# Patient Record
Sex: FEMALE | Race: WHITE | NOT HISPANIC OR LATINO
[De-identification: names, ages, dates, MRNs, and addresses within clinical notes are randomized per-mention and may not be internally consistent; named-entity substitution may affect disease eponyms.]

---

## 2017-01-05 ENCOUNTER — APPOINTMENT (OUTPATIENT)
Dept: WOUND CARE | Facility: HOSPITAL | Age: 82
End: 2017-01-05

## 2017-01-05 ENCOUNTER — OUTPATIENT (OUTPATIENT)
Dept: OUTPATIENT SERVICES | Facility: HOSPITAL | Age: 82
LOS: 1 days | Discharge: HOME | End: 2017-01-05

## 2017-01-05 DIAGNOSIS — N17.9 ACUTE KIDNEY FAILURE, UNSPECIFIED: ICD-10-CM

## 2017-01-05 DIAGNOSIS — Z95.5 PRESENCE OF CORONARY ANGIOPLASTY IMPLANT AND GRAFT: Chronic | ICD-10-CM

## 2017-01-05 DIAGNOSIS — H26.9 UNSPECIFIED CATARACT: Chronic | ICD-10-CM

## 2017-01-05 DIAGNOSIS — L89.899 PRESSURE ULCER OF OTHER SITE, UNSPECIFIED STAGE: ICD-10-CM

## 2017-01-05 DIAGNOSIS — R79.89 OTHER SPECIFIED ABNORMAL FINDINGS OF BLOOD CHEMISTRY: ICD-10-CM

## 2017-01-05 DIAGNOSIS — R78.9 FINDING OF UNSPECIFIED SUBSTANCE, NOT NORMALLY FOUND IN BLOOD: ICD-10-CM

## 2017-01-05 DIAGNOSIS — Z90.89 ACQUIRED ABSENCE OF OTHER ORGANS: Chronic | ICD-10-CM

## 2017-01-05 DIAGNOSIS — E79.0 HYPERURICEMIA WITHOUT SIGNS OF INFLAMMATORY ARTHRITIS AND TOPHACEOUS DISEASE: ICD-10-CM

## 2017-01-05 DIAGNOSIS — Z90.49 ACQUIRED ABSENCE OF OTHER SPECIFIED PARTS OF DIGESTIVE TRACT: Chronic | ICD-10-CM

## 2017-01-06 DIAGNOSIS — E78.00 PURE HYPERCHOLESTEROLEMIA, UNSPECIFIED: ICD-10-CM

## 2017-01-06 DIAGNOSIS — I25.2 OLD MYOCARDIAL INFARCTION: ICD-10-CM

## 2017-01-06 DIAGNOSIS — S31.819A UNSPECIFIED OPEN WOUND OF RIGHT BUTTOCK, INITIAL ENCOUNTER: ICD-10-CM

## 2017-01-06 DIAGNOSIS — L97.512 NON-PRESSURE CHRONIC ULCER OF OTHER PART OF RIGHT FOOT WITH FAT LAYER EXPOSED: ICD-10-CM
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
## 2017-01-06 DIAGNOSIS — I25.10 ATHEROSCLEROTIC HEART DISEASE OF NATIVE CORONARY ARTERY WITHOUT ANGINA PECTORIS: ICD-10-CM

## 2017-01-06 DIAGNOSIS — Z79.899 OTHER LONG TERM (CURRENT) DRUG THERAPY: ICD-10-CM
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
## 2017-01-06 DIAGNOSIS — L97.812 NON-PRESSURE CHRONIC ULCER OF OTHER PART OF RIGHT LOWER LEG WITH FAT LAYER EXPOSED: ICD-10-CM

## 2017-01-06 DIAGNOSIS — N17.9 ACUTE KIDNEY FAILURE, UNSPECIFIED: ICD-10-CM
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
## 2017-01-06 DIAGNOSIS — L88 PYODERMA GANGRENOSUM: ICD-10-CM

## 2017-01-06 DIAGNOSIS — M19.90 UNSPECIFIED OSTEOARTHRITIS, UNSPECIFIED SITE: ICD-10-CM

## 2017-01-06 DIAGNOSIS — Z79.01 LONG TERM (CURRENT) USE OF ANTICOAGULANTS: ICD-10-CM

## 2017-01-06 DIAGNOSIS — L97.822 NON-PRESSURE CHRONIC ULCER OF OTHER PART OF LEFT LOWER LEG WITH FAT LAYER EXPOSED: ICD-10-CM
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
## 2017-01-06 DIAGNOSIS — Z95.5 PRESENCE OF CORONARY ANGIOPLASTY IMPLANT AND GRAFT: ICD-10-CM

## 2017-01-06 DIAGNOSIS — Z88.8 ALLERGY STATUS TO OTHER DRUGS, MEDICAMENTS AND BIOLOGICAL SUBSTANCES STATUS: ICD-10-CM

## 2017-01-12 ENCOUNTER — APPOINTMENT (OUTPATIENT)
Dept: WOUND CARE | Facility: HOSPITAL | Age: 82
End: 2017-01-12

## 2017-01-12 ENCOUNTER — OUTPATIENT (OUTPATIENT)
Dept: OUTPATIENT SERVICES | Facility: HOSPITAL | Age: 82
LOS: 1 days | Discharge: HOME | End: 2017-01-12

## 2017-01-12 DIAGNOSIS — L89.90 PRESSURE ULCER OF UNSPECIFIED SITE, UNSPECIFIED STAGE: ICD-10-CM

## 2017-01-12 DIAGNOSIS — H26.9 UNSPECIFIED CATARACT: Chronic | ICD-10-CM

## 2017-01-12 DIAGNOSIS — Z90.89 ACQUIRED ABSENCE OF OTHER ORGANS: Chronic | ICD-10-CM

## 2017-01-12 DIAGNOSIS — Z95.5 PRESENCE OF CORONARY ANGIOPLASTY IMPLANT AND GRAFT: Chronic | ICD-10-CM

## 2017-01-12 DIAGNOSIS — Z90.49 ACQUIRED ABSENCE OF OTHER SPECIFIED PARTS OF DIGESTIVE TRACT: Chronic | ICD-10-CM

## 2017-01-13 DIAGNOSIS — E78.00 PURE HYPERCHOLESTEROLEMIA, UNSPECIFIED: ICD-10-CM
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
## 2017-01-13 DIAGNOSIS — M19.90 UNSPECIFIED OSTEOARTHRITIS, UNSPECIFIED SITE: ICD-10-CM

## 2017-01-13 DIAGNOSIS — L89.90 PRESSURE ULCER OF UNSPECIFIED SITE, UNSPECIFIED STAGE: ICD-10-CM

## 2017-01-13 DIAGNOSIS — Z79.01 LONG TERM (CURRENT) USE OF ANTICOAGULANTS: ICD-10-CM

## 2017-01-13 DIAGNOSIS — L97.822 NON-PRESSURE CHRONIC ULCER OF OTHER PART OF LEFT LOWER LEG WITH FAT LAYER EXPOSED: ICD-10-CM
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
## 2017-01-13 DIAGNOSIS — Z95.5 PRESENCE OF CORONARY ANGIOPLASTY IMPLANT AND GRAFT: ICD-10-CM

## 2017-01-13 DIAGNOSIS — L88 PYODERMA GANGRENOSUM: ICD-10-CM

## 2017-01-13 DIAGNOSIS — S31.819A UNSPECIFIED OPEN WOUND OF RIGHT BUTTOCK, INITIAL ENCOUNTER: ICD-10-CM

## 2017-01-13 DIAGNOSIS — I10 ESSENTIAL (PRIMARY) HYPERTENSION: ICD-10-CM

## 2017-01-13 DIAGNOSIS — L97.512 NON-PRESSURE CHRONIC ULCER OF OTHER PART OF RIGHT FOOT WITH FAT LAYER EXPOSED: ICD-10-CM

## 2017-01-13 DIAGNOSIS — L97.812 NON-PRESSURE CHRONIC ULCER OF OTHER PART OF RIGHT LOWER LEG WITH FAT LAYER EXPOSED: ICD-10-CM

## 2017-01-13 DIAGNOSIS — I25.2 OLD MYOCARDIAL INFARCTION: ICD-10-CM

## 2017-01-13 DIAGNOSIS — Z79.899 OTHER LONG TERM (CURRENT) DRUG THERAPY: ICD-10-CM

## 2017-01-13 DIAGNOSIS — Z88.8 ALLERGY STATUS TO OTHER DRUGS, MEDICAMENTS AND BIOLOGICAL SUBSTANCES STATUS: ICD-10-CM

## 2017-01-13 DIAGNOSIS — I25.10 ATHEROSCLEROTIC HEART DISEASE OF NATIVE CORONARY ARTERY WITHOUT ANGINA PECTORIS: ICD-10-CM
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
## 2017-01-17 ENCOUNTER — APPOINTMENT (OUTPATIENT)
Dept: ULTRASOUND IMAGING | Facility: HOSPITAL | Age: 82
End: 2017-01-17

## 2017-01-17 ENCOUNTER — OUTPATIENT (OUTPATIENT)
Dept: OUTPATIENT SERVICES | Facility: HOSPITAL | Age: 82
LOS: 1 days | Discharge: HOME | End: 2017-01-17
Payer: MEDICARE

## 2017-01-17 DIAGNOSIS — Z95.5 PRESENCE OF CORONARY ANGIOPLASTY IMPLANT AND GRAFT: Chronic | ICD-10-CM

## 2017-01-17 DIAGNOSIS — Z90.89 ACQUIRED ABSENCE OF OTHER ORGANS: Chronic | ICD-10-CM

## 2017-01-17 DIAGNOSIS — Z90.49 ACQUIRED ABSENCE OF OTHER SPECIFIED PARTS OF DIGESTIVE TRACT: Chronic | ICD-10-CM

## 2017-01-17 DIAGNOSIS — N17.9 ACUTE KIDNEY FAILURE, UNSPECIFIED: ICD-10-CM

## 2017-01-17 DIAGNOSIS — H26.9 UNSPECIFIED CATARACT: Chronic | ICD-10-CM

## 2017-01-17 PROCEDURE — 76705 ECHO EXAM OF ABDOMEN: CPT | Mod: 26

## 2017-01-23 ENCOUNTER — OUTPATIENT (OUTPATIENT)
Dept: OUTPATIENT SERVICES | Facility: HOSPITAL | Age: 82
LOS: 1 days | Discharge: HOME | End: 2017-01-23

## 2017-01-23 ENCOUNTER — APPOINTMENT (OUTPATIENT)
Dept: WOUND CARE | Facility: HOSPITAL | Age: 82
End: 2017-01-23

## 2017-01-23 DIAGNOSIS — L89.90 PRESSURE ULCER OF UNSPECIFIED SITE, UNSPECIFIED STAGE: ICD-10-CM

## 2017-01-23 DIAGNOSIS — Z90.89 ACQUIRED ABSENCE OF OTHER ORGANS: Chronic | ICD-10-CM

## 2017-01-23 DIAGNOSIS — Z90.49 ACQUIRED ABSENCE OF OTHER SPECIFIED PARTS OF DIGESTIVE TRACT: Chronic | ICD-10-CM

## 2017-01-23 DIAGNOSIS — H26.9 UNSPECIFIED CATARACT: Chronic | ICD-10-CM

## 2017-01-23 DIAGNOSIS — Z95.5 PRESENCE OF CORONARY ANGIOPLASTY IMPLANT AND GRAFT: Chronic | ICD-10-CM
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
## 2017-01-25 DIAGNOSIS — L97.822 NON-PRESSURE CHRONIC ULCER OF OTHER PART OF LEFT LOWER LEG WITH FAT LAYER EXPOSED: ICD-10-CM
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
## 2017-01-25 DIAGNOSIS — I25.10 ATHEROSCLEROTIC HEART DISEASE OF NATIVE CORONARY ARTERY WITHOUT ANGINA PECTORIS: ICD-10-CM

## 2017-01-25 DIAGNOSIS — Z88.8 ALLERGY STATUS TO OTHER DRUGS, MEDICAMENTS AND BIOLOGICAL SUBSTANCES STATUS: ICD-10-CM

## 2017-01-25 DIAGNOSIS — I10 ESSENTIAL (PRIMARY) HYPERTENSION: ICD-10-CM

## 2017-01-25 DIAGNOSIS — L97.812 NON-PRESSURE CHRONIC ULCER OF OTHER PART OF RIGHT LOWER LEG WITH FAT LAYER EXPOSED: ICD-10-CM

## 2017-01-25 DIAGNOSIS — L89.90 PRESSURE ULCER OF UNSPECIFIED SITE, UNSPECIFIED STAGE: ICD-10-CM

## 2017-01-25 DIAGNOSIS — Z95.5 PRESENCE OF CORONARY ANGIOPLASTY IMPLANT AND GRAFT: ICD-10-CM

## 2017-01-25 DIAGNOSIS — M19.90 UNSPECIFIED OSTEOARTHRITIS, UNSPECIFIED SITE: ICD-10-CM

## 2017-01-25 DIAGNOSIS — L97.512 NON-PRESSURE CHRONIC ULCER OF OTHER PART OF RIGHT FOOT WITH FAT LAYER EXPOSED: ICD-10-CM

## 2017-01-25 DIAGNOSIS — Z79.01 LONG TERM (CURRENT) USE OF ANTICOAGULANTS: ICD-10-CM

## 2017-01-25 DIAGNOSIS — I25.2 OLD MYOCARDIAL INFARCTION: ICD-10-CM
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
## 2017-01-25 DIAGNOSIS — E78.00 PURE HYPERCHOLESTEROLEMIA, UNSPECIFIED: ICD-10-CM

## 2017-01-25 DIAGNOSIS — Z79.899 OTHER LONG TERM (CURRENT) DRUG THERAPY: ICD-10-CM

## 2017-01-25 DIAGNOSIS — L88 PYODERMA GANGRENOSUM: ICD-10-CM

## 2017-01-25 DIAGNOSIS — S31.819A UNSPECIFIED OPEN WOUND OF RIGHT BUTTOCK, INITIAL ENCOUNTER: ICD-10-CM

## 2017-01-31 ENCOUNTER — APPOINTMENT (OUTPATIENT)
Dept: WOUND CARE | Facility: HOSPITAL | Age: 82
End: 2017-01-31

## 2017-02-02 ENCOUNTER — OUTPATIENT (OUTPATIENT)
Dept: OUTPATIENT SERVICES | Facility: HOSPITAL | Age: 82
LOS: 1 days | Discharge: HOME | End: 2017-02-02

## 2017-02-02 ENCOUNTER — APPOINTMENT (OUTPATIENT)
Dept: WOUND CARE | Facility: HOSPITAL | Age: 82
End: 2017-02-02

## 2017-02-02 DIAGNOSIS — Z90.49 ACQUIRED ABSENCE OF OTHER SPECIFIED PARTS OF DIGESTIVE TRACT: Chronic | ICD-10-CM

## 2017-02-02 DIAGNOSIS — L89.90 PRESSURE ULCER OF UNSPECIFIED SITE, UNSPECIFIED STAGE: ICD-10-CM

## 2017-02-02 DIAGNOSIS — Z90.89 ACQUIRED ABSENCE OF OTHER ORGANS: Chronic | ICD-10-CM

## 2017-02-02 DIAGNOSIS — Z95.5 PRESENCE OF CORONARY ANGIOPLASTY IMPLANT AND GRAFT: Chronic | ICD-10-CM

## 2017-02-02 DIAGNOSIS — H26.9 UNSPECIFIED CATARACT: Chronic | ICD-10-CM

## 2017-02-03 DIAGNOSIS — Z79.01 LONG TERM (CURRENT) USE OF ANTICOAGULANTS: ICD-10-CM

## 2017-02-03 DIAGNOSIS — I25.10 ATHEROSCLEROTIC HEART DISEASE OF NATIVE CORONARY ARTERY WITHOUT ANGINA PECTORIS: ICD-10-CM

## 2017-02-03 DIAGNOSIS — E78.00 PURE HYPERCHOLESTEROLEMIA, UNSPECIFIED: ICD-10-CM

## 2017-02-03 DIAGNOSIS — M19.90 UNSPECIFIED OSTEOARTHRITIS, UNSPECIFIED SITE: ICD-10-CM

## 2017-02-03 DIAGNOSIS — Z95.5 PRESENCE OF CORONARY ANGIOPLASTY IMPLANT AND GRAFT: ICD-10-CM

## 2017-02-03 DIAGNOSIS — I25.2 OLD MYOCARDIAL INFARCTION: ICD-10-CM

## 2017-02-03 DIAGNOSIS — L88 PYODERMA GANGRENOSUM: ICD-10-CM

## 2017-02-03 DIAGNOSIS — Z79.899 OTHER LONG TERM (CURRENT) DRUG THERAPY: ICD-10-CM

## 2017-02-03 DIAGNOSIS — L97.812 NON-PRESSURE CHRONIC ULCER OF OTHER PART OF RIGHT LOWER LEG WITH FAT LAYER EXPOSED: ICD-10-CM

## 2017-02-03 DIAGNOSIS — I10 ESSENTIAL (PRIMARY) HYPERTENSION: ICD-10-CM

## 2017-02-03 DIAGNOSIS — Z88.8 ALLERGY STATUS TO OTHER DRUGS, MEDICAMENTS AND BIOLOGICAL SUBSTANCES STATUS: ICD-10-CM

## 2017-02-03 DIAGNOSIS — L97.512 NON-PRESSURE CHRONIC ULCER OF OTHER PART OF RIGHT FOOT WITH FAT LAYER EXPOSED: ICD-10-CM

## 2017-02-03 DIAGNOSIS — L89.90 PRESSURE ULCER OF UNSPECIFIED SITE, UNSPECIFIED STAGE: ICD-10-CM

## 2017-02-03 DIAGNOSIS — L97.822 NON-PRESSURE CHRONIC ULCER OF OTHER PART OF LEFT LOWER LEG WITH FAT LAYER EXPOSED: ICD-10-CM

## 2017-02-07 ENCOUNTER — OUTPATIENT (OUTPATIENT)
Dept: OUTPATIENT SERVICES | Facility: HOSPITAL | Age: 82
LOS: 1 days | Discharge: HOME | End: 2017-02-07

## 2017-02-07 ENCOUNTER — APPOINTMENT (OUTPATIENT)
Dept: WOUND CARE | Facility: HOSPITAL | Age: 82
End: 2017-02-07

## 2017-02-07 DIAGNOSIS — L89.90 PRESSURE ULCER OF UNSPECIFIED SITE, UNSPECIFIED STAGE: ICD-10-CM

## 2017-02-07 DIAGNOSIS — Z90.49 ACQUIRED ABSENCE OF OTHER SPECIFIED PARTS OF DIGESTIVE TRACT: Chronic | ICD-10-CM

## 2017-02-07 DIAGNOSIS — Z95.5 PRESENCE OF CORONARY ANGIOPLASTY IMPLANT AND GRAFT: Chronic | ICD-10-CM

## 2017-02-07 DIAGNOSIS — H26.9 UNSPECIFIED CATARACT: Chronic | ICD-10-CM

## 2017-02-07 DIAGNOSIS — Z90.89 ACQUIRED ABSENCE OF OTHER ORGANS: Chronic | ICD-10-CM

## 2017-02-13 DIAGNOSIS — L89.90 PRESSURE ULCER OF UNSPECIFIED SITE, UNSPECIFIED STAGE: ICD-10-CM

## 2017-02-13 DIAGNOSIS — L97.822 NON-PRESSURE CHRONIC ULCER OF OTHER PART OF LEFT LOWER LEG WITH FAT LAYER EXPOSED: ICD-10-CM

## 2017-02-13 DIAGNOSIS — L88 PYODERMA GANGRENOSUM: ICD-10-CM

## 2017-02-13 DIAGNOSIS — I25.2 OLD MYOCARDIAL INFARCTION: ICD-10-CM

## 2017-02-13 DIAGNOSIS — L97.812 NON-PRESSURE CHRONIC ULCER OF OTHER PART OF RIGHT LOWER LEG WITH FAT LAYER EXPOSED: ICD-10-CM

## 2017-02-13 DIAGNOSIS — Z88.8 ALLERGY STATUS TO OTHER DRUGS, MEDICAMENTS AND BIOLOGICAL SUBSTANCES STATUS: ICD-10-CM

## 2017-02-13 DIAGNOSIS — Z79.899 OTHER LONG TERM (CURRENT) DRUG THERAPY: ICD-10-CM

## 2017-02-13 DIAGNOSIS — Z95.5 PRESENCE OF CORONARY ANGIOPLASTY IMPLANT AND GRAFT: ICD-10-CM

## 2017-02-13 DIAGNOSIS — M19.90 UNSPECIFIED OSTEOARTHRITIS, UNSPECIFIED SITE: ICD-10-CM

## 2017-02-13 DIAGNOSIS — I25.10 ATHEROSCLEROTIC HEART DISEASE OF NATIVE CORONARY ARTERY WITHOUT ANGINA PECTORIS: ICD-10-CM

## 2017-02-13 DIAGNOSIS — E78.00 PURE HYPERCHOLESTEROLEMIA, UNSPECIFIED: ICD-10-CM

## 2017-02-13 DIAGNOSIS — Z79.01 LONG TERM (CURRENT) USE OF ANTICOAGULANTS: ICD-10-CM

## 2017-02-13 DIAGNOSIS — I10 ESSENTIAL (PRIMARY) HYPERTENSION: ICD-10-CM

## 2017-02-14 ENCOUNTER — OUTPATIENT (OUTPATIENT)
Dept: OUTPATIENT SERVICES | Facility: HOSPITAL | Age: 82
LOS: 1 days | Discharge: HOME | End: 2017-02-14

## 2017-02-14 ENCOUNTER — APPOINTMENT (OUTPATIENT)
Dept: WOUND CARE | Facility: HOSPITAL | Age: 82
End: 2017-02-14

## 2017-02-14 DIAGNOSIS — Z90.89 ACQUIRED ABSENCE OF OTHER ORGANS: Chronic | ICD-10-CM

## 2017-02-14 DIAGNOSIS — L89.90 PRESSURE ULCER OF UNSPECIFIED SITE, UNSPECIFIED STAGE: ICD-10-CM

## 2017-02-14 DIAGNOSIS — Z95.5 PRESENCE OF CORONARY ANGIOPLASTY IMPLANT AND GRAFT: Chronic | ICD-10-CM

## 2017-02-14 DIAGNOSIS — Z90.49 ACQUIRED ABSENCE OF OTHER SPECIFIED PARTS OF DIGESTIVE TRACT: Chronic | ICD-10-CM

## 2017-02-14 DIAGNOSIS — H26.9 UNSPECIFIED CATARACT: Chronic | ICD-10-CM

## 2017-02-21 ENCOUNTER — OUTPATIENT (OUTPATIENT)
Dept: OUTPATIENT SERVICES | Facility: HOSPITAL | Age: 82
LOS: 1 days | Discharge: HOME | End: 2017-02-21

## 2017-02-21 ENCOUNTER — APPOINTMENT (OUTPATIENT)
Dept: WOUND CARE | Facility: HOSPITAL | Age: 82
End: 2017-02-21

## 2017-02-21 DIAGNOSIS — H26.9 UNSPECIFIED CATARACT: Chronic | ICD-10-CM

## 2017-02-21 DIAGNOSIS — Z90.49 ACQUIRED ABSENCE OF OTHER SPECIFIED PARTS OF DIGESTIVE TRACT: Chronic | ICD-10-CM

## 2017-02-21 DIAGNOSIS — Z95.5 PRESENCE OF CORONARY ANGIOPLASTY IMPLANT AND GRAFT: Chronic | ICD-10-CM

## 2017-02-21 DIAGNOSIS — Z90.89 ACQUIRED ABSENCE OF OTHER ORGANS: Chronic | ICD-10-CM

## 2017-02-21 DIAGNOSIS — L89.90 PRESSURE ULCER OF UNSPECIFIED SITE, UNSPECIFIED STAGE: ICD-10-CM

## 2017-02-24 DIAGNOSIS — I25.2 OLD MYOCARDIAL INFARCTION: ICD-10-CM

## 2017-02-24 DIAGNOSIS — I25.10 ATHEROSCLEROTIC HEART DISEASE OF NATIVE CORONARY ARTERY WITHOUT ANGINA PECTORIS: ICD-10-CM

## 2017-02-24 DIAGNOSIS — M19.90 UNSPECIFIED OSTEOARTHRITIS, UNSPECIFIED SITE: ICD-10-CM

## 2017-02-24 DIAGNOSIS — I10 ESSENTIAL (PRIMARY) HYPERTENSION: ICD-10-CM

## 2017-02-24 DIAGNOSIS — Z79.01 LONG TERM (CURRENT) USE OF ANTICOAGULANTS: ICD-10-CM

## 2017-02-24 DIAGNOSIS — Z88.8 ALLERGY STATUS TO OTHER DRUGS, MEDICAMENTS AND BIOLOGICAL SUBSTANCES STATUS: ICD-10-CM

## 2017-02-24 DIAGNOSIS — Z95.5 PRESENCE OF CORONARY ANGIOPLASTY IMPLANT AND GRAFT: ICD-10-CM

## 2017-02-24 DIAGNOSIS — L89.90 PRESSURE ULCER OF UNSPECIFIED SITE, UNSPECIFIED STAGE: ICD-10-CM

## 2017-02-24 DIAGNOSIS — L97.822 NON-PRESSURE CHRONIC ULCER OF OTHER PART OF LEFT LOWER LEG WITH FAT LAYER EXPOSED: ICD-10-CM

## 2017-02-24 DIAGNOSIS — L88 PYODERMA GANGRENOSUM: ICD-10-CM

## 2017-02-24 DIAGNOSIS — E78.00 PURE HYPERCHOLESTEROLEMIA, UNSPECIFIED: ICD-10-CM

## 2017-02-24 DIAGNOSIS — L97.812 NON-PRESSURE CHRONIC ULCER OF OTHER PART OF RIGHT LOWER LEG WITH FAT LAYER EXPOSED: ICD-10-CM

## 2017-02-24 DIAGNOSIS — Z79.899 OTHER LONG TERM (CURRENT) DRUG THERAPY: ICD-10-CM

## 2017-02-27 DIAGNOSIS — R60.9 EDEMA, UNSPECIFIED: ICD-10-CM

## 2017-02-27 DIAGNOSIS — I73.9 PERIPHERAL VASCULAR DISEASE, UNSPECIFIED: ICD-10-CM

## 2017-02-27 DIAGNOSIS — L89.90 PRESSURE ULCER OF UNSPECIFIED SITE, UNSPECIFIED STAGE: ICD-10-CM

## 2017-02-27 DIAGNOSIS — Z22.322 CARRIER OR SUSPECTED CARRIER OF METHICILLIN RESISTANT STAPHYLOCOCCUS AUREUS: ICD-10-CM

## 2017-02-27 DIAGNOSIS — I87.2 VENOUS INSUFFICIENCY (CHRONIC) (PERIPHERAL): ICD-10-CM

## 2017-02-27 DIAGNOSIS — Z79.899 OTHER LONG TERM (CURRENT) DRUG THERAPY: ICD-10-CM

## 2017-02-27 DIAGNOSIS — M79.605 PAIN IN LEFT LEG: ICD-10-CM

## 2017-02-27 DIAGNOSIS — E11.622 TYPE 2 DIABETES MELLITUS WITH OTHER SKIN ULCER: ICD-10-CM

## 2017-02-27 DIAGNOSIS — I10 ESSENTIAL (PRIMARY) HYPERTENSION: ICD-10-CM

## 2017-02-27 DIAGNOSIS — L95.9 VASCULITIS LIMITED TO THE SKIN, UNSPECIFIED: ICD-10-CM

## 2017-02-27 DIAGNOSIS — L97.822 NON-PRESSURE CHRONIC ULCER OF OTHER PART OF LEFT LOWER LEG WITH FAT LAYER EXPOSED: ICD-10-CM

## 2017-02-27 DIAGNOSIS — E11.9 TYPE 2 DIABETES MELLITUS WITHOUT COMPLICATIONS: ICD-10-CM

## 2017-02-27 DIAGNOSIS — Z79.82 LONG TERM (CURRENT) USE OF ASPIRIN: ICD-10-CM

## 2017-02-28 ENCOUNTER — APPOINTMENT (OUTPATIENT)
Dept: WOUND CARE | Facility: HOSPITAL | Age: 82
End: 2017-02-28

## 2017-03-03 ENCOUNTER — APPOINTMENT (OUTPATIENT)
Dept: WOUND CARE | Facility: HOSPITAL | Age: 82
End: 2017-03-03

## 2017-03-03 ENCOUNTER — OUTPATIENT (OUTPATIENT)
Dept: OUTPATIENT SERVICES | Facility: HOSPITAL | Age: 82
LOS: 1 days | Discharge: HOME | End: 2017-03-03

## 2017-03-03 DIAGNOSIS — L89.90 PRESSURE ULCER OF UNSPECIFIED SITE, UNSPECIFIED STAGE: ICD-10-CM

## 2017-03-03 DIAGNOSIS — H26.9 UNSPECIFIED CATARACT: Chronic | ICD-10-CM

## 2017-03-03 DIAGNOSIS — Z90.49 ACQUIRED ABSENCE OF OTHER SPECIFIED PARTS OF DIGESTIVE TRACT: Chronic | ICD-10-CM

## 2017-03-03 DIAGNOSIS — Z95.5 PRESENCE OF CORONARY ANGIOPLASTY IMPLANT AND GRAFT: Chronic | ICD-10-CM

## 2017-03-03 DIAGNOSIS — Z90.89 ACQUIRED ABSENCE OF OTHER ORGANS: Chronic | ICD-10-CM

## 2017-03-07 ENCOUNTER — OUTPATIENT (OUTPATIENT)
Dept: OUTPATIENT SERVICES | Facility: HOSPITAL | Age: 82
LOS: 1 days | Discharge: HOME | End: 2017-03-07

## 2017-03-07 ENCOUNTER — APPOINTMENT (OUTPATIENT)
Dept: WOUND CARE | Facility: HOSPITAL | Age: 82
End: 2017-03-07

## 2017-03-07 DIAGNOSIS — E78.00 PURE HYPERCHOLESTEROLEMIA, UNSPECIFIED: ICD-10-CM

## 2017-03-07 DIAGNOSIS — Z79.899 OTHER LONG TERM (CURRENT) DRUG THERAPY: ICD-10-CM

## 2017-03-07 DIAGNOSIS — M19.90 UNSPECIFIED OSTEOARTHRITIS, UNSPECIFIED SITE: ICD-10-CM

## 2017-03-07 DIAGNOSIS — Z95.5 PRESENCE OF CORONARY ANGIOPLASTY IMPLANT AND GRAFT: Chronic | ICD-10-CM

## 2017-03-07 DIAGNOSIS — H26.9 UNSPECIFIED CATARACT: Chronic | ICD-10-CM

## 2017-03-07 DIAGNOSIS — I25.10 ATHEROSCLEROTIC HEART DISEASE OF NATIVE CORONARY ARTERY WITHOUT ANGINA PECTORIS: ICD-10-CM

## 2017-03-07 DIAGNOSIS — Z95.5 PRESENCE OF CORONARY ANGIOPLASTY IMPLANT AND GRAFT: ICD-10-CM

## 2017-03-07 DIAGNOSIS — Z90.49 ACQUIRED ABSENCE OF OTHER SPECIFIED PARTS OF DIGESTIVE TRACT: Chronic | ICD-10-CM

## 2017-03-07 DIAGNOSIS — L89.90 PRESSURE ULCER OF UNSPECIFIED SITE, UNSPECIFIED STAGE: ICD-10-CM

## 2017-03-07 DIAGNOSIS — L88 PYODERMA GANGRENOSUM: ICD-10-CM

## 2017-03-07 DIAGNOSIS — I25.2 OLD MYOCARDIAL INFARCTION: ICD-10-CM

## 2017-03-07 DIAGNOSIS — Z79.01 LONG TERM (CURRENT) USE OF ANTICOAGULANTS: ICD-10-CM

## 2017-03-07 DIAGNOSIS — Z88.8 ALLERGY STATUS TO OTHER DRUGS, MEDICAMENTS AND BIOLOGICAL SUBSTANCES STATUS: ICD-10-CM

## 2017-03-07 DIAGNOSIS — Z90.89 ACQUIRED ABSENCE OF OTHER ORGANS: Chronic | ICD-10-CM

## 2017-03-14 ENCOUNTER — APPOINTMENT (OUTPATIENT)
Dept: WOUND CARE | Facility: HOSPITAL | Age: 82
End: 2017-03-14

## 2017-03-16 DIAGNOSIS — Z79.01 LONG TERM (CURRENT) USE OF ANTICOAGULANTS: ICD-10-CM

## 2017-03-16 DIAGNOSIS — L89.90 PRESSURE ULCER OF UNSPECIFIED SITE, UNSPECIFIED STAGE: ICD-10-CM

## 2017-03-16 DIAGNOSIS — Z95.5 PRESENCE OF CORONARY ANGIOPLASTY IMPLANT AND GRAFT: ICD-10-CM

## 2017-03-16 DIAGNOSIS — E78.00 PURE HYPERCHOLESTEROLEMIA, UNSPECIFIED: ICD-10-CM

## 2017-03-16 DIAGNOSIS — L88 PYODERMA GANGRENOSUM: ICD-10-CM

## 2017-03-16 DIAGNOSIS — I25.10 ATHEROSCLEROTIC HEART DISEASE OF NATIVE CORONARY ARTERY WITHOUT ANGINA PECTORIS: ICD-10-CM

## 2017-03-16 DIAGNOSIS — Z79.899 OTHER LONG TERM (CURRENT) DRUG THERAPY: ICD-10-CM

## 2017-03-16 DIAGNOSIS — Z88.8 ALLERGY STATUS TO OTHER DRUGS, MEDICAMENTS AND BIOLOGICAL SUBSTANCES STATUS: ICD-10-CM

## 2017-03-16 DIAGNOSIS — M19.90 UNSPECIFIED OSTEOARTHRITIS, UNSPECIFIED SITE: ICD-10-CM

## 2017-03-16 DIAGNOSIS — I25.2 OLD MYOCARDIAL INFARCTION: ICD-10-CM

## 2017-03-16 DIAGNOSIS — I10 ESSENTIAL (PRIMARY) HYPERTENSION: ICD-10-CM

## 2017-03-17 ENCOUNTER — OUTPATIENT (OUTPATIENT)
Dept: OUTPATIENT SERVICES | Facility: HOSPITAL | Age: 82
LOS: 1 days | Discharge: HOME | End: 2017-03-17

## 2017-03-17 ENCOUNTER — APPOINTMENT (OUTPATIENT)
Dept: WOUND CARE | Facility: HOSPITAL | Age: 82
End: 2017-03-17

## 2017-03-17 DIAGNOSIS — I25.2 OLD MYOCARDIAL INFARCTION: ICD-10-CM

## 2017-03-17 DIAGNOSIS — Z90.49 ACQUIRED ABSENCE OF OTHER SPECIFIED PARTS OF DIGESTIVE TRACT: Chronic | ICD-10-CM

## 2017-03-17 DIAGNOSIS — E78.00 PURE HYPERCHOLESTEROLEMIA, UNSPECIFIED: ICD-10-CM

## 2017-03-17 DIAGNOSIS — I10 ESSENTIAL (PRIMARY) HYPERTENSION: ICD-10-CM

## 2017-03-17 DIAGNOSIS — L89.90 PRESSURE ULCER OF UNSPECIFIED SITE, UNSPECIFIED STAGE: ICD-10-CM

## 2017-03-17 DIAGNOSIS — M19.90 UNSPECIFIED OSTEOARTHRITIS, UNSPECIFIED SITE: ICD-10-CM

## 2017-03-17 DIAGNOSIS — Z95.5 PRESENCE OF CORONARY ANGIOPLASTY IMPLANT AND GRAFT: Chronic | ICD-10-CM

## 2017-03-17 DIAGNOSIS — Z79.899 OTHER LONG TERM (CURRENT) DRUG THERAPY: ICD-10-CM

## 2017-03-17 DIAGNOSIS — Z90.89 ACQUIRED ABSENCE OF OTHER ORGANS: Chronic | ICD-10-CM

## 2017-03-17 DIAGNOSIS — Z95.5 PRESENCE OF CORONARY ANGIOPLASTY IMPLANT AND GRAFT: ICD-10-CM

## 2017-03-17 DIAGNOSIS — Z88.8 ALLERGY STATUS TO OTHER DRUGS, MEDICAMENTS AND BIOLOGICAL SUBSTANCES STATUS: ICD-10-CM

## 2017-03-17 DIAGNOSIS — I25.10 ATHEROSCLEROTIC HEART DISEASE OF NATIVE CORONARY ARTERY WITHOUT ANGINA PECTORIS: ICD-10-CM

## 2017-03-17 DIAGNOSIS — L88 PYODERMA GANGRENOSUM: ICD-10-CM

## 2017-03-17 DIAGNOSIS — Z79.01 LONG TERM (CURRENT) USE OF ANTICOAGULANTS: ICD-10-CM

## 2017-03-17 DIAGNOSIS — H26.9 UNSPECIFIED CATARACT: Chronic | ICD-10-CM

## 2017-03-21 ENCOUNTER — APPOINTMENT (OUTPATIENT)
Dept: WOUND CARE | Facility: HOSPITAL | Age: 82
End: 2017-03-21

## 2017-03-27 ENCOUNTER — OUTPATIENT (OUTPATIENT)
Dept: OUTPATIENT SERVICES | Facility: HOSPITAL | Age: 82
LOS: 1 days | Discharge: HOME | End: 2017-03-27
Payer: MEDICARE

## 2017-03-27 ENCOUNTER — APPOINTMENT (OUTPATIENT)
Dept: WOUND CARE | Facility: HOSPITAL | Age: 82
End: 2017-03-27

## 2017-03-27 DIAGNOSIS — H26.9 UNSPECIFIED CATARACT: Chronic | ICD-10-CM

## 2017-03-27 DIAGNOSIS — Z95.5 PRESENCE OF CORONARY ANGIOPLASTY IMPLANT AND GRAFT: Chronic | ICD-10-CM

## 2017-03-27 DIAGNOSIS — Z90.49 ACQUIRED ABSENCE OF OTHER SPECIFIED PARTS OF DIGESTIVE TRACT: Chronic | ICD-10-CM

## 2017-03-27 DIAGNOSIS — Z90.89 ACQUIRED ABSENCE OF OTHER ORGANS: Chronic | ICD-10-CM

## 2017-03-27 PROCEDURE — 93971 EXTREMITY STUDY: CPT | Mod: 26

## 2017-03-28 ENCOUNTER — APPOINTMENT (OUTPATIENT)
Dept: WOUND CARE | Facility: HOSPITAL | Age: 82
End: 2017-03-28

## 2017-03-30 DIAGNOSIS — Z79.52 LONG TERM (CURRENT) USE OF SYSTEMIC STEROIDS: ICD-10-CM

## 2017-03-30 DIAGNOSIS — M19.90 UNSPECIFIED OSTEOARTHRITIS, UNSPECIFIED SITE: ICD-10-CM

## 2017-03-30 DIAGNOSIS — Z95.5 PRESENCE OF CORONARY ANGIOPLASTY IMPLANT AND GRAFT: ICD-10-CM

## 2017-03-30 DIAGNOSIS — I25.2 OLD MYOCARDIAL INFARCTION: ICD-10-CM

## 2017-03-30 DIAGNOSIS — L88 PYODERMA GANGRENOSUM: ICD-10-CM

## 2017-03-30 DIAGNOSIS — Z79.899 OTHER LONG TERM (CURRENT) DRUG THERAPY: ICD-10-CM

## 2017-03-30 DIAGNOSIS — I25.10 ATHEROSCLEROTIC HEART DISEASE OF NATIVE CORONARY ARTERY WITHOUT ANGINA PECTORIS: ICD-10-CM

## 2017-03-30 DIAGNOSIS — I10 ESSENTIAL (PRIMARY) HYPERTENSION: ICD-10-CM

## 2017-03-30 DIAGNOSIS — Z79.01 LONG TERM (CURRENT) USE OF ANTICOAGULANTS: ICD-10-CM

## 2017-03-30 DIAGNOSIS — E78.00 PURE HYPERCHOLESTEROLEMIA, UNSPECIFIED: ICD-10-CM

## 2017-03-30 DIAGNOSIS — Z88.8 ALLERGY STATUS TO OTHER DRUGS, MEDICAMENTS AND BIOLOGICAL SUBSTANCES STATUS: ICD-10-CM

## 2017-04-06 ENCOUNTER — APPOINTMENT (OUTPATIENT)
Dept: WOUND CARE | Facility: HOSPITAL | Age: 82
End: 2017-04-06

## 2017-04-11 ENCOUNTER — APPOINTMENT (OUTPATIENT)
Dept: WOUND CARE | Facility: HOSPITAL | Age: 82
End: 2017-04-11

## 2017-04-11 ENCOUNTER — OUTPATIENT (OUTPATIENT)
Dept: OUTPATIENT SERVICES | Facility: HOSPITAL | Age: 82
LOS: 1 days | Discharge: HOME | End: 2017-04-11

## 2017-04-11 DIAGNOSIS — Z90.49 ACQUIRED ABSENCE OF OTHER SPECIFIED PARTS OF DIGESTIVE TRACT: Chronic | ICD-10-CM

## 2017-04-11 DIAGNOSIS — Z90.89 ACQUIRED ABSENCE OF OTHER ORGANS: Chronic | ICD-10-CM

## 2017-04-11 DIAGNOSIS — H26.9 UNSPECIFIED CATARACT: Chronic | ICD-10-CM

## 2017-04-11 DIAGNOSIS — L89.90 PRESSURE ULCER OF UNSPECIFIED SITE, UNSPECIFIED STAGE: ICD-10-CM

## 2017-04-11 DIAGNOSIS — Z95.5 PRESENCE OF CORONARY ANGIOPLASTY IMPLANT AND GRAFT: Chronic | ICD-10-CM

## 2017-04-13 ENCOUNTER — INPATIENT (INPATIENT)
Facility: HOSPITAL | Age: 82
LOS: 6 days | Discharge: HOME HEALTH | End: 2017-04-20
Attending: INTERNAL MEDICINE | Admitting: INTERNAL MEDICINE
Payer: MEDICARE

## 2017-04-13 VITALS
HEART RATE: 75 BPM | SYSTOLIC BLOOD PRESSURE: 123 MMHG | RESPIRATION RATE: 18 BRPM | TEMPERATURE: 98 F | HEIGHT: 63 IN | DIASTOLIC BLOOD PRESSURE: 78 MMHG | WEIGHT: 169.98 LBS | OXYGEN SATURATION: 94 %

## 2017-04-13 DIAGNOSIS — Z90.49 ACQUIRED ABSENCE OF OTHER SPECIFIED PARTS OF DIGESTIVE TRACT: Chronic | ICD-10-CM

## 2017-04-13 DIAGNOSIS — Z95.5 PRESENCE OF CORONARY ANGIOPLASTY IMPLANT AND GRAFT: Chronic | ICD-10-CM

## 2017-04-13 DIAGNOSIS — H26.9 UNSPECIFIED CATARACT: Chronic | ICD-10-CM

## 2017-04-13 DIAGNOSIS — Z90.89 ACQUIRED ABSENCE OF OTHER ORGANS: Chronic | ICD-10-CM

## 2017-04-13 LAB
ALBUMIN SERPL ELPH-MCNC: 2.6 G/DL — LOW (ref 3.3–5)
ALP SERPL-CCNC: 82 U/L — SIGNIFICANT CHANGE UP (ref 40–120)
ALT FLD-CCNC: 60 U/L — SIGNIFICANT CHANGE UP (ref 12–78)
ANION GAP SERPL CALC-SCNC: 11 MMOL/L — SIGNIFICANT CHANGE UP (ref 5–17)
APPEARANCE UR: ABNORMAL
APTT BLD: 31.1 SEC — SIGNIFICANT CHANGE UP (ref 27.5–37.4)
AST SERPL-CCNC: 65 U/L — HIGH (ref 15–37)
BASOPHILS # BLD AUTO: 0.1 K/UL — SIGNIFICANT CHANGE UP (ref 0–0.2)
BASOPHILS NFR BLD AUTO: 1 % — SIGNIFICANT CHANGE UP (ref 0–2)
BILIRUB SERPL-MCNC: 0.3 MG/DL — SIGNIFICANT CHANGE UP (ref 0.2–1.2)
BILIRUB UR-MCNC: NEGATIVE — SIGNIFICANT CHANGE UP
BUN SERPL-MCNC: 36 MG/DL — HIGH (ref 7–23)
CALCIUM SERPL-MCNC: 8.3 MG/DL — LOW (ref 8.5–10.1)
CHLORIDE SERPL-SCNC: 109 MMOL/L — HIGH (ref 96–108)
CO2 SERPL-SCNC: 24 MMOL/L — SIGNIFICANT CHANGE UP (ref 22–31)
COLOR SPEC: YELLOW — SIGNIFICANT CHANGE UP
CREAT SERPL-MCNC: 2.68 MG/DL — HIGH (ref 0.5–1.3)
DIFF PNL FLD: ABNORMAL
EOSINOPHIL # BLD AUTO: 0 K/UL — SIGNIFICANT CHANGE UP (ref 0–0.5)
EOSINOPHIL NFR BLD AUTO: 0.9 % — SIGNIFICANT CHANGE UP (ref 0–6)
FLUAV SPEC QL CULT: NEGATIVE — SIGNIFICANT CHANGE UP
FLUBV AG SPEC QL IA: POSITIVE
GLUCOSE SERPL-MCNC: 95 MG/DL — SIGNIFICANT CHANGE UP (ref 70–99)
GLUCOSE UR QL: NEGATIVE MG/DL — SIGNIFICANT CHANGE UP
HCT VFR BLD CALC: 36.5 % — SIGNIFICANT CHANGE UP (ref 34.5–45)
HGB BLD-MCNC: 12.2 G/DL — SIGNIFICANT CHANGE UP (ref 11.5–15.5)
INR BLD: 1.04 RATIO — SIGNIFICANT CHANGE UP (ref 0.88–1.16)
KETONES UR-MCNC: ABNORMAL
LACTATE SERPL-SCNC: 1.4 MMOL/L — SIGNIFICANT CHANGE UP (ref 0.7–2)
LEUKOCYTE ESTERASE UR-ACNC: ABNORMAL
LYMPHOCYTES # BLD AUTO: 0.9 K/UL — LOW (ref 1–3.3)
LYMPHOCYTES # BLD AUTO: 16.5 % — SIGNIFICANT CHANGE UP (ref 13–44)
MCHC RBC-ENTMCNC: 28.8 PG — SIGNIFICANT CHANGE UP (ref 27–34)
MCHC RBC-ENTMCNC: 33.6 GM/DL — SIGNIFICANT CHANGE UP (ref 32–36)
MCV RBC AUTO: 85.9 FL — SIGNIFICANT CHANGE UP (ref 80–100)
MONOCYTES # BLD AUTO: 0.6 K/UL — SIGNIFICANT CHANGE UP (ref 0–0.9)
MONOCYTES NFR BLD AUTO: 11.7 % — SIGNIFICANT CHANGE UP (ref 2–14)
NEUTROPHILS # BLD AUTO: 3.7 K/UL — SIGNIFICANT CHANGE UP (ref 1.8–7.4)
NEUTROPHILS NFR BLD AUTO: 69.8 % — SIGNIFICANT CHANGE UP (ref 43–77)
NITRITE UR-MCNC: POSITIVE
PH UR: 6 — SIGNIFICANT CHANGE UP (ref 4.8–8)
PLATELET # BLD AUTO: 168 K/UL — SIGNIFICANT CHANGE UP (ref 150–400)
POTASSIUM SERPL-MCNC: 4.3 MMOL/L — SIGNIFICANT CHANGE UP (ref 3.5–5.3)
POTASSIUM SERPL-SCNC: 4.3 MMOL/L — SIGNIFICANT CHANGE UP (ref 3.5–5.3)
PROT SERPL-MCNC: 6.3 GM/DL — SIGNIFICANT CHANGE UP (ref 6–8.3)
PROT UR-MCNC: 100 MG/DL
PROTHROM AB SERPL-ACNC: 11.4 SEC — SIGNIFICANT CHANGE UP (ref 9.8–12.7)
RBC # BLD: 4.25 M/UL — SIGNIFICANT CHANGE UP (ref 3.8–5.2)
RBC # FLD: 17.3 % — HIGH (ref 11–15)
SODIUM SERPL-SCNC: 144 MMOL/L — SIGNIFICANT CHANGE UP (ref 135–145)
SP GR SPEC: 1.01 — SIGNIFICANT CHANGE UP (ref 1.01–1.02)
UROBILINOGEN FLD QL: NEGATIVE MG/DL — SIGNIFICANT CHANGE UP
WBC # BLD: 5.3 K/UL — SIGNIFICANT CHANGE UP (ref 3.8–10.5)
WBC # FLD AUTO: 5.3 K/UL — SIGNIFICANT CHANGE UP (ref 3.8–10.5)

## 2017-04-13 PROCEDURE — 71010: CPT | Mod: 26

## 2017-04-13 PROCEDURE — 70450 CT HEAD/BRAIN W/O DYE: CPT | Mod: 26

## 2017-04-13 PROCEDURE — 99285 EMERGENCY DEPT VISIT HI MDM: CPT

## 2017-04-13 PROCEDURE — 72131 CT LUMBAR SPINE W/O DYE: CPT | Mod: 26

## 2017-04-13 PROCEDURE — 76377 3D RENDER W/INTRP POSTPROCES: CPT | Mod: 26

## 2017-04-13 RX ORDER — ACETAMINOPHEN 500 MG
650 TABLET ORAL ONCE
Qty: 0 | Refills: 0 | Status: COMPLETED | OUTPATIENT
Start: 2017-04-13 | End: 2017-04-13

## 2017-04-13 RX ORDER — SODIUM CHLORIDE 9 MG/ML
500 INJECTION INTRAMUSCULAR; INTRAVENOUS; SUBCUTANEOUS
Qty: 0 | Refills: 0 | Status: COMPLETED | OUTPATIENT
Start: 2017-04-13 | End: 2017-04-13

## 2017-04-13 RX ORDER — SODIUM CHLORIDE 9 MG/ML
3 INJECTION INTRAMUSCULAR; INTRAVENOUS; SUBCUTANEOUS ONCE
Qty: 0 | Refills: 0 | Status: COMPLETED | OUTPATIENT
Start: 2017-04-13 | End: 2017-04-13

## 2017-04-13 RX ORDER — IPRATROPIUM/ALBUTEROL SULFATE 18-103MCG
3 AEROSOL WITH ADAPTER (GRAM) INHALATION
Qty: 0 | Refills: 0 | Status: COMPLETED | OUTPATIENT
Start: 2017-04-13 | End: 2017-04-13

## 2017-04-13 RX ADMIN — Medication 3 MILLILITER(S): at 22:00

## 2017-04-13 RX ADMIN — Medication 650 MILLIGRAM(S): at 23:48

## 2017-04-13 RX ADMIN — SODIUM CHLORIDE 3 MILLILITER(S): 9 INJECTION INTRAMUSCULAR; INTRAVENOUS; SUBCUTANEOUS at 21:15

## 2017-04-13 RX ADMIN — Medication 3 MILLILITER(S): at 21:34

## 2017-04-13 RX ADMIN — SODIUM CHLORIDE 2000 MILLILITER(S): 9 INJECTION INTRAMUSCULAR; INTRAVENOUS; SUBCUTANEOUS at 21:34

## 2017-04-13 RX ADMIN — SODIUM CHLORIDE 2000 MILLILITER(S): 9 INJECTION INTRAMUSCULAR; INTRAVENOUS; SUBCUTANEOUS at 22:21

## 2017-04-13 RX ADMIN — SODIUM CHLORIDE 2000 MILLILITER(S): 9 INJECTION INTRAMUSCULAR; INTRAVENOUS; SUBCUTANEOUS at 22:00

## 2017-04-13 RX ADMIN — Medication 650 MILLIGRAM(S): at 21:06

## 2017-04-13 RX ADMIN — Medication 3 MILLILITER(S): at 21:11

## 2017-04-13 RX ADMIN — SODIUM CHLORIDE 2000 MILLILITER(S): 9 INJECTION INTRAMUSCULAR; INTRAVENOUS; SUBCUTANEOUS at 21:11

## 2017-04-13 NOTE — ED PROVIDER NOTE - CARE PLAN
Principal Discharge DX:	UTI (urinary tract infection)  Secondary Diagnosis:	ARF (acute renal failure)  Secondary Diagnosis:	Influenza

## 2017-04-13 NOTE — ED ADULT TRIAGE NOTE - CHIEF COMPLAINT QUOTE
c/o  lower back pain s/p fell out bed last night, denies loc or head injury, takes plavix, sent from urgent care, xrays done with compression fx noted l5, son states being evaluated by neurologist for increased memory lapses over past 3 months, no new mental status changes following fall last night, also c/o cough x few weeks, treated in march per pmd with avalox x 7 days, c/o generalized weakness increasing over past several months

## 2017-04-13 NOTE — ED PROVIDER NOTE - MEDICAL DECISION MAKING DETAILS
Patient with acute on chronic renal failure. Labs and imaging reviewed. Patient received abx, tamiflu, tylenol, tylenol, abuterol and ivfs. Patient now admitted to medicine for further management.

## 2017-04-13 NOTE — ED PROVIDER NOTE - OBJECTIVE STATEMENT
Pertinent PMH/PSH/FHx/SHx and Review of Systems contained within:  85 yo f with PMH of CAD on plavix, HTN, HLD and mild dementia BIB son for lower back pain s/p fall from bed. Unknown if head trauma. Son also reports patient c/o generalized weakness associated cough for the last month. Patient finished abx regimen but symptoms continue. No aggravating or relieving factors, No fever/chills, No photophobia/eye pain/changes in vision, No ear pain/sore throat/dysphagia, No chest pain/palpitations, no SOB/wheeze/stridor, No abdominal pain, No N/V/D, no dysuria/frequency/discharge, No neck pain, no rash, no changes in neurological status/function.

## 2017-04-13 NOTE — ED ADULT NURSE NOTE - PMH
High cholesterol    HTN (hypertension)    MI (myocardial infarction)    Ventricular bigeminy  history of ablation

## 2017-04-13 NOTE — ED ADULT NURSE NOTE - OBJECTIVE STATEMENT
Pt son states she has fallen frequently in the last 7 months.  She c/o weakness.  pt has cough and is wheezing.  Does not c/o SOB or difficulty breathing.

## 2017-04-13 NOTE — ED ADULT NURSE NOTE - PSH
Cataract, bilateral    History of appendectomy    History of tonsillectomy    S/P coronary artery stent placement

## 2017-04-14 DIAGNOSIS — E78.00 PURE HYPERCHOLESTEROLEMIA, UNSPECIFIED: ICD-10-CM

## 2017-04-14 DIAGNOSIS — I10 ESSENTIAL (PRIMARY) HYPERTENSION: ICD-10-CM

## 2017-04-14 DIAGNOSIS — N17.0 ACUTE KIDNEY FAILURE WITH TUBULAR NECROSIS: ICD-10-CM

## 2017-04-14 DIAGNOSIS — Z29.9 ENCOUNTER FOR PROPHYLACTIC MEASURES, UNSPECIFIED: ICD-10-CM

## 2017-04-14 DIAGNOSIS — E66.09 OTHER OBESITY DUE TO EXCESS CALORIES: ICD-10-CM

## 2017-04-14 DIAGNOSIS — N30.00 ACUTE CYSTITIS WITHOUT HEMATURIA: ICD-10-CM

## 2017-04-14 LAB
ALBUMIN SERPL ELPH-MCNC: 2.7 G/DL — LOW (ref 3.3–5)
ALP SERPL-CCNC: 88 U/L — SIGNIFICANT CHANGE UP (ref 40–120)
ALT FLD-CCNC: 60 U/L — SIGNIFICANT CHANGE UP (ref 12–78)
ANION GAP SERPL CALC-SCNC: 10 MMOL/L — SIGNIFICANT CHANGE UP (ref 5–17)
AST SERPL-CCNC: 63 U/L — HIGH (ref 15–37)
BACTERIA # UR AUTO: ABNORMAL
BILIRUB SERPL-MCNC: 0.3 MG/DL — SIGNIFICANT CHANGE UP (ref 0.2–1.2)
BUN SERPL-MCNC: 32 MG/DL — HIGH (ref 7–23)
CALCIUM SERPL-MCNC: 7.8 MG/DL — LOW (ref 8.5–10.1)
CHLORIDE SERPL-SCNC: 111 MMOL/L — HIGH (ref 96–108)
CK MB BLD-MCNC: 6.2 % — HIGH (ref 0–3.5)
CK MB CFR SERPL CALC: 3.2 NG/ML — SIGNIFICANT CHANGE UP (ref 0.5–3.6)
CK SERPL-CCNC: 52 U/L — SIGNIFICANT CHANGE UP (ref 26–192)
CO2 SERPL-SCNC: 23 MMOL/L — SIGNIFICANT CHANGE UP (ref 22–31)
CREAT SERPL-MCNC: 2.46 MG/DL — HIGH (ref 0.5–1.3)
EPI CELLS # UR: SIGNIFICANT CHANGE UP
FLUBV RNA SPEC QL NAA+PROBE: DETECTED
GLUCOSE SERPL-MCNC: 167 MG/DL — HIGH (ref 70–99)
HCT VFR BLD CALC: 38.1 % — SIGNIFICANT CHANGE UP (ref 34.5–45)
HGB BLD-MCNC: 12.1 G/DL — SIGNIFICANT CHANGE UP (ref 11.5–15.5)
LACTATE SERPL-SCNC: 1.6 MMOL/L — SIGNIFICANT CHANGE UP (ref 0.7–2)
MAGNESIUM SERPL-MCNC: 1.9 MG/DL — SIGNIFICANT CHANGE UP (ref 1.8–2.4)
MCHC RBC-ENTMCNC: 27.9 PG — SIGNIFICANT CHANGE UP (ref 27–34)
MCHC RBC-ENTMCNC: 31.7 GM/DL — LOW (ref 32–36)
MCV RBC AUTO: 88.1 FL — SIGNIFICANT CHANGE UP (ref 80–100)
NT-PROBNP SERPL-SCNC: 979 PG/ML — HIGH (ref 0–450)
PHOSPHATE SERPL-MCNC: 3.9 MG/DL — SIGNIFICANT CHANGE UP (ref 2.5–4.5)
PLATELET # BLD AUTO: 129 K/UL — LOW (ref 150–400)
POTASSIUM SERPL-MCNC: 4.4 MMOL/L — SIGNIFICANT CHANGE UP (ref 3.5–5.3)
POTASSIUM SERPL-SCNC: 4.4 MMOL/L — SIGNIFICANT CHANGE UP (ref 3.5–5.3)
PROT SERPL-MCNC: 6.2 GM/DL — SIGNIFICANT CHANGE UP (ref 6–8.3)
RAPID RVP RESULT: DETECTED
RBC # BLD: 4.33 M/UL — SIGNIFICANT CHANGE UP (ref 3.8–5.2)
RBC # FLD: 17.7 % — HIGH (ref 11–15)
RBC CASTS # UR COMP ASSIST: ABNORMAL /HPF (ref 0–4)
SODIUM SERPL-SCNC: 144 MMOL/L — SIGNIFICANT CHANGE UP (ref 135–145)
TROPONIN I SERPL-MCNC: 0.51 NG/ML — HIGH (ref 0.01–0.04)
WBC # BLD: 5.1 K/UL — SIGNIFICANT CHANGE UP (ref 3.8–10.5)
WBC # FLD AUTO: 5.1 K/UL — SIGNIFICANT CHANGE UP (ref 3.8–10.5)
WBC UR QL: >50

## 2017-04-14 PROCEDURE — 71010: CPT | Mod: 26

## 2017-04-14 PROCEDURE — 99291 CRITICAL CARE FIRST HOUR: CPT

## 2017-04-14 PROCEDURE — 99223 1ST HOSP IP/OBS HIGH 75: CPT

## 2017-04-14 RX ORDER — CEFTRIAXONE 500 MG/1
1 INJECTION, POWDER, FOR SOLUTION INTRAMUSCULAR; INTRAVENOUS EVERY 24 HOURS
Qty: 0 | Refills: 0 | Status: DISCONTINUED | OUTPATIENT
Start: 2017-04-14 | End: 2017-04-20

## 2017-04-14 RX ORDER — AMIODARONE HYDROCHLORIDE 400 MG/1
200 TABLET ORAL DAILY
Qty: 0 | Refills: 0 | Status: DISCONTINUED | OUTPATIENT
Start: 2017-04-14 | End: 2017-04-20

## 2017-04-14 RX ORDER — METOPROLOL TARTRATE 50 MG
25 TABLET ORAL DAILY
Qty: 0 | Refills: 0 | Status: DISCONTINUED | OUTPATIENT
Start: 2017-04-14 | End: 2017-04-16

## 2017-04-14 RX ORDER — ALPRAZOLAM 0.25 MG
0.25 TABLET ORAL DAILY
Qty: 0 | Refills: 0 | Status: DISCONTINUED | OUTPATIENT
Start: 2017-04-14 | End: 2017-04-20

## 2017-04-14 RX ORDER — INFLUENZA VIRUS VACCINE 15; 15; 15; 15 UG/.5ML; UG/.5ML; UG/.5ML; UG/.5ML
0.5 SUSPENSION INTRAMUSCULAR ONCE
Qty: 0 | Refills: 0 | Status: DISCONTINUED | OUTPATIENT
Start: 2017-04-14 | End: 2017-04-20

## 2017-04-14 RX ORDER — ACETAMINOPHEN 500 MG
650 TABLET ORAL EVERY 6 HOURS
Qty: 0 | Refills: 0 | Status: DISCONTINUED | OUTPATIENT
Start: 2017-04-14 | End: 2017-04-20

## 2017-04-14 RX ORDER — ZOLPIDEM TARTRATE 10 MG/1
5 TABLET ORAL AT BEDTIME
Qty: 0 | Refills: 0 | Status: DISCONTINUED | OUTPATIENT
Start: 2017-04-14 | End: 2017-04-20

## 2017-04-14 RX ORDER — HEPARIN SODIUM 5000 [USP'U]/ML
5000 INJECTION INTRAVENOUS; SUBCUTANEOUS EVERY 8 HOURS
Qty: 0 | Refills: 0 | Status: DISCONTINUED | OUTPATIENT
Start: 2017-04-14 | End: 2017-04-20

## 2017-04-14 RX ORDER — SODIUM CHLORIDE 9 MG/ML
1000 INJECTION, SOLUTION INTRAVENOUS
Qty: 0 | Refills: 0 | Status: DISCONTINUED | OUTPATIENT
Start: 2017-04-14 | End: 2017-04-15

## 2017-04-14 RX ORDER — IPRATROPIUM/ALBUTEROL SULFATE 18-103MCG
3 AEROSOL WITH ADAPTER (GRAM) INHALATION EVERY 6 HOURS
Qty: 0 | Refills: 0 | Status: DISCONTINUED | OUTPATIENT
Start: 2017-04-14 | End: 2017-04-20

## 2017-04-14 RX ORDER — FUROSEMIDE 40 MG
40 TABLET ORAL ONCE
Qty: 0 | Refills: 0 | Status: COMPLETED | OUTPATIENT
Start: 2017-04-14 | End: 2017-04-14

## 2017-04-14 RX ORDER — CEFTRIAXONE 500 MG/1
1 INJECTION, POWDER, FOR SOLUTION INTRAMUSCULAR; INTRAVENOUS ONCE
Qty: 0 | Refills: 0 | Status: COMPLETED | OUTPATIENT
Start: 2017-04-14 | End: 2017-04-14

## 2017-04-14 RX ORDER — CLOPIDOGREL BISULFATE 75 MG/1
75 TABLET, FILM COATED ORAL DAILY
Qty: 0 | Refills: 0 | Status: DISCONTINUED | OUTPATIENT
Start: 2017-04-14 | End: 2017-04-20

## 2017-04-14 RX ORDER — IPRATROPIUM/ALBUTEROL SULFATE 18-103MCG
3 AEROSOL WITH ADAPTER (GRAM) INHALATION ONCE
Qty: 0 | Refills: 0 | Status: COMPLETED | OUTPATIENT
Start: 2017-04-14 | End: 2017-04-14

## 2017-04-14 RX ORDER — ATORVASTATIN CALCIUM 80 MG/1
40 TABLET, FILM COATED ORAL AT BEDTIME
Qty: 0 | Refills: 0 | Status: DISCONTINUED | OUTPATIENT
Start: 2017-04-14 | End: 2017-04-17

## 2017-04-14 RX ADMIN — ATORVASTATIN CALCIUM 40 MILLIGRAM(S): 80 TABLET, FILM COATED ORAL at 22:08

## 2017-04-14 RX ADMIN — HEPARIN SODIUM 5000 UNIT(S): 5000 INJECTION INTRAVENOUS; SUBCUTANEOUS at 22:08

## 2017-04-14 RX ADMIN — Medication 25 MILLIGRAM(S): at 06:02

## 2017-04-14 RX ADMIN — SODIUM CHLORIDE 80 MILLILITER(S): 9 INJECTION, SOLUTION INTRAVENOUS at 05:25

## 2017-04-14 RX ADMIN — Medication 40 MILLIGRAM(S): at 06:33

## 2017-04-14 RX ADMIN — Medication 0.25 MILLIGRAM(S): at 05:54

## 2017-04-14 RX ADMIN — CLOPIDOGREL BISULFATE 75 MILLIGRAM(S): 75 TABLET, FILM COATED ORAL at 11:33

## 2017-04-14 RX ADMIN — HEPARIN SODIUM 5000 UNIT(S): 5000 INJECTION INTRAVENOUS; SUBCUTANEOUS at 15:34

## 2017-04-14 RX ADMIN — Medication 3 MILLILITER(S): at 11:54

## 2017-04-14 RX ADMIN — HEPARIN SODIUM 5000 UNIT(S): 5000 INJECTION INTRAVENOUS; SUBCUTANEOUS at 06:02

## 2017-04-14 RX ADMIN — Medication 3 MILLILITER(S): at 17:04

## 2017-04-14 RX ADMIN — CEFTRIAXONE 100 GRAM(S): 500 INJECTION, POWDER, FOR SOLUTION INTRAMUSCULAR; INTRAVENOUS at 01:55

## 2017-04-14 RX ADMIN — Medication 30 MILLIGRAM(S): at 00:10

## 2017-04-14 RX ADMIN — Medication 3 MILLILITER(S): at 06:11

## 2017-04-14 RX ADMIN — AMIODARONE HYDROCHLORIDE 200 MILLIGRAM(S): 400 TABLET ORAL at 06:02

## 2017-04-14 RX ADMIN — Medication 125 MILLIGRAM(S): at 06:27

## 2017-04-14 NOTE — H&P ADULT. - ASSESSMENT
86 year old woman with PMH of CAD on plavix, HTN, HLD and dementia brought in by son after she fell out of bed and was not acting herself.  Workup revealed UTII with decreased renal function.  Pt will need admission for at least 2 midnights for further evaluation and mgmt of metabolic encephalopathy secondary to UTI as detailed below:

## 2017-04-14 NOTE — H&P ADULT. - HISTORY OF PRESENT ILLNESS
86 year old woman with PMH of CAD on plavix, HTN, HLD and dementia brought in by son after she fell out of bed and was not acting herself.  Patient is not a reliable historian and information was obtained mainly from the chart.  At bedside, patient complains only of lower abdominal pressure.  As per chart, patient also has been complaining of cough for the last few weeks.    In the ED, CT LS done for low back pain showed:  1. Age-indeterminate superior endplate compression fracture deformities   of the L1 and L2 vertebral bodies which are new compared with a CT of the   abdomen and pelvis from 10/8/2016.   2. Mild perivesicular inflammatory change suspicious for a cystitis.   Correlate with urinalysis.  3. Mild bilateral hydroureteronephrosis to the level of the urinary   bladder without evidence of an obstructing calculus and likely related to   the urinary bladder pathology.    Pt received one dose of Rocephin IVPB, flu +, Tamiflu given.

## 2017-04-14 NOTE — ED ADULT NURSE REASSESSMENT NOTE - NS ED NURSE REASSESS COMMENT FT1
Spoke to lubna ARTEAGA.   Pt was having difficulty breathing, audible wheezes heard.  Duoneb given.  Pt to remain on O2 NC.  Will reassess.
floor called Neda to call back in 2 minutes
report given pt to go up with bipap Sobia aware to make sure pt clean before transport
son at bedside spoke with Dr Sam about moms c also aware troponin elevated
0815pt took self off bipap tolerating breakfast ,placed l O2 monitor tech aware to kep eye on vs 0900 torerating off monitor 1000 o2 off pt aware will need bipap if doesn't  keep o2 on. pt expressing verbal understanding and cooperation.
pt received bed 4 bipap removed by pt. states was too tight mask readjusted and reapplied color good pt slightly anxious no acute distress + distal pulses extremities distally equally cool.

## 2017-04-14 NOTE — H&P ADULT. - PROBLEM SELECTOR PLAN 1
Continue Rocephin 1gm IVPB Q Daily.  Send Urine Cx.  Send Blood Cx 2 sets.  Monitor CBC & BMP Qdaily.  Start IV Fluids 1/2 NS@80ml/hr for now  Tylenol 650mg po q6hrs PRN for fever.

## 2017-04-14 NOTE — H&P ADULT. - SKIN COMMENTS
1 venous stasis ulcer LLE, stage 2, no erythema, discharge, drainage.  2 venous stasis ulcers, both stage 2 RLE, no erythema, discharge, or drainage.

## 2017-04-14 NOTE — CHART NOTE - NSCHARTNOTEFT_GEN_A_CORE
Paged by RN, patient in resp distress.  Unclear etiology as patient has no history of CHF or COPD.  Lung sounds remarkable for b/l crackles.  Duoneb treatment given with little improvement. O2 sat 93%.  BP elevated to 195/90. Patient started on venti and given Solumedrol 125 mg IVP still with little improvement.  Patient placed on BiPAP with notable improvement.  BP improved to 130/76, O2 sat stable at 98%.  STAT CXR showed no change since admission.  1 dose lasix 40 mg given IVP.  EKG showed sinus tachycardia.  Patient currently breathing comfortably on BiPAP satting at 98-99%; STAT labs and ABG ordered, pending.  Possibly new onset HF given cardiac history and improvement after BiPAP and lasix.

## 2017-04-14 NOTE — CONSULT NOTE ADULT - SUBJECTIVE AND OBJECTIVE BOX
MEDICAL RECORD REVIEWED; HISTORY AND  REVIEW OF SYSTEMS OBTAINED; PATIENT EXAMINED:    86 YEAR OLD FEMALE KNOWN TO ME PRESENTING WITH A FALL AND DEVELOPING ACUTE DYSPNEA: CHEST XRAY TO MY EYE SUGGESTS SOME REDISTRIBUTION CHANGES; ABG:PENDING, ECGS: NSR.....RATE RELATED IVCD/LBBB. PRESENTLY WITH TACHYPNEA AND SATURATION OF 96% ON NASAL CANULA, TELEMETRY: NSR; STABLE BP; WHEEZING, SOFT S1, NO DISTENSION, NO CCE; ALL LABS/RADIOLOGY/MEDICATIONS REVIEWED; : INFLUENZA B POSITIVE, RENAL INSUFFICIENCY, UTI; HISTORY OF ASHD AND CARDIAC DYSRHYHMIA WITH ABLATION PROCEDURES. DEMENTIA    IMPRESSION:    ACUTE DYSPNEA: CHF COMPONENT; ? INFLUENZA COMPONENT? COPD/AIRFLOW OBSTRUCTION COMPONENT  AGREE WITH PRESENT MANAGEMENT : DIURESIS AS NEEDED; ON AMIODARONE AND  BETA BLOCKER; OXYGEN THERAPY  WILL FOLLOW AND REVIEW OFFICE RECORDS    RUEL LACY MD, FACC

## 2017-04-15 LAB
ANION GAP SERPL CALC-SCNC: 10 MMOL/L — SIGNIFICANT CHANGE UP (ref 5–17)
BUN SERPL-MCNC: 34 MG/DL — HIGH (ref 7–23)
CALCIUM SERPL-MCNC: 8.2 MG/DL — LOW (ref 8.5–10.1)
CHLORIDE SERPL-SCNC: 111 MMOL/L — HIGH (ref 96–108)
CHOLEST SERPL-MCNC: 114 MG/DL — SIGNIFICANT CHANGE UP (ref 10–199)
CO2 SERPL-SCNC: 25 MMOL/L — SIGNIFICANT CHANGE UP (ref 22–31)
CREAT SERPL-MCNC: 2.49 MG/DL — HIGH (ref 0.5–1.3)
CULTURE RESULTS: SIGNIFICANT CHANGE UP
GLUCOSE SERPL-MCNC: 175 MG/DL — HIGH (ref 70–99)
HCT VFR BLD CALC: 34.3 % — LOW (ref 34.5–45)
HDLC SERPL-MCNC: 38 MG/DL — LOW (ref 40–125)
HGB BLD-MCNC: 11.3 G/DL — LOW (ref 11.5–15.5)
LIPID PNL WITH DIRECT LDL SERPL: 32 MG/DL — SIGNIFICANT CHANGE UP
MCHC RBC-ENTMCNC: 28.8 PG — SIGNIFICANT CHANGE UP (ref 27–34)
MCHC RBC-ENTMCNC: 32.8 GM/DL — SIGNIFICANT CHANGE UP (ref 32–36)
MCV RBC AUTO: 87.8 FL — SIGNIFICANT CHANGE UP (ref 80–100)
PLATELET # BLD AUTO: 171 K/UL — SIGNIFICANT CHANGE UP (ref 150–400)
POTASSIUM SERPL-MCNC: 4.8 MMOL/L — SIGNIFICANT CHANGE UP (ref 3.5–5.3)
POTASSIUM SERPL-SCNC: 4.8 MMOL/L — SIGNIFICANT CHANGE UP (ref 3.5–5.3)
RBC # BLD: 3.91 M/UL — SIGNIFICANT CHANGE UP (ref 3.8–5.2)
RBC # FLD: 17.1 % — HIGH (ref 11–15)
SODIUM SERPL-SCNC: 146 MMOL/L — HIGH (ref 135–145)
SPECIMEN SOURCE: SIGNIFICANT CHANGE UP
TOTAL CHOLESTEROL/HDL RATIO MEASUREMENT: 3 RATIO — LOW (ref 3.3–7.1)
TRIGL SERPL-MCNC: 218 MG/DL — HIGH (ref 10–149)
TROPONIN I SERPL-MCNC: 0.14 NG/ML — HIGH (ref 0.01–0.04)
WBC # BLD: 11.3 K/UL — HIGH (ref 3.8–10.5)
WBC # FLD AUTO: 11.3 K/UL — HIGH (ref 3.8–10.5)

## 2017-04-15 PROCEDURE — 99233 SBSQ HOSP IP/OBS HIGH 50: CPT

## 2017-04-15 RX ORDER — AZITHROMYCIN 500 MG/1
500 TABLET, FILM COATED ORAL DAILY
Qty: 0 | Refills: 0 | Status: DISCONTINUED | OUTPATIENT
Start: 2017-04-15 | End: 2017-04-20

## 2017-04-15 RX ADMIN — Medication 3 MILLILITER(S): at 17:07

## 2017-04-15 RX ADMIN — Medication 0.25 MILLIGRAM(S): at 21:24

## 2017-04-15 RX ADMIN — Medication 30 MILLIGRAM(S): at 11:04

## 2017-04-15 RX ADMIN — Medication 3 MILLILITER(S): at 03:10

## 2017-04-15 RX ADMIN — HEPARIN SODIUM 5000 UNIT(S): 5000 INJECTION INTRAVENOUS; SUBCUTANEOUS at 14:17

## 2017-04-15 RX ADMIN — Medication 3 MILLILITER(S): at 12:01

## 2017-04-15 RX ADMIN — ATORVASTATIN CALCIUM 40 MILLIGRAM(S): 80 TABLET, FILM COATED ORAL at 21:25

## 2017-04-15 RX ADMIN — Medication 3 MILLILITER(S): at 23:33

## 2017-04-15 RX ADMIN — AMIODARONE HYDROCHLORIDE 200 MILLIGRAM(S): 400 TABLET ORAL at 05:27

## 2017-04-15 RX ADMIN — CEFTRIAXONE 100 GRAM(S): 500 INJECTION, POWDER, FOR SOLUTION INTRAMUSCULAR; INTRAVENOUS at 14:17

## 2017-04-15 RX ADMIN — Medication 3 MILLILITER(S): at 07:30

## 2017-04-15 RX ADMIN — HEPARIN SODIUM 5000 UNIT(S): 5000 INJECTION INTRAVENOUS; SUBCUTANEOUS at 21:25

## 2017-04-15 RX ADMIN — HEPARIN SODIUM 5000 UNIT(S): 5000 INJECTION INTRAVENOUS; SUBCUTANEOUS at 05:27

## 2017-04-15 RX ADMIN — CLOPIDOGREL BISULFATE 75 MILLIGRAM(S): 75 TABLET, FILM COATED ORAL at 11:04

## 2017-04-15 NOTE — PROGRESS NOTE ADULT - PROBLEM SELECTOR PLAN 1
Continue Rocephin 1gm IVPB Q Daily.  Send Urine Cx likely contaminant specimen  Send Blood Cx 2 sets.  Monitor CBC & BMP Qdaily.  Start IV Fluids 1/2 NS@80ml/hr for now will hold   Tylenol 650mg po q6hrs PRN for fever.

## 2017-04-15 NOTE — PROGRESS NOTE ADULT - SUBJECTIVE AND OBJECTIVE BOX
Patient is a 86y old  Female who presents with a chief complaint of     INTERVAL HPI/OVERNIGHT EVENTS: feeling better less hypoxic seen by cardiology     MEDICATIONS  (STANDING):  amiodarone    Tablet 200milliGRAM(s) Oral daily  atorvastatin 40milliGRAM(s) Oral at bedtime  clopidogrel Tablet 75milliGRAM(s) Oral daily  metoprolol 25milliGRAM(s) Oral daily  cefTRIAXone   IVPB 1Gram(s) IV Intermittent every 24 hours  heparin  Injectable 5000Unit(s) SubCutaneous every 8 hours  ALBUTerol/ipratropium for Nebulization 3milliLiter(s) Nebulizer every 6 hours  oseltamivir 30milliGRAM(s) Oral daily  influenza   Vaccine 0.5milliLiter(s) IntraMuscular once  azithromycin   Tablet 500milliGRAM(s) Oral daily  predniSONE   Tablet 50milliGRAM(s) Oral daily    MEDICATIONS  (PRN):  acetaminophen   Tablet. 650milliGRAM(s) Oral every 6 hours PRN Mild Pain (1 - 3 or fever >100.4 F  zolpidem 5milliGRAM(s) Oral at bedtime PRN Insomnia  ALPRAZolam 0.25milliGRAM(s) Oral daily PRN anxiety  HYDROcodone/homatropine Syrup 5milliLiter(s) Oral every 4 hours PRN Cough  benzonatate 100milliGRAM(s) Oral every 6 hours PRN Cough      Allergies    Benadryl Allergy (Other)    Intolerances        REVIEW OF SYSTEMS:  CONSTITUTIONAL: No fever, weight loss, or fatigue  EYES: No eye pain, visual disturbances, or discharge  ENMT:  No difficulty hearing, tinnitus, vertigo; No sinus or throat pain  NECK: No pain or stiffness  BREASTS: No pain, masses, or nipple discharge  RESPIRATORY: cough   CARDIOVASCULAR: No chest pain, palpitations, dizziness, or leg swelling  GASTROINTESTINAL: No abdominal or epigastric pain. No nausea, vomiting, or hematemesis; No diarrhea or constipation. No melena or hematochezia.  GENITOURINARY: No dysuria, frequency, hematuria, or incontinence  NEUROLOGICAL: No headaches, memory loss, loss of strength, numbness, or tremors  SKIN: No itching, burning, rashes, or lesions   LYMPH NODES: No enlarged glands  ENDOCRINE: No heat or cold intolerance; No hair loss  MUSCULOSKELETAL: No joint pain or swelling; No muscle, back, or extremity pain  PSYCHIATRIC: No depression, anxiety, mood swings, or difficulty sleeping  HEME/LYMPH: No easy bruising, or bleeding gums  ALLERGY AND IMMUNOLOGIC: No hives or eczema    Vital Signs Last 24 Hrs  T(C): 37, Max: 37 (-15 @ 16:16)  T(F): 98.6, Max: 98.6 (-15 @ 16:16)  HR: 103 (70 - 103)  BP: 157/84 (124/62 - 157/84)  BP(mean): --  RR: 18 (16 - 18)  SpO2: 98% (95% - 99%)    PHYSICAL EXAM:  GENERAL: NAD, well-groomed, well-developed  HEAD:  Atraumatic, Normocephalic  EYES: EOMI, PERRLA, conjunctiva and sclera clear  ENMT: No tonsillar erythema, exudates, or enlargement; Moist mucous membranes, Good dentition, No lesions  NECK: Supple, No JVD, Normal thyroid  NERVOUS SYSTEM:  Alert & Oriented X3, Good concentration; Motor Strength 5/5 B/L upper and lower extremities; DTRs 2+ intact and symmetric  CHEST/LUNG: Clear to percussion bilaterally; No rales, rhonchi, wheezing, or rubs  HEART: Regular rate and rhythm; No murmurs, rubs, or gallops  ABDOMEN: Soft, Nontender, Nondistended; Bowel sounds present  EXTREMITIES:  2+ Peripheral Pulses, No clubbing, cyanosis, or edema  LYMPH: No lymphadenopathy noted  SKIN: skin lesion wound   LABS:                        11.3   11.3  )-----------( 171      ( 15 Apr 2017 08:47 )             34.3     -15    146<H>  |  111<H>  |  34<H>  ----------------------------<  175<H>  4.8   |  25  |  2.49<H>    Ca    8.2<L>      15 Apr 2017 08:47  Phos  3.9     -14  Mg     1.9     -14    TPro  6.2  /  Alb  2.7<L>  /  TBili  0.3  /  DBili  x   /  AST  63<H>  /  ALT  60  /  AlkPhos  88  -14    PT/INR - ( 2017 21:17 )   PT: 11.4 sec;   INR: 1.04 ratio         PTT - ( 2017 21:17 )  PTT:31.1 sec  Urinalysis Basic - ( 2017 23:35 )    Color: Yellow / Appearance: Slightly Turbid / S.015 / pH: x  Gluc: x / Ketone: Trace  / Bili: Negative / Urobili: Negative mg/dL   Blood: x / Protein: 100 mg/dL / Nitrite: Positive   Leuk Esterase: Moderate / RBC: 11-25 /HPF / WBC >50   Sq Epi: x / Non Sq Epi: Few / Bacteria: TNTC      CAPILLARY BLOOD GLUCOSE      RADIOLOGY & ADDITIONAL TESTS:    Imaging Personally Reviewed:  [ x] YES  [ ] NO    Consultant(s) Notes Reviewed:  [ x] YES  [ ] NO    Care Discussed with Consultants/Other Providers [x ] YES  [ ] NO

## 2017-04-15 NOTE — PROGRESS NOTE ADULT - SUBJECTIVE AND OBJECTIVE BOX
LOOKS & FEELS MUCH BETTER; LESS LABORED BREATHING  APPEARS CUSHINGOID....HAS BEEN ON CHRONIC STEROIDS  TELEMETRY: NSR  GOOD AIR ENTRY  SOFT S1  NON TENDER ABDOMEN  MILD EDEMA; LEG: DRESSED  TROPONIN EQUIVOCAL    IMPRESSION:    INFLUENA B  DEMAND ISCHEMIA  PROBABLE ELEMENT OF ACUTE HEART FAILURE ,NOW RESOLVED CLINICALLY  ONGOING TREATMENT OF PNEUMONITIS AND ASHD;   MAY NEED TO CONSIDER STEROID REPLACEMENT THERAPY

## 2017-04-15 NOTE — PROGRESS NOTE ADULT - PROBLEM SELECTOR PLAN 2
hold ivf for now  may restart in am   If no improvement after hydration consider further workup  Avoid nephrotoxic medications

## 2017-04-15 NOTE — PROGRESS NOTE ADULT - ASSESSMENT
86 year old woman with PMH of CAD on plavix, HTN, HLD and dementia brought in by son after she fell out of bed and was not acting herself.  Workup revealed UTII with decreased renal function.  Pt will need admission for at least 2 midnights for further evaluation and mgmt of metabolic encephalopathy secondary to UTI as detailed below:    Positive flue metabolic encephalopathy and acute respiratory distress resolved   will continue steroids for now concern raised by cardiology for cushingoid syndrome

## 2017-04-16 LAB
ALBUMIN SERPL ELPH-MCNC: 2.6 G/DL — LOW (ref 3.3–5)
ALP SERPL-CCNC: 83 U/L — SIGNIFICANT CHANGE UP (ref 40–120)
ALT FLD-CCNC: 46 U/L — SIGNIFICANT CHANGE UP (ref 12–78)
ANION GAP SERPL CALC-SCNC: 9 MMOL/L — SIGNIFICANT CHANGE UP (ref 5–17)
AST SERPL-CCNC: 39 U/L — HIGH (ref 15–37)
BILIRUB SERPL-MCNC: 0.3 MG/DL — SIGNIFICANT CHANGE UP (ref 0.2–1.2)
BUN SERPL-MCNC: 40 MG/DL — HIGH (ref 7–23)
CALCIUM SERPL-MCNC: 8.6 MG/DL — SIGNIFICANT CHANGE UP (ref 8.5–10.1)
CHLORIDE SERPL-SCNC: 112 MMOL/L — HIGH (ref 96–108)
CO2 SERPL-SCNC: 26 MMOL/L — SIGNIFICANT CHANGE UP (ref 22–31)
CREAT SERPL-MCNC: 2.65 MG/DL — HIGH (ref 0.5–1.3)
GLUCOSE SERPL-MCNC: 98 MG/DL — SIGNIFICANT CHANGE UP (ref 70–99)
HCT VFR BLD CALC: 33.1 % — LOW (ref 34.5–45)
HGB BLD-MCNC: 11.4 G/DL — LOW (ref 11.5–15.5)
MCHC RBC-ENTMCNC: 30.3 PG — SIGNIFICANT CHANGE UP (ref 27–34)
MCHC RBC-ENTMCNC: 34.4 GM/DL — SIGNIFICANT CHANGE UP (ref 32–36)
MCV RBC AUTO: 88.1 FL — SIGNIFICANT CHANGE UP (ref 80–100)
PLATELET # BLD AUTO: 158 K/UL — SIGNIFICANT CHANGE UP (ref 150–400)
POTASSIUM SERPL-MCNC: 5.1 MMOL/L — SIGNIFICANT CHANGE UP (ref 3.5–5.3)
POTASSIUM SERPL-SCNC: 5.1 MMOL/L — SIGNIFICANT CHANGE UP (ref 3.5–5.3)
PROT SERPL-MCNC: 5.9 GM/DL — LOW (ref 6–8.3)
RBC # BLD: 3.76 M/UL — LOW (ref 3.8–5.2)
RBC # FLD: 18 % — HIGH (ref 11–15)
SODIUM SERPL-SCNC: 147 MMOL/L — HIGH (ref 135–145)
WBC # BLD: 9.7 K/UL — SIGNIFICANT CHANGE UP (ref 3.8–10.5)
WBC # FLD AUTO: 9.7 K/UL — SIGNIFICANT CHANGE UP (ref 3.8–10.5)

## 2017-04-16 PROCEDURE — 99233 SBSQ HOSP IP/OBS HIGH 50: CPT

## 2017-04-16 RX ORDER — AMLODIPINE BESYLATE 2.5 MG/1
10 TABLET ORAL DAILY
Qty: 0 | Refills: 0 | Status: DISCONTINUED | OUTPATIENT
Start: 2017-04-16 | End: 2017-04-20

## 2017-04-16 RX ORDER — METOPROLOL TARTRATE 50 MG
25 TABLET ORAL
Qty: 0 | Refills: 0 | Status: DISCONTINUED | OUTPATIENT
Start: 2017-04-16 | End: 2017-04-16

## 2017-04-16 RX ORDER — SODIUM CHLORIDE 9 MG/ML
1000 INJECTION, SOLUTION INTRAVENOUS
Qty: 0 | Refills: 0 | Status: DISCONTINUED | OUTPATIENT
Start: 2017-04-16 | End: 2017-04-17

## 2017-04-16 RX ADMIN — Medication 3 MILLILITER(S): at 05:40

## 2017-04-16 RX ADMIN — CEFTRIAXONE 100 GRAM(S): 500 INJECTION, POWDER, FOR SOLUTION INTRAMUSCULAR; INTRAVENOUS at 13:17

## 2017-04-16 RX ADMIN — Medication 30 MILLIGRAM(S): at 11:11

## 2017-04-16 RX ADMIN — AMLODIPINE BESYLATE 10 MILLIGRAM(S): 2.5 TABLET ORAL at 18:09

## 2017-04-16 RX ADMIN — AMIODARONE HYDROCHLORIDE 200 MILLIGRAM(S): 400 TABLET ORAL at 06:14

## 2017-04-16 RX ADMIN — CLOPIDOGREL BISULFATE 75 MILLIGRAM(S): 75 TABLET, FILM COATED ORAL at 11:11

## 2017-04-16 RX ADMIN — Medication 3 MILLILITER(S): at 17:16

## 2017-04-16 RX ADMIN — SODIUM CHLORIDE 100 MILLILITER(S): 9 INJECTION, SOLUTION INTRAVENOUS at 16:00

## 2017-04-16 RX ADMIN — Medication 3 MILLILITER(S): at 11:43

## 2017-04-16 RX ADMIN — HEPARIN SODIUM 5000 UNIT(S): 5000 INJECTION INTRAVENOUS; SUBCUTANEOUS at 06:14

## 2017-04-16 RX ADMIN — HEPARIN SODIUM 5000 UNIT(S): 5000 INJECTION INTRAVENOUS; SUBCUTANEOUS at 13:17

## 2017-04-16 RX ADMIN — AZITHROMYCIN 500 MILLIGRAM(S): 500 TABLET, FILM COATED ORAL at 11:12

## 2017-04-16 RX ADMIN — HEPARIN SODIUM 5000 UNIT(S): 5000 INJECTION INTRAVENOUS; SUBCUTANEOUS at 21:43

## 2017-04-16 RX ADMIN — Medication 50 MILLIGRAM(S): at 06:14

## 2017-04-16 NOTE — PHYSICAL THERAPY INITIAL EVALUATION ADULT - GENERAL OBSERVATIONS, REHAB EVAL
Patient seen seated in bedside chair with nasal canula O2 at 2L/minute with telemetry monitor, with no apparent distress, and multiple wound dressings clean, dry, and intact.

## 2017-04-16 NOTE — PROGRESS NOTE ADULT - PROBLEM SELECTOR PLAN 2
rivf started at 100  in am   If no improvement after hydration consider further workup  Avoid nephrotoxic medications  previous creatnine has leilani as high as 2.7

## 2017-04-16 NOTE — PHYSICAL THERAPY INITIAL EVALUATION ADULT - PLANNED THERAPY INTERVENTIONS, PT EVAL
strengthening/balance training/bed mobility training/postural re-education/transfer training/gait training

## 2017-04-16 NOTE — DIETITIAN INITIAL EVALUATION ADULT. - PERTINENT LABORATORY DATA
04-16 Na147 mmol/L<H> Glu 98 mg/dL K+ 5.1 mmol/L Cr  2.65 mg/dL<H> BUN 40 mg/dL<H> Phos n/a   Alb 2.6 g/dL<L> PAB n/a, GFR 17L, TG 218H, HDL Chol 38L

## 2017-04-16 NOTE — PROGRESS NOTE ADULT - SUBJECTIVE AND OBJECTIVE BOX
Patient is a 86y old  Female who presents with a chief complaint of     INTERVAL HPI/OVERNIGHT EVENTS: reportred elevation of blood pressure feels better however     MEDICATIONS  (STANDING):  amiodarone    Tablet 200milliGRAM(s) Oral daily  atorvastatin 40milliGRAM(s) Oral at bedtime  clopidogrel Tablet 75milliGRAM(s) Oral daily  cefTRIAXone   IVPB 1Gram(s) IV Intermittent every 24 hours  heparin  Injectable 5000Unit(s) SubCutaneous every 8 hours  ALBUTerol/ipratropium for Nebulization 3milliLiter(s) Nebulizer every 6 hours  oseltamivir 30milliGRAM(s) Oral daily  influenza   Vaccine 0.5milliLiter(s) IntraMuscular once  azithromycin   Tablet 500milliGRAM(s) Oral daily  predniSONE   Tablet 50milliGRAM(s) Oral daily  sodium chloride 0.45%. 1000milliLiter(s) IV Continuous <Continuous>  metoprolol 25milliGRAM(s) Oral two times a day    MEDICATIONS  (PRN):  acetaminophen   Tablet. 650milliGRAM(s) Oral every 6 hours PRN Mild Pain (1 - 3 or fever >100.4 F  zolpidem 5milliGRAM(s) Oral at bedtime PRN Insomnia  ALPRAZolam 0.25milliGRAM(s) Oral daily PRN anxiety  HYDROcodone/homatropine Syrup 5milliLiter(s) Oral every 4 hours PRN Cough  benzonatate 100milliGRAM(s) Oral every 6 hours PRN Cough      Allergies    Benadryl Allergy (Other)    Intolerances        REVIEW OF SYSTEMS:  CONSTITUTIONAL: No fever, weight loss, or fatigue  EYES: No eye pain, visual disturbances, or discharge  ENMT:  No difficulty hearing, tinnitus, vertigo; No sinus or throat pain  NECK: No pain or stiffness  BREASTS: No pain, masses, or nipple discharge  RESPIRATORY: No cough, wheezing, chills or hemoptysis; No shortness of breath  CARDIOVASCULAR: No chest pain, palpitations, dizziness, or leg swelling  GASTROINTESTINAL: No abdominal or epigastric pain. No nausea, vomiting, or hematemesis; No diarrhea or constipation. No melena or hematochezia.  GENITOURINARY: No dysuria, frequency, hematuria, or incontinence  NEUROLOGICAL: No headaches, memory loss, loss of strength, numbness, or tremors  SKIN: No itching, burning, rashes, or lesions   LYMPH NODES: No enlarged glands  ENDOCRINE: No heat or cold intolerance; No hair loss  MUSCULOSKELETAL: No joint pain or swelling; No muscle, back, or extremity pain  PSYCHIATRIC: No depression, anxiety, mood swings, or difficulty sleeping  HEME/LYMPH: No easy bruising, or bleeding gums  ALLERGY AND IMMUNOLOGIC: No hives or eczema    Vital Signs Last 24 Hrs  T(C): 36.6, Max: 36.7 (04-16 @ 10:48)  T(F): 97.8, Max: 98.1 (04-16 @ 10:48)  HR: 93 (80 - 98)  BP: 177/95 (111/64 - 177/95)  BP(mean): --  RR: 18 (17 - 18)  SpO2: 100% (93% - 100%)    PHYSICAL EXAM:  GENERAL: NAD, well-groomed, well-developed  HEAD:  Atraumatic, Normocephalic  EYES: EOMI, PERRLA, conjunctiva and sclera clear  ENMT: No tonsillar erythema, exudates, or enlargement; Moist mucous membranes, Good dentition, No lesions  NECK: Supple, No JVD, Normal thyroid  NERVOUS SYSTEM:  Alert & Oriented X3, Good concentration; Motor Strength 5/5 B/L upper and lower extremities; DTRs 2+ intact and symmetric  CHEST/LUNG: Clear to percussion bilaterally; No rales, rhonchi, wheezing, or rubs  HEART: Regular rate and rhythm; No murmurs, rubs, or gallops  ABDOMEN: Soft, Nontender, Nondistended; Bowel sounds present  EXTREMITIES:right lowere extremity wrapped with ace bandage left lowerr extremity also wrapped some edema   LYMPH: No lymphadenopathy noted  SKIN: No rashes or lesions    LABS:                        11.4   9.7   )-----------( 158      ( 16 Apr 2017 08:30 )             33.1     04-16    147<H>  |  112<H>  |  40<H>  ----------------------------<  98  5.1   |  26  |  2.65<H>    Ca    8.6      16 Apr 2017 08:30    TPro  5.9<L>  /  Alb  2.6<L>  /  TBili  0.3  /  DBili  x   /  AST  39<H>  /  ALT  46  /  AlkPhos  83  04-16        CAPILLARY BLOOD GLUCOSE      RADIOLOGY & ADDITIONAL TESTS:    Imaging Personally Reviewed:  [ ] YES  [ ] NO    Consultant(s) Notes Reviewed:  [ ] YES  [ ] NO    Care Discussed with Consultants/Other Providers [ ] YES  [ ] NO

## 2017-04-16 NOTE — PHYSICAL THERAPY INITIAL EVALUATION ADULT - MODIFIED CLINICAL TEST OF SENSORY INTEGRATION IN BALANCE TEST
Barthel Index: Feeding Score _10__, Bathing Score _0__, Grooming Score __5_, Dressing Score __5_, Bowels Score _10__, Bladder Score _10__, Toilet Score _10__, Transfers Score _10__, Mobility Score _0__, Stairs Score _0__,     Total Score _60__

## 2017-04-16 NOTE — DIETITIAN INITIAL EVALUATION ADULT. - ENERGY NEEDS
Height (cm): 162.6 (04-14)  Weight (kg): 78.5 (04-14)  BMI (kg/m2): 29.7 (04-14)  IBW:   54.5 kg +/- 10%       % IBW:   144%          UBW:   stable           %UBW: 100%

## 2017-04-16 NOTE — PHYSICAL THERAPY INITIAL EVALUATION ADULT - LIVES WITH, PROFILE
children/Private house with 1/2 step to enter to first level with no stairs within the house. Patient receives assistance from an aide 7 days per week 9 hours per day.

## 2017-04-16 NOTE — PHYSICAL THERAPY INITIAL EVALUATION ADULT - CRITERIA FOR SKILLED THERAPEUTIC INTERVENTIONS
Home with Home P.T./risk reduction/prevention/rehab potential/impairments found/functional limitations in following categories/predicted duration of therapy intervention/therapy frequency/anticipated discharge recommendation

## 2017-04-16 NOTE — DIETITIAN INITIAL EVALUATION ADULT. - OTHER INFO
Pt seen for consult - education. Pt somewhat confused; limited information obtained. Pt lives c son in a 2-family home; has HHA 10 hrs/day x 7 days/wk to assist c ADL. Denies any N/V/C/D or chew/swallowing issues; BM regular; last x 1 (4/16). RD observed pt eating breakfast; approx 75% consumed.

## 2017-04-16 NOTE — DIETITIAN INITIAL EVALUATION ADULT. - PERTINENT MEDS FT
Plavix, Heparin, Zithromax, Tamiflu, Rocephin, Tessalon Perle, Hycodan, Prednisone, Xanax, Pacerone, Lopressor, Ambien

## 2017-04-16 NOTE — DIETITIAN INITIAL EVALUATION ADULT. - PROBLEM SELECTOR PLAN 2
IVF with 1/2 NS as above  If no improvement after hydration consider further workup  Avoid nephrotoxic medications

## 2017-04-16 NOTE — PROGRESS NOTE ADULT - PROBLEM SELECTOR PLAN 1
Continue Rocephin 1gm IVPB Q Daily.  Send Urine Cx likely contaminant specimen  Send Blood Cx 2 setsnegative to date   Monitor CBC & BMP Qdaily.  Start IV Fluids 1/2 NS@80ml/hr for now will hold   Tylenol 650mg po q6hrs PRN for fever.

## 2017-04-17 LAB
ALBUMIN SERPL ELPH-MCNC: 2.6 G/DL — LOW (ref 3.3–5)
ALP SERPL-CCNC: 81 U/L — SIGNIFICANT CHANGE UP (ref 40–120)
ALT FLD-CCNC: 37 U/L — SIGNIFICANT CHANGE UP (ref 12–78)
ANION GAP SERPL CALC-SCNC: 8 MMOL/L — SIGNIFICANT CHANGE UP (ref 5–17)
AST SERPL-CCNC: 28 U/L — SIGNIFICANT CHANGE UP (ref 15–37)
BILIRUB SERPL-MCNC: 0.4 MG/DL — SIGNIFICANT CHANGE UP (ref 0.2–1.2)
BUN SERPL-MCNC: 35 MG/DL — HIGH (ref 7–23)
CALCIUM SERPL-MCNC: 8.6 MG/DL — SIGNIFICANT CHANGE UP (ref 8.5–10.1)
CHLORIDE SERPL-SCNC: 109 MMOL/L — HIGH (ref 96–108)
CO2 SERPL-SCNC: 25 MMOL/L — SIGNIFICANT CHANGE UP (ref 22–31)
CREAT SERPL-MCNC: 2.36 MG/DL — HIGH (ref 0.5–1.3)
GLUCOSE SERPL-MCNC: 117 MG/DL — HIGH (ref 70–99)
HCT VFR BLD CALC: 33 % — LOW (ref 34.5–45)
HGB BLD-MCNC: 10.9 G/DL — LOW (ref 11.5–15.5)
MAGNESIUM SERPL-MCNC: 2 MG/DL — SIGNIFICANT CHANGE UP (ref 1.8–2.4)
MCHC RBC-ENTMCNC: 28.8 PG — SIGNIFICANT CHANGE UP (ref 27–34)
MCHC RBC-ENTMCNC: 33.1 GM/DL — SIGNIFICANT CHANGE UP (ref 32–36)
MCV RBC AUTO: 87.2 FL — SIGNIFICANT CHANGE UP (ref 80–100)
PLATELET # BLD AUTO: 175 K/UL — SIGNIFICANT CHANGE UP (ref 150–400)
POTASSIUM SERPL-MCNC: 5 MMOL/L — SIGNIFICANT CHANGE UP (ref 3.5–5.3)
POTASSIUM SERPL-SCNC: 5 MMOL/L — SIGNIFICANT CHANGE UP (ref 3.5–5.3)
PROT SERPL-MCNC: 5.9 GM/DL — LOW (ref 6–8.3)
RBC # BLD: 3.79 M/UL — LOW (ref 3.8–5.2)
RBC # FLD: 17.2 % — HIGH (ref 11–15)
SODIUM SERPL-SCNC: 142 MMOL/L — SIGNIFICANT CHANGE UP (ref 135–145)
WBC # BLD: 11.3 K/UL — HIGH (ref 3.8–10.5)
WBC # FLD AUTO: 11.3 K/UL — HIGH (ref 3.8–10.5)

## 2017-04-17 PROCEDURE — 99233 SBSQ HOSP IP/OBS HIGH 50: CPT

## 2017-04-17 RX ORDER — SODIUM CHLORIDE 9 MG/ML
1000 INJECTION, SOLUTION INTRAVENOUS
Qty: 0 | Refills: 0 | Status: DISCONTINUED | OUTPATIENT
Start: 2017-04-17 | End: 2017-04-19

## 2017-04-17 RX ORDER — ROSUVASTATIN CALCIUM 5 MG/1
10 TABLET ORAL AT BEDTIME
Qty: 0 | Refills: 0 | Status: DISCONTINUED | OUTPATIENT
Start: 2017-04-17 | End: 2017-04-17

## 2017-04-17 RX ORDER — NITROGLYCERIN 6.5 MG
0.5 CAPSULE, EXTENDED RELEASE ORAL ONCE
Qty: 0 | Refills: 0 | Status: COMPLETED | OUTPATIENT
Start: 2017-04-17 | End: 2017-04-17

## 2017-04-17 RX ORDER — IPRATROPIUM/ALBUTEROL SULFATE 18-103MCG
3 AEROSOL WITH ADAPTER (GRAM) INHALATION ONCE
Qty: 0 | Refills: 0 | Status: COMPLETED | OUTPATIENT
Start: 2017-04-17 | End: 2017-04-17

## 2017-04-17 RX ORDER — BISOPROLOL FUMARATE 10 MG/1
5 TABLET, FILM COATED ORAL DAILY
Qty: 0 | Refills: 0 | Status: DISCONTINUED | OUTPATIENT
Start: 2017-04-17 | End: 2017-04-20

## 2017-04-17 RX ADMIN — Medication 3 MILLILITER(S): at 00:19

## 2017-04-17 RX ADMIN — SODIUM CHLORIDE 100 MILLILITER(S): 9 INJECTION, SOLUTION INTRAVENOUS at 17:33

## 2017-04-17 RX ADMIN — BISOPROLOL FUMARATE 5 MILLIGRAM(S): 10 TABLET, FILM COATED ORAL at 17:32

## 2017-04-17 RX ADMIN — Medication 3 MILLILITER(S): at 12:16

## 2017-04-17 RX ADMIN — CEFTRIAXONE 100 GRAM(S): 500 INJECTION, POWDER, FOR SOLUTION INTRAMUSCULAR; INTRAVENOUS at 13:42

## 2017-04-17 RX ADMIN — HEPARIN SODIUM 5000 UNIT(S): 5000 INJECTION INTRAVENOUS; SUBCUTANEOUS at 21:42

## 2017-04-17 RX ADMIN — ZOLPIDEM TARTRATE 5 MILLIGRAM(S): 10 TABLET ORAL at 22:18

## 2017-04-17 RX ADMIN — CLOPIDOGREL BISULFATE 75 MILLIGRAM(S): 75 TABLET, FILM COATED ORAL at 11:28

## 2017-04-17 RX ADMIN — AMIODARONE HYDROCHLORIDE 200 MILLIGRAM(S): 400 TABLET ORAL at 05:26

## 2017-04-17 RX ADMIN — Medication 30 MILLIGRAM(S): at 11:28

## 2017-04-17 RX ADMIN — Medication 0.5 INCH(S): at 22:14

## 2017-04-17 RX ADMIN — Medication 3 MILLILITER(S): at 20:58

## 2017-04-17 RX ADMIN — Medication 0.25 MILLIGRAM(S): at 01:06

## 2017-04-17 RX ADMIN — Medication 3 MILLILITER(S): at 06:19

## 2017-04-17 RX ADMIN — HEPARIN SODIUM 5000 UNIT(S): 5000 INJECTION INTRAVENOUS; SUBCUTANEOUS at 05:26

## 2017-04-17 RX ADMIN — AMLODIPINE BESYLATE 10 MILLIGRAM(S): 2.5 TABLET ORAL at 05:26

## 2017-04-17 RX ADMIN — Medication 3 MILLILITER(S): at 17:23

## 2017-04-17 RX ADMIN — HEPARIN SODIUM 5000 UNIT(S): 5000 INJECTION INTRAVENOUS; SUBCUTANEOUS at 13:43

## 2017-04-17 RX ADMIN — Medication 100 MILLIGRAM(S): at 11:27

## 2017-04-17 RX ADMIN — Medication 3 MILLILITER(S): at 23:12

## 2017-04-17 RX ADMIN — SODIUM CHLORIDE 100 MILLILITER(S): 9 INJECTION, SOLUTION INTRAVENOUS at 05:39

## 2017-04-17 RX ADMIN — Medication 50 MILLIGRAM(S): at 13:43

## 2017-04-17 RX ADMIN — AZITHROMYCIN 500 MILLIGRAM(S): 500 TABLET, FILM COATED ORAL at 13:42

## 2017-04-17 NOTE — CHART NOTE - NSCHARTNOTEFT_GEN_A_CORE
Hospitalist Medicine PA    Called by RN to evaluate patient for SOB when pt walked to bathroom. Saw patient and examined at bedside. Pt c/o SOB when she went to the bathroom and cough. Pt denies chest pain, palpitations.     VS  Heart: Normal S1/S2. RRR  Pulmonary: B/L basilar crackles.  Abd: Soft, NT/ND.  Extremities: Slight B/L leg edema  Neuro: AAOx3    A/P:  Pt is an 87 y/o F w/ PNA/flu will give STAT Duoneb treatment. Will continue to follow. Hospitalist Medicine PA    Called by RN to evaluate patient for SOB when pt walked to bathroom. Saw patient and examined at bedside. Pt c/o SOB when she went to the bathroom and cough. Pt denies chest pain, palpitations.       Vital Signs Last 24 Hrs  T(C): 36.9, Max: 36.9 (04-17 @ 11:29)  T(F): 98.4, Max: 98.4 (04-17 @ 11:29)  HR: 93 (87 - 103)  BP: 144/74 (137/69 - 145/73)  BP(mean): --  RR: 18 (16 - 18)  SpO2: 96% (95% - 98%)    PE:  Heart: Normal S1/S2. RRR  Pulmonary: B/L basilar crackles.  Abd: Soft, NT/ND.  Extremities: Slight B/L leg edema  Neuro: AAOx3    A/P:  Pt is an 87 y/o F w/ PNA/flu will give STAT Duoneb treatment. Will continue to follow. Hospitalist Medicine PA    Called by RN to evaluate patient for SOB when pt walked to bathroom. Saw patient and examined at bedside. Pt c/o SOB when she went to the bathroom and cough. Pt denies chest pain, palpitations.       Vital Signs Last 24 Hrs  T(C): 36.9, Max: 36.9 (04-17 @ 11:29)  T(F): 98.4, Max: 98.4 (04-17 @ 11:29)  HR: 93 (87 - 103)  BP: 144/74 (137/69 - 145/73)  BP(mean): --  RR: 18 (16 - 18)  SpO2: 96% (95% - 98%)    PE:  Heart: Normal S1/S2. RRR  Pulmonary: B/L basilar crackles and wheezing.  Abd: Soft, NT/ND.  Extremities: +1 B/L leg edema  Neuro: AAOx3    A/P:  Pt is an 87 y/o F w/ PNA/flu will give STAT Duoneb treatment. Will continue to follow.        Addendum 4/17/17 @ 9073    Follow up after patient finished nebulizer treatment. Saw patient and examined at bedside. Lessening crackles and wheezing. Will give some nitropaste 1/2" on chest for one dose. Will continue to follow.

## 2017-04-17 NOTE — PROGRESS NOTE ADULT - PROBLEM SELECTOR PLAN 2
ivf started at 100  in am   If no improvement after hydration consider further workup  Avoid nephrotoxic medications  previus admission creatnine range ws low 2 to normal consider renal consult   previous creatnine has leilani as high as 2.7

## 2017-04-17 NOTE — PROGRESS NOTE ADULT - SUBJECTIVE AND OBJECTIVE BOX
STILL WHEEZING  PULMONARY ISSUES PREDOMINATE  STABLE CARDIOVASCULAR STATUS; CONTINUING WITH PRESENT MANAGEMENT.

## 2017-04-17 NOTE — PHARMACY COMMUNICATION NOTE - COMMENTS
4/17/17 spoke w Dr Sam He verified that he wants patient to take 5mg(1 tab)  daily. He is aware that patient takes this med at home as 1/2 tab. daily

## 2017-04-17 NOTE — PROGRESS NOTE ADULT - PROBLEM SELECTOR PLAN 1
Continue Rocephin 1gm IVPB Q Daily.   Urine Cx likely contaminant specimen   Blood Cx 2 sets negative   negative to date   Monitor CBC & BMP Qdaily.  Start IV Fluids 1/2 NS@80ml/hr for now will hold   Tylenol 650mg po q6hrs PRN for fever.

## 2017-04-17 NOTE — PROGRESS NOTE ADULT - ASSESSMENT
86 year old woman with PMH of CAD on plavix, HTN, HLD and dementia brought in by son after she fell out of bed and was not acting herself.  Workup revealed UTII with decreased renal function.  Pt will need admission for at least 2 midnights for further evaluation and mgmt of metabolic encephalopathy secondary to UTI as detailed below:    Positive flue metabolic encephalopathy and acute respiratory distress resolved   will continue steroids for now concern raised by cardiology for cushingoid syndrome      Patient With history of MDR e.coli last urine  culture was contaminated may need repeat if patient has fever

## 2017-04-17 NOTE — PROGRESS NOTE ADULT - SUBJECTIVE AND OBJECTIVE BOX
Patient is a 86y old  Female who presents with a chief complaint of     INTERVAL HPI/OVERNIGHT EVENTS:    MEDICATIONS  (STANDING):  amiodarone    Tablet 200milliGRAM(s) Oral daily  clopidogrel Tablet 75milliGRAM(s) Oral daily  cefTRIAXone   IVPB 1Gram(s) IV Intermittent every 24 hours  heparin  Injectable 5000Unit(s) SubCutaneous every 8 hours  ALBUTerol/ipratropium for Nebulization 3milliLiter(s) Nebulizer every 6 hours  oseltamivir 30milliGRAM(s) Oral daily  influenza   Vaccine 0.5milliLiter(s) IntraMuscular once  azithromycin   Tablet 500milliGRAM(s) Oral daily  predniSONE   Tablet 50milliGRAM(s) Oral daily  sodium chloride 0.45%. 1000milliLiter(s) IV Continuous <Continuous>  amLODIPine   Tablet 10milliGRAM(s) Oral daily  bisoprolol   Tablet 5milliGRAM(s) Oral daily  freetext medication  - 10milliGRAM(s) Oral at bedtime    MEDICATIONS  (PRN):  acetaminophen   Tablet. 650milliGRAM(s) Oral every 6 hours PRN Mild Pain (1 - 3 or fever >100.4 F  zolpidem 5milliGRAM(s) Oral at bedtime PRN Insomnia  ALPRAZolam 0.25milliGRAM(s) Oral daily PRN anxiety  HYDROcodone/homatropine Syrup 5milliLiter(s) Oral every 4 hours PRN Cough  benzonatate 100milliGRAM(s) Oral every 6 hours PRN Cough      Allergies    Benadryl Allergy (Other)    Intolerances    metoprolol (Other)      REVIEW OF SYSTEMS:  CONSTITUTIONAL: No fever, weight loss, or fatigue  EYES: No eye pain, visual disturbances, or discharge  ENMT:  No difficulty hearing, tinnitus, vertigo; No sinus or throat pain  NECK: No pain or stiffness  BREASTS: No pain, masses, or nipple discharge  RESPIRATORY: No cough, wheezing, chills or hemoptysis; No shortness of breath  CARDIOVASCULAR: No chest pain, palpitations, dizziness, or leg swelling  GASTROINTESTINAL: No abdominal or epigastric pain. No nausea, vomiting, or hematemesis; No diarrhea or constipation. No melena or hematochezia.  GENITOURINARY: No dysuria, frequency, hematuria, or incontinence  NEUROLOGICAL: No headaches, memory loss, loss of strength, numbness, or tremors  SKIN: No itching, burning, rashes, or lesions   LYMPH NODES: No enlarged glands  ENDOCRINE: No heat or cold intolerance; No hair loss  MUSCULOSKELETAL: No joint pain or swelling; No muscle, back, or extremity pain  PSYCHIATRIC: No depression, anxiety, mood swings, or difficulty sleeping  HEME/LYMPH: No easy bruising, or bleeding gums  ALLERGY AND IMMUNOLOGIC: No hives or eczema    Vital Signs Last 24 Hrs  T(C): 36.9, Max: 36.9 (04-17 @ 11:29)  T(F): 98.4, Max: 98.4 (04-17 @ 11:29)  HR: 90 (87 - 103)  BP: 140/68 (137/69 - 177/95)  BP(mean): --  RR: 16 (16 - 18)  SpO2: 96% (95% - 100%)    PHYSICAL EXAM:  GENERAL: NAD, well-groomed, well-developed  HEAD:  Atraumatic, Normocephalic  EYES: EOMI, PERRLA, conjunctiva and sclera clear  ENMT: No tonsillar erythema, exudates, or enlargement; Moist mucous membranes, Good dentition, No lesions  NECK: Supple, No JVD, Normal thyroid  NERVOUS SYSTEM:  Alert & Oriented X3, Good concentration; Motor Strength 5/5 B/L upper and lower extremities; DTRs 2+ intact and symmetric  CHEST/LUNG: Clear to percussion bilaterally; No rales, rhonchi, wheezing, or rubs  HEART: Regular rate and rhythm; No murmurs, rubs, or gallops  ABDOMEN: Soft, Nontender, Nondistended; Bowel sounds present  EXTREMITIES:  2+ Peripheral Pulses, No clubbing, cyanosis, or edema  LYMPH: No lymphadenopathy noted  SKIN: No rashes or lesions    LABS:                        10.9   11.3  )-----------( 175      ( 17 Apr 2017 10:56 )             33.0     04-17    142  |  109<H>  |  35<H>  ----------------------------<  117<H>  5.0   |  25  |  2.36<H>    Ca    8.6      17 Apr 2017 10:56  Mg     2.0     04-17    TPro  5.9<L>  /  Alb  2.6<L>  /  TBili  0.4  /  DBili  x   /  AST  28  /  ALT  37  /  AlkPhos  81  04-17        CAPILLARY BLOOD GLUCOSE      RADIOLOGY & ADDITIONAL TESTS:    Imaging Personally Reviewed:  [ ] YES  [ ] NO    Consultant(s) Notes Reviewed:  [ ] YES  [ ] NO    Care Discussed with Consultants/Other Providers [ ] YES  [ ] NO

## 2017-04-18 ENCOUNTER — APPOINTMENT (OUTPATIENT)
Dept: WOUND CARE | Facility: HOSPITAL | Age: 82
End: 2017-04-18

## 2017-04-18 LAB
ANION GAP SERPL CALC-SCNC: 11 MMOL/L — SIGNIFICANT CHANGE UP (ref 5–17)
BUN SERPL-MCNC: 31 MG/DL — HIGH (ref 7–23)
CALCIUM SERPL-MCNC: 8.8 MG/DL — SIGNIFICANT CHANGE UP (ref 8.5–10.1)
CHLORIDE SERPL-SCNC: 106 MMOL/L — SIGNIFICANT CHANGE UP (ref 96–108)
CO2 SERPL-SCNC: 25 MMOL/L — SIGNIFICANT CHANGE UP (ref 22–31)
CREAT SERPL-MCNC: 2.07 MG/DL — HIGH (ref 0.5–1.3)
GLUCOSE SERPL-MCNC: 137 MG/DL — HIGH (ref 70–99)
HCT VFR BLD CALC: 33 % — LOW (ref 34.5–45)
HGB BLD-MCNC: 11.4 G/DL — LOW (ref 11.5–15.5)
MAGNESIUM SERPL-MCNC: 2.1 MG/DL — SIGNIFICANT CHANGE UP (ref 1.8–2.4)
MCHC RBC-ENTMCNC: 29.9 PG — SIGNIFICANT CHANGE UP (ref 27–34)
MCHC RBC-ENTMCNC: 34.5 GM/DL — SIGNIFICANT CHANGE UP (ref 32–36)
MCV RBC AUTO: 86.6 FL — SIGNIFICANT CHANGE UP (ref 80–100)
PLATELET # BLD AUTO: 148 K/UL — LOW (ref 150–400)
POTASSIUM SERPL-MCNC: 5.1 MMOL/L — SIGNIFICANT CHANGE UP (ref 3.5–5.3)
POTASSIUM SERPL-SCNC: 5.1 MMOL/L — SIGNIFICANT CHANGE UP (ref 3.5–5.3)
RBC # BLD: 3.81 M/UL — SIGNIFICANT CHANGE UP (ref 3.8–5.2)
RBC # FLD: 16.9 % — HIGH (ref 11–15)
SODIUM SERPL-SCNC: 142 MMOL/L — SIGNIFICANT CHANGE UP (ref 135–145)
WBC # BLD: 10.2 K/UL — SIGNIFICANT CHANGE UP (ref 3.8–10.5)
WBC # FLD AUTO: 10.2 K/UL — SIGNIFICANT CHANGE UP (ref 3.8–10.5)

## 2017-04-18 PROCEDURE — 99233 SBSQ HOSP IP/OBS HIGH 50: CPT

## 2017-04-18 RX ADMIN — Medication 3 MILLILITER(S): at 05:45

## 2017-04-18 RX ADMIN — Medication 3 MILLILITER(S): at 11:27

## 2017-04-18 RX ADMIN — AMLODIPINE BESYLATE 10 MILLIGRAM(S): 2.5 TABLET ORAL at 05:23

## 2017-04-18 RX ADMIN — Medication 30 MILLIGRAM(S): at 11:24

## 2017-04-18 RX ADMIN — Medication 3 MILLILITER(S): at 17:06

## 2017-04-18 RX ADMIN — Medication 0.25 MILLIGRAM(S): at 20:30

## 2017-04-18 RX ADMIN — Medication 0.5 INCH(S): at 10:39

## 2017-04-18 RX ADMIN — CLOPIDOGREL BISULFATE 75 MILLIGRAM(S): 75 TABLET, FILM COATED ORAL at 11:24

## 2017-04-18 RX ADMIN — Medication 50 MILLIGRAM(S): at 05:23

## 2017-04-18 RX ADMIN — Medication 3 MILLILITER(S): at 23:48

## 2017-04-18 RX ADMIN — HEPARIN SODIUM 5000 UNIT(S): 5000 INJECTION INTRAVENOUS; SUBCUTANEOUS at 05:23

## 2017-04-18 RX ADMIN — HEPARIN SODIUM 5000 UNIT(S): 5000 INJECTION INTRAVENOUS; SUBCUTANEOUS at 14:07

## 2017-04-18 RX ADMIN — AZITHROMYCIN 500 MILLIGRAM(S): 500 TABLET, FILM COATED ORAL at 11:24

## 2017-04-18 RX ADMIN — AMIODARONE HYDROCHLORIDE 200 MILLIGRAM(S): 400 TABLET ORAL at 05:23

## 2017-04-18 RX ADMIN — CEFTRIAXONE 100 GRAM(S): 500 INJECTION, POWDER, FOR SOLUTION INTRAMUSCULAR; INTRAVENOUS at 14:07

## 2017-04-18 RX ADMIN — Medication 100 MILLIGRAM(S): at 17:15

## 2017-04-18 RX ADMIN — BISOPROLOL FUMARATE 5 MILLIGRAM(S): 10 TABLET, FILM COATED ORAL at 05:27

## 2017-04-18 RX ADMIN — HEPARIN SODIUM 5000 UNIT(S): 5000 INJECTION INTRAVENOUS; SUBCUTANEOUS at 21:19

## 2017-04-18 NOTE — PROGRESS NOTE ADULT - SUBJECTIVE AND OBJECTIVE BOX
BREATHING A BIT BETTER THOUGH STILL TACHYPNEIC  CUSHINGOID  GOOD AIR ENTRY WITH WHEEZES  SOFT HEART SOUNDS  NO APPRECIABLE EDEMA    PULMONARY ISSUES PREDOMINATE  CV STATUS MORE OR LESS STABLE  CONTINUING PRESENT CV CARE FOR NOW.

## 2017-04-19 ENCOUNTER — APPOINTMENT (OUTPATIENT)
Dept: WOUND CARE | Facility: HOSPITAL | Age: 82
End: 2017-04-19

## 2017-04-19 LAB
ANION GAP SERPL CALC-SCNC: 9 MMOL/L — SIGNIFICANT CHANGE UP (ref 5–17)
BASOPHILS # BLD AUTO: 0.1 K/UL — SIGNIFICANT CHANGE UP (ref 0–0.2)
BASOPHILS NFR BLD AUTO: 1 % — SIGNIFICANT CHANGE UP (ref 0–2)
BUN SERPL-MCNC: 30 MG/DL — HIGH (ref 7–23)
CALCIUM SERPL-MCNC: 8.6 MG/DL — SIGNIFICANT CHANGE UP (ref 8.5–10.1)
CHLORIDE SERPL-SCNC: 108 MMOL/L — SIGNIFICANT CHANGE UP (ref 96–108)
CO2 SERPL-SCNC: 26 MMOL/L — SIGNIFICANT CHANGE UP (ref 22–31)
CREAT SERPL-MCNC: 1.81 MG/DL — HIGH (ref 0.5–1.3)
CULTURE RESULTS: SIGNIFICANT CHANGE UP
CULTURE RESULTS: SIGNIFICANT CHANGE UP
EOSINOPHIL # BLD AUTO: 0 K/UL — SIGNIFICANT CHANGE UP (ref 0–0.5)
EOSINOPHIL NFR BLD AUTO: 0.1 % — SIGNIFICANT CHANGE UP (ref 0–6)
GLUCOSE SERPL-MCNC: 88 MG/DL — SIGNIFICANT CHANGE UP (ref 70–99)
HCT VFR BLD CALC: 31.7 % — LOW (ref 34.5–45)
HGB BLD-MCNC: 10.6 G/DL — LOW (ref 11.5–15.5)
LYMPHOCYTES # BLD AUTO: 1.6 K/UL — SIGNIFICANT CHANGE UP (ref 1–3.3)
LYMPHOCYTES # BLD AUTO: 14.2 % — SIGNIFICANT CHANGE UP (ref 13–44)
MCHC RBC-ENTMCNC: 28.7 PG — SIGNIFICANT CHANGE UP (ref 27–34)
MCHC RBC-ENTMCNC: 33.3 GM/DL — SIGNIFICANT CHANGE UP (ref 32–36)
MCV RBC AUTO: 86.2 FL — SIGNIFICANT CHANGE UP (ref 80–100)
MONOCYTES # BLD AUTO: 1.2 K/UL — HIGH (ref 0–0.9)
MONOCYTES NFR BLD AUTO: 10.6 % — SIGNIFICANT CHANGE UP (ref 2–14)
NEUTROPHILS # BLD AUTO: 8.5 K/UL — HIGH (ref 1.8–7.4)
NEUTROPHILS NFR BLD AUTO: 74.1 % — SIGNIFICANT CHANGE UP (ref 43–77)
PLATELET # BLD AUTO: 183 K/UL — SIGNIFICANT CHANGE UP (ref 150–400)
POTASSIUM SERPL-MCNC: 4.7 MMOL/L — SIGNIFICANT CHANGE UP (ref 3.5–5.3)
POTASSIUM SERPL-SCNC: 4.7 MMOL/L — SIGNIFICANT CHANGE UP (ref 3.5–5.3)
RBC # BLD: 3.68 M/UL — LOW (ref 3.8–5.2)
RBC # FLD: 16.9 % — HIGH (ref 11–15)
SODIUM SERPL-SCNC: 143 MMOL/L — SIGNIFICANT CHANGE UP (ref 135–145)
SPECIMEN SOURCE: SIGNIFICANT CHANGE UP
SPECIMEN SOURCE: SIGNIFICANT CHANGE UP
WBC # BLD: 11.5 K/UL — HIGH (ref 3.8–10.5)
WBC # FLD AUTO: 11.5 K/UL — HIGH (ref 3.8–10.5)

## 2017-04-19 PROCEDURE — 71010: CPT | Mod: 26

## 2017-04-19 PROCEDURE — 99233 SBSQ HOSP IP/OBS HIGH 50: CPT

## 2017-04-19 RX ORDER — ROSUVASTATIN CALCIUM 5 MG/1
1 TABLET ORAL
Qty: 0 | Refills: 0 | COMMUNITY

## 2017-04-19 RX ORDER — ALBUTEROL 90 UG/1
2 AEROSOL, METERED ORAL
Qty: 1 | Refills: 0 | OUTPATIENT
Start: 2017-04-19 | End: 2017-05-19

## 2017-04-19 RX ADMIN — Medication 3 MILLILITER(S): at 11:34

## 2017-04-19 RX ADMIN — ZOLPIDEM TARTRATE 5 MILLIGRAM(S): 10 TABLET ORAL at 21:08

## 2017-04-19 RX ADMIN — CEFTRIAXONE 100 GRAM(S): 500 INJECTION, POWDER, FOR SOLUTION INTRAMUSCULAR; INTRAVENOUS at 14:07

## 2017-04-19 RX ADMIN — Medication 30 MILLIGRAM(S): at 11:01

## 2017-04-19 RX ADMIN — AZITHROMYCIN 500 MILLIGRAM(S): 500 TABLET, FILM COATED ORAL at 11:01

## 2017-04-19 RX ADMIN — CLOPIDOGREL BISULFATE 75 MILLIGRAM(S): 75 TABLET, FILM COATED ORAL at 11:01

## 2017-04-19 RX ADMIN — Medication 3 MILLILITER(S): at 17:13

## 2017-04-19 RX ADMIN — AMIODARONE HYDROCHLORIDE 200 MILLIGRAM(S): 400 TABLET ORAL at 05:16

## 2017-04-19 RX ADMIN — BISOPROLOL FUMARATE 5 MILLIGRAM(S): 10 TABLET, FILM COATED ORAL at 05:17

## 2017-04-19 RX ADMIN — Medication 3 MILLILITER(S): at 23:19

## 2017-04-19 RX ADMIN — HEPARIN SODIUM 5000 UNIT(S): 5000 INJECTION INTRAVENOUS; SUBCUTANEOUS at 21:07

## 2017-04-19 RX ADMIN — HEPARIN SODIUM 5000 UNIT(S): 5000 INJECTION INTRAVENOUS; SUBCUTANEOUS at 05:15

## 2017-04-19 RX ADMIN — AMLODIPINE BESYLATE 10 MILLIGRAM(S): 2.5 TABLET ORAL at 05:16

## 2017-04-19 RX ADMIN — HEPARIN SODIUM 5000 UNIT(S): 5000 INJECTION INTRAVENOUS; SUBCUTANEOUS at 14:08

## 2017-04-19 RX ADMIN — Medication 3 MILLILITER(S): at 05:25

## 2017-04-19 RX ADMIN — Medication 20 MILLIGRAM(S): at 05:16

## 2017-04-19 RX ADMIN — Medication 0.25 MILLIGRAM(S): at 17:10

## 2017-04-19 NOTE — DISCHARGE NOTE ADULT - SECONDARY DIAGNOSIS.
Influenza Acute cystitis without hematuria Ventricular bigeminy Acute renal failure with tubular necrosis Leg ulcer

## 2017-04-19 NOTE — PROGRESS NOTE ADULT - PROBLEM SELECTOR PLAN 2
ivf started at 100  in am   If no improvement after hydration consider further workup  Avoid nephrotoxic medications  previus admission creatnine range ws low 2 to normal consider renal consult   previous creatnine has been as high as 2.7  at baseline ckd 3 now

## 2017-04-19 NOTE — DISCHARGE NOTE ADULT - CARE PROVIDER_API CALL
Scott Medley), Medicine  [address] [place] [id number]  Phone: [phone]  Fax: [phone]    Miguelito Bautista), Urology  [address] [place] [id number]  Phone: [phone]  Fax: [phone]    Jeovanny Riggs), Surgery  [address] [place] [address]  Phone: [phone]  Fax: [phone]

## 2017-04-19 NOTE — DISCHARGE NOTE ADULT - PLAN OF CARE
optimize breathing continue with cough medicines and inhaler as needed you have completed treatment with tamiflu and zithromax for acute bronchitis you have completed treatment with ceftriaxone  follow-up with urologist Dr. Bautista for recurrent urinary retention and infections continue wit amiodarone with chronic kidney disease, kidney function returning to baseline, monitor with kidney function closely with your primary care provider.  avoid motrin/advil/aleve follow up with wound care

## 2017-04-19 NOTE — PROGRESS NOTE ADULT - PROBLEM SELECTOR PLAN 3
Pt given Tamiflu will continue for positive rapid flu, complete tamiflu  on azithromycin for bronchitis

## 2017-04-19 NOTE — PROGRESS NOTE ADULT - ASSESSMENT
86 year old woman with PMH of CAD on plavix, HTN, HLD and dementia brought in by son after she fell out of bed and was not acting herself.  Workup revealed UTII with decreased renal function.  Pt will need admission for at least 2 midnights for further evaluation and mgmt of metabolic encephalopathy secondary to UTI as detailed below:    Positive flu metabolic encephalopathy and acute respiratory distress resolved   will continue steroids for now concern raised by cardiology for cushingoid syndrome      Patient With history of MDR e.coli last urine  culture was contaminated may need repeat if patient has fever

## 2017-04-19 NOTE — DISCHARGE NOTE ADULT - PATIENT PORTAL LINK FT
“You can access the [facility] Patient Portal, offered by [facility], by registering with the following website: [email or web address]”

## 2017-04-19 NOTE — DISCHARGE NOTE ADULT - MEDICATION SUMMARY - MEDICATIONS TO STOP TAKING
I will STOP taking the medications listed below when I get home from the hospital:    acetaminophen 325 mg oral tablet  -- 2 tab(s) by mouth every 6 hours, As needed, For Temp greater than 38 C (100.4 F)

## 2017-04-19 NOTE — PROGRESS NOTE ADULT - PROBLEM SELECTOR PLAN 1
completely abb   Urine Cx likely contaminant specimen   Blood Cx 2 sets negative   negative to date   Monitor CBC & BMP Qdaily.  Start IV Fluids 1/2 NS@80ml/hr for now will hold   Tylenol 650mg po q6hrs PRN for fever.

## 2017-04-19 NOTE — PROGRESS NOTE ADULT - PROBLEM/PLAN-2
DISPLAY PLAN FREE TEXT normal (ped)... In no apparent distress, appears well developed and well nourished.

## 2017-04-19 NOTE — DISCHARGE NOTE ADULT - CARE PLAN
Principal Discharge DX:	Acute respiratory failure with hypoxia  Goal:	optimize breathing  Instructions for follow-up, activity and diet:	continue with cough medicines and inhaler as needed  Secondary Diagnosis:	Influenza  Instructions for follow-up, activity and diet:	you have completed treatment with tamiflu and zithromax for acute bronchitis  Secondary Diagnosis:	Acute cystitis without hematuria  Instructions for follow-up, activity and diet:	you have completed treatment with ceftriaxone  follow-up with urologist Dr. Bautista for recurrent urinary retention and infections  Secondary Diagnosis:	Ventricular bigeminy  Instructions for follow-up, activity and diet:	continue wit amiodarone  Secondary Diagnosis:	Acute renal failure with tubular necrosis  Instructions for follow-up, activity and diet:	with chronic kidney disease, kidney function returning to baseline, monitor with kidney function closely with your primary care provider.  avoid motrin/advil/aleve  Secondary Diagnosis:	Leg ulcer  Instructions for follow-up, activity and diet:	follow up with wound care

## 2017-04-19 NOTE — DISCHARGE NOTE ADULT - HOSPITAL COURSE
86 year old woman with PMH of CAD on plavix, HTN, HLD and , urinary retentio, mdr e.coli uti, leg wounds brought in by son after she fell out of bed and was not acting herself.  Patient is not a reliable historian and information was obtained mainly from the chart.  At bedside, patient complains only of lower abdominal pressure.  As per chart, patient also has been complaining of cough for the last few weeks.    In the ED, CT LS done for low back pain showed:  1. Age-indeterminate superior endplate compression fracture deformities   of the L1 and L2 vertebral bodies which are new compared with a CT of the   abdomen and pelvis from 10/8/2016.   2. Mild perivesicular inflammatory change suspicious for a cystitis.   Correlate with urinalysis.  3. Mild bilateral hydroureteronephrosis to the level of the urinary   bladder without evidence of an obstructing calculus and likely related to   the urinary bladder pathology.    Pt received one dose of Rocephin IVPB, flu +, Tamiflu given. (14 Apr 2017 03:42)    Admitted with acute hypoxic resp failure 2/2 influenza and acute bronchitis, treated withh tamiflu and zithromax. uti treated withh ceftriaxone, urine culture had >3 organisms so unclear if recurrent MDR e.coli but asymptomatic. hypoxia resolved and pt no longer requiring oxygen. Se will have [facility] care and home PT and aide reinstated

## 2017-04-20 VITALS
HEART RATE: 84 BPM | DIASTOLIC BLOOD PRESSURE: 68 MMHG | SYSTOLIC BLOOD PRESSURE: 112 MMHG | OXYGEN SATURATION: 96 % | RESPIRATION RATE: 18 BRPM | TEMPERATURE: 98 F

## 2017-04-20 PROCEDURE — 99239 HOSP IP/OBS DSCHRG MGMT >30: CPT

## 2017-04-20 RX ADMIN — Medication 20 MILLIGRAM(S): at 05:21

## 2017-04-20 RX ADMIN — AMLODIPINE BESYLATE 10 MILLIGRAM(S): 2.5 TABLET ORAL at 05:21

## 2017-04-20 RX ADMIN — HEPARIN SODIUM 5000 UNIT(S): 5000 INJECTION INTRAVENOUS; SUBCUTANEOUS at 05:21

## 2017-04-20 RX ADMIN — BISOPROLOL FUMARATE 5 MILLIGRAM(S): 10 TABLET, FILM COATED ORAL at 05:21

## 2017-04-20 RX ADMIN — Medication 3 MILLILITER(S): at 05:39

## 2017-04-20 RX ADMIN — AMIODARONE HYDROCHLORIDE 200 MILLIGRAM(S): 400 TABLET ORAL at 05:21

## 2017-04-24 DIAGNOSIS — I25.10 ATHEROSCLEROTIC HEART DISEASE OF NATIVE CORONARY ARTERY WITHOUT ANGINA PECTORIS: ICD-10-CM

## 2017-04-24 DIAGNOSIS — N30.00 ACUTE CYSTITIS WITHOUT HEMATURIA: ICD-10-CM

## 2017-04-24 DIAGNOSIS — R00.0 TACHYCARDIA, UNSPECIFIED: ICD-10-CM

## 2017-04-24 DIAGNOSIS — Z95.5 PRESENCE OF CORONARY ANGIOPLASTY IMPLANT AND GRAFT: ICD-10-CM

## 2017-04-24 DIAGNOSIS — N13.30 UNSPECIFIED HYDRONEPHROSIS: ICD-10-CM

## 2017-04-24 DIAGNOSIS — Z87.891 PERSONAL HISTORY OF NICOTINE DEPENDENCE: ICD-10-CM

## 2017-04-24 DIAGNOSIS — J20.9 ACUTE BRONCHITIS, UNSPECIFIED: ICD-10-CM

## 2017-04-24 DIAGNOSIS — Z90.49 ACQUIRED ABSENCE OF OTHER SPECIFIED PARTS OF DIGESTIVE TRACT: ICD-10-CM

## 2017-04-24 DIAGNOSIS — J10.1 INFLUENZA DUE TO OTHER IDENTIFIED INFLUENZA VIRUS WITH OTHER RESPIRATORY MANIFESTATIONS: ICD-10-CM

## 2017-04-24 DIAGNOSIS — I12.9 HYPERTENSIVE CHRONIC KIDNEY DISEASE WITH STAGE 1 THROUGH STAGE 4 CHRONIC KIDNEY DISEASE, OR UNSPECIFIED CHRONIC KIDNEY DISEASE: ICD-10-CM

## 2017-04-24 DIAGNOSIS — I24.8 OTHER FORMS OF ACUTE ISCHEMIC HEART DISEASE: ICD-10-CM
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
## 2017-04-24 DIAGNOSIS — M48.56XA COLLAPSED VERTEBRA, NOT ELSEWHERE CLASSIFIED, LUMBAR REGION, INITIAL ENCOUNTER FOR FRACTURE: ICD-10-CM

## 2017-04-24 DIAGNOSIS — T38.7X5A ADVERSE EFFECT OF ANDROGENS AND ANABOLIC CONGENERS, INITIAL ENCOUNTER: ICD-10-CM

## 2017-04-24 DIAGNOSIS — N18.9 CHRONIC KIDNEY DISEASE, UNSPECIFIED: ICD-10-CM

## 2017-04-24 DIAGNOSIS — I44.7 LEFT BUNDLE-BRANCH BLOCK, UNSPECIFIED: ICD-10-CM

## 2017-04-24 DIAGNOSIS — J96.01 ACUTE RESPIRATORY FAILURE WITH HYPOXIA: ICD-10-CM

## 2017-04-24 DIAGNOSIS — N17.0 ACUTE KIDNEY FAILURE WITH TUBULAR NECROSIS: ICD-10-CM

## 2017-04-24 DIAGNOSIS — E24.2 DRUG-INDUCED CUSHING'S SYNDROME: ICD-10-CM

## 2017-04-24 DIAGNOSIS — I25.2 OLD MYOCARDIAL INFARCTION: ICD-10-CM

## 2017-04-24 DIAGNOSIS — F41.9 ANXIETY DISORDER, UNSPECIFIED: ICD-10-CM

## 2017-04-24 DIAGNOSIS — G93.41 METABOLIC ENCEPHALOPATHY: ICD-10-CM

## 2017-04-24 DIAGNOSIS — E66.9 OBESITY, UNSPECIFIED: ICD-10-CM

## 2017-04-24 DIAGNOSIS — R33.9 RETENTION OF URINE, UNSPECIFIED: ICD-10-CM

## 2017-04-24 DIAGNOSIS — L97.909 NON-PRESSURE CHRONIC ULCER OF UNSPECIFIED PART OF UNSPECIFIED LOWER LEG WITH UNSPECIFIED SEVERITY: ICD-10-CM

## 2017-04-24 DIAGNOSIS — Z91.81 HISTORY OF FALLING: ICD-10-CM
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
## 2017-04-24 DIAGNOSIS — R00.8 OTHER ABNORMALITIES OF HEART BEAT: ICD-10-CM

## 2017-04-24 DIAGNOSIS — Z79.01 LONG TERM (CURRENT) USE OF ANTICOAGULANTS: ICD-10-CM

## 2017-04-24 DIAGNOSIS — E78.5 HYPERLIPIDEMIA, UNSPECIFIED: ICD-10-CM

## 2017-04-25 ENCOUNTER — APPOINTMENT (OUTPATIENT)
Dept: WOUND CARE | Facility: HOSPITAL | Age: 82
End: 2017-04-25

## 2017-04-25 ENCOUNTER — INPATIENT (INPATIENT)
Facility: HOSPITAL | Age: 82
LOS: 10 days | Discharge: NURSING, REHAB OR LONG-TERM CARE | End: 2017-05-06
Attending: INTERNAL MEDICINE | Admitting: INTERNAL MEDICINE
Payer: MEDICARE

## 2017-04-25 VITALS
TEMPERATURE: 100 F | SYSTOLIC BLOOD PRESSURE: 136 MMHG | HEIGHT: 63 IN | WEIGHT: 175.05 LBS | HEART RATE: 100 BPM | RESPIRATION RATE: 22 BRPM | DIASTOLIC BLOOD PRESSURE: 93 MMHG | OXYGEN SATURATION: 94 %

## 2017-04-25 DIAGNOSIS — Z95.5 PRESENCE OF CORONARY ANGIOPLASTY IMPLANT AND GRAFT: Chronic | ICD-10-CM

## 2017-04-25 DIAGNOSIS — Z90.89 ACQUIRED ABSENCE OF OTHER ORGANS: Chronic | ICD-10-CM

## 2017-04-25 DIAGNOSIS — H26.9 UNSPECIFIED CATARACT: Chronic | ICD-10-CM

## 2017-04-25 DIAGNOSIS — Z90.49 ACQUIRED ABSENCE OF OTHER SPECIFIED PARTS OF DIGESTIVE TRACT: Chronic | ICD-10-CM

## 2017-04-25 LAB
ALBUMIN SERPL ELPH-MCNC: 2.9 G/DL — LOW (ref 3.3–5)
ALP SERPL-CCNC: 113 U/L — SIGNIFICANT CHANGE UP (ref 40–120)
ALT FLD-CCNC: 32 U/L — SIGNIFICANT CHANGE UP (ref 12–78)
ANION GAP SERPL CALC-SCNC: 10 MMOL/L — SIGNIFICANT CHANGE UP (ref 5–17)
ANISOCYTOSIS BLD QL: SLIGHT — SIGNIFICANT CHANGE UP
APTT BLD: 26.6 SEC — LOW (ref 27.5–37.4)
AST SERPL-CCNC: 39 U/L — HIGH (ref 15–37)
BILIRUB SERPL-MCNC: 0.6 MG/DL — SIGNIFICANT CHANGE UP (ref 0.2–1.2)
BUN SERPL-MCNC: 34 MG/DL — HIGH (ref 7–23)
CALCIUM SERPL-MCNC: 8.9 MG/DL — SIGNIFICANT CHANGE UP (ref 8.5–10.1)
CHLORIDE SERPL-SCNC: 104 MMOL/L — SIGNIFICANT CHANGE UP (ref 96–108)
CK MB BLD-MCNC: <0.6 % — SIGNIFICANT CHANGE UP (ref 0–3.5)
CK MB CFR SERPL CALC: <0.5 NG/ML — SIGNIFICANT CHANGE UP (ref 0.5–3.6)
CK SERPL-CCNC: 88 U/L — SIGNIFICANT CHANGE UP (ref 26–192)
CO2 SERPL-SCNC: 26 MMOL/L — SIGNIFICANT CHANGE UP (ref 22–31)
CREAT SERPL-MCNC: 2.19 MG/DL — HIGH (ref 0.5–1.3)
GLUCOSE SERPL-MCNC: 108 MG/DL — HIGH (ref 70–99)
HCT VFR BLD CALC: 38 % — SIGNIFICANT CHANGE UP (ref 34.5–45)
HGB BLD-MCNC: 13.3 G/DL — SIGNIFICANT CHANGE UP (ref 11.5–15.5)
INR BLD: 1.09 RATIO — SIGNIFICANT CHANGE UP (ref 0.88–1.16)
LACTATE SERPL-SCNC: 0.9 MMOL/L — SIGNIFICANT CHANGE UP (ref 0.7–2)
LYMPHOCYTES # BLD AUTO: 4 % — LOW (ref 13–44)
MCHC RBC-ENTMCNC: 30.2 PG — SIGNIFICANT CHANGE UP (ref 27–34)
MCHC RBC-ENTMCNC: 34.9 GM/DL — SIGNIFICANT CHANGE UP (ref 32–36)
MCV RBC AUTO: 86.3 FL — SIGNIFICANT CHANGE UP (ref 80–100)
MONOCYTES NFR BLD AUTO: 5 % — SIGNIFICANT CHANGE UP (ref 2–14)
NEUTROPHILS NFR BLD AUTO: 89 % — HIGH (ref 43–77)
NEUTS BAND # BLD: 2 % — SIGNIFICANT CHANGE UP (ref 0–8)
NT-PROBNP SERPL-SCNC: 1650 PG/ML — HIGH (ref 0–450)
OVALOCYTES BLD QL SMEAR: SLIGHT — SIGNIFICANT CHANGE UP
PLAT MORPH BLD: NORMAL — SIGNIFICANT CHANGE UP
PLATELET # BLD AUTO: 241 K/UL — SIGNIFICANT CHANGE UP (ref 150–400)
POTASSIUM SERPL-MCNC: 4.8 MMOL/L — SIGNIFICANT CHANGE UP (ref 3.5–5.3)
POTASSIUM SERPL-SCNC: 4.8 MMOL/L — SIGNIFICANT CHANGE UP (ref 3.5–5.3)
PROT SERPL-MCNC: 6.9 GM/DL — SIGNIFICANT CHANGE UP (ref 6–8.3)
PROTHROM AB SERPL-ACNC: 11.9 SEC — SIGNIFICANT CHANGE UP (ref 9.8–12.7)
RBC # BLD: 4.4 M/UL — SIGNIFICANT CHANGE UP (ref 3.8–5.2)
RBC # FLD: 16.4 % — HIGH (ref 11–15)
RBC BLD AUTO: ABNORMAL
SODIUM SERPL-SCNC: 140 MMOL/L — SIGNIFICANT CHANGE UP (ref 135–145)
TROPONIN I SERPL-MCNC: 0.05 NG/ML — HIGH (ref 0.01–0.04)
WBC # BLD: 20.2 K/UL — HIGH (ref 3.8–10.5)
WBC # FLD AUTO: 20.2 K/UL — HIGH (ref 3.8–10.5)

## 2017-04-25 PROCEDURE — 71010: CPT | Mod: 26

## 2017-04-25 PROCEDURE — 99285 EMERGENCY DEPT VISIT HI MDM: CPT

## 2017-04-25 RX ORDER — ACETAMINOPHEN 500 MG
650 TABLET ORAL ONCE
Qty: 0 | Refills: 0 | Status: COMPLETED | OUTPATIENT
Start: 2017-04-25 | End: 2017-04-25

## 2017-04-25 RX ORDER — PIPERACILLIN AND TAZOBACTAM 4; .5 G/20ML; G/20ML
3.38 INJECTION, POWDER, LYOPHILIZED, FOR SOLUTION INTRAVENOUS ONCE
Qty: 0 | Refills: 0 | Status: COMPLETED | OUTPATIENT
Start: 2017-04-25 | End: 2017-04-25

## 2017-04-25 RX ORDER — SODIUM CHLORIDE 9 MG/ML
1000 INJECTION INTRAMUSCULAR; INTRAVENOUS; SUBCUTANEOUS ONCE
Qty: 0 | Refills: 0 | Status: COMPLETED | OUTPATIENT
Start: 2017-04-25 | End: 2017-04-25

## 2017-04-25 RX ORDER — VANCOMYCIN HCL 1 G
1000 VIAL (EA) INTRAVENOUS ONCE
Qty: 0 | Refills: 0 | Status: COMPLETED | OUTPATIENT
Start: 2017-04-25 | End: 2017-04-25

## 2017-04-25 RX ADMIN — SODIUM CHLORIDE 1000 MILLILITER(S): 9 INJECTION INTRAMUSCULAR; INTRAVENOUS; SUBCUTANEOUS at 22:39

## 2017-04-25 RX ADMIN — Medication 250 MILLIGRAM(S): at 23:41

## 2017-04-25 RX ADMIN — PIPERACILLIN AND TAZOBACTAM 200 GRAM(S): 4; .5 INJECTION, POWDER, LYOPHILIZED, FOR SOLUTION INTRAVENOUS at 22:39

## 2017-04-25 RX ADMIN — Medication 650 MILLIGRAM(S): at 22:38

## 2017-04-25 NOTE — ED PROVIDER NOTE - PROGRESS NOTE DETAILS
ICU consult requested. ICU consult requested per admitting physician. Patient more alert and oriented this morning, asking for family.

## 2017-04-25 NOTE — ED PROVIDER NOTE - OBJECTIVE STATEMENT
Pertinent PMH/PSH/FHx/SHx and Review of Systems contained within:  Patient with history of HTN, HLD, CAD/MI s/p stents, presents to the ED for fever and altered mental status.  Patient diagnosed with UTI and influenza B on April 14.  Lives with son who noted that patient was confused and weak today and warm to touch.  Patient is awake, disoriented to time and place.  Appears comfortable, denies any pain.  Other than mildly worsening cough, weakness, and poor appetite, son denies noting any symptoms.

## 2017-04-25 NOTE — ED PROVIDER NOTE - MEDICAL DECISION MAKING DETAILS
Patient presents with fever and altered mental status.  Sepsis work up and broad spectrum abx given.  Patient source seems to be urine.  Does not appear meningeal.  ICU consult in chart, see note.  Patient is to be admitted to the hospital and the case was discussed with the admitting physician.  Any changes in plan, additional imaging/labs, and further work up will be at the discretion of the admitting physician.  Patient remained stable in my care. Patient presents with fever and altered mental status.  Sepsis work up and broad spectrum abx given.  Fever source seems to be UTI.  No signs of meningitis.  ICU consult in chart, see note.  Patient is to be admitted to the hospital and the case was discussed with the admitting physician.  Any changes in plan, additional imaging/labs, and further work up will be at the discretion of the admitting physician.  Patient remained stable in my care.

## 2017-04-25 NOTE — ED PROVIDER NOTE - CARE PLAN
Principal Discharge DX:	Fever, unspecified fever cause  Secondary Diagnosis:	Urinary tract infection without hematuria, site unspecified  Secondary Diagnosis:	Acute encephalopathy

## 2017-04-26 DIAGNOSIS — Z79.52 LONG TERM (CURRENT) USE OF SYSTEMIC STEROIDS: ICD-10-CM

## 2017-04-26 DIAGNOSIS — Z79.899 OTHER LONG TERM (CURRENT) DRUG THERAPY: ICD-10-CM

## 2017-04-26 DIAGNOSIS — N17.9 ACUTE KIDNEY FAILURE, UNSPECIFIED: ICD-10-CM

## 2017-04-26 DIAGNOSIS — I25.2 OLD MYOCARDIAL INFARCTION: ICD-10-CM

## 2017-04-26 DIAGNOSIS — Z79.01 LONG TERM (CURRENT) USE OF ANTICOAGULANTS: ICD-10-CM

## 2017-04-26 DIAGNOSIS — Z95.5 PRESENCE OF CORONARY ANGIOPLASTY IMPLANT AND GRAFT: ICD-10-CM

## 2017-04-26 DIAGNOSIS — R33.9 RETENTION OF URINE, UNSPECIFIED: ICD-10-CM

## 2017-04-26 DIAGNOSIS — I15.9 SECONDARY HYPERTENSION, UNSPECIFIED: ICD-10-CM

## 2017-04-26 DIAGNOSIS — I10 ESSENTIAL (PRIMARY) HYPERTENSION: ICD-10-CM

## 2017-04-26 DIAGNOSIS — I25.10 ATHEROSCLEROTIC HEART DISEASE OF NATIVE CORONARY ARTERY WITHOUT ANGINA PECTORIS: ICD-10-CM

## 2017-04-26 DIAGNOSIS — E78.00 PURE HYPERCHOLESTEROLEMIA, UNSPECIFIED: ICD-10-CM

## 2017-04-26 DIAGNOSIS — Z88.8 ALLERGY STATUS TO OTHER DRUGS, MEDICAMENTS AND BIOLOGICAL SUBSTANCES STATUS: ICD-10-CM

## 2017-04-26 DIAGNOSIS — N39.0 URINARY TRACT INFECTION, SITE NOT SPECIFIED: ICD-10-CM

## 2017-04-26 DIAGNOSIS — M19.90 UNSPECIFIED OSTEOARTHRITIS, UNSPECIFIED SITE: ICD-10-CM

## 2017-04-26 DIAGNOSIS — L88 PYODERMA GANGRENOSUM: ICD-10-CM

## 2017-04-26 DIAGNOSIS — L89.90 PRESSURE ULCER OF UNSPECIFIED SITE, UNSPECIFIED STAGE: ICD-10-CM

## 2017-04-26 DIAGNOSIS — N20.0 CALCULUS OF KIDNEY: ICD-10-CM

## 2017-04-26 LAB
APPEARANCE UR: ABNORMAL
BACTERIA # UR AUTO: ABNORMAL
BILIRUB UR-MCNC: NEGATIVE — SIGNIFICANT CHANGE UP
COLOR SPEC: YELLOW — SIGNIFICANT CHANGE UP
DIFF PNL FLD: ABNORMAL
EPI CELLS # UR: SIGNIFICANT CHANGE UP
GLUCOSE UR QL: NEGATIVE MG/DL — SIGNIFICANT CHANGE UP
KETONES UR-MCNC: NEGATIVE — SIGNIFICANT CHANGE UP
LEUKOCYTE ESTERASE UR-ACNC: ABNORMAL
NITRITE UR-MCNC: POSITIVE
PH UR: 6 — SIGNIFICANT CHANGE UP (ref 5–8)
PROT UR-MCNC: 100 MG/DL
RBC CASTS # UR COMP ASSIST: ABNORMAL /HPF (ref 0–4)
SP GR SPEC: 1.01 — SIGNIFICANT CHANGE UP (ref 1.01–1.02)
UROBILINOGEN FLD QL: NEGATIVE MG/DL — SIGNIFICANT CHANGE UP
WBC UR QL: >50

## 2017-04-26 PROCEDURE — 74176 CT ABD & PELVIS W/O CONTRAST: CPT | Mod: 26

## 2017-04-26 PROCEDURE — 76770 US EXAM ABDO BACK WALL COMP: CPT | Mod: 26

## 2017-04-26 RX ORDER — ASPIRIN/CALCIUM CARB/MAGNESIUM 324 MG
325 TABLET ORAL ONCE
Qty: 0 | Refills: 0 | Status: COMPLETED | OUTPATIENT
Start: 2017-04-26 | End: 2017-04-26

## 2017-04-26 RX ORDER — SODIUM CHLORIDE 9 MG/ML
1000 INJECTION, SOLUTION INTRAVENOUS
Qty: 0 | Refills: 0 | Status: DISCONTINUED | OUTPATIENT
Start: 2017-04-26 | End: 2017-05-06

## 2017-04-26 RX ORDER — ALBUTEROL 90 UG/1
2 AEROSOL, METERED ORAL EVERY 6 HOURS
Qty: 0 | Refills: 0 | Status: DISCONTINUED | OUTPATIENT
Start: 2017-04-26 | End: 2017-05-06

## 2017-04-26 RX ORDER — CLOPIDOGREL BISULFATE 75 MG/1
75 TABLET, FILM COATED ORAL DAILY
Qty: 0 | Refills: 0 | Status: DISCONTINUED | OUTPATIENT
Start: 2017-04-26 | End: 2017-05-06

## 2017-04-26 RX ORDER — ENOXAPARIN SODIUM 100 MG/ML
30 INJECTION SUBCUTANEOUS EVERY 24 HOURS
Qty: 0 | Refills: 0 | Status: DISCONTINUED | OUTPATIENT
Start: 2017-04-26 | End: 2017-05-06

## 2017-04-26 RX ORDER — PIPERACILLIN AND TAZOBACTAM 4; .5 G/20ML; G/20ML
3.38 INJECTION, POWDER, LYOPHILIZED, FOR SOLUTION INTRAVENOUS EVERY 8 HOURS
Qty: 0 | Refills: 0 | Status: DISCONTINUED | OUTPATIENT
Start: 2017-04-26 | End: 2017-04-28

## 2017-04-26 RX ORDER — SENNA PLUS 8.6 MG/1
2 TABLET ORAL AT BEDTIME
Qty: 0 | Refills: 0 | Status: DISCONTINUED | OUTPATIENT
Start: 2017-04-26 | End: 2017-05-06

## 2017-04-26 RX ORDER — ROSUVASTATIN CALCIUM 5 MG/1
10 TABLET ORAL AT BEDTIME
Qty: 0 | Refills: 0 | Status: DISCONTINUED | OUTPATIENT
Start: 2017-04-26 | End: 2017-04-26

## 2017-04-26 RX ORDER — FUROSEMIDE 40 MG
40 TABLET ORAL DAILY
Qty: 0 | Refills: 0 | Status: DISCONTINUED | OUTPATIENT
Start: 2017-04-26 | End: 2017-04-26

## 2017-04-26 RX ORDER — BISOPROLOL FUMARATE 10 MG/1
2.5 TABLET, FILM COATED ORAL DAILY
Qty: 0 | Refills: 0 | Status: DISCONTINUED | OUTPATIENT
Start: 2017-04-26 | End: 2017-05-06

## 2017-04-26 RX ORDER — ONDANSETRON 8 MG/1
4 TABLET, FILM COATED ORAL EVERY 6 HOURS
Qty: 0 | Refills: 0 | Status: DISCONTINUED | OUTPATIENT
Start: 2017-04-26 | End: 2017-05-06

## 2017-04-26 RX ORDER — AMIODARONE HYDROCHLORIDE 400 MG/1
200 TABLET ORAL DAILY
Qty: 0 | Refills: 0 | Status: DISCONTINUED | OUTPATIENT
Start: 2017-04-26 | End: 2017-05-06

## 2017-04-26 RX ORDER — ACETAMINOPHEN 500 MG
650 TABLET ORAL EVERY 6 HOURS
Qty: 0 | Refills: 0 | Status: DISCONTINUED | OUTPATIENT
Start: 2017-04-26 | End: 2017-05-06

## 2017-04-26 RX ADMIN — ENOXAPARIN SODIUM 30 MILLIGRAM(S): 100 INJECTION SUBCUTANEOUS at 05:43

## 2017-04-26 RX ADMIN — PIPERACILLIN AND TAZOBACTAM 25 GRAM(S): 4; .5 INJECTION, POWDER, LYOPHILIZED, FOR SOLUTION INTRAVENOUS at 05:42

## 2017-04-26 RX ADMIN — CLOPIDOGREL BISULFATE 75 MILLIGRAM(S): 75 TABLET, FILM COATED ORAL at 12:25

## 2017-04-26 RX ADMIN — PIPERACILLIN AND TAZOBACTAM 25 GRAM(S): 4; .5 INJECTION, POWDER, LYOPHILIZED, FOR SOLUTION INTRAVENOUS at 13:14

## 2017-04-26 RX ADMIN — AMIODARONE HYDROCHLORIDE 200 MILLIGRAM(S): 400 TABLET ORAL at 05:44

## 2017-04-26 RX ADMIN — BISOPROLOL FUMARATE 2.5 MILLIGRAM(S): 10 TABLET, FILM COATED ORAL at 22:41

## 2017-04-26 RX ADMIN — Medication 325 MILLIGRAM(S): at 01:09

## 2017-04-26 RX ADMIN — SODIUM CHLORIDE 75 MILLILITER(S): 9 INJECTION, SOLUTION INTRAVENOUS at 04:50

## 2017-04-26 RX ADMIN — Medication 650 MILLIGRAM(S): at 08:21

## 2017-04-26 RX ADMIN — PIPERACILLIN AND TAZOBACTAM 25 GRAM(S): 4; .5 INJECTION, POWDER, LYOPHILIZED, FOR SOLUTION INTRAVENOUS at 22:40

## 2017-04-26 RX ADMIN — Medication 40 MILLIGRAM(S): at 05:42

## 2017-04-26 NOTE — CONSULT NOTE ADULT - SUBJECTIVE AND OBJECTIVE BOX
Patient is a 86y old  Female who presents with a chief complaint of Mental status change (2017 13:34)    Full records in shadow chart.      HPI:       Noted pyuria; bilateral hydronephrosis with renal stones on imaging.    PAST MEDICAL & SURGICAL HISTORY:  Ventricular bigeminy: history of ablation  MI (myocardial infarction)  High cholesterol  HTN (hypertension)  S/P coronary artery stent placement  History of tonsillectomy  Cataract, bilateral  History of appendectomy    FAMILY HISTORY:  No pertinent family history in first degree relatives    Benadryl Allergy (Other)  metoprolol (Other)    MEDICATIONS  (STANDING):  enoxaparin Injectable 30milliGRAM(s) SubCutaneous every 24 hours  dextrose 5% + sodium chloride 0.9%. 1000milliLiter(s) IV Continuous <Continuous>  predniSONE   Tablet 10milliGRAM(s) Oral daily  amiodarone    Tablet 200milliGRAM(s) Oral daily  clopidogrel Tablet 75milliGRAM(s) Oral daily  piperacillin/tazobactam IVPB. 3.375Gram(s) IV Intermittent every 8 hours    MEDICATIONS  (PRN):  acetaminophen   Tablet 650milliGRAM(s) Oral every 6 hours PRN For Temp greater than 38 C (100.4 F)  ondansetron Injectable 4milliGRAM(s) IV Push every 6 hours PRN Nausea  aluminum hydroxide/magnesium hydroxide/simethicone Suspension 30milliLiter(s) Oral every 4 hours PRN Dyspepsia  senna 2Tablet(s) Oral at bedtime PRN Constipation  ALBUTerol    90 MICROgram(s) HFA Inhaler 2Puff(s) Inhalation every 6 hours PRN Shortness of Breath    Vital Signs Last 24 Hrs  T(C): 37, Max: 38.5 (- @ 08:12)  T(F): 98.6, Max: 101.3 (- @ 08:12)  HR: 70 (70 - 100)  BP: 115/64 (115/64 - 136/93)  BP(mean): --  RR: 18 (16 - 23)  SpO2: 98% (94% - 98%)    CAPILLARY BLOOD GLUCOSE    PHYSICAL EXAM:    alert, confused, easily reoriented      T(C): 37, Max: 38.5 (- @ 08:12)  HR: 70 (70 - 100)  BP: 115/64 (115/64 - 136/93)  RR: 18 (16 - 23)  SpO2: 98% (94% - 98%)  Wt(kg): --  Respiratory: clear anteriorly, decreased BS at bases  Cardiovascular: S1 S2  Gastrointestinal: soft NT ND +BS  Extremities:    1 +   edema                  140  |  104  |  34<H>  ----------------------------<  108<H>  4.8   |  26  |  2.19<H>    Ca    8.9      2017 22:49    TPro  6.9  /  Alb  2.9<L>  /  TBili  0.6  /  DBili  x   /  AST  39<H>  /  ALT  32  /  AlkPhos  113                            13.3   20.2  )-----------( 241      ( 2017 22:49 )             38.0     Urinalysis Basic - ( 2017 00:42 )    Color: Yellow / Appearance: Slightly Turbid / S.010 / pH: x  Gluc: x / Ketone: Negative  / Bili: Negative / Urobili: Negative mg/dL   Blood: x / Protein: 100 mg/dL / Nitrite: Positive   Leuk Esterase: Moderate / RBC: 3-5 /HPF / WBC >50   Sq Epi: x / Non Sq Epi: Few / Bacteria: Many        Assessment and Plan    ASHD  Stable CV status  Agree with plans as outlined.

## 2017-04-26 NOTE — CONSULT NOTE ADULT - ATTENDING COMMENTS
86 year old female with  HLD, HTN, CAD s/p PTCA, s/ p MI, brought in by EMS after son activated for mental status change and fever.  noted to have recurrent UTI and ? tabatha on ckd  hemodynamics stale at time of consult    Plan  admit to medical floor  IVF  IV abx  renal eval  US abd  reconsult ICu as needed.

## 2017-04-26 NOTE — CONSULT NOTE ADULT - SUBJECTIVE AND OBJECTIVE BOX
Patient is a 86y old  Female with PMhf of HLD, HTN, CAD s/p PTCA, s/ p MI, brought in by EMS after son activated for mental status change and fever. As per chart, patient was admitted to Telemetry from - for Influ B and UTI. Pt lives with son who noted that patient was confused and weak today and warm to touch.  Patient is awake, and alert, knows who  called EMS, and how got to the hospital, " I am having problem remembering the year", otherwise pleasantly confused.  Pt vital signs were: 116/70 HR 70's O2 100% on room air.  ICU consulted for AMS.      PAST MEDICAL & SURGICAL HISTORY:  Ventricular bigeminy: history of ablation  MI (myocardial infarction)  High cholesterol  HTN (hypertension)  S/P coronary artery stent placement  History of tonsillectomy  Cataract, bilateral  History of appendectomy    FAMILY HISTORY:  No pertinent family history in first degree relatives    Social History: Allergies    Benadryl Allergy (Other)    Intolerances    metoprolol (Other)      REVIEW OF SYSTEMS:    see HPI    PHYSICAL EXAM:    T(C): 37.1, Max: 37.9 ( @ 21:31)  T(F): 98.7, Max: 100.3 ( @ 21:31)  HR: 91 (91 - 100)  BP: 132/87 (132/87 - 136/93)  RR: 20 (20 - 22)  SpO2: 95% (94% - 95%)    GENERAL: awake and alert, knows self in hospital, cannot remember the year  HEAD:  Atraumatic, Normocephalic  NECK: Supple, No JVD, Normal thyroid  CHEST/LUNG: CTA bilaterally; No rales, rhonchi, wheezing, or rubs  HEART: Regular rate and rhythm; No murmurs, rubs, or gallops  ABDOMEN: Soft, Nontender, Nondistended; Bowel sounds present  EXTREMITIES:  +4 edema to right lower leg, 1+ to left leg          LABS:                        13.3   20.2  )-----------( 241      ( 2017 22:49 )             38.0         140  |  104  |  34<H>  ----------------------------<  108<H>  4.8   |  26  |  2.19<H>    Ca    8.9      2017 22:49    TPro  6.9  /  Alb  2.9<L>  /  TBili  0.6  /  DBili  x   /  AST  39<H>  /  ALT  32  /  AlkPhos  113  04-25    PT/INR - ( 2017 22:49 )   PT: 11.9 sec;   INR: 1.09 ratio         PTT - ( 2017 22:49 )  PTT:26.6 sec  Urinalysis Basic - ( 2017 00:42 )    Color: Yellow / Appearance: Slightly Turbid / S.010 / pH: x  Gluc: x / Ketone: Negative  / Bili: Negative / Urobili: Negative mg/dL   Blood: x / Protein: 100 mg/dL / Nitrite: Positive   Leuk Esterase: Moderate / RBC: 3-5 /HPF / WBC >50   Sq Epi: x / Non Sq Epi: Few / Bacteria: Many         A/P   Patient is a 86y old  Female with PMhf of HLD, HTN, CAD s/p PTCA, s/ p MI, here for Mental status change, with history of Dementia.    -patient not an ICU candidate this time  -all labs reviewed, normal lactate, with high white count and abnormal UA, possible another UTI  -would continue abx, urine culture  -please re-consult, when patient condition changes.  -case d/w EICU attending    CRITICAL CARE TIME SPENT:40minutes.

## 2017-04-26 NOTE — CONSULT NOTE ADULT - SUBJECTIVE AND OBJECTIVE BOX
HPI:  87 yo lady with acute MS changes and fever. Admitted with acute pylonephritis. She notes frequency and nocturia. She denies any dysuria, hematuria or flank pain.(2017 13:34)      PAST MEDICAL & SURGICAL HISTORY:  Ventricular bigeminy: history of ablation  MI (myocardial infarction)  High cholesterol  HTN (hypertension)  S/P coronary artery stent placement  History of tonsillectomy  Cataract, bilateral  History of appendectomy      REVIEW OF SYSTEMS:    General: Fever	    Respiratory and Thorax: no sob  	  Cardiovascular:	no cp    Gastrointestinal:	 diarrhea    Genitourinary: no flank pain	    Musculoskeletal: no bone pain	      MEDICATIONS  (STANDING):  enoxaparin Injectable 30milliGRAM(s) SubCutaneous every 24 hours  dextrose 5% + sodium chloride 0.9%. 1000milliLiter(s) IV Continuous <Continuous>  predniSONE   Tablet 10milliGRAM(s) Oral daily  amiodarone    Tablet 200milliGRAM(s) Oral daily  clopidogrel Tablet 75milliGRAM(s) Oral daily  piperacillin/tazobactam IVPB. 3.375Gram(s) IV Intermittent every 8 hours    MEDICATIONS  (PRN):  acetaminophen   Tablet 650milliGRAM(s) Oral every 6 hours PRN For Temp greater than 38 C (100.4 F)  ondansetron Injectable 4milliGRAM(s) IV Push every 6 hours PRN Nausea  aluminum hydroxide/magnesium hydroxide/simethicone Suspension 30milliLiter(s) Oral every 4 hours PRN Dyspepsia  senna 2Tablet(s) Oral at bedtime PRN Constipation  ALBUTerol    90 MICROgram(s) HFA Inhaler 2Puff(s) Inhalation every 6 hours PRN Shortness of Breath      Allergies    Benadryl Allergy (Other)    Intolerances    metoprolol (Other)      SOCIAL HISTORY:    FAMILY HISTORY:  No pertinent family history in first degree relatives      Vital Signs Last 24 Hrs  T(C): 37.2, Max: 38.5 ( @ 08:12)  T(F): 99, Max: 101.3 (- @ 08:12)  HR: 77 (70 - 100)  BP: 133/71 (115/64 - 136/93)  BP(mean): --  RR: 20 (16 - 23)  SpO2: 97% (94% - 98%)      PHYSICAL EXAM:    Constitutional: WDWN female    Gastrointestinal: soft, ND, NT    Genitourinary: No cva    Extremities: no edema      LABS:                        13.3   20.2  )-----------( 241      ( 2017 22:49 )             38.0         140  |  104  |  34<H>  ----------------------------<  108<H>  4.8   |  26  |  2.19<H>    Ca    8.9      2017 22:49    TPro  6.9  /  Alb  2.9<L>  /  TBili  0.6  /  DBili  x   /  AST  39<H>  /  ALT  32  /  AlkPhos  113      PT/INR - ( 2017 22:49 )   PT: 11.9 sec;   INR: 1.09 ratio         PTT - ( 2017 22:49 )  PTT:26.6 sec  Urinalysis Basic - ( 2017 00:42 )    Color: Yellow / Appearance: Slightly Turbid / S.010 / pH: x  Gluc: x / Ketone: Negative  / Bili: Negative / Urobili: Negative mg/dL   Blood: x / Protein: 100 mg/dL / Nitrite: Positive   Leuk Esterase: Moderate / RBC: 3-5 /HPF / WBC >50   Sq Epi: x / Non Sq Epi: Few / Bacteria: Many        RADIOLOGY & ADDITIONAL STUDIES:

## 2017-04-26 NOTE — CONSULT NOTE ADULT - ASSESSMENT
86 yrs old white female as fever , leucocytosis , infected ulcer on lateral aspect of right lower leg.  CT scan : is negative for obstruction. Source of fever may be from infected leg wound or from urine.  No surgical intervention necessary at present. Continue Higuera catheter.  await for cultures

## 2017-04-26 NOTE — CONSULT NOTE ADULT - SUBJECTIVE AND OBJECTIVE BOX
HPI:  85 yo lady with acute MS changes and fever. Admitted with acute pylonephritis. (2017 13:34)    pt has fever, renal sonogram: Bilateral hydronephrosis, leucocytosis with left shift.    She hs been my old pt, took care of her in May and 2016 for similar problem. No upper tract obstruction was found and was treated with Higuera catheter and improvement. However in view of fever and possible stones will get CT scan for renal stone hunt to r/o stone, requiring urgent intervention. I have ordered the CT and personally spoke to the CT tech for CT ASAP.        PAST MEDICAL & SURGICAL HISTORY:  Ventricular bigeminy: history of ablation  MI (myocardial infarction)  High cholesterol  HTN (hypertension)  S/P coronary artery stent placement  History of tonsillectomy  Cataract, bilateral  History of appendectomy  bilateral hydronephrosis        Allergies    Benadryl Allergy (Other)    Intolerances    metoprolol (Other)    FAMILY HISTORY:  No pertinent family history in first degree relatives      MEDICATIONS  (STANDING):  enoxaparin Injectable 30milliGRAM(s) SubCutaneous every 24 hours  dextrose 5% + sodium chloride 0.9%. 1000milliLiter(s) IV Continuous <Continuous>  predniSONE   Tablet 10milliGRAM(s) Oral daily  amiodarone    Tablet 200milliGRAM(s) Oral daily  clopidogrel Tablet 75milliGRAM(s) Oral daily  piperacillin/tazobactam IVPB. 3.375Gram(s) IV Intermittent every 8 hours    MEDICATIONS  (PRN):  acetaminophen   Tablet 650milliGRAM(s) Oral every 6 hours PRN For Temp greater than 38 C (100.4 F)  ondansetron Injectable 4milliGRAM(s) IV Push every 6 hours PRN Nausea  aluminum hydroxide/magnesium hydroxide/simethicone Suspension 30milliLiter(s) Oral every 4 hours PRN Dyspepsia  senna 2Tablet(s) Oral at bedtime PRN Constipation  ALBUTerol    90 MICROgram(s) HFA Inhaler 2Puff(s) Inhalation every 6 hours PRN Shortness of Breath      ROS:  difficult to evaluate because of AMS    General: +fevers, chills, night sweats  Eyes:  Good vision, no reported pain  ENT:  No sore throat, pain, runny nose, dysphagia  CV:  No pain, palpitatioins, hypo/hypertension  Resp:  No dyspnea, cough, tachypnea, wheezing  GI:  No pain, nausea, vomiting, diarrhea, constipatiion  :  + nocturia,+ frequency  Muscle:  No pain, weakness  Neuro:  No weakness, tingling, memory problems  Psych:  AMS  Endocrine:  No polyuria, polydypsia, cold/heat intolerance  Heme:  No petechiae, ecchymosis, easy bruisability  Skin:  No rash, tattoos, scars, edema      Physical Exam:    Vital Signs:  Vital Signs Last 24 Hrs  T(C): 37.7, Max: 38.5 ( @ 08:12)  T(F): 99.8, Max: 101.3 ( @ 08:12)  HR: 86 (70 - 100)  BP: 145/95 (115/64 - 145/95)  BP(mean): --  RR: 18 (16 - 23)  SpO2: 96% (94% - 98%)  Daily Height in cm: 160.02 (2017 21:31)        General:  Appears stated age,  well-nourished, no distress  HEENT:  NC/AT, patent nares w/ pink mucosa, OP moist and pink,  PERRL, EOMI, conjunctivae clear, no thyromegaly, nodules, adenopathy, no JVD  Chest:  Full & symmetric excursion, no increased effort.   Cardiovascular:  Regular rhythm, S1, S2,   Abdomen:  Soft, non-tender, non-distended, normoactive bowel sounds.  Pelvic: deferred  Rectal Examination: Deferred.  Extremities:  infected ulcer lateral aspect of right lower leg  Skin:  infected ulcer lateral aspect of right lower leg  Musculoskeletal:  no pain  Neuro/Psych: confused      LABS:                        13.3   20.2  )-----------( 241      ( 2017 22:49 )             38.0         140  |  104  |  34<H>  ----------------------------<  108<H>  4.8   |  26  |  2.19<H>    Ca    8.9      2017 22:49    TPro  6.9  /  Alb  2.9<L>  /  TBili  0.6  /  DBili  x   /  AST  39<H>  /  ALT  32  /  AlkPhos  113      PT/INR - ( 2017 22:49 )   PT: 11.9 sec;   INR: 1.09 ratio         PTT - ( 2017 22:49 )  PTT:26.6 sec  Urinalysis Basic - ( 2017 00:42 )    Color: Yellow / Appearance: Slightly Turbid / S.010 / pH: x  Gluc: x / Ketone: Negative  / Bili: Negative / Urobili: Negative mg/dL   Blood: x / Protein: 100 mg/dL / Nitrite: Positive   Leuk Esterase: Moderate / RBC: 3-5 /HPF / WBC >50   Sq Epi: x / Non Sq Epi: Few / Bacteria: Many        RADIOLOGY & ADDITIONAL STUDIES:  EXAM:  CT RENAL STONE HUNT                            PROCEDURE DATE:  2017        INTERPRETATION:  CLINICAL INFORMATION: Pyelonephritis. Fever and UTI.    COMPARISON: CT abdomen and pelvis 10/8/2016    PROCEDURE:   CT of the Abdomen and Pelvis was performed without intravenous contrast.   Intravenous contrast: None.  Oral contrast: None.  Sagittal and coronal reformats were performed.    FINDINGS:    LOWER CHEST: Within normal limits.    LIVER: Stable indeterminate 1.3 cm hypodense lesion in segment 7.  BILE DUCTS: Normal caliber.  GALLBLADDER: Within normal limits.  SPLEEN: Stable indeterminate 1.3 cm hypodense splenic lesion .  PANCREAS: Within normal limits.  ADRENALS: Within normal limits.  KIDNEYS/URETERS: No hydroureteronephrosis or nephroureterolithiasis..    BLADDER: Collapsed around a Higuera catheter balloon.  REPRODUCTIVE ORGANS: The uterus and adnexa are within normal limits.     BOWEL: No bowel obstruction. Sigmoid diverticulosis.  PERITONEUM: No ascites.  VESSELS:  Within normal limits.  RETROPERITONEUM: No lymphadenopathy.    ABDOMINAL WALL: Subcutaneous gas in the anterior abdominal wall likely   due to recent injection. Calcified injection granulomas in bilateral   gluteal regions  BONES: Degenerative changes of the spine. Stable grade 1 retrolisthesis   of L5 on S1. New minimal grade 1 anterolisthesis of L4 on L5. L1, age   indeterminant, but new since prior study. There is minimal bony   retropulsion at this level.      IMPRESSION: No evidence for obstructive uropathy.    Age-indeterminate mild compression deformity at L1, new since prior study   with minimal bony retropulsion. Correlate for the presence of point   tenderness.              MILAN WHITE M.D., ATTENDING RADIOLOGIST  This document has been electronically signed. 2017  5:27PM      EXAM:  US KIDNEYS AND BLADDER                            PROCEDURE DATE:  2017        INTERPRETATION:    Date of study: 4/26/17.    Prior: Had renal sonogram done 17.    Clinical history: 86-yo-female - fever; altered mental status. Assess   renal failure.    Ultrasound of the kidneys and bladder:  Right kidney: The right kidney is 9.6cm in greatest longitudinal   diameter. There is moderate right hydroureteronephrosis. No right renal   calculi seen. There is slightly increased rightrenal cortical   echogenicity.    Left kidney: The left kidney is 9.4cm in greatest longitudinal diameter.   There is mild to moderate left-sided hydroureteronephrosis. Two   nonobstructing left renal calculi seen - the larger is 1.2cm. there is   slightly increased left renal cortical echogenicity.    Urinary bladder:   Prevoid volume is 315.5mls. The patient did not feel like voiding.  There is moveable debris at the bladder base.  Unable to visualize the bilateral ureteral jets.      IMPRESSION:   Moderate right hydroureteronephrosis, similar in appearance to the   17 study.  Interval development of mild to moderate left-sided hydronephrosis since   17 exam. Nonobstructing left renal calculi now seen.  Mildly increased bilateral renal cortical echogenicity compatible with   medical renal disease.  Bladder volume as above. Moveable bladder debris noted.               TAINA AMAYA M.D., ATTENDING RADIOLOGIST  This document has been electronically signed. 2017 12:19PM     EXAM:  CHEST SINGLE VIEW                            PROCEDURE DATE:  2017        INTERPRETATION:  PROCEDURE: AP view of the chest.    CLINICAL INFORMATION: Fever    COMPARISON: 2017    FINDINGS:        There are no focal air space opacities. The cardiac and mediastinal   contours are prominent, which may be due to magnification from AP   technique and shallow inspiration. The osseous structures are intact.    IMPRESSION:    No focal air space opacities.                EDGAR JAMES M.D., ATTENDING RADIOLOGIST  This document has been electronically signed. 2017  8:52AM

## 2017-04-26 NOTE — CONSULT NOTE ADULT - SUBJECTIVE AND OBJECTIVE BOX
Patient is a 86y old  Female who presents with a chief complaint of Mental status change (2017 13:34)    HPI:  87 yo lady with acute MS changes and fever. Admitted with acute pylonephritis. (2017 13:34)    Noted pyuria; bilateral hydronephrosis with renal stones on imaging.    PAST MEDICAL & SURGICAL HISTORY:  Ventricular bigeminy: history of ablation  MI (myocardial infarction)  High cholesterol  HTN (hypertension)  S/P coronary artery stent placement  History of tonsillectomy  Cataract, bilateral  History of appendectomy    FAMILY HISTORY:  No pertinent family history in first degree relatives    Benadryl Allergy (Other)  metoprolol (Other)    MEDICATIONS  (STANDING):  enoxaparin Injectable 30milliGRAM(s) SubCutaneous every 24 hours  dextrose 5% + sodium chloride 0.9%. 1000milliLiter(s) IV Continuous <Continuous>  predniSONE   Tablet 10milliGRAM(s) Oral daily  amiodarone    Tablet 200milliGRAM(s) Oral daily  clopidogrel Tablet 75milliGRAM(s) Oral daily  piperacillin/tazobactam IVPB. 3.375Gram(s) IV Intermittent every 8 hours    MEDICATIONS  (PRN):  acetaminophen   Tablet 650milliGRAM(s) Oral every 6 hours PRN For Temp greater than 38 C (100.4 F)  ondansetron Injectable 4milliGRAM(s) IV Push every 6 hours PRN Nausea  aluminum hydroxide/magnesium hydroxide/simethicone Suspension 30milliLiter(s) Oral every 4 hours PRN Dyspepsia  senna 2Tablet(s) Oral at bedtime PRN Constipation  ALBUTerol    90 MICROgram(s) HFA Inhaler 2Puff(s) Inhalation every 6 hours PRN Shortness of Breath    Vital Signs Last 24 Hrs  T(C): 37, Max: 38.5 (04- @ 08:12)  T(F): 98.6, Max: 101.3 (04- @ 08:12)  HR: 70 (70 - 100)  BP: 115/64 (115/64 - 136/93)  BP(mean): --  RR: 18 (16 - 23)  SpO2: 98% (94% - 98%)    CAPILLARY BLOOD GLUCOSE    PHYSICAL EXAM:      T(C): 37, Max: 38.5 (- @ 08:12)  HR: 70 (70 - 100)  BP: 115/64 (115/64 - 136/93)  RR: 18 (16 - 23)  SpO2: 98% (94% - 98%)  Wt(kg): --  Respiratory: clear anteriorly, decreased BS at bases  Cardiovascular: S1 S2  Gastrointestinal: soft NT ND +BS  Extremities:    1 + dressed edema                  140  |  104  |  34<H>  ----------------------------<  108<H>  4.8   |  26  |  2.19<H>    Ca    8.9      2017 22:49    TPro  6.9  /  Alb  2.9<L>  /  TBili  0.6  /  DBili  x   /  AST  39<H>  /  ALT  32  /  AlkPhos  113                            13.3   20.2  )-----------( 241      ( 2017 22:49 )             38.0     Urinalysis Basic - ( 2017 00:42 )    Color: Yellow / Appearance: Slightly Turbid / S.010 / pH: x  Gluc: x / Ketone: Negative  / Bili: Negative / Urobili: Negative mg/dL   Blood: x / Protein: 100 mg/dL / Nitrite: Positive   Leuk Esterase: Moderate / RBC: 3-5 /HPF / WBC >50   Sq Epi: x / Non Sq Epi: Few / Bacteria: Many        Assessment and Plan    FÉLIX pre/post renal azotemia, AIN infectious w/ pyelonephritis.  IVF; IV abx; urology opinion.  Stress dose steroids pending MAP trend.

## 2017-04-27 LAB
ALBUMIN SERPL ELPH-MCNC: 2.3 G/DL — LOW (ref 3.3–5)
ALP SERPL-CCNC: 89 U/L — SIGNIFICANT CHANGE UP (ref 40–120)
ALT FLD-CCNC: 24 U/L — SIGNIFICANT CHANGE UP (ref 12–78)
ANION GAP SERPL CALC-SCNC: 8 MMOL/L — SIGNIFICANT CHANGE UP (ref 5–17)
AST SERPL-CCNC: 27 U/L — SIGNIFICANT CHANGE UP (ref 15–37)
BILIRUB SERPL-MCNC: 0.4 MG/DL — SIGNIFICANT CHANGE UP (ref 0.2–1.2)
BUN SERPL-MCNC: 25 MG/DL — HIGH (ref 7–23)
CALCIUM SERPL-MCNC: 8.1 MG/DL — LOW (ref 8.5–10.1)
CHLORIDE SERPL-SCNC: 104 MMOL/L — SIGNIFICANT CHANGE UP (ref 96–108)
CO2 SERPL-SCNC: 30 MMOL/L — SIGNIFICANT CHANGE UP (ref 22–31)
CREAT SERPL-MCNC: 2.02 MG/DL — HIGH (ref 0.5–1.3)
GLUCOSE SERPL-MCNC: 129 MG/DL — HIGH (ref 70–99)
HCT VFR BLD CALC: 33.7 % — LOW (ref 34.5–45)
HGB BLD-MCNC: 11.7 G/DL — SIGNIFICANT CHANGE UP (ref 11.5–15.5)
MCHC RBC-ENTMCNC: 30 PG — SIGNIFICANT CHANGE UP (ref 27–34)
MCHC RBC-ENTMCNC: 34.7 GM/DL — SIGNIFICANT CHANGE UP (ref 32–36)
MCV RBC AUTO: 86.5 FL — SIGNIFICANT CHANGE UP (ref 80–100)
PLATELET # BLD AUTO: 191 K/UL — SIGNIFICANT CHANGE UP (ref 150–400)
POTASSIUM SERPL-MCNC: 3.1 MMOL/L — LOW (ref 3.5–5.3)
POTASSIUM SERPL-SCNC: 3.1 MMOL/L — LOW (ref 3.5–5.3)
PROT SERPL-MCNC: 5.7 GM/DL — LOW (ref 6–8.3)
RBC # BLD: 3.9 M/UL — SIGNIFICANT CHANGE UP (ref 3.8–5.2)
RBC # FLD: 16.5 % — HIGH (ref 11–15)
SODIUM SERPL-SCNC: 142 MMOL/L — SIGNIFICANT CHANGE UP (ref 135–145)
WBC # BLD: 14.7 K/UL — HIGH (ref 3.8–10.5)
WBC # FLD AUTO: 14.7 K/UL — HIGH (ref 3.8–10.5)

## 2017-04-27 RX ORDER — ALPRAZOLAM 0.25 MG
0.25 TABLET ORAL EVERY 8 HOURS
Qty: 0 | Refills: 0 | Status: DISCONTINUED | OUTPATIENT
Start: 2017-04-27 | End: 2017-04-30

## 2017-04-27 RX ORDER — POTASSIUM CHLORIDE 20 MEQ
40 PACKET (EA) ORAL EVERY 4 HOURS
Qty: 0 | Refills: 0 | Status: COMPLETED | OUTPATIENT
Start: 2017-04-27 | End: 2017-04-27

## 2017-04-27 RX ORDER — POTASSIUM CHLORIDE 20 MEQ
40 PACKET (EA) ORAL EVERY 4 HOURS
Qty: 0 | Refills: 0 | Status: DISCONTINUED | OUTPATIENT
Start: 2017-04-27 | End: 2017-04-27

## 2017-04-27 RX ORDER — POTASSIUM CHLORIDE 20 MEQ
10 PACKET (EA) ORAL
Qty: 0 | Refills: 0 | Status: COMPLETED | OUTPATIENT
Start: 2017-04-27 | End: 2017-04-27

## 2017-04-27 RX ADMIN — BISOPROLOL FUMARATE 2.5 MILLIGRAM(S): 10 TABLET, FILM COATED ORAL at 06:14

## 2017-04-27 RX ADMIN — Medication 40 MILLIEQUIVALENT(S): at 17:55

## 2017-04-27 RX ADMIN — Medication 100 MILLIEQUIVALENT(S): at 11:53

## 2017-04-27 RX ADMIN — AMIODARONE HYDROCHLORIDE 200 MILLIGRAM(S): 400 TABLET ORAL at 06:08

## 2017-04-27 RX ADMIN — PIPERACILLIN AND TAZOBACTAM 25 GRAM(S): 4; .5 INJECTION, POWDER, LYOPHILIZED, FOR SOLUTION INTRAVENOUS at 22:12

## 2017-04-27 RX ADMIN — Medication 10 MILLIGRAM(S): at 06:07

## 2017-04-27 RX ADMIN — ENOXAPARIN SODIUM 30 MILLIGRAM(S): 100 INJECTION SUBCUTANEOUS at 06:07

## 2017-04-27 RX ADMIN — SODIUM CHLORIDE 75 MILLILITER(S): 9 INJECTION, SOLUTION INTRAVENOUS at 17:50

## 2017-04-27 RX ADMIN — CLOPIDOGREL BISULFATE 75 MILLIGRAM(S): 75 TABLET, FILM COATED ORAL at 11:56

## 2017-04-27 RX ADMIN — Medication 100 MILLIEQUIVALENT(S): at 13:50

## 2017-04-27 RX ADMIN — Medication 100 MILLIEQUIVALENT(S): at 10:36

## 2017-04-27 RX ADMIN — Medication 40 MILLIEQUIVALENT(S): at 10:35

## 2017-04-27 RX ADMIN — PIPERACILLIN AND TAZOBACTAM 25 GRAM(S): 4; .5 INJECTION, POWDER, LYOPHILIZED, FOR SOLUTION INTRAVENOUS at 06:15

## 2017-04-27 RX ADMIN — PIPERACILLIN AND TAZOBACTAM 25 GRAM(S): 4; .5 INJECTION, POWDER, LYOPHILIZED, FOR SOLUTION INTRAVENOUS at 13:51

## 2017-04-27 RX ADMIN — Medication 0.25 MILLIGRAM(S): at 22:12

## 2017-04-27 NOTE — PROGRESS NOTE ADULT - SUBJECTIVE AND OBJECTIVE BOX
Subjective: comfortable. Higuera maintained.       MEDICATIONS  (STANDING):  enoxaparin Injectable 30milliGRAM(s) SubCutaneous every 24 hours  dextrose 5% + sodium chloride 0.9%. 1000milliLiter(s) IV Continuous <Continuous>  predniSONE   Tablet 10milliGRAM(s) Oral daily  amiodarone    Tablet 200milliGRAM(s) Oral daily  clopidogrel Tablet 75milliGRAM(s) Oral daily  piperacillin/tazobactam IVPB. 3.375Gram(s) IV Intermittent every 8 hours  bisoprolol   Tablet 2.5milliGRAM(s) Oral daily  freetext medication  - 1Tablet(s) Oral daily    MEDICATIONS  (PRN):  acetaminophen   Tablet 650milliGRAM(s) Oral every 6 hours PRN For Temp greater than 38 C (100.4 F)  ondansetron Injectable 4milliGRAM(s) IV Push every 6 hours PRN Nausea  aluminum hydroxide/magnesium hydroxide/simethicone Suspension 30milliLiter(s) Oral every 4 hours PRN Dyspepsia  senna 2Tablet(s) Oral at bedtime PRN Constipation  ALBUTerol    90 MICROgram(s) HFA Inhaler 2Puff(s) Inhalation every 6 hours PRN Shortness of Breath          T(C): 38.2, Max: 38.2 (04-27 @ 05:34)  HR: 84 (70 - 86)  BP: 149/76 (115/64 - 149/76)  RR: 18 (18 - 20)  SpO2: 96% (96% - 98%)  Wt(kg): --        I&O's; 1100/900    I & Os for current day (as of 2017 09:26)  =============================================  IN:    dextrose 5% + sodium chloride 0.9%.: 900 ml    IV PiggyBack: 200 ml    Total IN: 1100 ml  ---------------------------------------------  OUT:    Indwelling Catheter - Urethral: 900 ml    Total OUT: 900 ml  ---------------------------------------------  Total NET: 200 ml           PHYSICAL EXAM:    GENERAL: comfortable  EYES: EOMI, PERRLA, conjunctiva and sclera clear  NECK: Supple, no inc in JVP  CHEST/LUNG: Clear  HEART: S1S2  ABDOMEN: Soft, Nontender, Nondistended; Bowel sounds present  EXTREMITIES:  trace edema  NEURO: no asterixis      LABS:  CBC Full  -  ( 2017 07:27 )  WBC Count : 14.7 K/uL  Hemoglobin : 11.7 g/dL  Hematocrit : 33.7 %  Platelet Count - Automated : 191 K/uL  Mean Cell Volume : 86.5 fl  Mean Cell Hemoglobin : 30.0 pg  Mean Cell Hemoglobin Concentration : 34.7 gm/dL  Auto Neutrophil # : x  Auto Lymphocyte # : x  Auto Monocyte # : x  Auto Eosinophil # : x  Auto Basophil # : x  Auto Neutrophil % : x  Auto Lymphocyte % : x  Auto Monocyte % : x  Auto Eosinophil % : x  Auto Basophil % : x        142  |  104  |  25<H>  ----------------------------<  129<H>  3.1<L>   |  30  |  2.02<H>    Ca    8.1<L>      2017 07:27    TPro  5.7<L>  /  Alb  2.3<L>  /  TBili  0.4  /  DBili  x   /  AST  27  /  ALT  24  /  AlkPhos  89      PT/INR - ( 2017 22:49 )   PT: 11.9 sec;   INR: 1.09 ratio         PTT - ( 2017 22:49 )  PTT:26.6 sec  Urinalysis Basic - ( 2017 00:42 )    Color: Yellow / Appearance: Slightly Turbid / S.010 / pH: x  Gluc: x / Ketone: Negative  / Bili: Negative / Urobili: Negative mg/dL   Blood: x / Protein: 100 mg/dL / Nitrite: Positive   Leuk Esterase: Moderate / RBC: 3-5 /HPF / WBC >50   Sq Epi: x / Non Sq Epi: Few / Bacteria: Many      Culture Results:   No growth to date. ( @ 08:28)  Culture Results:   No growth to date. ( @ 08:27)        ASSESSMENT and PLAN:    * FÉLIX -- urinary retention, +/- intravasc volume depletion. Urology eval appreciated. CT A/P without hydro or nephro-ureterolithiasis.   Cr is stable over 24h, non-oliguric. Maintain Higuera, cont volume expansion with crystalloid. Repeat Cr/BMP in am

## 2017-04-27 NOTE — PROGRESS NOTE ADULT - SUBJECTIVE AND OBJECTIVE BOX
Patient seen and examined bedside resting comfortably.  No complaints offered.   Denies chest pain, dyspnea, cough.    T(F): 97.5, Max: 100.8 (04-27 @ 05:34)  HR: 72 (67 - 86)  BP: 105/69 (104/59 - 149/76)  RR: 18 (18 - 19)  SpO2: 99% (96% - 99%)      General: Alert, oriented, NAD  CV: +S1S2 regular rate and rhythm  Lung: Respirations nonlabored  Abdomen: soft, NTND. Normactive BS  Extremities: 1+ pitting edema b/l, no calf tenderness  : no suprapubic tenderness. childs catheter in situ draining clear, yellow urine, output: 900cc/24hrs    LABS:                        11.7   14.7  )-----------( 191      ( 27 Apr 2017 07:27 )             33.7     04-27    142  |  104  |  25<H>  ----------------------------<  129<H>  3.1<L>   |  30  |  2.02<H>    Ca    8.1<L>      27 Apr 2017 07:27    TPro  5.7<L>  /  Alb  2.3<L>  /  TBili  0.4  /  DBili  x   /  AST  27  /  ALT  24  /  AlkPhos  89  04-27    PT/INR - ( 25 Apr 2017 22:49 )   PT: 11.9 sec;   INR: 1.09 ratio         PTT - ( 25 Apr 2017 22:49 )  PTT:26.6 sec    Impression: 86 year old female PMH MI, HLD, HTN a/w AMS, UTI, infected right leg wound, and FÉLIX. Leukocytosis improving.   Plan:  - continue childs catheter, monitor output  - continue antibiotics  - continue nephrology follow up  - follow labs, monitor WBC and renal function  - follow up urine cultures  - continue all medical management  - will discuss with surgical attendings

## 2017-04-27 NOTE — PROGRESS NOTE ADULT - SUBJECTIVE AND OBJECTIVE BOX
INTERVAL HISTORY:    improved    PAST MEDICAL & SURGICAL HISTORY:  Ventricular bigeminy: history of ablation  MI (myocardial infarction)  High cholesterol  HTN (hypertension)  S/P coronary artery stent placement  History of tonsillectomy  Cataract, bilateral  History of appendectomy        MEDICATIONS:  MEDICATIONS  (STANDING):  enoxaparin Injectable 30milliGRAM(s) SubCutaneous every 24 hours  dextrose 5% + sodium chloride 0.9%. 1000milliLiter(s) IV Continuous <Continuous>  predniSONE   Tablet 10milliGRAM(s) Oral daily  amiodarone    Tablet 200milliGRAM(s) Oral daily  clopidogrel Tablet 75milliGRAM(s) Oral daily  piperacillin/tazobactam IVPB. 3.375Gram(s) IV Intermittent every 8 hours  bisoprolol   Tablet 2.5milliGRAM(s) Oral daily  freetext medication  - 1Tablet(s) Oral daily  potassium chloride    Tablet ER 40milliEquivalent(s) Oral every 4 hours    MEDICATIONS  (PRN):  acetaminophen   Tablet 650milliGRAM(s) Oral every 6 hours PRN For Temp greater than 38 C (100.4 F)  ondansetron Injectable 4milliGRAM(s) IV Push every 6 hours PRN Nausea  aluminum hydroxide/magnesium hydroxide/simethicone Suspension 30milliLiter(s) Oral every 4 hours PRN Dyspepsia  senna 2Tablet(s) Oral at bedtime PRN Constipation  ALBUTerol    90 MICROgram(s) HFA Inhaler 2Puff(s) Inhalation every 6 hours PRN Shortness of Breath      PHYSICAL EXAM:  T(F): 97.5, Max: 100.8 (04-27 @ 05:34)  HR: 67 (67 - 86)  BP: 104/59 (104/59 - 149/76)  RR: 18 (18 - 20)  SpO2: 96% (96% - 98%)  Wt(kg): --  I&O's Summary  I & Os for 24h ending 27 Apr 2017 07:00  =============================================  IN: 1100 ml / OUT: 900 ml / NET: 200 ml    I & Os for current day (as of 27 Apr 2017 15:33)  =============================================  IN: 240 ml / OUT: 0 ml / NET: 240 ml        PHYSICAL EXAM:    HEENT: sclera anicteric, conjunctiva normal  JVP normal  Carotids: normal upstroke  Lungs:  diminished bs  Cor: normal S1S2, no S3, rub  Abdomen:  normal bs, nontender, nondistended, no organomegaly  Ext: + edema  Neuro:  no focality       CARDIAC MARKERS:  Troponin I, Serum: .052 ng/mL (04-25 @ 22:49)                                  11.7   14.7  )-----------( 191      ( 27 Apr 2017 07:27 )             33.7     04-27    142  |  104  |  25<H>  ----------------------------<  129<H>  3.1<L>   |  30  |  2.02<H>    Ca    8.1<L>      27 Apr 2017 07:27    TPro  5.7<L>  /  Alb  2.3<L>  /  TBili  0.4  /  DBili  x   /  AST  27  /  ALT  24  /  AlkPhos  89  04-27    proBNP: Serum Pro-Brain Natriuretic Peptide: 1650 pg/mL (04-25 @ 22:49)    Lipid Profile:   HgA1c:   TSH:   Test                            Date  WBC         14.7                04-27 @ 07:27  RBC         3.90                04-27 @ 07:27  Hemoglobin  11.7                04-27 @ 07:27  Hematocrit  33.7                04-27 @ 07:27  Platelets   191                 04-27 @ 07:27  Creatinine  2.02                04-27 @ 07:27  Test                            Date  WBC         20.2                04-25 @ 22:49  RBC         4.40                04-25 @ 22:49  Hemoglobin  13.3                04-25 @ 22:49  Hematocrit  38.0                04-25 @ 22:49  Platelets   241                 04-25 @ 22:49  Creatinine  2.19                04-25 @ 22:49

## 2017-04-27 NOTE — PHYSICAL THERAPY INITIAL EVALUATION ADULT - GAIT DEVIATIONS NOTED, PT EVAL
increased stride width/decreased stride length/decreased fred/decreased step length/decreased velocity of limb motion/increased time in double stance

## 2017-04-28 LAB
-  AMIKACIN: SIGNIFICANT CHANGE UP
-  AMPICILLIN/SULBACTAM: SIGNIFICANT CHANGE UP
-  AMPICILLIN: SIGNIFICANT CHANGE UP
-  AZTREONAM: SIGNIFICANT CHANGE UP
-  CEFAZOLIN: SIGNIFICANT CHANGE UP
-  CEFEPIME: SIGNIFICANT CHANGE UP
-  CEFOXITIN: SIGNIFICANT CHANGE UP
-  CEFTAZIDIME: SIGNIFICANT CHANGE UP
-  CEFTRIAXONE: SIGNIFICANT CHANGE UP
-  CIPROFLOXACIN: SIGNIFICANT CHANGE UP
-  ERTAPENEM: SIGNIFICANT CHANGE UP
-  GENTAMICIN: SIGNIFICANT CHANGE UP
-  IMIPENEM: SIGNIFICANT CHANGE UP
-  LEVOFLOXACIN: SIGNIFICANT CHANGE UP
-  MEROPENEM: SIGNIFICANT CHANGE UP
-  NITROFURANTOIN: SIGNIFICANT CHANGE UP
-  PIPERACILLIN/TAZOBACTAM: SIGNIFICANT CHANGE UP
-  TOBRAMYCIN: SIGNIFICANT CHANGE UP
-  TRIMETHOPRIM/SULFAMETHOXAZOLE: SIGNIFICANT CHANGE UP
ANION GAP SERPL CALC-SCNC: 7 MMOL/L — SIGNIFICANT CHANGE UP (ref 5–17)
BUN SERPL-MCNC: 24 MG/DL — HIGH (ref 7–23)
CALCIUM SERPL-MCNC: 8.1 MG/DL — LOW (ref 8.5–10.1)
CHLORIDE SERPL-SCNC: 111 MMOL/L — HIGH (ref 96–108)
CO2 SERPL-SCNC: 26 MMOL/L — SIGNIFICANT CHANGE UP (ref 22–31)
CREAT SERPL-MCNC: 1.83 MG/DL — HIGH (ref 0.5–1.3)
CULTURE RESULTS: SIGNIFICANT CHANGE UP
EOSINOPHIL NFR URNS MANUAL: NEGATIVE — SIGNIFICANT CHANGE UP
GLUCOSE SERPL-MCNC: 85 MG/DL — SIGNIFICANT CHANGE UP (ref 70–99)
METHOD TYPE: SIGNIFICANT CHANGE UP
ORGANISM # SPEC MICROSCOPIC CNT: SIGNIFICANT CHANGE UP
ORGANISM # SPEC MICROSCOPIC CNT: SIGNIFICANT CHANGE UP
POTASSIUM SERPL-MCNC: 4.5 MMOL/L — SIGNIFICANT CHANGE UP (ref 3.5–5.3)
POTASSIUM SERPL-SCNC: 4.5 MMOL/L — SIGNIFICANT CHANGE UP (ref 3.5–5.3)
SODIUM SERPL-SCNC: 144 MMOL/L — SIGNIFICANT CHANGE UP (ref 135–145)
SPECIMEN SOURCE: SIGNIFICANT CHANGE UP

## 2017-04-28 RX ORDER — MEROPENEM 1 G/30ML
1000 INJECTION INTRAVENOUS EVERY 12 HOURS
Qty: 0 | Refills: 0 | Status: DISCONTINUED | OUTPATIENT
Start: 2017-04-29 | End: 2017-05-06

## 2017-04-28 RX ORDER — MEROPENEM 1 G/30ML
INJECTION INTRAVENOUS
Qty: 0 | Refills: 0 | Status: DISCONTINUED | OUTPATIENT
Start: 2017-04-28 | End: 2017-05-06

## 2017-04-28 RX ORDER — MEROPENEM 1 G/30ML
1000 INJECTION INTRAVENOUS ONCE
Qty: 0 | Refills: 0 | Status: COMPLETED | OUTPATIENT
Start: 2017-04-28 | End: 2017-04-28

## 2017-04-28 RX ORDER — GENTAMICIN SULFATE 40 MG/ML
80 VIAL (ML) INJECTION ONCE
Qty: 0 | Refills: 0 | Status: COMPLETED | OUTPATIENT
Start: 2017-04-28 | End: 2017-04-28

## 2017-04-28 RX ORDER — LACTOBACILLUS ACIDOPHILUS 100MM CELL
1 CAPSULE ORAL EVERY 12 HOURS
Qty: 0 | Refills: 0 | Status: DISCONTINUED | OUTPATIENT
Start: 2017-04-28 | End: 2017-05-06

## 2017-04-28 RX ADMIN — AMIODARONE HYDROCHLORIDE 200 MILLIGRAM(S): 400 TABLET ORAL at 05:48

## 2017-04-28 RX ADMIN — CLOPIDOGREL BISULFATE 75 MILLIGRAM(S): 75 TABLET, FILM COATED ORAL at 14:01

## 2017-04-28 RX ADMIN — MEROPENEM 200 MILLIGRAM(S): 1 INJECTION INTRAVENOUS at 20:34

## 2017-04-28 RX ADMIN — PIPERACILLIN AND TAZOBACTAM 25 GRAM(S): 4; .5 INJECTION, POWDER, LYOPHILIZED, FOR SOLUTION INTRAVENOUS at 15:23

## 2017-04-28 RX ADMIN — Medication 100 MILLIGRAM(S): at 19:42

## 2017-04-28 RX ADMIN — BISOPROLOL FUMARATE 2.5 MILLIGRAM(S): 10 TABLET, FILM COATED ORAL at 05:49

## 2017-04-28 RX ADMIN — SODIUM CHLORIDE 75 MILLILITER(S): 9 INJECTION, SOLUTION INTRAVENOUS at 05:48

## 2017-04-28 RX ADMIN — Medication 1 TABLET(S): at 19:41

## 2017-04-28 RX ADMIN — ENOXAPARIN SODIUM 30 MILLIGRAM(S): 100 INJECTION SUBCUTANEOUS at 05:49

## 2017-04-28 RX ADMIN — PIPERACILLIN AND TAZOBACTAM 25 GRAM(S): 4; .5 INJECTION, POWDER, LYOPHILIZED, FOR SOLUTION INTRAVENOUS at 05:48

## 2017-04-28 RX ADMIN — Medication 0.25 MILLIGRAM(S): at 22:36

## 2017-04-28 RX ADMIN — Medication 10 MILLIGRAM(S): at 05:48

## 2017-04-28 NOTE — PROGRESS NOTE ADULT - SUBJECTIVE AND OBJECTIVE BOX
Patient seen and examined bedside resting comfortably.  No complaints offered. Denies f/c, cough,     T(F): 98.8, Max: 98.8 (04-28 @ 12:06)  HR: 65 (62 - 72)  BP: 142/70 (102/50 - 143/69)  RR: 16 (16 - 18)  SpO2: 96% (96% - 99%)    PHYSICAL EXAM:  General: NAD, alert and awake  HEENT: NCAT, EOMI, conjunctiva clear  Chest: nonlabored respirations, good inspiratory effort  Abdomen: soft, NTND.   Extremities: no pedal edema or calf tenderness noted   : no suprapubic tenderness. Childs catheter indwelling, draining clear yellow urine    LABS:                        11.7   14.7  )-----------( 191      ( 27 Apr 2017 07:27 )             33.7   04-28    144  |  111<H>  |  24<H>  ----------------------------<  85  4.5   |  26  |  1.83<H>    Ca    8.1<L>      28 Apr 2017 06:22  TPro  5.7<L>  /  Alb  2.3<L>  /  TBili  0.4  /  DBili  x   /  AST  27  /  ALT  24  /  AlkPhos  89  04-27    I&O 580/3300cc    Urine cx 4/26: >100,000 E coli ESBL    CT 4/26:  IMPRESSION: No evidence for obstructive uropathy.    Urinary bladder:   Prevoid volume is 315.5mls. The patient did not feel like voiding.  There is moveable debris at the bladder base.  Unable to visualize the bilateral ureteral jets.      Renal US 4/26:  IMPRESSION:   Moderate right hydroureteronephrosis, similar in appearance to the   1/17/17 study.  Interval development of mild to moderate left-sided hydronephrosis since   1/17/17 exam. Nonobstructing left renal calculi now seen.  Mildly increased bilateral renal cortical echogenicity compatible with   medical renal disease.  Bladder volume as above. Moveable bladder debris noted.     Impression: 86 year old female PMH urinary retention, MI, HLD, HTN a/w AMS, e coli UTI, b/l hydronephrosis with urinary retention, infected right leg wound, and Acute on chronic kidney failure, improving.     Plan:  - continue childs catheter for retention; renal function improving. Will discuss with Dr Bautista for further recommendations.  - continue present medical management, renal and cardio Follow up   - e coli UTI resistant to zosyn. Await ID evaluation for abx modification Patient seen and examined bedside resting comfortably.  No complaints offered. Denies f/c, cough, abdominal pain. Ambulates with PT/walker.    T(F): 98.8, Max: 98.8 (04-28 @ 12:06)  HR: 65 (62 - 72)  BP: 142/70 (102/50 - 143/69)  RR: 16 (16 - 18)  SpO2: 96% (96% - 99%)    PHYSICAL EXAM:  General: NAD, alert and awake  HEENT: NCAT, EOMI, conjunctiva clear  Chest: nonlabored respirations, good inspiratory effort  Abdomen: soft, NTND.   Extremities: no pedal edema or calf tenderness noted   : no suprapubic tenderness. Childs catheter indwelling, draining clear yellow urine    LABS:                        11.7   14.7  )-----------( 191      ( 27 Apr 2017 07:27 )             33.7   04-28    144  |  111<H>  |  24<H>  ----------------------------<  85  4.5   |  26  |  1.83<H>    Ca    8.1<L>      28 Apr 2017 06:22  TPro  5.7<L>  /  Alb  2.3<L>  /  TBili  0.4  /  DBili  x   /  AST  27  /  ALT  24  /  AlkPhos  89  04-27    I&O 580/3300cc    Urine cx 4/26: >100,000 E coli ESBL    CT 4/26:  IMPRESSION: No evidence for obstructive uropathy.    Urinary bladder:   Prevoid volume is 315.5mls. The patient did not feel like voiding.  There is moveable debris at the bladder base.  Unable to visualize the bilateral ureteral jets.      Renal US 4/26:  IMPRESSION:   Moderate right hydroureteronephrosis, similar in appearance to the   1/17/17 study.  Interval development of mild to moderate left-sided hydronephrosis since   1/17/17 exam. Nonobstructing left renal calculi now seen.  Mildly increased bilateral renal cortical echogenicity compatible with   medical renal disease.  Bladder volume as above. Moveable bladder debris noted.     Impression: 86 year old female PMH urinary retention, MI, HLD, HTN a/w AMS, e coli UTI, b/l hydronephrosis with urinary retention, infected right leg wound, and Acute on chronic kidney failure, improving.     Plan:  - continue childs catheter for retention; renal function improving. Will discuss with Dr Bautista for further recommendations.  - continue present medical management, renal and cardio Follow up   - e coli UTI resistant to zosyn. Await ID evaluation for abx modification

## 2017-04-28 NOTE — PROGRESS NOTE ADULT - SUBJECTIVE AND OBJECTIVE BOX
INTERVAL HISTORY:  comfortable    PAST MEDICAL & SURGICAL HISTORY:  Ventricular bigeminy: history of ablation  MI (myocardial infarction)  High cholesterol  HTN (hypertension)  S/P coronary artery stent placement  History of tonsillectomy  Cataract, bilateral  History of appendectomy        MEDICATIONS:  MEDICATIONS  (STANDING):  enoxaparin Injectable 30milliGRAM(s) SubCutaneous every 24 hours  dextrose 5% + sodium chloride 0.9%. 1000milliLiter(s) IV Continuous <Continuous>  predniSONE   Tablet 10milliGRAM(s) Oral daily  amiodarone    Tablet 200milliGRAM(s) Oral daily  clopidogrel Tablet 75milliGRAM(s) Oral daily  piperacillin/tazobactam IVPB. 3.375Gram(s) IV Intermittent every 8 hours  bisoprolol   Tablet 2.5milliGRAM(s) Oral daily  freetext medication  - 1Tablet(s) Oral daily    MEDICATIONS  (PRN):  acetaminophen   Tablet 650milliGRAM(s) Oral every 6 hours PRN For Temp greater than 38 C (100.4 F)  ondansetron Injectable 4milliGRAM(s) IV Push every 6 hours PRN Nausea  aluminum hydroxide/magnesium hydroxide/simethicone Suspension 30milliLiter(s) Oral every 4 hours PRN Dyspepsia  senna 2Tablet(s) Oral at bedtime PRN Constipation  ALBUTerol    90 MICROgram(s) HFA Inhaler 2Puff(s) Inhalation every 6 hours PRN Shortness of Breath  ALPRAZolam 0.25milliGRAM(s) Oral every 8 hours PRN anxiety      PHYSICAL EXAM:  T(F): 98.8, Max: 98.8 (04-28 @ 12:06)  HR: 65 (62 - 70)  BP: 142/70 (102/50 - 143/69)  RR: 16 (16 - 18)  SpO2: 96% (96% - 98%)  Wt(kg): --  I&O's Summary  I & Os for 24h ending 28 Apr 2017 07:00  =============================================  IN: 580 ml / OUT: 3300 ml / NET: -2720 ml    I & Os for current day (as of 28 Apr 2017 15:53)  =============================================  IN: 1000 ml / OUT: 0 ml / NET: 1000 ml        PHYSICAL EXAM:    HEENT: sclera anicteric, conjunctiva normal  JVP normal  Carotids: normal upstroke  Lungs:  clear  Cor: normal S1S2, no S3, rub  Abdomen:  normal bs, nontender, nondistended, no organomegaly  Ext:  ++ edema        OTHER: 	    LABS:	     CARDIAC MARKERS:  Troponin I, Serum: .052 ng/mL (04-25 @ 22:49)                                  11.7   14.7  )-----------( 191      ( 27 Apr 2017 07:27 )             33.7     04-28    144  |  111<H>  |  24<H>  ----------------------------<  85  4.5   |  26  |  1.83<H>    Ca    8.1<L>      28 Apr 2017 06:22    TPro  5.7<L>  /  Alb  2.3<L>  /  TBili  0.4  /  DBili  x   /  AST  27  /  ALT  24  /  AlkPhos  89  04-27    proBNP: Serum Pro-Brain Natriuretic Peptide: 1650 pg/mL (04-25 @ 22:49)    Lipid Profile:   HgA1c:   TSH:   Test                            Date  WBC         --                  04-28 @ 06:22  RBC         --                  04-28 @ 06:22  Hemoglobin  --                  04-28 @ 06:22  Hematocrit  --                  04-28 @ 06:22  Platelets   --                  04-28 @ 06:22  Creatinine  1.83                04-28 @ 06:22  Test                            Date  WBC         14.7                04-27 @ 07:27  RBC         3.90                04-27 @ 07:27  Hemoglobin  11.7                04-27 @ 07:27  Hematocrit  33.7                04-27 @ 07:27  Platelets   191                 04-27 @ 07:27  Creatinine  2.02                04-27 @ 07:27

## 2017-04-28 NOTE — CONSULT NOTE ADULT - SUBJECTIVE AND OBJECTIVE BOX
HPI:  85 y/o white female    Allergies :  Benadryl Allergy (Other)    Intolerances:  metoprolol (Other)      Social History:  Tobacco: neg  Alcohol: neg  Recent Travel: none  Immunizations:        MEDICATIONS  (STANDING):  enoxaparin Injectable 30milliGRAM(s) SubCutaneous every 24 hours  dextrose 5% + sodium chloride 0.9%. 1000milliLiter(s) IV Continuous <Continuous>  predniSONE   Tablet 10milliGRAM(s) Oral daily  amiodarone    Tablet 200milliGRAM(s) Oral daily  clopidogrel Tablet 75milliGRAM(s) Oral daily  bisoprolol   Tablet 2.5milliGRAM(s) Oral daily  freetext medication  - 1Tablet(s) Oral daily    MEDICATIONS  (PRN):  acetaminophen   Tablet 650milliGRAM(s) Oral every 6 hours PRN For Temp greater than 38 C (100.4 F)  ondansetron Injectable 4milliGRAM(s) IV Push every 6 hours PRN Nausea  aluminum hydroxide/magnesium hydroxide/simethicone Suspension 30milliLiter(s) Oral every 4 hours PRN Dyspepsia  senna 2Tablet(s) Oral at bedtime PRN Constipation  ALBUTerol    90 MICROgram(s) HFA Inhaler 2Puff(s) Inhalation every 6 hours PRN Shortness of Breath  ALPRAZolam 0.25milliGRAM(s) Oral every 8 hours PRN anxiety      LABS:  CBC Full  -  ( 27 Apr 2017 07:27 )  WBC Count : 14.7 K/uL  Hemoglobin : 11.7 g/dL  Hematocrit : 33.7 %  Platelet Count - Automated : 191 K/uL  Mean Cell Volume : 86.5 fl  Mean Cell Hemoglobin : 30.0 pg  Mean Cell Hemoglobin Concentration : 34.7 gm/dL  Auto Neutrophil # : x  Auto Lymphocyte # : x  Auto Monocyte # : x  Auto Eosinophil # : x  Auto Basophil # : x  Auto Neutrophil % : x  Auto Lymphocyte % : x  Auto Monocyte % : x  Auto Eosinophil % : x  Auto Basophil % : x    04-28    144  |  111<H>  |  24<H>  ----------------------------<  85  4.5   |  26  |  1.83<H>    Ca    8.1<L>      28 Apr 2017 06:22    TPro  5.7<L>  /  Alb  2.3<L>  /  TBili  0.4  /  DBili  x   /  AST  27  /  ALT  24  /  AlkPhos  89  04-27    LIVER FUNCTIONS - ( 27 Apr 2017 07:27 )  Alb: 2.3 g/dL / Pro: 5.7 gm/dL / ALK PHOS: 89 U/L / ALT: 24 U/L / AST: 27 U/L / GGT: x               CULTURES:  Specimen Source: .Urine Clean Catch (Midstream) (04-26 @ 09:22)  Culture Results:   >100,000 CFU/ml Escherichia coli ESBL (04-26 @ 09:22) - only  sensitive to Carbapenems and Aminoglycosides    Specimen Source: .Blood Blood-Peripheral (04-26 @ 08:28)  Culture Results:   No growth to date. (04-26 @ 08:28)    Specimen Source: .Blood Blood-Peripheral (04-26 @ 08:27)  Culture Results:   No growth to date. (04-26 @ 08:27)          Radiology:   CXR - 4/25 -    IMPRESSION:    No focal air space opacities.                                               Renal Sono - 4/26 -  Urinary bladder:   Prevoid volume is 315.5mls. The patient did not feel like voiding.  There is moveable debris at the bladder base.  Unable to visualize the bilateral ureteral jets.      IMPRESSION:   Moderate right hydroureteronephrosis, similar in appearance to the   1/17/17 study.  Interval development of mild to moderate left-sided hydronephrosis since   1/17/17 exam. Nonobstructing left renal calculi now seen.  Mildly increased bilateral renal cortical echogenicity compatible with   medical renal disease.  Bladder volume as above. Moveable bladder debris noted.                         CT Renal Stone [facility] - 4/26 -     IMPRESSION: No evidence for obstructive uropathy.    Age-indeterminate mild compression deformity at L1, new since prior study   with minimal bony retropulsion. Correlate for the presence of point   tenderness.        Vital Signs Last 24 Hrs  T(C): 37.1, Max: 37.1 (04-28 @ 12:06)  T(F): 98.8, Max: 98.8 (04-28 @ 12:06)  HR: 65 (62 - 70)  BP: 142/70 (102/50 - 143/69)  BP(mean): --  RR: 16 (16 - 18)  SpO2: 96% (96% - 98%)  Supplemental O2:    PHYSICAL EXAM    General:  HEENT:   Neck:  Heart:  Lungs:  Abdomen:  Extremities:  Skin:          I&O's Summary  I & Os for 24h ending 28 Apr 2017 07:00  =============================================  IN: 580 ml / OUT: 3300 ml / NET: -2720 ml    I & Os for current day (as of 28 Apr 2017 16:44)  =============================================  IN: 1000 ml / OUT: 0 ml / NET: 1000 ml      Impression:    Suggestions: HPI:  85 y/o white female born in [facility], in the [place] x many yrs., with hx HTN, HLD, CAD, s/p MI, s/p Coronary Stent placement, CKD,  s/p Bilateral Cataract Surgeries, s/p AP, s/p Tonsillectomy, recurrent UTI's (several of which have been ESBL EColi  in the last 2 yrs.), and with recurrent problems with hydronephrosis as well, s/p recent [facility] admission from 4/13- 4/19 for Influenza B, now re-admitted on 4/26 with fever, weakness, and confusion.  Found to have fever up to 100.8, leukocytosis, and pyuria on u/a, and further w/u with Renal Sono and then CT Scan revealed Bilateral Hydroureteronephrosis and a non-obstructing Lt Renal calculus.  Blood and Urine cx's were obtained, childs catheter was placed,  and patient was rx'ed empirically with Zosyn + Vanco x1.  Now Urine cx is + again for ESBL EColi.  Patient denies any pain now and was unaware of any dysuria or difficulty voiding at home.      Allergies :  Benadryl Allergy  - shaking reported    Intolerances:  metoprolol  - palpitations      Social History:  Tobacco: neg  Alcohol: neg  Recent Travel: none  Immunizations: none - claims she never receives Influenza Vaccination and has never had Pneumovax  Patient lives at home with her son and his wife and 3 children  No pets  Claims she has been ambulating at home with walker,        MEDICATIONS  (STANDING):  enoxaparin Injectable 30milliGRAM(s) SubCutaneous every 24 hours  dextrose 5% + sodium chloride 0.9%. 1000milliLiter(s) IV Continuous <Continuous>  predniSONE   Tablet 10milliGRAM(s) Oral daily  amiodarone    Tablet 200milliGRAM(s) Oral daily  clopidogrel Tablet 75milliGRAM(s) Oral daily  bisoprolol   Tablet 2.5milliGRAM(s) Oral daily  freetext medication  - 1Tablet(s) Oral daily    MEDICATIONS  (PRN):  acetaminophen   Tablet 650milliGRAM(s) Oral every 6 hours PRN For Temp greater than 38 C (100.4 F)  ondansetron Injectable 4milliGRAM(s) IV Push every 6 hours PRN Nausea  aluminum hydroxide/magnesium hydroxide/simethicone Suspension 30milliLiter(s) Oral every 4 hours PRN Dyspepsia  senna 2Tablet(s) Oral at bedtime PRN Constipation  ALBUTerol    90 MICROgram(s) HFA Inhaler 2Puff(s) Inhalation every 6 hours PRN Shortness of Breath  ALPRAZolam 0.25milliGRAM(s) Oral every 8 hours PRN anxiety      LABS:  CBC Full  -  ( 27 Apr 2017 07:27 )  WBC Count : 14.7 K/uL  Hemoglobin : 11.7 g/dL  Hematocrit : 33.7 %  Platelet Count - Automated : 191 K/uL  Mean Cell Volume : 86.5 fl  Mean Cell Hemoglobin : 30.0 pg  Mean Cell Hemoglobin Concentration : 34.7 gm/dL  Auto Neutrophil # : x  Auto Lymphocyte # : x  Auto Monocyte # : x  Auto Eosinophil # : x  Auto Basophil # : x  Auto Neutrophil % : x  Auto Lymphocyte % : x  Auto Monocyte % : x  Auto Eosinophil % : x  Auto Basophil % : x    04-28    144  |  111<H>  |  24<H>  ----------------------------<  85  4.5   |  26  |  1.83<H>    Ca    8.1<L>      28 Apr 2017 06:22    TPro  5.7<L>  /  Alb  2.3<L>  /  TBili  0.4  /  DBili  x   /  AST  27  /  ALT  24  /  AlkPhos  89  04-27    LIVER FUNCTIONS - ( 27 Apr 2017 07:27 )  Alb: 2.3 g/dL / Pro: 5.7 gm/dL / ALK PHOS: 89 U/L / ALT: 24 U/L / AST: 27 U/L / GGT: x               CULTURES:  Specimen Source: .Urine Clean Catch (Midstream) (04-26 @ 09:22)  Culture Results:   >100,000 CFU/ml Escherichia coli ESBL (04-26 @ 09:22) - only  sensitive to Carbapenems, Gent and Tobra ( resistant to Amikacin)    Specimen Source: .Blood Blood-Peripheral (04-26 @ 08:28)  Culture Results:   No growth to date. (04-26 @ 08:28)    Specimen Source: .Blood Blood-Peripheral (04-26 @ 08:27)  Culture Results:   No growth to date. (04-26 @ 08:27)          Radiology:   CXR - 4/25 -    IMPRESSION:    No focal air space opacities.                                               Renal Sono - 4/26 -  Urinary bladder:   Prevoid volume is 315.5mls. The patient did not feel like voiding.  There is moveable debris at the bladder base.  Unable to visualize the bilateral ureteral jets.      IMPRESSION:   Moderate right hydroureteronephrosis, similar in appearance to the   1/17/17 study.  Interval development of mild to moderate left-sided hydronephrosis since   1/17/17 exam. Nonobstructing left renal calculi now seen.  Mildly increased bilateral renal cortical echogenicity compatible with   medical renal disease.  Bladder volume as above. Moveable bladder debris noted.                         CT Renal [facility] - 4/26 -     IMPRESSION: No evidence for obstructive uropathy.    Age-indeterminate mild compression deformity at L1, new since prior study   with minimal bony retropulsion. Correlate for the presence of point   tenderness.        Vital Signs Last 24 Hrs  T(C): 37.1, Max: 37.1 (04-28 @ 12:06)  T(F): 98.8, Max: 98.8 (04-28 @ 12:06)  HR: 65 (62 - 70)  BP: 142/70 (102/50 - 143/69)  BP(mean): --  RR: 16 (16 - 18)  SpO2: 96% (96% - 98%)  Supplemental O2: on RA       PHYSICAL EXAM    General: 85 y/o white female, awake, alert, lying semi-upright in bed, cooperative, but wants to go home, noted with intermittent cough, but in NAD  HEENT: conj pink, sclerae anicteric, PERRLA, no oral lesions noted  Neck: supple, no nodes noted  Heart: RR  Lungs: rhonchi bilat at bases   Abdomen: soft, BS+, nontender, no masses, no HS-megaly noted, no CVA or spinal tenderness elicited  Extremities: trace edema LE's                    dressing in place RLE covering an ulceration  Skin: warm, dry, no rash noted  Childs in place and draining cloudy-yellow urine now      I&O's Summary :  I & Os for 24h ending 28 Apr 2017 07:00  =============================================  IN: 580 ml / OUT: 3300 ml / NET: -2720 ml    I & Os for current day (as of 28 Apr 2017 16:44)  =============================================  IN: 1000 ml / OUT: 0 ml / NET: 1000 ml      Impression:  85 y/o white female with hx HTN, HLD, CAD, s/p MI, s/p Coronary stent placement,  CKD, hx recurrent UTI's and  intermittent Hydronephrosis, recent Influenza B, now with Sepsis secondary to Pyelonephritis with ESBL EColi now in urine cx.    Suggestions: Will therefore change ab rx to Meropenem and rx with Gent x1,  and follow-up temps and labs closely.  Contact Isolation for ESBL EColi.  Urology follow-up re: bilateral Hydro and underlying renal calculi with recurrent infections.  Discussed in detail with patient at bedside.

## 2017-04-28 NOTE — PROGRESS NOTE ADULT - SUBJECTIVE AND OBJECTIVE BOX
Patient seen in follow up for FÉLIX; feels well.    MEDICATIONS  (STANDING):  enoxaparin Injectable 30milliGRAM(s) SubCutaneous every 24 hours  dextrose 5% + sodium chloride 0.9%. 1000milliLiter(s) IV Continuous <Continuous>  predniSONE   Tablet 10milliGRAM(s) Oral daily  amiodarone    Tablet 200milliGRAM(s) Oral daily  clopidogrel Tablet 75milliGRAM(s) Oral daily  piperacillin/tazobactam IVPB. 3.375Gram(s) IV Intermittent every 8 hours  bisoprolol   Tablet 2.5milliGRAM(s) Oral daily  freetext medication  - 1Tablet(s) Oral daily    MEDICATIONS  (PRN):  acetaminophen   Tablet 650milliGRAM(s) Oral every 6 hours PRN For Temp greater than 38 C (100.4 F)  ondansetron Injectable 4milliGRAM(s) IV Push every 6 hours PRN Nausea  aluminum hydroxide/magnesium hydroxide/simethicone Suspension 30milliLiter(s) Oral every 4 hours PRN Dyspepsia  senna 2Tablet(s) Oral at bedtime PRN Constipation  ALBUTerol    90 MICROgram(s) HFA Inhaler 2Puff(s) Inhalation every 6 hours PRN Shortness of Breath  ALPRAZolam 0.25milliGRAM(s) Oral every 8 hours PRN anxiety    PHYSICAL EXAM:      T(C): 37.1, Max: 37.1 (04-28 @ 12:06)  HR: 65 (62 - 72)  BP: 142/70 (102/50 - 143/69)  RR: 16 (16 - 18)  SpO2: 96% (96% - 99%)  Wt(kg): --  Respiratory: clear anteriorly, decreased BS at bases  Cardiovascular: S1 S2  Gastrointestinal: soft NT ND +BS  Extremities:   1-2  edema                                    11.7   14.7  )-----------( 191      ( 27 Apr 2017 07:27 )             33.7     04-28    144  |  111<H>  |  24<H>  ----------------------------<  85  4.5   |  26  |  1.83<H>    Ca    8.1<L>      28 Apr 2017 06:22    TPro  5.7<L>  /  Alb  2.3<L>  /  TBili  0.4  /  DBili  x   /  AST  27  /  ALT  24  /  AlkPhos  89  04-27      LIVER FUNCTIONS - ( 27 Apr 2017 07:27 )  Alb: 2.3 g/dL / Pro: 5.7 gm/dL / ALK PHOS: 89 U/L / ALT: 24 U/L / AST: 27 U/L / GGT: x             Assessment and Plan:    FÉLIX, bladder outlet obstruction with UTI, r/o AIN.  Continue childs as per urology.  Urine eos.  IV abx.

## 2017-04-28 NOTE — CONSULT NOTE ADULT - CONSULT REASON
AMS
AMS, ? UTI , hydronephrosis
FÉLIX, CKD
Sepsis/ UTI
UTI; urinary retention; nephrolithiasis
Evaluate cardiac status

## 2017-04-29 LAB
ANION GAP SERPL CALC-SCNC: 8 MMOL/L — SIGNIFICANT CHANGE UP (ref 5–17)
BASOPHILS # BLD AUTO: 0.1 K/UL — SIGNIFICANT CHANGE UP (ref 0–0.2)
BASOPHILS NFR BLD AUTO: 1 % — SIGNIFICANT CHANGE UP (ref 0–2)
BUN SERPL-MCNC: 22 MG/DL — SIGNIFICANT CHANGE UP (ref 7–23)
CALCIUM SERPL-MCNC: 8.2 MG/DL — LOW (ref 8.5–10.1)
CHLORIDE SERPL-SCNC: 111 MMOL/L — HIGH (ref 96–108)
CO2 SERPL-SCNC: 26 MMOL/L — SIGNIFICANT CHANGE UP (ref 22–31)
CREAT SERPL-MCNC: 1.64 MG/DL — HIGH (ref 0.5–1.3)
EOSINOPHIL # BLD AUTO: 0 K/UL — SIGNIFICANT CHANGE UP (ref 0–0.5)
EOSINOPHIL NFR BLD AUTO: 0.4 % — SIGNIFICANT CHANGE UP (ref 0–6)
ERYTHROCYTE [SEDIMENTATION RATE] IN BLOOD: 51 MM/HR — HIGH (ref 0–20)
GLUCOSE SERPL-MCNC: 88 MG/DL — SIGNIFICANT CHANGE UP (ref 70–99)
HCT VFR BLD CALC: 32.8 % — LOW (ref 34.5–45)
HGB BLD-MCNC: 10.8 G/DL — LOW (ref 11.5–15.5)
LYMPHOCYTES # BLD AUTO: 2 K/UL — SIGNIFICANT CHANGE UP (ref 1–3.3)
LYMPHOCYTES # BLD AUTO: 20.6 % — SIGNIFICANT CHANGE UP (ref 13–44)
MCHC RBC-ENTMCNC: 29.1 PG — SIGNIFICANT CHANGE UP (ref 27–34)
MCHC RBC-ENTMCNC: 32.8 GM/DL — SIGNIFICANT CHANGE UP (ref 32–36)
MCV RBC AUTO: 88.6 FL — SIGNIFICANT CHANGE UP (ref 80–100)
MONOCYTES # BLD AUTO: 1.1 K/UL — HIGH (ref 0–0.9)
MONOCYTES NFR BLD AUTO: 10.8 % — SIGNIFICANT CHANGE UP (ref 2–14)
NEUTROPHILS # BLD AUTO: 6.6 K/UL — SIGNIFICANT CHANGE UP (ref 1.8–7.4)
NEUTROPHILS NFR BLD AUTO: 67.2 % — SIGNIFICANT CHANGE UP (ref 43–77)
PLATELET # BLD AUTO: 238 K/UL — SIGNIFICANT CHANGE UP (ref 150–400)
POTASSIUM SERPL-MCNC: 4 MMOL/L — SIGNIFICANT CHANGE UP (ref 3.5–5.3)
POTASSIUM SERPL-SCNC: 4 MMOL/L — SIGNIFICANT CHANGE UP (ref 3.5–5.3)
RBC # BLD: 3.69 M/UL — LOW (ref 3.8–5.2)
RBC # FLD: 16.5 % — HIGH (ref 11–15)
SODIUM SERPL-SCNC: 145 MMOL/L — SIGNIFICANT CHANGE UP (ref 135–145)
WBC # BLD: 9.9 K/UL — SIGNIFICANT CHANGE UP (ref 3.8–10.5)
WBC # FLD AUTO: 9.9 K/UL — SIGNIFICANT CHANGE UP (ref 3.8–10.5)

## 2017-04-29 RX ADMIN — SODIUM CHLORIDE 75 MILLILITER(S): 9 INJECTION, SOLUTION INTRAVENOUS at 03:38

## 2017-04-29 RX ADMIN — CLOPIDOGREL BISULFATE 75 MILLIGRAM(S): 75 TABLET, FILM COATED ORAL at 12:47

## 2017-04-29 RX ADMIN — SODIUM CHLORIDE 75 MILLILITER(S): 9 INJECTION, SOLUTION INTRAVENOUS at 15:26

## 2017-04-29 RX ADMIN — MEROPENEM 200 MILLIGRAM(S): 1 INJECTION INTRAVENOUS at 06:31

## 2017-04-29 RX ADMIN — Medication 10 MILLIGRAM(S): at 06:30

## 2017-04-29 RX ADMIN — Medication 1 TABLET(S): at 17:04

## 2017-04-29 RX ADMIN — BISOPROLOL FUMARATE 2.5 MILLIGRAM(S): 10 TABLET, FILM COATED ORAL at 06:31

## 2017-04-29 RX ADMIN — Medication 1 TABLET(S): at 06:30

## 2017-04-29 RX ADMIN — MEROPENEM 200 MILLIGRAM(S): 1 INJECTION INTRAVENOUS at 17:05

## 2017-04-29 RX ADMIN — AMIODARONE HYDROCHLORIDE 200 MILLIGRAM(S): 400 TABLET ORAL at 06:31

## 2017-04-29 NOTE — PROGRESS NOTE ADULT - SUBJECTIVE AND OBJECTIVE BOX
85 yo lady with chronic bladder dysfunction and pyoderma miah. now with acute pyelo. Improving on abx.

## 2017-04-30 LAB
ANION GAP SERPL CALC-SCNC: 8 MMOL/L — SIGNIFICANT CHANGE UP (ref 5–17)
BUN SERPL-MCNC: 17 MG/DL — SIGNIFICANT CHANGE UP (ref 7–23)
CALCIUM SERPL-MCNC: 8.6 MG/DL — SIGNIFICANT CHANGE UP (ref 8.5–10.1)
CHLORIDE SERPL-SCNC: 106 MMOL/L — SIGNIFICANT CHANGE UP (ref 96–108)
CO2 SERPL-SCNC: 28 MMOL/L — SIGNIFICANT CHANGE UP (ref 22–31)
CREAT SERPL-MCNC: 1.47 MG/DL — HIGH (ref 0.5–1.3)
GLUCOSE SERPL-MCNC: 197 MG/DL — HIGH (ref 70–99)
HCT VFR BLD CALC: 35 % — SIGNIFICANT CHANGE UP (ref 34.5–45)
HGB BLD-MCNC: 12 G/DL — SIGNIFICANT CHANGE UP (ref 11.5–15.5)
MCHC RBC-ENTMCNC: 29.8 PG — SIGNIFICANT CHANGE UP (ref 27–34)
MCHC RBC-ENTMCNC: 34.2 GM/DL — SIGNIFICANT CHANGE UP (ref 32–36)
MCV RBC AUTO: 87.1 FL — SIGNIFICANT CHANGE UP (ref 80–100)
PLATELET # BLD AUTO: 258 K/UL — SIGNIFICANT CHANGE UP (ref 150–400)
POTASSIUM SERPL-MCNC: 4.4 MMOL/L — SIGNIFICANT CHANGE UP (ref 3.5–5.3)
POTASSIUM SERPL-SCNC: 4.4 MMOL/L — SIGNIFICANT CHANGE UP (ref 3.5–5.3)
RBC # BLD: 4.02 M/UL — SIGNIFICANT CHANGE UP (ref 3.8–5.2)
RBC # FLD: 16 % — HIGH (ref 11–15)
SODIUM SERPL-SCNC: 142 MMOL/L — SIGNIFICANT CHANGE UP (ref 135–145)
WBC # BLD: 10.2 K/UL — SIGNIFICANT CHANGE UP (ref 3.8–10.5)
WBC # FLD AUTO: 10.2 K/UL — SIGNIFICANT CHANGE UP (ref 3.8–10.5)

## 2017-04-30 PROCEDURE — 70551 MRI BRAIN STEM W/O DYE: CPT | Mod: 26

## 2017-04-30 RX ADMIN — Medication 10 MILLIGRAM(S): at 05:34

## 2017-04-30 RX ADMIN — BISOPROLOL FUMARATE 2.5 MILLIGRAM(S): 10 TABLET, FILM COATED ORAL at 05:36

## 2017-04-30 RX ADMIN — Medication 1 TABLET(S): at 05:34

## 2017-04-30 RX ADMIN — SODIUM CHLORIDE 75 MILLILITER(S): 9 INJECTION, SOLUTION INTRAVENOUS at 21:01

## 2017-04-30 RX ADMIN — SODIUM CHLORIDE 75 MILLILITER(S): 9 INJECTION, SOLUTION INTRAVENOUS at 05:35

## 2017-04-30 RX ADMIN — AMIODARONE HYDROCHLORIDE 200 MILLIGRAM(S): 400 TABLET ORAL at 05:34

## 2017-04-30 RX ADMIN — MEROPENEM 200 MILLIGRAM(S): 1 INJECTION INTRAVENOUS at 17:09

## 2017-04-30 RX ADMIN — Medication 1 TABLET(S): at 17:09

## 2017-04-30 RX ADMIN — ENOXAPARIN SODIUM 30 MILLIGRAM(S): 100 INJECTION SUBCUTANEOUS at 05:34

## 2017-04-30 RX ADMIN — CLOPIDOGREL BISULFATE 75 MILLIGRAM(S): 75 TABLET, FILM COATED ORAL at 11:42

## 2017-04-30 RX ADMIN — Medication 0.25 MILLIGRAM(S): at 21:00

## 2017-04-30 RX ADMIN — MEROPENEM 200 MILLIGRAM(S): 1 INJECTION INTRAVENOUS at 05:34

## 2017-05-01 RX ADMIN — Medication 1 TABLET(S): at 17:41

## 2017-05-01 RX ADMIN — AMIODARONE HYDROCHLORIDE 200 MILLIGRAM(S): 400 TABLET ORAL at 05:04

## 2017-05-01 RX ADMIN — SODIUM CHLORIDE 75 MILLILITER(S): 9 INJECTION, SOLUTION INTRAVENOUS at 14:55

## 2017-05-01 RX ADMIN — CLOPIDOGREL BISULFATE 75 MILLIGRAM(S): 75 TABLET, FILM COATED ORAL at 11:49

## 2017-05-01 RX ADMIN — MEROPENEM 200 MILLIGRAM(S): 1 INJECTION INTRAVENOUS at 17:41

## 2017-05-01 RX ADMIN — Medication 10 MILLIGRAM(S): at 05:04

## 2017-05-01 RX ADMIN — ENOXAPARIN SODIUM 30 MILLIGRAM(S): 100 INJECTION SUBCUTANEOUS at 05:05

## 2017-05-01 RX ADMIN — BISOPROLOL FUMARATE 2.5 MILLIGRAM(S): 10 TABLET, FILM COATED ORAL at 05:05

## 2017-05-01 RX ADMIN — MEROPENEM 200 MILLIGRAM(S): 1 INJECTION INTRAVENOUS at 05:04

## 2017-05-01 RX ADMIN — Medication 1 TABLET(S): at 05:04

## 2017-05-01 NOTE — PROGRESS NOTE ADULT - SUBJECTIVE AND OBJECTIVE BOX
Patient seen and examined bedside resting comfortably, eating breakfast.  No complaints offered.   Tolerating regular diet; denies abdominal pain, nausea, vomiting.   Denies fever, chills, chest pain, dyspnea, cough.    T(F): 98.2, Max: 98.2 (05-01 @ 05:17)  HR: 63 (63 - 77)  BP: 140/56 (140/56 - 150/79)  RR: 16 (16 - 16)  SpO2: 97% (97% - 97%)    General: Alert, oriented, NAD  CV: +S1S2 regular rate and rhythm  Lung: Nonlabored respirations  Abdomen: soft, NTND. Normactive BS  Extremities: no pedal edema or calf tenderness noted   : childs catheter in situ draining clear, yellow urine with minimal amount of sediment, output: 3200cc/24hrs    LABS:                        12.0   10.2  )-----------( 258      ( 30 Apr 2017 12:37 )             35.0     04-30    142  |  106  |  17  ----------------------------<  197<H>  4.4   |  28  |  1.47<H>    Ca    8.6      30 Apr 2017 12:37    Impression: 86 year old female PMH urinary retention, MI, HLD, HTN a/w AMS, e coli UTI, b/l hydronephrosis with urinary retention, infected right leg wound, and Acute on chronic kidney failure- resolving    Plan:  - ID consult appreciated; noted change in antibiotics to meropenem and completed dose of gentamycin   - continue childs catheter for retention  - continue present medical management, renal and cardio Follow up   - discuss with Dr. Bautista for further recommendations; possible TOV

## 2017-05-01 NOTE — PROGRESS NOTE ADULT - SUBJECTIVE AND OBJECTIVE BOX
TMAX- 99.8    On day # 3 Meropenem / s/p Gent x1    Vital Signs Last 24 Hrs  T(C): 36.4, Max: 36.8 (05-01 @ 05:17)  T(F): 97.6, Max: 98.2 (05-01 @ 05:17)  HR: 76 (63 - 76)  BP: 137/72 (115/55 - 150/79)  BP(mean): --  RR: 17 (16 - 17)  SpO2: 96% (95% - 97%)  Supplemental O2:  on RA now      Patient claims to be feeling better today.  No c/o abdominal pain and no dysuria.  Davida has been d/c'ed now and patient states that she has voided 3 times so far.  Appetite is good, no GI upset.      PHYSICAL EXAM  General:  awake, alert, lying in bed now, cooperative and pleasant, in NAD  HEENT: conj pink, sclerae anicteric, PERRLA, no oral lesions noted   Neck:  supple, no nodes noted  Heart:  RR  Lungs:  clear bilat now  Abdomen:  soft, BS+, nontender, no CVA tenderness elicited  Extremities:  trace edema LE's, no calf tenderness                      dressing in place RLE  Skin:  warm, dry, no rash noted      I&O's Summary :  I & Os for 24h ending 01 May 2017 07:00  =============================================  IN: 1320 ml / OUT: 3200 ml / NET: -1880 ml    I & Os for current day (as of 01 May 2017 16:55)  =============================================  IN: 440 ml / OUT: 900 ml / NET: -460 ml        LABS:  CBC Full  -  ( 30 Apr 2017 12:37 )  WBC Count : 10.2 K/uL  Hemoglobin : 12.0 g/dL  Hematocrit : 35.0 %  Platelet Count - Automated : 258 K/uL  Mean Cell Volume : 87.1 fl  Mean Cell Hemoglobin : 29.8 pg  Mean Cell Hemoglobin Concentration : 34.2 gm/dL  Auto Neutrophil # : x  Auto Lymphocyte # : x  Auto Monocyte # : x  Auto Eosinophil # : x  Auto Basophil # : x  Auto Neutrophil % : x  Auto Lymphocyte % : x  Auto Monocyte % : x  Auto Eosinophil % : x  Auto Basophil % : x    04-30    142  |  106  |  17  ----------------------------<  197<H>  4.4   |  28  |  1.47<H>    Ca    8.6      30 Apr 2017 12:37    Sedimentation Rate, Erythrocyte: 51 mm/hr (04-29 @ 07:01)          CULTURES:  Specimen Source: .Urine Clean Catch (Midstream) (04-26 @ 09:22)  Culture Results:   >100,000 CFU/ml Escherichia coli ESBL (04-26 @ 09:22)    Specimen Source: .Blood Blood-Peripheral (04-26 @ 08:28)  Culture Results:   No growth at 5 days. (04-26 @ 08:28)    Specimen Source: .Blood Blood-Peripheral (04-26 @ 08:27)  Culture Results:   No growth at 5 days. (04-26 @ 08:27)        Radiology:   MRI Brain - 4/30 -    IMPRESSION:    Cerebral atrophy and chronic small vessel ischemic changes. No acute   infarction. Old lacunar infarction right basal ganglia.    Scattered chronic mucosal disease of the paranasal sinuses without   air-fluid levels.    Bilateral mastoid inflammatory disease.    Normal noncontrast MRI of brain.      Impression:  Clinically improving now on Meropenem for ESBL EColi UTI/ Pyelonephritis with bilat hydronephrosis and now s/p removal of childs for voiding trial after episode of urinary retention.    Suggestions:  Will continue present ab rx and follow-up temps and labs closely.  Urology follow-up - ? repeat Renal Sono to re-evaluate  bilateral hydronephrosis. Discussed with patient at bedside.

## 2017-05-01 NOTE — PROGRESS NOTE ADULT - NEUROLOGICAL
Alert & oriented; no sensory, motor or coordination deficits, normal reflexes
Alert & oriented; no sensory, motor or coordination deficits, normal reflexes

## 2017-05-01 NOTE — PROGRESS NOTE ADULT - SUBJECTIVE AND OBJECTIVE BOX
INTERVAL HISTORY:  alert, comfortable, no complaints    PAST MEDICAL & SURGICAL HISTORY:  Ventricular bigeminy: history of ablation  MI (myocardial infarction)  High cholesterol  HTN (hypertension)  S/P coronary artery stent placement  History of tonsillectomy  Cataract, bilateral  History of appendectomy        MEDICATIONS:  MEDICATIONS  (STANDING):  enoxaparin Injectable 30milliGRAM(s) SubCutaneous every 24 hours  dextrose 5% + sodium chloride 0.9%. 1000milliLiter(s) IV Continuous <Continuous>  predniSONE   Tablet 10milliGRAM(s) Oral daily  amiodarone    Tablet 200milliGRAM(s) Oral daily  clopidogrel Tablet 75milliGRAM(s) Oral daily  bisoprolol   Tablet 2.5milliGRAM(s) Oral daily  freetext medication  - 1Tablet(s) Oral daily  meropenem IVPB 1000milliGRAM(s) IV Intermittent every 12 hours  meropenem IVPB  IV Intermittent   lactobacillus acidophilus 1Tablet(s) Oral every 12 hours    MEDICATIONS  (PRN):  acetaminophen   Tablet 650milliGRAM(s) Oral every 6 hours PRN For Temp greater than 38 C (100.4 F)  ondansetron Injectable 4milliGRAM(s) IV Push every 6 hours PRN Nausea  aluminum hydroxide/magnesium hydroxide/simethicone Suspension 30milliLiter(s) Oral every 4 hours PRN Dyspepsia  senna 2Tablet(s) Oral at bedtime PRN Constipation  ALBUTerol    90 MICROgram(s) HFA Inhaler 2Puff(s) Inhalation every 6 hours PRN Shortness of Breath  ALPRAZolam 0.25milliGRAM(s) Oral every 8 hours PRN anxiety      PHYSICAL EXAM:  T(F): 97.6, Max: 98.2 (05-01 @ 05:17)  HR: 76 (63 - 76)  BP: 137/72 (115/55 - 150/79)  RR: 17 (16 - 17)  SpO2: 96% (95% - 97%)  Wt(kg): --  I&O's Summary  I & Os for 24h ending 01 May 2017 07:00  =============================================  IN: 1320 ml / OUT: 3200 ml / NET: -1880 ml    I & Os for current day (as of 01 May 2017 16:33)  =============================================  IN: 440 ml / OUT: 900 ml / NET: -460 ml        PHYSICAL EXAM:    HEENT: sclera anicteric, conjunctiva normal  JVP normal  Carotids: normal upstroke  Lungs:  clear  Cor: normal S1S2, no S3, rub  Abdomen:  normal bs, nontender, nondistended, no organomegaly  Ext:  less edema, bandaged  Neuro:  no focality       OTHER: 	    LABS:	     CARDIAC MARKERS:                                  12.0   10.2  )-----------( 258      ( 30 Apr 2017 12:37 )             35.0     04-30    142  |  106  |  17  ----------------------------<  197<H>  4.4   |  28  |  1.47<H>    Ca    8.6      30 Apr 2017 12:37      proBNP:   Lipid Profile:   HgA1c:   TSH:   Test                            Date  WBC         10.2                04-30 @ 12:37  RBC         4.02                04-30 @ 12:37  Hemoglobin  12.0                04-30 @ 12:37  Hematocrit  35.0                04-30 @ 12:37  Platelets   258                 04-30 @ 12:37  Creatinine  1.47                04-30 @ 12:37

## 2017-05-02 LAB
ANION GAP SERPL CALC-SCNC: 6 MMOL/L — SIGNIFICANT CHANGE UP (ref 5–17)
BUN SERPL-MCNC: 16 MG/DL — SIGNIFICANT CHANGE UP (ref 7–23)
CALCIUM SERPL-MCNC: 9 MG/DL — SIGNIFICANT CHANGE UP (ref 8.5–10.1)
CHLORIDE SERPL-SCNC: 104 MMOL/L — SIGNIFICANT CHANGE UP (ref 96–108)
CO2 SERPL-SCNC: 31 MMOL/L — SIGNIFICANT CHANGE UP (ref 22–31)
CREAT SERPL-MCNC: 1.27 MG/DL — SIGNIFICANT CHANGE UP (ref 0.5–1.3)
GLUCOSE SERPL-MCNC: 193 MG/DL — HIGH (ref 70–99)
POTASSIUM SERPL-MCNC: 4 MMOL/L — SIGNIFICANT CHANGE UP (ref 3.5–5.3)
POTASSIUM SERPL-SCNC: 4 MMOL/L — SIGNIFICANT CHANGE UP (ref 3.5–5.3)
SODIUM SERPL-SCNC: 141 MMOL/L — SIGNIFICANT CHANGE UP (ref 135–145)

## 2017-05-02 PROCEDURE — 74176 CT ABD & PELVIS W/O CONTRAST: CPT | Mod: 26

## 2017-05-02 RX ORDER — IOHEXOL 300 MG/ML
30 INJECTION, SOLUTION INTRAVENOUS ONCE
Qty: 0 | Refills: 0 | Status: COMPLETED | OUTPATIENT
Start: 2017-05-02 | End: 2017-05-02

## 2017-05-02 RX ADMIN — SODIUM CHLORIDE 75 MILLILITER(S): 9 INJECTION, SOLUTION INTRAVENOUS at 05:26

## 2017-05-02 RX ADMIN — SODIUM CHLORIDE 75 MILLILITER(S): 9 INJECTION, SOLUTION INTRAVENOUS at 17:36

## 2017-05-02 RX ADMIN — Medication 10 MILLIGRAM(S): at 05:25

## 2017-05-02 RX ADMIN — MEROPENEM 200 MILLIGRAM(S): 1 INJECTION INTRAVENOUS at 17:36

## 2017-05-02 RX ADMIN — Medication 1 TABLET(S): at 17:36

## 2017-05-02 RX ADMIN — AMIODARONE HYDROCHLORIDE 200 MILLIGRAM(S): 400 TABLET ORAL at 05:27

## 2017-05-02 RX ADMIN — CLOPIDOGREL BISULFATE 75 MILLIGRAM(S): 75 TABLET, FILM COATED ORAL at 11:29

## 2017-05-02 RX ADMIN — ENOXAPARIN SODIUM 30 MILLIGRAM(S): 100 INJECTION SUBCUTANEOUS at 05:25

## 2017-05-02 RX ADMIN — Medication 1 TABLET(S): at 05:25

## 2017-05-02 RX ADMIN — MEROPENEM 200 MILLIGRAM(S): 1 INJECTION INTRAVENOUS at 05:35

## 2017-05-02 RX ADMIN — BISOPROLOL FUMARATE 2.5 MILLIGRAM(S): 10 TABLET, FILM COATED ORAL at 05:35

## 2017-05-02 RX ADMIN — IOHEXOL 30 MILLILITER(S): 300 INJECTION, SOLUTION INTRAVENOUS at 12:09

## 2017-05-02 NOTE — PROGRESS NOTE ADULT - SUBJECTIVE AND OBJECTIVE BOX
INTERVAL HISTORY:  generally better    PAST MEDICAL & SURGICAL HISTORY:  Ventricular bigeminy: history of ablation  MI (myocardial infarction)  High cholesterol  HTN (hypertension)  S/P coronary artery stent placement  History of tonsillectomy  Cataract, bilateral  History of appendectomy        MEDICATIONS:  MEDICATIONS  (STANDING):  enoxaparin Injectable 30milliGRAM(s) SubCutaneous every 24 hours  dextrose 5% + sodium chloride 0.9%. 1000milliLiter(s) IV Continuous <Continuous>  predniSONE   Tablet 10milliGRAM(s) Oral daily  amiodarone    Tablet 200milliGRAM(s) Oral daily  clopidogrel Tablet 75milliGRAM(s) Oral daily  bisoprolol   Tablet 2.5milliGRAM(s) Oral daily  freetext medication  - 1Tablet(s) Oral daily  meropenem IVPB 1000milliGRAM(s) IV Intermittent every 12 hours  meropenem IVPB  IV Intermittent   lactobacillus acidophilus 1Tablet(s) Oral every 12 hours    MEDICATIONS  (PRN):  acetaminophen   Tablet 650milliGRAM(s) Oral every 6 hours PRN For Temp greater than 38 C (100.4 F)  ondansetron Injectable 4milliGRAM(s) IV Push every 6 hours PRN Nausea  aluminum hydroxide/magnesium hydroxide/simethicone Suspension 30milliLiter(s) Oral every 4 hours PRN Dyspepsia  senna 2Tablet(s) Oral at bedtime PRN Constipation  ALBUTerol    90 MICROgram(s) HFA Inhaler 2Puff(s) Inhalation every 6 hours PRN Shortness of Breath  ALPRAZolam 0.25milliGRAM(s) Oral every 8 hours PRN anxiety      PHYSICAL EXAM:  T(F): 98.2, Max: 99 (05-01 @ 17:35)  HR: 71 (70 - 77)  BP: 113/62 (113/62 - 153/70)  RR: 16 (16 - 17)  SpO2: 97% (96% - 98%)  Wt(kg): --  I&O's Summary    I & Os for current day (as of 02 May 2017 16:29)  =============================================  IN: 800 ml / OUT: 900 ml / NET: -100 ml                OTHER: 	    LABS:	     CARDIAC MARKERS:              05-02    141  |  104  |  16  ----------------------------<  193<H>  4.0   |  31  |  1.27    Ca    9.0      02 May 2017 10:24

## 2017-05-02 NOTE — PROGRESS NOTE ADULT - SUBJECTIVE AND OBJECTIVE BOX
INTERVAL HPI/OVERNIGHT EVENTS:    Patient sitting comfortably.  [place] DC.  Voiding with PVR of 65 cc.  Patient with no complaint of abdominal pain.  Tolerating diet with no complaint of nausea/vomiting.  Afebrile.  No other acute event overnight.      Vital Signs Last 24 Hrs  T(C): 36.6, Max: 37.2 (05-01 @ 17:35)  T(F): 97.8, Max: 99 (05-01 @ 17:35)  HR: 70 (70 - 77)  BP: 153/70 (115/55 - 153/70)  BP(mean): --  RR: 16 (16 - 17)  SpO2: 96% (95% - 98%)    MEDICATIONS  (STANDING):  enoxaparin Injectable 30milliGRAM(s) SubCutaneous every 24 hours  dextrose 5% + sodium chloride 0.9%. 1000milliLiter(s) IV Continuous <Continuous>  predniSONE   Tablet 10milliGRAM(s) Oral daily  amiodarone    Tablet 200milliGRAM(s) Oral daily  clopidogrel Tablet 75milliGRAM(s) Oral daily  bisoprolol   Tablet 2.5milliGRAM(s) Oral daily  freetext medication  - 1Tablet(s) Oral daily  meropenem IVPB 1000milliGRAM(s) IV Intermittent every 12 hours  meropenem IVPB  IV Intermittent   lactobacillus acidophilus 1Tablet(s) Oral every 12 hours    MEDICATIONS  (PRN):  acetaminophen   Tablet 650milliGRAM(s) Oral every 6 hours PRN For Temp greater than 38 C (100.4 F)  ondansetron Injectable 4milliGRAM(s) IV Push every 6 hours PRN Nausea  aluminum hydroxide/magnesium hydroxide/simethicone Suspension 30milliLiter(s) Oral every 4 hours PRN Dyspepsia  senna 2Tablet(s) Oral at bedtime PRN Constipation  ALBUTerol    90 MICROgram(s) HFA Inhaler 2Puff(s) Inhalation every 6 hours PRN Shortness of Breath  ALPRAZolam 0.25milliGRAM(s) Oral every 8 hours PRN anxiety      PHYSICAL EXAM:    GENERAL: NAD  HEAD:  Atraumatic, Normocephalic  EYES: EOMI, PERRLA, conjunctiva and sclera clear  CHEST/LUNG: Clear to ausculation, bilaterally   HEART: S1S2  ABDOMEN: non distended, +BS, soft, non tender, no guarding, no CVAT   EXTREMITIES:  calf soft, non tender     I&O's Detail    I & Os for current day (as of 02 May 2017 10:45)  =============================================  IN:    Oral Fluid: 800 ml    Total IN: 800 ml  ---------------------------------------------  OUT:    Indwelling Catheter - Urethral: 900 ml    Total OUT: 900 ml  ---------------------------------------------  Total NET: -100 ml      LABS:                        12.0   10.2  )-----------( 258      ( 30 Apr 2017 12:37 )             35.0     05-02    141  |  104  |  16  ----------------------------<  193<H>  4.0   |  31  |  1.27    Ca    9.0      02 May 2017 10:24          Impression:    86 year old female PMH urinary retention, MI, HLD, HTN a/w AMS, e coli UTI, b/l hydronephrosis with urinary retention, infected right leg wound, and Acute on chronic kidney failure- resolving    Plan -   Patient seen with Dr. Bautista.  Have an extensive discussion between Dr. Bautista and family.  Per Family, patient with repeated bouts of UTI. and find etiology.    Will recheck PVR.  Monitor I/O.  Renal function   Will repeat Urine Culture.  If negative -- tx with prophylactic Macrodantin 50 mg qhs and for further work up as outpatient  Will do CT abdomen/pelvis with po contrast   continue medical management/supportive care   Discuss case with 2E team.

## 2017-05-02 NOTE — PROGRESS NOTE ADULT - SUBJECTIVE AND OBJECTIVE BOX
TMAX - 99    On day # 4 Meropenem    Vital Signs Last 24 Hrs  T(C): 36.6, Max: 37.1 (05-01 @ 23:20)  T(F): 97.8, Max: 98.8 (05-01 @ 23:20)  HR: 65 (65 - 77)  BP: 137/82 (113/62 - 153/70)  BP(mean): --  RR: 17 (16 - 17)  SpO2: 98% (96% - 98%)  Supplemental O2: on RA      Patient claims to be feeling better.  No c/o abdominal pain, no back pain, no dysuria, and reports urinating several times today without any difficulty noted.  Appetite is good.  No cp, no SOB.       PHYSICAL EXAM  General:  awake, alert, sitting up in bed, pleasant and cooperative, in NAD now  HEENT:  conj pink, sclerae anicteric, PERRLA, no oral lesions noted  Neck: supple, no nodes noted   Heart:  RR  Lungs:  clear bilat  Abdomen:  soft, BS +, nontender, no CVA or spinal tenderness elicited  Extremities:  no edema LE's now                     dressing in place RLE  Skin: warm, dry, no rash noted    I&O's Summary:  I & Os for 24h ending 02 May 2017 07:00  =============================================  IN: 800 ml / OUT: 900 ml / NET: -100 ml    I & Os for current day (as of 02 May 2017 17:59)  =============================================  IN: 360 ml / OUT: 0 ml / NET: 360 ml        05-02    141  |  104  |  16  ----------------------------<  193<H>  4.0   |  31  |  1.27    Ca    9.0      02 May 2017 10:24    Sedimentation Rate, Erythrocyte: 51 mm/hr (04-29 @ 07:01)        CULTURES:  Specimen Source: .Urine Clean Catch (Midstream) (04-26 @ 09:22)  Culture Results:   >100,000 CFU/ml Escherichia coli ESBL (04-26 @ 09:22)    Specimen Source: .Blood Blood-Peripheral (04-26 @ 08:28)  Culture Results:   No growth at 5 days. (04-26 @ 08:28)    Specimen Source: .Blood Blood-Peripheral (04-26 @ 08:27)  Culture Results:   No growth at 5 days. (04-26 @ 08:27)          Radiology:  CT Abdomen and Pelvis - 5/2/17 -     IMPRESSION:     Mild bilateral hydroureteronephrosis. No renal stone identified.    Urinary bladder is underdistended, however there is mild perivesicular   fat stranding concerning for cystitis. Correlate with urinalysis.    Additional incidental findings as above.        Impression:  Slowly improving on ab rx with Meropenem for rx of Sepsis with ESBL EColi UTI/ Pyelonephritis with initial bilat hydronephrosis and urinary retention now apparently improving as per CT Scan done today.    Suggestions: Will continue present ab rx and would plan to complete 14 days total rx with Meropenem in view of ESBL EColi in cx and Pyelonephritis.  Of note that this is a recurrent process for this patient with prior urine cx's on previous admissions also with this organism along with recurrent episodes of Hydronephrosis.  Would consider Midline placement for continued IV access and then if patient is otherwise medically improved, ab rx could be completed in a Rehab facility.

## 2017-05-03 ENCOUNTER — APPOINTMENT (OUTPATIENT)
Dept: WOUND CARE | Facility: HOSPITAL | Age: 82
End: 2017-05-03

## 2017-05-03 LAB
ANION GAP SERPL CALC-SCNC: 8 MMOL/L — SIGNIFICANT CHANGE UP (ref 5–17)
BASOPHILS # BLD AUTO: 0.1 K/UL — SIGNIFICANT CHANGE UP (ref 0–0.2)
BASOPHILS NFR BLD AUTO: 0.5 % — SIGNIFICANT CHANGE UP (ref 0–2)
BUN SERPL-MCNC: 17 MG/DL — SIGNIFICANT CHANGE UP (ref 7–23)
CALCIUM SERPL-MCNC: 8.4 MG/DL — LOW (ref 8.5–10.1)
CHLORIDE SERPL-SCNC: 106 MMOL/L — SIGNIFICANT CHANGE UP (ref 96–108)
CO2 SERPL-SCNC: 30 MMOL/L — SIGNIFICANT CHANGE UP (ref 22–31)
CREAT SERPL-MCNC: 1.2 MG/DL — SIGNIFICANT CHANGE UP (ref 0.5–1.3)
CULTURE RESULTS: NO GROWTH — SIGNIFICANT CHANGE UP
EOSINOPHIL # BLD AUTO: 0 K/UL — SIGNIFICANT CHANGE UP (ref 0–0.5)
EOSINOPHIL NFR BLD AUTO: 0.4 % — SIGNIFICANT CHANGE UP (ref 0–6)
GLUCOSE SERPL-MCNC: 91 MG/DL — SIGNIFICANT CHANGE UP (ref 70–99)
HCT VFR BLD CALC: 34.7 % — SIGNIFICANT CHANGE UP (ref 34.5–45)
HGB BLD-MCNC: 11.8 G/DL — SIGNIFICANT CHANGE UP (ref 11.5–15.5)
LYMPHOCYTES # BLD AUTO: 1.6 K/UL — SIGNIFICANT CHANGE UP (ref 1–3.3)
LYMPHOCYTES # BLD AUTO: 13 % — SIGNIFICANT CHANGE UP (ref 13–44)
MCHC RBC-ENTMCNC: 29.4 PG — SIGNIFICANT CHANGE UP (ref 27–34)
MCHC RBC-ENTMCNC: 33.8 GM/DL — SIGNIFICANT CHANGE UP (ref 32–36)
MCV RBC AUTO: 87 FL — SIGNIFICANT CHANGE UP (ref 80–100)
MONOCYTES # BLD AUTO: 0.6 K/UL — SIGNIFICANT CHANGE UP (ref 0–0.9)
MONOCYTES NFR BLD AUTO: 4.6 % — SIGNIFICANT CHANGE UP (ref 2–14)
NEUTROPHILS # BLD AUTO: 10.3 K/UL — HIGH (ref 1.8–7.4)
NEUTROPHILS NFR BLD AUTO: 81.6 % — HIGH (ref 43–77)
PLATELET # BLD AUTO: 291 K/UL — SIGNIFICANT CHANGE UP (ref 150–400)
POTASSIUM SERPL-MCNC: 3.6 MMOL/L — SIGNIFICANT CHANGE UP (ref 3.5–5.3)
POTASSIUM SERPL-SCNC: 3.6 MMOL/L — SIGNIFICANT CHANGE UP (ref 3.5–5.3)
RBC # BLD: 3.99 M/UL — SIGNIFICANT CHANGE UP (ref 3.8–5.2)
RBC # FLD: 15.8 % — HIGH (ref 11–15)
SODIUM SERPL-SCNC: 144 MMOL/L — SIGNIFICANT CHANGE UP (ref 135–145)
SPECIMEN SOURCE: SIGNIFICANT CHANGE UP
WBC # BLD: 12.6 K/UL — HIGH (ref 3.8–10.5)
WBC # FLD AUTO: 12.6 K/UL — HIGH (ref 3.8–10.5)

## 2017-05-03 RX ORDER — PANTOPRAZOLE SODIUM 20 MG/1
40 TABLET, DELAYED RELEASE ORAL
Qty: 0 | Refills: 0 | Status: DISCONTINUED | OUTPATIENT
Start: 2017-05-03 | End: 2017-05-06

## 2017-05-03 RX ADMIN — ENOXAPARIN SODIUM 30 MILLIGRAM(S): 100 INJECTION SUBCUTANEOUS at 05:09

## 2017-05-03 RX ADMIN — PANTOPRAZOLE SODIUM 40 MILLIGRAM(S): 20 TABLET, DELAYED RELEASE ORAL at 09:40

## 2017-05-03 RX ADMIN — MEROPENEM 200 MILLIGRAM(S): 1 INJECTION INTRAVENOUS at 17:33

## 2017-05-03 RX ADMIN — Medication 10 MILLIGRAM(S): at 05:08

## 2017-05-03 RX ADMIN — MEROPENEM 200 MILLIGRAM(S): 1 INJECTION INTRAVENOUS at 05:09

## 2017-05-03 RX ADMIN — Medication 1 TABLET(S): at 05:08

## 2017-05-03 RX ADMIN — CLOPIDOGREL BISULFATE 75 MILLIGRAM(S): 75 TABLET, FILM COATED ORAL at 13:39

## 2017-05-03 RX ADMIN — SODIUM CHLORIDE 75 MILLILITER(S): 9 INJECTION, SOLUTION INTRAVENOUS at 07:30

## 2017-05-03 RX ADMIN — BISOPROLOL FUMARATE 2.5 MILLIGRAM(S): 10 TABLET, FILM COATED ORAL at 05:07

## 2017-05-03 RX ADMIN — Medication 1 TABLET(S): at 17:33

## 2017-05-03 RX ADMIN — AMIODARONE HYDROCHLORIDE 200 MILLIGRAM(S): 400 TABLET ORAL at 05:08

## 2017-05-03 NOTE — PROGRESS NOTE ADULT - GUM GEN PE MLT EXAM PC
Normal genitalia; no lesions; no discharge

## 2017-05-03 NOTE — PROGRESS NOTE ADULT - SUBJECTIVE AND OBJECTIVE BOX
Patient with continue abx treatment. CT reviewed. Agree that the cause of her recurrent UTI's remain her neurogenic bladder.

## 2017-05-04 ENCOUNTER — TRANSCRIPTION ENCOUNTER (OUTPATIENT)
Age: 82
End: 2017-05-04

## 2017-05-04 LAB
ANION GAP SERPL CALC-SCNC: 7 MMOL/L — SIGNIFICANT CHANGE UP (ref 5–17)
BUN SERPL-MCNC: 16 MG/DL — SIGNIFICANT CHANGE UP (ref 7–23)
CALCIUM SERPL-MCNC: 8.5 MG/DL — SIGNIFICANT CHANGE UP (ref 8.5–10.1)
CHLORIDE SERPL-SCNC: 108 MMOL/L — SIGNIFICANT CHANGE UP (ref 96–108)
CO2 SERPL-SCNC: 31 MMOL/L — SIGNIFICANT CHANGE UP (ref 22–31)
CREAT SERPL-MCNC: 1.18 MG/DL — SIGNIFICANT CHANGE UP (ref 0.5–1.3)
GLUCOSE SERPL-MCNC: 86 MG/DL — SIGNIFICANT CHANGE UP (ref 70–99)
HCT VFR BLD CALC: 35.8 % — SIGNIFICANT CHANGE UP (ref 34.5–45)
HGB BLD-MCNC: 12.3 G/DL — SIGNIFICANT CHANGE UP (ref 11.5–15.5)
MAGNESIUM SERPL-MCNC: 1.8 MG/DL — SIGNIFICANT CHANGE UP (ref 1.8–2.4)
MCHC RBC-ENTMCNC: 30.2 PG — SIGNIFICANT CHANGE UP (ref 27–34)
MCHC RBC-ENTMCNC: 34.5 GM/DL — SIGNIFICANT CHANGE UP (ref 32–36)
MCV RBC AUTO: 87.6 FL — SIGNIFICANT CHANGE UP (ref 80–100)
PLATELET # BLD AUTO: 286 K/UL — SIGNIFICANT CHANGE UP (ref 150–400)
POTASSIUM SERPL-MCNC: 3.4 MMOL/L — LOW (ref 3.5–5.3)
POTASSIUM SERPL-SCNC: 3.4 MMOL/L — LOW (ref 3.5–5.3)
RBC # BLD: 4.08 M/UL — SIGNIFICANT CHANGE UP (ref 3.8–5.2)
RBC # FLD: 16.1 % — HIGH (ref 11–15)
SODIUM SERPL-SCNC: 146 MMOL/L — HIGH (ref 135–145)
WBC # BLD: 12 K/UL — HIGH (ref 3.8–10.5)
WBC # FLD AUTO: 12 K/UL — HIGH (ref 3.8–10.5)

## 2017-05-04 RX ORDER — POTASSIUM CHLORIDE 20 MEQ
40 PACKET (EA) ORAL ONCE
Qty: 0 | Refills: 0 | Status: COMPLETED | OUTPATIENT
Start: 2017-05-04 | End: 2017-05-04

## 2017-05-04 RX ADMIN — CLOPIDOGREL BISULFATE 75 MILLIGRAM(S): 75 TABLET, FILM COATED ORAL at 11:59

## 2017-05-04 RX ADMIN — ENOXAPARIN SODIUM 30 MILLIGRAM(S): 100 INJECTION SUBCUTANEOUS at 05:19

## 2017-05-04 RX ADMIN — AMIODARONE HYDROCHLORIDE 200 MILLIGRAM(S): 400 TABLET ORAL at 06:49

## 2017-05-04 RX ADMIN — Medication 1 TABLET(S): at 06:51

## 2017-05-04 RX ADMIN — MEROPENEM 200 MILLIGRAM(S): 1 INJECTION INTRAVENOUS at 05:18

## 2017-05-04 RX ADMIN — PANTOPRAZOLE SODIUM 40 MILLIGRAM(S): 20 TABLET, DELAYED RELEASE ORAL at 07:42

## 2017-05-04 RX ADMIN — MEROPENEM 200 MILLIGRAM(S): 1 INJECTION INTRAVENOUS at 17:31

## 2017-05-04 RX ADMIN — Medication 10 MILLIGRAM(S): at 06:49

## 2017-05-04 RX ADMIN — Medication 40 MILLIEQUIVALENT(S): at 11:59

## 2017-05-04 RX ADMIN — BISOPROLOL FUMARATE 2.5 MILLIGRAM(S): 10 TABLET, FILM COATED ORAL at 06:50

## 2017-05-04 NOTE — PHYSICAL THERAPY INITIAL EVALUATION ADULT - MODIFIED CLINICAL TEST OF SENSORY INTEGRATION IN BALANCE TEST
Barthel Index: Feeding Score _10/10__, Bathing Score 0_/5__, Grooming Score 5_/5__, Dressing Score 5_/10__, Bowels Score _10_/10_, Bladder Score _5/10__, Toilet Score _5/10__, Transfers Score _0/15__, Mobility Score _0_/15_, Stairs Score 0_/10__,     Total Score __40_/100
Barthel Index: Feeding Score _10__, Bathing Score _0__, Grooming Score _5__, Dressing Score 10___, Bowels Score _10__, Bladder Score __10_, Toilet Score _10__, Transfers Score _10__, Mobility Score _10__, Stairs Score __0_,     Total Score __75_

## 2017-05-04 NOTE — PROGRESS NOTE ADULT - SUBJECTIVE AND OBJECTIVE BOX
Patient seen and examined bedside resting comfortably.  No complaints offered. No fever/chills.  Voiding spontaneously without difficulty.     T(F): 97.4, Max: 98.4 (05-04 @ 05:00)  HR: 69 (68 - 76)  BP: 165/82 (127/75 - 165/82)  RR: 17 (16 - 17)  SpO2: 96% (96% - 98%)    PHYSICAL EXAM:  General: NAD, alert and awake  HEENT: NCAT, EOMI, conjunctiva clear  Chest: nonlabored respirations, good inspiratory effort  Abdomen: soft, NTND.   Extremities: R calf with dry cling. No gross swelling b/l lower extremities  : no suprapubic tenderness    LABS:                        12.3   12.0  )-----------( 286      ( 04 May 2017 07:07 )             35.8   05-04    146<H>  |  108  |  16  ----------------------------<  86  3.4<L>   |  31  |  1.18    Ca    8.5      04 May 2017 07:07  Mg     1.8     05-04    CT 5/2  IMPRESSION:   Mild bilateral hydroureteronephrosis. No renal stone identified.  Urinary bladder is underdistended, however there is mild perivesicular   fat stranding concerning for cystitis. Correlate with urinalysis.    Culture - Urine (05.03.17 @ 00:06)    Specimen Source: .Urine Clean Catch (Midstream)    Culture Results:   No growth    Impression:  86 year old female PMH urinary retention, MI, HLD, HTN a/w AMS, e coli UTI, b/l hydronephrosis with urinary retention, infected right leg wound, and Acute on chronic kidney failure, resolved    Plan:  repeat urine culture negative. will discuss with Dr Bautista for further recommendations  cont merrem per ID  continue present medical management and supportive care   VTE/GI ppx Patient seen and examined bedside resting comfortably.  No complaints offered. No fever/chills.  Voiding spontaneously without difficulty.     T(F): 97.4, Max: 98.4 (05-04 @ 05:00)  HR: 69 (68 - 76)  BP: 165/82 (127/75 - 165/82)  RR: 17 (16 - 17)  SpO2: 96% (96% - 98%)    PHYSICAL EXAM:  General: NAD, alert and awake  HEENT: NCAT, EOMI, conjunctiva clear  Chest: nonlabored respirations, good inspiratory effort  Abdomen: soft, NTND.   Extremities: R calf with dry cling. No gross swelling b/l lower extremities  : no suprapubic tenderness    LABS:                        12.3   12.0  )-----------( 286      ( 04 May 2017 07:07 )             35.8   05-04    146<H>  |  108  |  16  ----------------------------<  86  3.4<L>   |  31  |  1.18    Ca    8.5      04 May 2017 07:07  Mg     1.8     05-04    CT 5/2  IMPRESSION:   Mild bilateral hydroureteronephrosis. No renal stone identified.  Urinary bladder is underdistended, however there is mild perivesicular   fat stranding concerning for cystitis. Correlate with urinalysis.    Culture - Urine (05.03.17 @ 00:06)    Specimen Source: .Urine Clean Catch (Midstream)    Culture Results:   No growth    Impression:  86 year old female PMH urinary retention, MI, HLD, HTN a/w AMS, e coli UTI, b/l hydronephrosis with urinary retention, infected right leg wound, and Acute on chronic kidney failure, resolved    Plan:  repeat urine culture negative.   as discussed with Dr Bautista, recommend start macrodantin UTI prophylaxis 50mg PO qhs on discharge/when off IV abx  cont merrem per ID  continue present medical management and supportive care   VTE/GI ppx

## 2017-05-04 NOTE — PHYSICAL THERAPY INITIAL EVALUATION ADULT - ADDITIONAL COMMENTS
Pt reports she lives on the first floor with no stairs to enter home as there is a side ramp. Pt reports son lives on second floor for home. Pt reports having a HHA 10hrs a day 7 days a week to assist with ADL's (showering, cleaning, cooking)
Pt reports she lives on the first floor with no stairs to enter home as there is a side ramp. Pt reports son lives on second floor for home. Pt reports having a HHA 10hrs a day 7 days a week to assist with ADL's (showering, cleaning, cooking)

## 2017-05-04 NOTE — PHYSICAL THERAPY INITIAL EVALUATION ADULT - IMPAIRMENTS FOUND, PT EVAL
gait, locomotion, and balance/aerobic capacity/endurance/posture/muscle strength/ergonomics and body mechanics
muscle strength/gait, locomotion, and balance

## 2017-05-04 NOTE — PHYSICAL THERAPY INITIAL EVALUATION ADULT - PLANNED THERAPY INTERVENTIONS, PT EVAL
transfer training/gait training/strengthening/bed mobility training/balance training
balance training/transfer training/strengthening/gait training

## 2017-05-04 NOTE — PHYSICAL THERAPY INITIAL EVALUATION ADULT - CRITERIA FOR SKILLED THERAPEUTIC INTERVENTIONS
impairments found/therapy frequency/functional limitations in following categories/anticipated discharge recommendation/predicted duration of therapy intervention
impairments found/functional limitations in following categories/therapy frequency/predicted duration of therapy intervention/anticipated discharge recommendation/Home with home PT. Pt reports having a rolling walker at home./risk reduction/prevention

## 2017-05-04 NOTE — PHYSICAL THERAPY INITIAL EVALUATION ADULT - GENERAL OBSERVATIONS, REHAB EVAL
Pt was seen for wound care consult. Pt was seen in supine c bandage dry and intact on R lower leg. Pt was alert and Ox4. The bandage was too tight and pitty edema was +2 at R ankle and foot. Bandage and dressing were removed by P.T. R lower leg skin was 100% healed from pyoderma gangrenosum (as per Dr. Riggs ). However, skin was too moist and maceration was noted. Pt c/o skin is very sensitive to touch and pressure.
Pt seen supine in bed, alert and oriented x1, cardiac monitor intact, IV intact, childs catheter intact, ace wrap to R lower leg intact, bandage to L shin intact, wounds examined by RNCaty at the time and skin integrity appears intact. Assessed orientation again, pt orientedx4. Pt /65,HR 68, O2 100% on room air.

## 2017-05-05 LAB
ALBUMIN SERPL ELPH-MCNC: 2.5 G/DL — LOW (ref 3.3–5)
ALP SERPL-CCNC: 96 U/L — SIGNIFICANT CHANGE UP (ref 40–120)
ALT FLD-CCNC: 23 U/L — SIGNIFICANT CHANGE UP (ref 12–78)
ANION GAP SERPL CALC-SCNC: 10 MMOL/L — SIGNIFICANT CHANGE UP (ref 5–17)
AST SERPL-CCNC: 18 U/L — SIGNIFICANT CHANGE UP (ref 15–37)
BILIRUB SERPL-MCNC: 0.4 MG/DL — SIGNIFICANT CHANGE UP (ref 0.2–1.2)
BUN SERPL-MCNC: 16 MG/DL — SIGNIFICANT CHANGE UP (ref 7–23)
CALCIUM SERPL-MCNC: 8.6 MG/DL — SIGNIFICANT CHANGE UP (ref 8.5–10.1)
CHLORIDE SERPL-SCNC: 106 MMOL/L — SIGNIFICANT CHANGE UP (ref 96–108)
CO2 SERPL-SCNC: 29 MMOL/L — SIGNIFICANT CHANGE UP (ref 22–31)
CREAT SERPL-MCNC: 1.04 MG/DL — SIGNIFICANT CHANGE UP (ref 0.5–1.3)
GLUCOSE SERPL-MCNC: 86 MG/DL — SIGNIFICANT CHANGE UP (ref 70–99)
HCT VFR BLD CALC: 35.9 % — SIGNIFICANT CHANGE UP (ref 34.5–45)
HGB BLD-MCNC: 12.4 G/DL — SIGNIFICANT CHANGE UP (ref 11.5–15.5)
MCHC RBC-ENTMCNC: 29.4 PG — SIGNIFICANT CHANGE UP (ref 27–34)
MCHC RBC-ENTMCNC: 34.4 GM/DL — SIGNIFICANT CHANGE UP (ref 32–36)
MCV RBC AUTO: 85.6 FL — SIGNIFICANT CHANGE UP (ref 80–100)
PLATELET # BLD AUTO: 310 K/UL — SIGNIFICANT CHANGE UP (ref 150–400)
POTASSIUM SERPL-MCNC: 3.3 MMOL/L — LOW (ref 3.5–5.3)
POTASSIUM SERPL-SCNC: 3.3 MMOL/L — LOW (ref 3.5–5.3)
PROT SERPL-MCNC: 5.9 GM/DL — LOW (ref 6–8.3)
RBC # BLD: 4.2 M/UL — SIGNIFICANT CHANGE UP (ref 3.8–5.2)
RBC # FLD: 15.6 % — HIGH (ref 11–15)
SODIUM SERPL-SCNC: 145 MMOL/L — SIGNIFICANT CHANGE UP (ref 135–145)
WBC # BLD: 10.2 K/UL — SIGNIFICANT CHANGE UP (ref 3.8–10.5)
WBC # FLD AUTO: 10.2 K/UL — SIGNIFICANT CHANGE UP (ref 3.8–10.5)

## 2017-05-05 RX ORDER — POTASSIUM CHLORIDE 20 MEQ
40 PACKET (EA) ORAL ONCE
Qty: 0 | Refills: 0 | Status: COMPLETED | OUTPATIENT
Start: 2017-05-05 | End: 2017-05-05

## 2017-05-05 RX ADMIN — MEROPENEM 200 MILLIGRAM(S): 1 INJECTION INTRAVENOUS at 05:50

## 2017-05-05 RX ADMIN — Medication 10 MILLIGRAM(S): at 05:49

## 2017-05-05 RX ADMIN — MEROPENEM 200 MILLIGRAM(S): 1 INJECTION INTRAVENOUS at 17:33

## 2017-05-05 RX ADMIN — CLOPIDOGREL BISULFATE 75 MILLIGRAM(S): 75 TABLET, FILM COATED ORAL at 12:38

## 2017-05-05 RX ADMIN — Medication 40 MILLIEQUIVALENT(S): at 08:40

## 2017-05-05 RX ADMIN — Medication 1 TABLET(S): at 05:49

## 2017-05-05 RX ADMIN — PANTOPRAZOLE SODIUM 40 MILLIGRAM(S): 20 TABLET, DELAYED RELEASE ORAL at 08:06

## 2017-05-05 RX ADMIN — ENOXAPARIN SODIUM 30 MILLIGRAM(S): 100 INJECTION SUBCUTANEOUS at 05:48

## 2017-05-05 RX ADMIN — SODIUM CHLORIDE 75 MILLILITER(S): 9 INJECTION, SOLUTION INTRAVENOUS at 06:03

## 2017-05-05 RX ADMIN — AMIODARONE HYDROCHLORIDE 200 MILLIGRAM(S): 400 TABLET ORAL at 05:49

## 2017-05-05 RX ADMIN — Medication 1 TABLET(S): at 17:33

## 2017-05-05 RX ADMIN — BISOPROLOL FUMARATE 2.5 MILLIGRAM(S): 10 TABLET, FILM COATED ORAL at 05:49

## 2017-05-05 NOTE — PROGRESS NOTE ADULT - MUSCULOSKELETAL
No joint pain, swelling or deformity; no limitation of movement

## 2017-05-05 NOTE — PROGRESS NOTE ADULT - BACK
No deformity or limitation of movement

## 2017-05-05 NOTE — PROGRESS NOTE ADULT - SUBJECTIVE AND OBJECTIVE BOX
Patient seen and examined bedside resting comfortably, no acute issues overnight, afebrile. No complaints offered. Voiding spontaneously without difficulty.     T(F): 98.8, Max: 98.8 (05-04 @ 23:08)  HR: 68 (68 - 72)  BP: 128/68 (127/86 - 165/82)  RR: 18 (17 - 18)  SpO2: 96% (95% - 96%)  Wt(kg): --  CAPILLARY BLOOD GLUCOSE      PHYSICAL EXAM:  General: NAD, alert and awake  Chest: nonlabored respirations, good inspiratory effort  Abdomen: soft, NTND.   : no suprapubic tenderness/distention  Extremities: R calf with dry cling. No gross swelling b/l lower extremities      LABS:                        12.4   10.2  )-----------( 310      ( 05 May 2017 06:16 )             35.9     05-05    145  |  106  |  16  ----------------------------<  86  3.3<L>   |  29  |  1.04    Ca    8.6      05 May 2017 06:16  Mg     1.8     05-04    TPro  5.9<L>  /  Alb  2.5<L>  /  TBili  0.4  /  DBili  x   /  AST  18  /  ALT  23  /  AlkPhos  96  05-05      I&O's Detail  I & Os for 24h ending 05 May 2017 07:00  =============================================  IN:    dextrose 5% + sodium chloride 0.9%.: 900 ml    Oral Fluid: 700 ml    Solution: 100 ml    Total IN: 1700 ml  ---------------------------------------------  OUT:    Voided: 450 ml    Total OUT: 450 ml  ---------------------------------------------  Total NET: 1250 ml    I & Os for current day (as of 05 May 2017 10:57)  =============================================  IN:    Oral Fluid: 150 ml    Total IN: 150 ml  ---------------------------------------------  OUT:    Total OUT: 0 ml  ---------------------------------------------  Total NET: 150 ml    Culture Results:   No growth (05-03 @ 00:06)      Impression: 86 year old female PMH urinary retention, MI, HLD, HTN a/w AMS, e coli UTI, b/l hydronephrosis with urinary retention, infected right leg wound, and Acute on chronic kidney failure, resolved    Plan:  repeat urine culture: no growth.   cont ABX per ID  VTE/GI ppx  Continue present medical management, d/c planning to [facility] per Medicine  UTI prophylaxis Macrodantin  50mg PO qhs to start when off IV abx  will discuss plan with Dr. Bautista

## 2017-05-05 NOTE — PROGRESS NOTE ADULT - SUBJECTIVE AND OBJECTIVE BOX
TMAX - 98.8 now    On day # 7 Meropenem    Vital Signs Last 24 Hrs  T(C): 36.7, Max: 37.1 (05-04 @ 23:08)  T(F): 98, Max: 98.8 (05-04 @ 23:08)  HR: 73 (68 - 73)  BP: 120/63 (120/63 - 144/64)  BP(mean): --  RR: 16 (16 - 18)  SpO2: 98% (96% - 99%)  Supplemental O2: on RA      Patient claims to be feeling better, with no abdominal pain, no back pain, and denies any dysuria or hesitancy when voiding.  Appetite fair, claims she is not very hungry though.       PHYSICAL EXAM  General:  awake, alert, lying in bed at present, pleasant and cooperative,  appears comfortable  HEENT: conj pink, sclerae anicteric, PERRLA, no oral lesions noted   Neck: supple, no nodes noted   Heart: RR   Lungs: clear bilaterally   Abdomen:  soft, BS+, nontender                    no CVA or spinal tenderness elicited  Extremities:  no edema LE's                      no calf tenderness  Skin:  warm, dry, no rash noted      I&O's Summary:  I & Os for 24h ending 05 May 2017 07:00  =============================================  IN: 1700 ml / OUT: 450 ml / NET: 1250 ml    I & Os for current day (as of 05 May 2017 17:14)  =============================================  IN: 300 ml / OUT: 0 ml / NET: 300 ml      LABS:  CBC Full  -  ( 05 May 2017 06:16 )  WBC Count : 10.2 K/uL  Hemoglobin : 12.4 g/dL  Hematocrit : 35.9 %  Platelet Count - Automated : 310 K/uL  Mean Cell Volume : 85.6 fl  Mean Cell Hemoglobin : 29.4 pg  Mean Cell Hemoglobin Concentration : 34.4 gm/dL  Auto Neutrophil # : x  Auto Lymphocyte # : x  Auto Monocyte # : x  Auto Eosinophil # : x  Auto Basophil # : x  Auto Neutrophil % : x  Auto Lymphocyte % : x  Auto Monocyte % : x  Auto Eosinophil % : x  Auto Basophil % : x    05-05    145  |  106  |  16  ----------------------------<  86  3.3<L>   |  29  |  1.04    Ca    8.6      05 May 2017 06:16  Mg     1.8     05-04    TPro  5.9<L>  /  Alb  2.5<L>  /  TBili  0.4  /  DBili  x   /  AST  18  /  ALT  23  /  AlkPhos  96  05-05    LIVER FUNCTIONS - ( 05 May 2017 06:16 )  Alb: 2.5 g/dL / Pro: 5.9 gm/dL / ALK PHOS: 96 U/L / ALT: 23 U/L / AST: 18 U/L / GGT: x             CULTURES:  Specimen Source: .Urine Clean Catch (Midstream) (05-03 @ 00:06)  Culture Results:   No growth (05-03 @ 00:06)          Impression: Clinically improving on ab rx with Meropenem for rx of Sepsis with ESBL EColi UTI/ Bilateral Pyelonephritis secondary to bilateral hydronephrosis with urinary retention now improving.    Suggestions: Recommend completing 14 days total ab rx with Meropenem ( today is # 7) with careful monitoring of patient's voiding to assure no recurrence of urinary retention which appears to be the etiology of her recurrent episodes of hydronephrosis and UTI's/ pyelonephritis.  Discussed in detail with patient at bedside.

## 2017-05-05 NOTE — PROGRESS NOTE ADULT - ATTENDING COMMENTS
agree with the plan of completing the antibiotic course and continue prophylactic antibiotic like Macrodantin

## 2017-05-06 VITALS
RESPIRATION RATE: 16 BRPM | TEMPERATURE: 99 F | DIASTOLIC BLOOD PRESSURE: 74 MMHG | HEART RATE: 70 BPM | SYSTOLIC BLOOD PRESSURE: 141 MMHG | OXYGEN SATURATION: 94 %

## 2017-05-06 LAB
ANION GAP SERPL CALC-SCNC: 8 MMOL/L — SIGNIFICANT CHANGE UP (ref 5–17)
BUN SERPL-MCNC: 19 MG/DL — SIGNIFICANT CHANGE UP (ref 7–23)
CALCIUM SERPL-MCNC: 8.7 MG/DL — SIGNIFICANT CHANGE UP (ref 8.5–10.1)
CHLORIDE SERPL-SCNC: 107 MMOL/L — SIGNIFICANT CHANGE UP (ref 96–108)
CO2 SERPL-SCNC: 30 MMOL/L — SIGNIFICANT CHANGE UP (ref 22–31)
CREAT SERPL-MCNC: 1.16 MG/DL — SIGNIFICANT CHANGE UP (ref 0.5–1.3)
GLUCOSE SERPL-MCNC: 86 MG/DL — SIGNIFICANT CHANGE UP (ref 70–99)
HCT VFR BLD CALC: 34.1 % — LOW (ref 34.5–45)
HGB BLD-MCNC: 11.8 G/DL — SIGNIFICANT CHANGE UP (ref 11.5–15.5)
MCHC RBC-ENTMCNC: 30.3 PG — SIGNIFICANT CHANGE UP (ref 27–34)
MCHC RBC-ENTMCNC: 34.5 GM/DL — SIGNIFICANT CHANGE UP (ref 32–36)
MCV RBC AUTO: 87.8 FL — SIGNIFICANT CHANGE UP (ref 80–100)
PLATELET # BLD AUTO: 248 K/UL — SIGNIFICANT CHANGE UP (ref 150–400)
POTASSIUM SERPL-MCNC: 4.1 MMOL/L — SIGNIFICANT CHANGE UP (ref 3.5–5.3)
POTASSIUM SERPL-SCNC: 4.1 MMOL/L — SIGNIFICANT CHANGE UP (ref 3.5–5.3)
RBC # BLD: 3.88 M/UL — SIGNIFICANT CHANGE UP (ref 3.8–5.2)
RBC # FLD: 15.8 % — HIGH (ref 11–15)
SODIUM SERPL-SCNC: 145 MMOL/L — SIGNIFICANT CHANGE UP (ref 135–145)
WBC # BLD: 10.4 K/UL — SIGNIFICANT CHANGE UP (ref 3.8–10.5)
WBC # FLD AUTO: 10.4 K/UL — SIGNIFICANT CHANGE UP (ref 3.8–10.5)

## 2017-05-06 RX ORDER — MEROPENEM 1 G/30ML
1000 INJECTION INTRAVENOUS
Qty: 0 | Refills: 0 | COMMUNITY
Start: 2017-05-06

## 2017-05-06 RX ORDER — PANTOPRAZOLE SODIUM 20 MG/1
1 TABLET, DELAYED RELEASE ORAL
Qty: 0 | Refills: 0 | COMMUNITY
Start: 2017-05-06

## 2017-05-06 RX ADMIN — MEROPENEM 200 MILLIGRAM(S): 1 INJECTION INTRAVENOUS at 06:19

## 2017-05-06 RX ADMIN — PANTOPRAZOLE SODIUM 40 MILLIGRAM(S): 20 TABLET, DELAYED RELEASE ORAL at 07:34

## 2017-05-06 RX ADMIN — AMIODARONE HYDROCHLORIDE 200 MILLIGRAM(S): 400 TABLET ORAL at 06:18

## 2017-05-06 RX ADMIN — MEROPENEM 200 MILLIGRAM(S): 1 INJECTION INTRAVENOUS at 17:05

## 2017-05-06 RX ADMIN — SODIUM CHLORIDE 75 MILLILITER(S): 9 INJECTION, SOLUTION INTRAVENOUS at 11:22

## 2017-05-06 RX ADMIN — Medication 10 MILLIGRAM(S): at 06:19

## 2017-05-06 RX ADMIN — BISOPROLOL FUMARATE 2.5 MILLIGRAM(S): 10 TABLET, FILM COATED ORAL at 06:19

## 2017-05-06 RX ADMIN — Medication 1 TABLET(S): at 17:05

## 2017-05-06 RX ADMIN — CLOPIDOGREL BISULFATE 75 MILLIGRAM(S): 75 TABLET, FILM COATED ORAL at 11:22

## 2017-05-06 RX ADMIN — Medication 1 TABLET(S): at 06:18

## 2017-05-06 RX ADMIN — ENOXAPARIN SODIUM 30 MILLIGRAM(S): 100 INJECTION SUBCUTANEOUS at 06:19

## 2017-05-06 NOTE — DISCHARGE NOTE ADULT - MEDICATION SUMMARY - MEDICATIONS TO TAKE
I will START or STAY ON the medications listed below when I get home from the hospital:    freetext medication  -  -- 1 tab(s) by mouth once a day  -- Indication: For Acute on chronic kidney failure    predniSONE 5 mg oral tablet  -- 1 tab(s) by mouth once a day  -- Indication: For Acute encephalopathy    acetaminophen 325 mg oral tablet  -- 2 tab(s) by mouth every 6 hours, As needed, Mild Pain (1 - 3)  -- Indication: For Acute encephalopathy    amiodarone 200 mg oral tablet  -- 1 tab(s) by mouth once a day  -- Indication: For Secondary hypertension    rosuvastatin 10 mg oral tablet  -- 1 tab(s) by mouth once a day (at bedtime)  -- Indication: For Secondary hypertension    clopidogrel 75 mg oral tablet  -- 1 tab(s) by mouth once a day  -- Indication: For Secondary hypertension    Xanax 0.25 mg oral tablet  -- 1 tab(s) by mouth , As Needed  -- Indication: For Acute encephalopathy    Ambien 5 mg oral tablet  -- 1 tab(s) by mouth once a day (at bedtime)  -- Indication: For Acute encephalopathy    bisoprolol  -- 2.5 milligram(s) by mouth once a day  -- Indication: For Secondary hypertension    Proventil HFA 90 mcg/inh inhalation aerosol  -- 2 puff(s) inhaled 4 times a day  -- For inhalation only.  It is very important that you take or use this exactly as directed.  Do not skip doses or discontinue unless directed by your doctor.  Obtain medical advice before taking any non-prescription drugs as some may affect the action of this medication.  Shake well before use.    -- Indication: For Asthma    meropenem  -- 1000 milligram(s) intravenous 2 times a day  -- Indication: For FEVER,UTI WITHOUT HEMATURIA, ACUTE ENCEPHALOPATHY    furosemide 20 mg oral tablet  -- 1 tab(s) by mouth once a day  -- Indication: For Secondary hypertension    pantoprazole 40 mg oral delayed release tablet  -- 1 tab(s) by mouth once a day (before a meal)  -- Indication: For Acute on chronic kidney failure

## 2017-05-06 NOTE — PROGRESS NOTE ADULT - CARDIOVASCULAR
Regular rate & rhythm, normal S1, S2; no murmurs, gallops or rubs; no S3, S4

## 2017-05-06 NOTE — DISCHARGE NOTE ADULT - PATIENT PORTAL LINK FT
“You can access the [facility] Patient Portal, offered by [facility], by registering with the following website: [email or web address]”

## 2017-05-06 NOTE — PROGRESS NOTE ADULT - ASSESSMENT
No acute CV issues  usual meds appropriate  will reevaluate as needed
CV status stable  Renal/bladder issues noted; Higuera in place  agree with plans as outlined  same CV meds
gradual progress  no CV intervention needed
improved  same meds for now
Patient with Neurogenic bladder. Cont abx.
Patient's initial presentation of septic shock with acute pyelonephritis is now clearly better. She will need to complete her complete course of abx. I feel I will hold off making a decision about PICC until I return on Thursday. I agree she will be best suited to have short term rehab. Possibly Monday. I don't think the use of suppressive therapy will work. But this will be revisited at a later date.
Patient admitted with acute Pyelo. Improving. Possible Neurogenic bladder.
Patient stable on meds. Will decrease steroids.
Patient for PICC. Agrees with [facility] for IV abx completion.
Patient is improving. Awaiting Sensitivities.
ID to evaluate, Need to discuss with Urology.
Patient has responded well sofar. I remain concerned about recurrence. Awaiting Culture.

## 2017-05-06 NOTE — DISCHARGE NOTE ADULT - HOSPITAL COURSE
Patient with Acute Sepsis found to have acute pyelo nephritis. Abx resistant E Coli was isolated now on Meropenem. She has neurogenic bladder for which Dr Bautista is following.

## 2017-05-06 NOTE — PROGRESS NOTE ADULT - PROVIDER SPECIALTY LIST ADULT
Cardiology
Infectious Disease
Internal Medicine
Nephrology
Nephrology
Surgery
Urology
Cardiology

## 2017-05-06 NOTE — PROGRESS NOTE ADULT - SUBJECTIVE AND OBJECTIVE BOX
Patient seen and examined with Dr. Bautista bedside resting comfortably. No complaints offered.     T(F): 99, Max: 99.8 (05-06 @ 00:22)  HR: 70 (70 - 75)  BP: 141/74 (121/63 - 148/72)  RR: 16 (16 - 16)  SpO2: 94% (94% - 96%)    PHYSICAL EXAM:  General: NAD, WDWN, Alert  HEENT: NCAT, EOMI, conjunctiva clear  CV: +S1S2 regular rate and rhythm  Lung: clear to ausculation bilaterally, respirations nonlabored, good inspiratory effort   : No suprapubic tenderness    LABS:                        11.8   10.4  )-----------( 248      ( 06 May 2017 07:10 )             34.1     05-06    145  |  107  |  19  ----------------------------<  86  4.1   |  30  |  1.16    Ca    8.7      06 May 2017 07:10    TPro  5.9<L>  /  Alb  2.5<L>  /  TBili  0.4  /  DBili  x   /  AST  18  /  ALT  23  /  AlkPhos  96  05-05    I&O's Detail  I & Os for 24h ending 06 May 2017 07:00  =============================================  IN:    Oral Fluid: 640 ml    dextrose 5% + sodium chloride 0.9%.: 600 ml    Solution: 100 ml    Total IN: 1340 ml  ---------------------------------------------  OUT:    Total OUT: 0 ml  ---------------------------------------------  Total NET: 1340 ml    I & Os for current day (as of 06 May 2017 20:04)  =============================================  IN:    Oral Fluid: 270 ml    Total IN: 270 ml  ---------------------------------------------  OUT:    Total OUT: 0 ml  ---------------------------------------------  Total NET: 270 ml    Impression: 86 year old female PMH urinary retention, MI, HLD, HTN a/w AMS, e coli UTI, b/l hydronephrosis with urinary retention, infected right leg wound, and Acute on chronic kidney failure, resolved    Plan:  repeat urine culture: no growth.   cont ABX per ID x 7days. Via peripheral IV?  VTE/GI ppx  Continue present medical management, d/c planning to [facility] per Medicine  discussed with Dr. Bautista

## 2017-05-06 NOTE — PROGRESS NOTE ADULT - PROBLEM SELECTOR PROBLEM 1
Urinary tract infection without hematuria, site unspecified
Urinary tract infection without hematuria, site unspecified
Urinary retention
Urinary tract infection without hematuria, site unspecified
Urinary retention
Urinary retention
Acute on chronic kidney failure
Urinary retention

## 2017-05-06 NOTE — PROGRESS NOTE ADULT - EXTREMITIES
No cyanosis, clubbing or edema

## 2017-05-06 NOTE — DISCHARGE NOTE ADULT - CARE PLAN
Principal Discharge DX:	Urinary tract infection without hematuria, site unspecified  Goal:	Complete Abx  Instructions for follow-up, activity and diet:	7 days  Secondary Diagnosis:	Urinary retention  Goal:	Urology to follow

## 2017-05-09 DIAGNOSIS — R65.21 SEVERE SEPSIS WITH SEPTIC SHOCK: ICD-10-CM

## 2017-05-09 DIAGNOSIS — I12.9 HYPERTENSIVE CHRONIC KIDNEY DISEASE WITH STAGE 1 THROUGH STAGE 4 CHRONIC KIDNEY DISEASE, OR UNSPECIFIED CHRONIC KIDNEY DISEASE: ICD-10-CM

## 2017-05-09 DIAGNOSIS — I25.2 OLD MYOCARDIAL INFARCTION: ICD-10-CM

## 2017-05-09 DIAGNOSIS — N10 ACUTE PYELONEPHRITIS: ICD-10-CM

## 2017-05-09 DIAGNOSIS — N18.9 CHRONIC KIDNEY DISEASE, UNSPECIFIED: ICD-10-CM

## 2017-05-09 DIAGNOSIS — Z87.440 PERSONAL HISTORY OF URINARY (TRACT) INFECTIONS: ICD-10-CM

## 2017-05-09 DIAGNOSIS — Z95.5 PRESENCE OF CORONARY ANGIOPLASTY IMPLANT AND GRAFT: ICD-10-CM

## 2017-05-09 DIAGNOSIS — E78.00 PURE HYPERCHOLESTEROLEMIA, UNSPECIFIED: ICD-10-CM

## 2017-05-09 DIAGNOSIS — N17.9 ACUTE KIDNEY FAILURE, UNSPECIFIED: ICD-10-CM

## 2017-05-09 DIAGNOSIS — N13.6 PYONEPHROSIS: ICD-10-CM

## 2017-05-09 DIAGNOSIS — Z98.42 CATARACT EXTRACTION STATUS, LEFT EYE: ICD-10-CM

## 2017-05-09 DIAGNOSIS — B96.29 OTHER ESCHERICHIA COLI [E. COLI] AS THE CAUSE OF DISEASES CLASSIFIED ELSEWHERE: ICD-10-CM

## 2017-05-09 DIAGNOSIS — I25.10 ATHEROSCLEROTIC HEART DISEASE OF NATIVE CORONARY ARTERY WITHOUT ANGINA PECTORIS: ICD-10-CM

## 2017-05-09 DIAGNOSIS — N39.0 URINARY TRACT INFECTION, SITE NOT SPECIFIED: ICD-10-CM

## 2017-05-09 DIAGNOSIS — I15.9 SECONDARY HYPERTENSION, UNSPECIFIED: ICD-10-CM

## 2017-05-09 DIAGNOSIS — A41.9 SEPSIS, UNSPECIFIED ORGANISM: ICD-10-CM

## 2017-05-09 DIAGNOSIS — Z16.30 RESISTANCE TO UNSPECIFIED ANTIMICROBIAL DRUGS: ICD-10-CM

## 2017-05-09 DIAGNOSIS — L97.919 NON-PRESSURE CHRONIC ULCER OF UNSPECIFIED PART OF RIGHT LOWER LEG WITH UNSPECIFIED SEVERITY: ICD-10-CM

## 2017-05-09 DIAGNOSIS — N31.9 NEUROMUSCULAR DYSFUNCTION OF BLADDER, UNSPECIFIED: ICD-10-CM

## 2017-05-09 DIAGNOSIS — Z98.41 CATARACT EXTRACTION STATUS, RIGHT EYE: ICD-10-CM

## 2017-05-09 DIAGNOSIS — R33.9 RETENTION OF URINE, UNSPECIFIED: ICD-10-CM

## 2017-05-09 DIAGNOSIS — L88 PYODERMA GANGRENOSUM: ICD-10-CM

## 2017-05-09 DIAGNOSIS — E78.5 HYPERLIPIDEMIA, UNSPECIFIED: ICD-10-CM

## 2017-05-12 ENCOUNTER — OUTPATIENT (OUTPATIENT)
Dept: OUTPATIENT SERVICES | Facility: HOSPITAL | Age: 82
LOS: 1 days | Discharge: HOME | End: 2017-05-12
Payer: MEDICARE

## 2017-05-12 DIAGNOSIS — N31.9 NEUROMUSCULAR DYSFUNCTION OF BLADDER, UNSPECIFIED: ICD-10-CM

## 2017-05-12 DIAGNOSIS — H26.9 UNSPECIFIED CATARACT: Chronic | ICD-10-CM

## 2017-05-12 DIAGNOSIS — Z90.49 ACQUIRED ABSENCE OF OTHER SPECIFIED PARTS OF DIGESTIVE TRACT: Chronic | ICD-10-CM

## 2017-05-12 DIAGNOSIS — Z90.89 ACQUIRED ABSENCE OF OTHER ORGANS: Chronic | ICD-10-CM

## 2017-05-12 DIAGNOSIS — Z95.5 PRESENCE OF CORONARY ANGIOPLASTY IMPLANT AND GRAFT: Chronic | ICD-10-CM

## 2017-05-12 PROCEDURE — 76770 US EXAM ABDO BACK WALL COMP: CPT | Mod: 26

## 2017-05-25 ENCOUNTER — OUTPATIENT (OUTPATIENT)
Dept: OUTPATIENT SERVICES | Facility: HOSPITAL | Age: 82
LOS: 1 days | Discharge: HOME | End: 2017-05-25

## 2017-05-25 DIAGNOSIS — Z95.5 PRESENCE OF CORONARY ANGIOPLASTY IMPLANT AND GRAFT: Chronic | ICD-10-CM

## 2017-05-25 DIAGNOSIS — Z90.89 ACQUIRED ABSENCE OF OTHER ORGANS: Chronic | ICD-10-CM

## 2017-05-25 DIAGNOSIS — Z90.49 ACQUIRED ABSENCE OF OTHER SPECIFIED PARTS OF DIGESTIVE TRACT: Chronic | ICD-10-CM

## 2017-05-25 DIAGNOSIS — H26.9 UNSPECIFIED CATARACT: Chronic | ICD-10-CM

## 2017-05-25 DIAGNOSIS — L89.90 PRESSURE ULCER OF UNSPECIFIED SITE, UNSPECIFIED STAGE: ICD-10-CM

## 2017-05-31 ENCOUNTER — OUTPATIENT (OUTPATIENT)
Dept: OUTPATIENT SERVICES | Facility: HOSPITAL | Age: 82
LOS: 1 days | Discharge: HOME | End: 2017-05-31

## 2017-05-31 DIAGNOSIS — Z90.49 ACQUIRED ABSENCE OF OTHER SPECIFIED PARTS OF DIGESTIVE TRACT: Chronic | ICD-10-CM

## 2017-05-31 DIAGNOSIS — Z95.5 PRESENCE OF CORONARY ANGIOPLASTY IMPLANT AND GRAFT: Chronic | ICD-10-CM

## 2017-05-31 DIAGNOSIS — Z90.89 ACQUIRED ABSENCE OF OTHER ORGANS: Chronic | ICD-10-CM

## 2017-05-31 DIAGNOSIS — L89.90 PRESSURE ULCER OF UNSPECIFIED SITE, UNSPECIFIED STAGE: ICD-10-CM

## 2017-05-31 DIAGNOSIS — H26.9 UNSPECIFIED CATARACT: Chronic | ICD-10-CM

## 2017-06-01 DIAGNOSIS — L88 PYODERMA GANGRENOSUM: ICD-10-CM

## 2017-06-01 DIAGNOSIS — I25.10 ATHEROSCLEROTIC HEART DISEASE OF NATIVE CORONARY ARTERY WITHOUT ANGINA PECTORIS: ICD-10-CM

## 2017-06-01 DIAGNOSIS — H26.9 UNSPECIFIED CATARACT: ICD-10-CM

## 2017-06-01 DIAGNOSIS — Z91.81 HISTORY OF FALLING: ICD-10-CM

## 2017-06-01 DIAGNOSIS — Z80.9 FAMILY HISTORY OF MALIGNANT NEOPLASM, UNSPECIFIED: ICD-10-CM

## 2017-06-01 DIAGNOSIS — Z88.8 ALLERGY STATUS TO OTHER DRUGS, MEDICAMENTS AND BIOLOGICAL SUBSTANCES STATUS: ICD-10-CM

## 2017-06-01 DIAGNOSIS — M19.90 UNSPECIFIED OSTEOARTHRITIS, UNSPECIFIED SITE: ICD-10-CM

## 2017-06-01 DIAGNOSIS — Z79.01 LONG TERM (CURRENT) USE OF ANTICOAGULANTS: ICD-10-CM

## 2017-06-01 DIAGNOSIS — I25.2 OLD MYOCARDIAL INFARCTION: ICD-10-CM

## 2017-06-01 DIAGNOSIS — Z79.899 OTHER LONG TERM (CURRENT) DRUG THERAPY: ICD-10-CM

## 2017-06-01 DIAGNOSIS — Z95.5 PRESENCE OF CORONARY ANGIOPLASTY IMPLANT AND GRAFT: ICD-10-CM

## 2017-06-01 DIAGNOSIS — Z74.1 NEED FOR ASSISTANCE WITH PERSONAL CARE: ICD-10-CM

## 2017-06-01 DIAGNOSIS — E78.00 PURE HYPERCHOLESTEROLEMIA, UNSPECIFIED: ICD-10-CM

## 2017-06-01 DIAGNOSIS — I10 ESSENTIAL (PRIMARY) HYPERTENSION: ICD-10-CM

## 2017-06-07 ENCOUNTER — OUTPATIENT (OUTPATIENT)
Dept: OUTPATIENT SERVICES | Facility: HOSPITAL | Age: 82
LOS: 1 days | Discharge: HOME | End: 2017-06-07

## 2017-06-07 DIAGNOSIS — Z95.5 PRESENCE OF CORONARY ANGIOPLASTY IMPLANT AND GRAFT: Chronic | ICD-10-CM

## 2017-06-07 DIAGNOSIS — H26.9 UNSPECIFIED CATARACT: Chronic | ICD-10-CM

## 2017-06-07 DIAGNOSIS — Z90.89 ACQUIRED ABSENCE OF OTHER ORGANS: Chronic | ICD-10-CM

## 2017-06-07 DIAGNOSIS — Z90.49 ACQUIRED ABSENCE OF OTHER SPECIFIED PARTS OF DIGESTIVE TRACT: Chronic | ICD-10-CM

## 2017-06-07 DIAGNOSIS — L89.90 PRESSURE ULCER OF UNSPECIFIED SITE, UNSPECIFIED STAGE: ICD-10-CM

## 2017-06-09 DIAGNOSIS — Z88.8 ALLERGY STATUS TO OTHER DRUGS, MEDICAMENTS AND BIOLOGICAL SUBSTANCES STATUS: ICD-10-CM

## 2017-06-09 DIAGNOSIS — I10 ESSENTIAL (PRIMARY) HYPERTENSION: ICD-10-CM

## 2017-06-09 DIAGNOSIS — Z79.01 LONG TERM (CURRENT) USE OF ANTICOAGULANTS: ICD-10-CM

## 2017-06-09 DIAGNOSIS — Z79.899 OTHER LONG TERM (CURRENT) DRUG THERAPY: ICD-10-CM

## 2017-06-09 DIAGNOSIS — E78.00 PURE HYPERCHOLESTEROLEMIA, UNSPECIFIED: ICD-10-CM

## 2017-06-09 DIAGNOSIS — Z95.5 PRESENCE OF CORONARY ANGIOPLASTY IMPLANT AND GRAFT: ICD-10-CM

## 2017-06-09 DIAGNOSIS — Z91.81 HISTORY OF FALLING: ICD-10-CM

## 2017-06-09 DIAGNOSIS — M19.90 UNSPECIFIED OSTEOARTHRITIS, UNSPECIFIED SITE: ICD-10-CM

## 2017-06-09 DIAGNOSIS — L88 PYODERMA GANGRENOSUM: ICD-10-CM

## 2017-06-09 DIAGNOSIS — Z74.1 NEED FOR ASSISTANCE WITH PERSONAL CARE: ICD-10-CM

## 2017-06-09 DIAGNOSIS — H26.9 UNSPECIFIED CATARACT: ICD-10-CM

## 2017-06-09 DIAGNOSIS — I25.10 ATHEROSCLEROTIC HEART DISEASE OF NATIVE CORONARY ARTERY WITHOUT ANGINA PECTORIS: ICD-10-CM

## 2017-06-13 DIAGNOSIS — Z91.81 HISTORY OF FALLING: ICD-10-CM

## 2017-06-13 DIAGNOSIS — M19.90 UNSPECIFIED OSTEOARTHRITIS, UNSPECIFIED SITE: ICD-10-CM

## 2017-06-13 DIAGNOSIS — E78.00 PURE HYPERCHOLESTEROLEMIA, UNSPECIFIED: ICD-10-CM

## 2017-06-13 DIAGNOSIS — Z88.8 ALLERGY STATUS TO OTHER DRUGS, MEDICAMENTS AND BIOLOGICAL SUBSTANCES STATUS: ICD-10-CM

## 2017-06-13 DIAGNOSIS — L88 PYODERMA GANGRENOSUM: ICD-10-CM

## 2017-06-13 DIAGNOSIS — Z79.52 LONG TERM (CURRENT) USE OF SYSTEMIC STEROIDS: ICD-10-CM

## 2017-06-13 DIAGNOSIS — I25.2 OLD MYOCARDIAL INFARCTION: ICD-10-CM

## 2017-06-13 DIAGNOSIS — Z74.1 NEED FOR ASSISTANCE WITH PERSONAL CARE: ICD-10-CM

## 2017-06-13 DIAGNOSIS — Z95.5 PRESENCE OF CORONARY ANGIOPLASTY IMPLANT AND GRAFT: ICD-10-CM

## 2017-06-13 DIAGNOSIS — Z79.01 LONG TERM (CURRENT) USE OF ANTICOAGULANTS: ICD-10-CM

## 2017-06-13 DIAGNOSIS — Z79.899 OTHER LONG TERM (CURRENT) DRUG THERAPY: ICD-10-CM

## 2017-06-13 DIAGNOSIS — I25.10 ATHEROSCLEROTIC HEART DISEASE OF NATIVE CORONARY ARTERY WITHOUT ANGINA PECTORIS: ICD-10-CM

## 2017-06-13 DIAGNOSIS — H26.9 UNSPECIFIED CATARACT: ICD-10-CM

## 2017-06-13 DIAGNOSIS — I10 ESSENTIAL (PRIMARY) HYPERTENSION: ICD-10-CM

## 2017-08-08 ENCOUNTER — OUTPATIENT (OUTPATIENT)
Dept: OUTPATIENT SERVICES | Facility: HOSPITAL | Age: 82
LOS: 1 days | Discharge: HOME | End: 2017-08-08

## 2017-08-08 DIAGNOSIS — H26.9 UNSPECIFIED CATARACT: Chronic | ICD-10-CM

## 2017-08-08 DIAGNOSIS — Z90.89 ACQUIRED ABSENCE OF OTHER ORGANS: Chronic | ICD-10-CM

## 2017-08-08 DIAGNOSIS — Z90.49 ACQUIRED ABSENCE OF OTHER SPECIFIED PARTS OF DIGESTIVE TRACT: Chronic | ICD-10-CM

## 2017-08-08 DIAGNOSIS — L89.90 PRESSURE ULCER OF UNSPECIFIED SITE, UNSPECIFIED STAGE: ICD-10-CM

## 2017-08-08 DIAGNOSIS — Z95.5 PRESENCE OF CORONARY ANGIOPLASTY IMPLANT AND GRAFT: Chronic | ICD-10-CM

## 2017-08-10 DIAGNOSIS — Z79.52 LONG TERM (CURRENT) USE OF SYSTEMIC STEROIDS: ICD-10-CM

## 2017-08-10 DIAGNOSIS — L97.512 NON-PRESSURE CHRONIC ULCER OF OTHER PART OF RIGHT FOOT WITH FAT LAYER EXPOSED: ICD-10-CM

## 2017-08-10 DIAGNOSIS — L97.821 NON-PRESSURE CHRONIC ULCER OF OTHER PART OF LEFT LOWER LEG LIMITED TO BREAKDOWN OF SKIN: ICD-10-CM

## 2017-08-10 DIAGNOSIS — I49.9 CARDIAC ARRHYTHMIA, UNSPECIFIED: ICD-10-CM

## 2017-08-10 DIAGNOSIS — M19.90 UNSPECIFIED OSTEOARTHRITIS, UNSPECIFIED SITE: ICD-10-CM

## 2017-08-10 DIAGNOSIS — Z95.5 PRESENCE OF CORONARY ANGIOPLASTY IMPLANT AND GRAFT: ICD-10-CM

## 2017-08-10 DIAGNOSIS — I25.10 ATHEROSCLEROTIC HEART DISEASE OF NATIVE CORONARY ARTERY WITHOUT ANGINA PECTORIS: ICD-10-CM

## 2017-08-10 DIAGNOSIS — H26.9 UNSPECIFIED CATARACT: ICD-10-CM

## 2017-08-10 DIAGNOSIS — Z88.8 ALLERGY STATUS TO OTHER DRUGS, MEDICAMENTS AND BIOLOGICAL SUBSTANCES STATUS: ICD-10-CM

## 2017-08-10 DIAGNOSIS — R26.81 UNSTEADINESS ON FEET: ICD-10-CM

## 2017-08-10 DIAGNOSIS — Z97.3 PRESENCE OF SPECTACLES AND CONTACT LENSES: ICD-10-CM

## 2017-08-10 DIAGNOSIS — I25.2 OLD MYOCARDIAL INFARCTION: ICD-10-CM

## 2017-08-10 DIAGNOSIS — Z79.01 LONG TERM (CURRENT) USE OF ANTICOAGULANTS: ICD-10-CM

## 2017-08-10 DIAGNOSIS — Z79.899 OTHER LONG TERM (CURRENT) DRUG THERAPY: ICD-10-CM

## 2017-08-10 DIAGNOSIS — Z91.81 HISTORY OF FALLING: ICD-10-CM

## 2017-08-10 DIAGNOSIS — L88 PYODERMA GANGRENOSUM: ICD-10-CM

## 2017-08-10 DIAGNOSIS — E78.5 HYPERLIPIDEMIA, UNSPECIFIED: ICD-10-CM

## 2017-08-10 DIAGNOSIS — I10 ESSENTIAL (PRIMARY) HYPERTENSION: ICD-10-CM

## 2017-08-10 DIAGNOSIS — Z80.9 FAMILY HISTORY OF MALIGNANT NEOPLASM, UNSPECIFIED: ICD-10-CM

## 2017-08-10 DIAGNOSIS — Z82.49 FAMILY HISTORY OF ISCHEMIC HEART DISEASE AND OTHER DISEASES OF THE CIRCULATORY SYSTEM: ICD-10-CM

## 2017-08-15 ENCOUNTER — OUTPATIENT (OUTPATIENT)
Dept: OUTPATIENT SERVICES | Facility: HOSPITAL | Age: 82
LOS: 1 days | Discharge: HOME | End: 2017-08-15

## 2017-08-15 DIAGNOSIS — L97.512 NON-PRESSURE CHRONIC ULCER OF OTHER PART OF RIGHT FOOT WITH FAT LAYER EXPOSED: ICD-10-CM

## 2017-08-15 DIAGNOSIS — I25.10 ATHEROSCLEROTIC HEART DISEASE OF NATIVE CORONARY ARTERY WITHOUT ANGINA PECTORIS: ICD-10-CM

## 2017-08-15 DIAGNOSIS — L88 PYODERMA GANGRENOSUM: ICD-10-CM

## 2017-08-15 DIAGNOSIS — Z79.01 LONG TERM (CURRENT) USE OF ANTICOAGULANTS: ICD-10-CM

## 2017-08-15 DIAGNOSIS — Z79.899 OTHER LONG TERM (CURRENT) DRUG THERAPY: ICD-10-CM

## 2017-08-15 DIAGNOSIS — I10 ESSENTIAL (PRIMARY) HYPERTENSION: ICD-10-CM

## 2017-08-15 DIAGNOSIS — H26.9 UNSPECIFIED CATARACT: ICD-10-CM

## 2017-08-15 DIAGNOSIS — M19.90 UNSPECIFIED OSTEOARTHRITIS, UNSPECIFIED SITE: ICD-10-CM

## 2017-08-15 DIAGNOSIS — E78.5 HYPERLIPIDEMIA, UNSPECIFIED: ICD-10-CM

## 2017-08-15 DIAGNOSIS — L97.821 NON-PRESSURE CHRONIC ULCER OF OTHER PART OF LEFT LOWER LEG LIMITED TO BREAKDOWN OF SKIN: ICD-10-CM

## 2017-08-15 DIAGNOSIS — I25.2 OLD MYOCARDIAL INFARCTION: ICD-10-CM

## 2017-08-15 DIAGNOSIS — Z95.5 PRESENCE OF CORONARY ANGIOPLASTY IMPLANT AND GRAFT: Chronic | ICD-10-CM

## 2017-08-15 DIAGNOSIS — Z91.81 HISTORY OF FALLING: ICD-10-CM

## 2017-08-15 DIAGNOSIS — Z88.8 ALLERGY STATUS TO OTHER DRUGS, MEDICAMENTS AND BIOLOGICAL SUBSTANCES: ICD-10-CM

## 2017-08-15 DIAGNOSIS — R26.81 UNSTEADINESS ON FEET: ICD-10-CM

## 2017-08-15 DIAGNOSIS — L89.90 PRESSURE ULCER OF UNSPECIFIED SITE, UNSPECIFIED STAGE: ICD-10-CM

## 2017-08-15 DIAGNOSIS — Z90.89 ACQUIRED ABSENCE OF OTHER ORGANS: Chronic | ICD-10-CM

## 2017-08-15 DIAGNOSIS — H26.9 UNSPECIFIED CATARACT: Chronic | ICD-10-CM

## 2017-08-15 DIAGNOSIS — Z90.49 ACQUIRED ABSENCE OF OTHER SPECIFIED PARTS OF DIGESTIVE TRACT: Chronic | ICD-10-CM

## 2017-08-15 DIAGNOSIS — Z79.52 LONG TERM (CURRENT) USE OF SYSTEMIC STEROIDS: ICD-10-CM

## 2017-08-15 DIAGNOSIS — I49.9 CARDIAC ARRHYTHMIA, UNSPECIFIED: ICD-10-CM

## 2017-09-12 ENCOUNTER — OUTPATIENT (OUTPATIENT)
Dept: OUTPATIENT SERVICES | Facility: HOSPITAL | Age: 82
LOS: 1 days | Discharge: HOME | End: 2017-09-12

## 2017-09-12 DIAGNOSIS — Z90.89 ACQUIRED ABSENCE OF OTHER ORGANS: Chronic | ICD-10-CM

## 2017-09-12 DIAGNOSIS — H26.9 UNSPECIFIED CATARACT: Chronic | ICD-10-CM

## 2017-09-12 DIAGNOSIS — Z95.5 PRESENCE OF CORONARY ANGIOPLASTY IMPLANT AND GRAFT: Chronic | ICD-10-CM

## 2017-09-12 DIAGNOSIS — Z90.49 ACQUIRED ABSENCE OF OTHER SPECIFIED PARTS OF DIGESTIVE TRACT: Chronic | ICD-10-CM

## 2017-09-12 DIAGNOSIS — L89.90 PRESSURE ULCER OF UNSPECIFIED SITE, UNSPECIFIED STAGE: ICD-10-CM

## 2017-09-14 DIAGNOSIS — H26.9 UNSPECIFIED CATARACT: ICD-10-CM

## 2017-09-14 DIAGNOSIS — I25.10 ATHEROSCLEROTIC HEART DISEASE OF NATIVE CORONARY ARTERY WITHOUT ANGINA PECTORIS: ICD-10-CM

## 2017-09-14 DIAGNOSIS — I49.9 CARDIAC ARRHYTHMIA, UNSPECIFIED: ICD-10-CM

## 2017-09-14 DIAGNOSIS — E78.5 HYPERLIPIDEMIA, UNSPECIFIED: ICD-10-CM

## 2017-09-14 DIAGNOSIS — Z79.01 LONG TERM (CURRENT) USE OF ANTICOAGULANTS: ICD-10-CM

## 2017-09-14 DIAGNOSIS — Z79.899 OTHER LONG TERM (CURRENT) DRUG THERAPY: ICD-10-CM

## 2017-09-14 DIAGNOSIS — Z91.81 HISTORY OF FALLING: ICD-10-CM

## 2017-09-14 DIAGNOSIS — I25.2 OLD MYOCARDIAL INFARCTION: ICD-10-CM

## 2017-09-14 DIAGNOSIS — L88 PYODERMA GANGRENOSUM: ICD-10-CM

## 2017-09-14 DIAGNOSIS — L97.821 NON-PRESSURE CHRONIC ULCER OF OTHER PART OF LEFT LOWER LEG LIMITED TO BREAKDOWN OF SKIN: ICD-10-CM

## 2017-09-14 DIAGNOSIS — I10 ESSENTIAL (PRIMARY) HYPERTENSION: ICD-10-CM

## 2017-09-14 DIAGNOSIS — L97.512 NON-PRESSURE CHRONIC ULCER OF OTHER PART OF RIGHT FOOT WITH FAT LAYER EXPOSED: ICD-10-CM

## 2017-09-14 DIAGNOSIS — Z88.8 ALLERGY STATUS TO OTHER DRUGS, MEDICAMENTS AND BIOLOGICAL SUBSTANCES: ICD-10-CM

## 2017-09-14 DIAGNOSIS — M19.90 UNSPECIFIED OSTEOARTHRITIS, UNSPECIFIED SITE: ICD-10-CM

## 2017-09-14 DIAGNOSIS — Z79.52 LONG TERM (CURRENT) USE OF SYSTEMIC STEROIDS: ICD-10-CM

## 2017-09-20 ENCOUNTER — OUTPATIENT (OUTPATIENT)
Dept: OUTPATIENT SERVICES | Facility: HOSPITAL | Age: 82
LOS: 1 days | Discharge: HOME | End: 2017-09-20

## 2017-09-20 DIAGNOSIS — H26.9 UNSPECIFIED CATARACT: Chronic | ICD-10-CM

## 2017-09-20 DIAGNOSIS — Z90.89 ACQUIRED ABSENCE OF OTHER ORGANS: Chronic | ICD-10-CM

## 2017-09-20 DIAGNOSIS — L89.90 PRESSURE ULCER OF UNSPECIFIED SITE, UNSPECIFIED STAGE: ICD-10-CM

## 2017-09-20 DIAGNOSIS — Z90.49 ACQUIRED ABSENCE OF OTHER SPECIFIED PARTS OF DIGESTIVE TRACT: Chronic | ICD-10-CM

## 2017-09-20 DIAGNOSIS — Z95.5 PRESENCE OF CORONARY ANGIOPLASTY IMPLANT AND GRAFT: Chronic | ICD-10-CM

## 2017-09-22 DIAGNOSIS — L97.512 NON-PRESSURE CHRONIC ULCER OF OTHER PART OF RIGHT FOOT WITH FAT LAYER EXPOSED: ICD-10-CM

## 2017-09-22 DIAGNOSIS — I49.9 CARDIAC ARRHYTHMIA, UNSPECIFIED: ICD-10-CM

## 2017-09-22 DIAGNOSIS — Z88.8 ALLERGY STATUS TO OTHER DRUGS, MEDICAMENTS AND BIOLOGICAL SUBSTANCES: ICD-10-CM

## 2017-09-22 DIAGNOSIS — Z79.899 OTHER LONG TERM (CURRENT) DRUG THERAPY: ICD-10-CM

## 2017-09-22 DIAGNOSIS — I25.2 OLD MYOCARDIAL INFARCTION: ICD-10-CM

## 2017-09-22 DIAGNOSIS — Z79.52 LONG TERM (CURRENT) USE OF SYSTEMIC STEROIDS: ICD-10-CM

## 2017-09-22 DIAGNOSIS — Z79.01 LONG TERM (CURRENT) USE OF ANTICOAGULANTS: ICD-10-CM

## 2017-09-22 DIAGNOSIS — I25.10 ATHEROSCLEROTIC HEART DISEASE OF NATIVE CORONARY ARTERY WITHOUT ANGINA PECTORIS: ICD-10-CM

## 2017-09-22 DIAGNOSIS — L88 PYODERMA GANGRENOSUM: ICD-10-CM

## 2017-09-22 DIAGNOSIS — M19.90 UNSPECIFIED OSTEOARTHRITIS, UNSPECIFIED SITE: ICD-10-CM

## 2017-09-22 DIAGNOSIS — L97.821 NON-PRESSURE CHRONIC ULCER OF OTHER PART OF LEFT LOWER LEG LIMITED TO BREAKDOWN OF SKIN: ICD-10-CM

## 2017-09-22 DIAGNOSIS — H26.9 UNSPECIFIED CATARACT: ICD-10-CM

## 2017-09-22 DIAGNOSIS — E78.5 HYPERLIPIDEMIA, UNSPECIFIED: ICD-10-CM

## 2017-09-22 DIAGNOSIS — Z91.81 HISTORY OF FALLING: ICD-10-CM

## 2017-11-25 ENCOUNTER — INPATIENT (INPATIENT)
Facility: HOSPITAL | Age: 82
LOS: 26 days | Discharge: HOME | End: 2017-12-22
Attending: INTERNAL MEDICINE | Admitting: INTERNAL MEDICINE
Payer: MEDICARE

## 2017-11-25 VITALS
WEIGHT: 154.98 LBS | TEMPERATURE: 105 F | HEIGHT: 63 IN | DIASTOLIC BLOOD PRESSURE: 57 MMHG | RESPIRATION RATE: 16 BRPM | HEART RATE: 70 BPM | SYSTOLIC BLOOD PRESSURE: 113 MMHG | OXYGEN SATURATION: 95 %

## 2017-11-25 DIAGNOSIS — H26.9 UNSPECIFIED CATARACT: Chronic | ICD-10-CM

## 2017-11-25 DIAGNOSIS — Z95.5 PRESENCE OF CORONARY ANGIOPLASTY IMPLANT AND GRAFT: Chronic | ICD-10-CM

## 2017-11-25 DIAGNOSIS — Z90.49 ACQUIRED ABSENCE OF OTHER SPECIFIED PARTS OF DIGESTIVE TRACT: Chronic | ICD-10-CM

## 2017-11-25 DIAGNOSIS — Z90.89 ACQUIRED ABSENCE OF OTHER ORGANS: Chronic | ICD-10-CM

## 2017-11-25 LAB
ALBUMIN SERPL ELPH-MCNC: 2.8 G/DL — LOW (ref 3.3–5)
ALP SERPL-CCNC: 135 U/L — HIGH (ref 40–120)
ALT FLD-CCNC: 71 U/L — SIGNIFICANT CHANGE UP (ref 12–78)
ANION GAP SERPL CALC-SCNC: 11 MMOL/L — SIGNIFICANT CHANGE UP (ref 5–17)
APPEARANCE UR: ABNORMAL
APTT BLD: 29.2 SEC — SIGNIFICANT CHANGE UP (ref 27.5–37.4)
AST SERPL-CCNC: 95 U/L — HIGH (ref 15–37)
BACTERIA # UR AUTO: ABNORMAL
BASOPHILS # BLD AUTO: 0.2 K/UL — SIGNIFICANT CHANGE UP (ref 0–0.2)
BASOPHILS NFR BLD AUTO: 1 % — SIGNIFICANT CHANGE UP (ref 0–2)
BILIRUB SERPL-MCNC: 0.6 MG/DL — SIGNIFICANT CHANGE UP (ref 0.2–1.2)
BILIRUB UR-MCNC: NEGATIVE — SIGNIFICANT CHANGE UP
BUN SERPL-MCNC: 36 MG/DL — HIGH (ref 7–23)
CALCIUM SERPL-MCNC: 9 MG/DL — SIGNIFICANT CHANGE UP (ref 8.5–10.1)
CHLORIDE SERPL-SCNC: 99 MMOL/L — SIGNIFICANT CHANGE UP (ref 96–108)
CO2 SERPL-SCNC: 25 MMOL/L — SIGNIFICANT CHANGE UP (ref 22–31)
COLOR SPEC: YELLOW — SIGNIFICANT CHANGE UP
COMMENT - URINE: SIGNIFICANT CHANGE UP
CREAT SERPL-MCNC: 2.64 MG/DL — HIGH (ref 0.5–1.3)
DIFF PNL FLD: ABNORMAL
EOSINOPHIL # BLD AUTO: 0 K/UL — SIGNIFICANT CHANGE UP (ref 0–0.5)
EOSINOPHIL NFR BLD AUTO: 0.2 % — SIGNIFICANT CHANGE UP (ref 0–6)
EPI CELLS # UR: ABNORMAL
GLUCOSE SERPL-MCNC: 116 MG/DL — HIGH (ref 70–99)
GLUCOSE UR QL: NEGATIVE MG/DL — SIGNIFICANT CHANGE UP
HCT VFR BLD CALC: 38.5 % — SIGNIFICANT CHANGE UP (ref 34.5–45)
HGB BLD-MCNC: 12.5 G/DL — SIGNIFICANT CHANGE UP (ref 11.5–15.5)
INR BLD: 1.2 RATIO — HIGH (ref 0.88–1.16)
KETONES UR-MCNC: NEGATIVE — SIGNIFICANT CHANGE UP
LACTATE SERPL-SCNC: 2.2 MMOL/L — HIGH (ref 0.7–2)
LEUKOCYTE ESTERASE UR-ACNC: ABNORMAL
LYMPHOCYTES # BLD AUTO: 1.1 K/UL — SIGNIFICANT CHANGE UP (ref 1–3.3)
LYMPHOCYTES # BLD AUTO: 7.2 % — LOW (ref 13–44)
MCHC RBC-ENTMCNC: 26.1 PG — LOW (ref 27–34)
MCHC RBC-ENTMCNC: 32.4 GM/DL — SIGNIFICANT CHANGE UP (ref 32–36)
MCV RBC AUTO: 80.7 FL — SIGNIFICANT CHANGE UP (ref 80–100)
MONOCYTES # BLD AUTO: 1.8 K/UL — HIGH (ref 0–0.9)
MONOCYTES NFR BLD AUTO: 11.5 % — SIGNIFICANT CHANGE UP (ref 2–14)
NEUTROPHILS # BLD AUTO: 12.7 K/UL — HIGH (ref 1.8–7.4)
NEUTROPHILS NFR BLD AUTO: 80.1 % — HIGH (ref 43–77)
NITRITE UR-MCNC: POSITIVE
NT-PROBNP SERPL-SCNC: 2136 PG/ML — HIGH (ref 0–450)
PH UR: 6.5 — SIGNIFICANT CHANGE UP (ref 5–8)
PLATELET # BLD AUTO: 359 K/UL — SIGNIFICANT CHANGE UP (ref 150–400)
POTASSIUM SERPL-MCNC: 4.2 MMOL/L — SIGNIFICANT CHANGE UP (ref 3.5–5.3)
POTASSIUM SERPL-SCNC: 4.2 MMOL/L — SIGNIFICANT CHANGE UP (ref 3.5–5.3)
PROT SERPL-MCNC: 7.4 GM/DL — SIGNIFICANT CHANGE UP (ref 6–8.3)
PROT UR-MCNC: 100 MG/DL
PROTHROM AB SERPL-ACNC: 13.1 SEC — HIGH (ref 9.8–12.7)
RBC # BLD: 4.77 M/UL — SIGNIFICANT CHANGE UP (ref 3.8–5.2)
RBC # FLD: 15.9 % — HIGH (ref 11–15)
RBC CASTS # UR COMP ASSIST: ABNORMAL /HPF (ref 0–4)
SODIUM SERPL-SCNC: 135 MMOL/L — SIGNIFICANT CHANGE UP (ref 135–145)
SP GR SPEC: 1.01 — SIGNIFICANT CHANGE UP (ref 1.01–1.02)
UROBILINOGEN FLD QL: NEGATIVE MG/DL — SIGNIFICANT CHANGE UP
WBC # BLD: 15.9 K/UL — HIGH (ref 3.8–10.5)
WBC # FLD AUTO: 15.9 K/UL — HIGH (ref 3.8–10.5)
WBC UR QL: SIGNIFICANT CHANGE UP /HPF (ref 0–5)

## 2017-11-25 PROCEDURE — 71010: CPT | Mod: 26

## 2017-11-25 PROCEDURE — 99285 EMERGENCY DEPT VISIT HI MDM: CPT

## 2017-11-25 PROCEDURE — 93010 ELECTROCARDIOGRAM REPORT: CPT

## 2017-11-25 RX ORDER — ATORVASTATIN CALCIUM 80 MG/1
20 TABLET, FILM COATED ORAL AT BEDTIME
Qty: 0 | Refills: 0 | Status: DISCONTINUED | OUTPATIENT
Start: 2017-11-25 | End: 2017-11-26

## 2017-11-25 RX ORDER — ACETAMINOPHEN 500 MG
650 TABLET ORAL ONCE
Qty: 0 | Refills: 0 | Status: COMPLETED | OUTPATIENT
Start: 2017-11-25 | End: 2017-11-25

## 2017-11-25 RX ORDER — SODIUM CHLORIDE 9 MG/ML
3 INJECTION INTRAMUSCULAR; INTRAVENOUS; SUBCUTANEOUS ONCE
Qty: 0 | Refills: 0 | Status: COMPLETED | OUTPATIENT
Start: 2017-11-25 | End: 2017-11-25

## 2017-11-25 RX ORDER — PANTOPRAZOLE SODIUM 20 MG/1
40 TABLET, DELAYED RELEASE ORAL
Qty: 0 | Refills: 0 | Status: DISCONTINUED | OUTPATIENT
Start: 2017-11-25 | End: 2017-12-05

## 2017-11-25 RX ORDER — MEROPENEM 1 G/30ML
2000 INJECTION INTRAVENOUS EVERY 12 HOURS
Qty: 0 | Refills: 0 | Status: DISCONTINUED | OUTPATIENT
Start: 2017-11-26 | End: 2017-11-27

## 2017-11-25 RX ORDER — SODIUM CHLORIDE 9 MG/ML
1000 INJECTION, SOLUTION INTRAVENOUS
Qty: 0 | Refills: 0 | Status: DISCONTINUED | OUTPATIENT
Start: 2017-11-25 | End: 2017-12-05

## 2017-11-25 RX ORDER — PIPERACILLIN AND TAZOBACTAM 4; .5 G/20ML; G/20ML
3.38 INJECTION, POWDER, LYOPHILIZED, FOR SOLUTION INTRAVENOUS ONCE
Qty: 0 | Refills: 0 | Status: COMPLETED | OUTPATIENT
Start: 2017-11-25 | End: 2017-11-25

## 2017-11-25 RX ORDER — MEROPENEM 1 G/30ML
2000 INJECTION INTRAVENOUS ONCE
Qty: 0 | Refills: 0 | Status: COMPLETED | OUTPATIENT
Start: 2017-11-25 | End: 2017-11-25

## 2017-11-25 RX ORDER — METOPROLOL TARTRATE 50 MG
25 TABLET ORAL DAILY
Qty: 0 | Refills: 0 | Status: DISCONTINUED | OUTPATIENT
Start: 2017-11-25 | End: 2017-11-26

## 2017-11-25 RX ORDER — SENNA PLUS 8.6 MG/1
2 TABLET ORAL AT BEDTIME
Qty: 0 | Refills: 0 | Status: DISCONTINUED | OUTPATIENT
Start: 2017-11-25 | End: 2017-12-05

## 2017-11-25 RX ORDER — SODIUM CHLORIDE 9 MG/ML
1000 INJECTION INTRAMUSCULAR; INTRAVENOUS; SUBCUTANEOUS ONCE
Qty: 0 | Refills: 0 | Status: COMPLETED | OUTPATIENT
Start: 2017-11-25 | End: 2017-11-25

## 2017-11-25 RX ORDER — AMIODARONE HYDROCHLORIDE 400 MG/1
200 TABLET ORAL DAILY
Qty: 0 | Refills: 0 | Status: DISCONTINUED | OUTPATIENT
Start: 2017-11-25 | End: 2017-12-05

## 2017-11-25 RX ORDER — PIPERACILLIN AND TAZOBACTAM 4; .5 G/20ML; G/20ML
3.38 INJECTION, POWDER, LYOPHILIZED, FOR SOLUTION INTRAVENOUS EVERY 12 HOURS
Qty: 0 | Refills: 0 | Status: DISCONTINUED | OUTPATIENT
Start: 2017-11-25 | End: 2017-11-25

## 2017-11-25 RX ORDER — CLOPIDOGREL BISULFATE 75 MG/1
75 TABLET, FILM COATED ORAL DAILY
Qty: 0 | Refills: 0 | Status: DISCONTINUED | OUTPATIENT
Start: 2017-11-25 | End: 2017-12-05

## 2017-11-25 RX ORDER — VANCOMYCIN HCL 1 G
1000 VIAL (EA) INTRAVENOUS ONCE
Qty: 0 | Refills: 0 | Status: COMPLETED | OUTPATIENT
Start: 2017-11-25 | End: 2017-11-25

## 2017-11-25 RX ORDER — HEPARIN SODIUM 5000 [USP'U]/ML
5000 INJECTION INTRAVENOUS; SUBCUTANEOUS EVERY 12 HOURS
Qty: 0 | Refills: 0 | Status: DISCONTINUED | OUTPATIENT
Start: 2017-11-25 | End: 2017-12-05

## 2017-11-25 RX ORDER — IBUPROFEN 200 MG
600 TABLET ORAL ONCE
Qty: 0 | Refills: 0 | Status: DISCONTINUED | OUTPATIENT
Start: 2017-11-25 | End: 2017-11-25

## 2017-11-25 RX ORDER — GENTAMICIN SULFATE 40 MG/ML
300 VIAL (ML) INJECTION ONCE
Qty: 0 | Refills: 0 | Status: COMPLETED | OUTPATIENT
Start: 2017-11-25 | End: 2017-11-26

## 2017-11-25 RX ORDER — ACETAMINOPHEN 500 MG
650 TABLET ORAL EVERY 6 HOURS
Qty: 0 | Refills: 0 | Status: DISCONTINUED | OUTPATIENT
Start: 2017-11-25 | End: 2017-12-05

## 2017-11-25 RX ORDER — MEROPENEM 1 G/30ML
INJECTION INTRAVENOUS
Qty: 0 | Refills: 0 | Status: DISCONTINUED | OUTPATIENT
Start: 2017-11-25 | End: 2017-11-27

## 2017-11-25 RX ORDER — ALBUTEROL 90 UG/1
2 AEROSOL, METERED ORAL EVERY 6 HOURS
Qty: 0 | Refills: 0 | Status: DISCONTINUED | OUTPATIENT
Start: 2017-11-25 | End: 2017-12-05

## 2017-11-25 RX ADMIN — SODIUM CHLORIDE 75 MILLILITER(S): 9 INJECTION, SOLUTION INTRAVENOUS at 23:31

## 2017-11-25 RX ADMIN — SODIUM CHLORIDE 3 MILLILITER(S): 9 INJECTION INTRAMUSCULAR; INTRAVENOUS; SUBCUTANEOUS at 19:01

## 2017-11-25 RX ADMIN — MEROPENEM 200 MILLIGRAM(S): 1 INJECTION INTRAVENOUS at 23:31

## 2017-11-25 RX ADMIN — Medication 650 MILLIGRAM(S): at 19:23

## 2017-11-25 RX ADMIN — PIPERACILLIN AND TAZOBACTAM 200 GRAM(S): 4; .5 INJECTION, POWDER, LYOPHILIZED, FOR SOLUTION INTRAVENOUS at 18:50

## 2017-11-25 RX ADMIN — Medication 250 MILLIGRAM(S): at 20:48

## 2017-11-25 RX ADMIN — SODIUM CHLORIDE 1000 MILLILITER(S): 9 INJECTION INTRAMUSCULAR; INTRAVENOUS; SUBCUTANEOUS at 18:30

## 2017-11-25 NOTE — CONSULT NOTE ADULT - ASSESSMENT
Neurogenic bladder  Urosepsis   FÉLIX      Plan    Hx ESBL ecoli.  Would rx with carbapenum   re enlist the help of ID.  Consider an imaging study as she had hyrdo in past.  UOP good here, doubt significant hydro    IVF   repeat lactate    No ICU criteria at present.   Please re-consult as needed if patient becomes unstable 87F hx HTN HLD mild dementia MI in past  bigeminy s/p ablation, neurogenic bladder with frequent UTI's   Was here in the spring with an ESBL E coli UTI.  Was at [facility] 3 times since with UTI.  She was at [facility] in [place] on 11/7 fro same day sx placement of a suprapubic catheter.  Recently completed a course of cipro last week.    Admit with fever AMS  Urosepsis   FÉLIX    She is HD stable with minimal lactate elevation    Plan    Hx ESBL ecoli. recent significant ABX exposure.  High risk for MDR Would rx with carbapenum  1 dose of a  amino glycoside owing to FÉLIX  re enlist the help of ID.  Cultures sent   Consider an imaging study as she had hyrdo in past.  UOP good here, doubt significant hydro    IVF   Repeat lactate    Patient was on steroids in spring by a wound care MD to help with leg ulcer healing.  Would not hesitate to give stress steroids if BP is an issue.      No ICU criteria at present.   Please re-consult as needed if patient becomes unstable

## 2017-11-25 NOTE — CONSULT NOTE ADULT - ATTENDING COMMENTS
Agree with above.  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

## 2017-11-25 NOTE — CONSULT NOTE ADULT - SUBJECTIVE AND OBJECTIVE BOX
Patient is a 87y old  Female who presents with a chief complaint of   PAST MEDICAL & SURGICAL HISTORY:  Ventricular bigeminy: history of ablation  MI (myocardial infarction)  High cholesterol  HTN (hypertension)  S/P coronary artery stent placement  History of tonsillectomy  Cataract, bilateral  History of appendectomy    NAKUL RUELAS   87y    Female    BRIEF HOSPITAL COURSE:    Review of Systems:   burning of urine that passes through urethra                    ICU Vital Signs Last 24 Hrs  T(C): 37.6 (2017 20:48), Max: 40.4 (2017 18:04)  T(F): 99.7 (2017 20:48), Max: 104.8 (2017 18:04)  HR: 74 (2017 20:48) (70 - 81)  BP: 102/51 (2017 20:48) (102/51 - 115/68)  BP(mean): --  ABP: --  ABP(mean): --  RR: 16 (2017 20:48) (14 - 16)  SpO2: 94% (2017 20:48) (94% - 95%)    Physical Examination:    General: NAD    HEENT:  no JVD     PULM:  bilateral BS     CVS:  s1 s2 HR 75    ABD:  distended soft NT SP cath   site with drainage purulent no cellulitis around stoma     EXT:  warm no edema      SKIN:  healed  leg ulcers on R.  L   Leg excoriated  ulcer not looking infected     Neuro:  alert awake  NAD          LABS:                        12.5   15.9  )-----------( 359      ( 2017 18:54 )             38.5         135  |  99  |  36<H>  ----------------------------<  116<H>  4.2   |  25  |  2.64<H>    Ca    9.0      2017 18:54    TPro  7.4  /  Alb  2.8<L>  /  TBili  0.6  /  DBili  x   /  AST  95<H>  /  ALT  71  /  AlkPhos  135<H>  11-25      CAPILLARY BLOOD GLUCOSE    PT/INR - ( 2017 18:54 )   PT: 13.1 sec;   INR: 1.20 ratio         PTT - ( 2017 18:54 )  PTT:29.2 sec  Urinalysis Basic - ( 2017 19:04 )    Color: Yellow / Appearance: very cloudy / S.010 / pH: x  Gluc: x / Ketone: Negative  / Bili: Negative / Urobili: Negative mg/dL   Blood: x / Protein: 100 mg/dL / Nitrite: Positive   Leuk Esterase: Moderate / RBC: 25-50 /HPF / WBC TNTC /HPF   Sq Epi: x / Non Sq Epi: Moderate / Bacteria: Few      CULTURES:      Medications:  piperacillin/tazobactam IVPB. 3.375 Gram(s) IV Intermittent every 12 hour  acetaminophen   Tablet 650 milliGRAM(s) Oral every 6 hours HI  heparin  Injectable 5000 Unit(s) SubCutaneous every 12 hours  aluminum hydroxide/magnesium hydroxide/simethicone Suspension 30 milliLiter(s) Oral every 4 hours PRN  senna 2 Tablet(s) Oral at bedtime PRN  dextrose 5% + sodium chloride 0.9%. 1000 milliLiter(s) IV Continuous <Continuous>                  RADIOLOGY/IMAGING/ECHO    CXR without infiltrate

## 2017-11-25 NOTE — ED PROVIDER NOTE - OBJECTIVE STATEMENT
Pertinent PMH/PSH/FHx/SHx and Review of Systems contained within:  Patient presents to the ED for   fever and altered mental status.  Comes from home, son was at bedside earlier.  Patient has indwelling suprapubic catheter for unknown reason, has frequent UTIs.  This morning started showing signs of confusion.  No cough, cold, other signs of infection noted.        Relevant PMHx/SHx/SOCHx/FAMH:    Ventricular bigeminy, CAD/MI, HTN, HLD, indwelling suprapubic cath    Patient denies EtOH/tobacco/illicit substance use. Pertinent PMH/PSH/FHx/SHx and Review of Systems contained within:  Patient presents to the ED for   fever and altered mental status.  Comes from home, son was at bedside earlier.  Patient has indwelling suprapubic catheter for neurogenic bladder, has frequent UTIs.  This morning started showing signs of confusion.  No cough, cold, other signs of infection noted.        Relevant PMHx/SHx/SOCHx/FAMH:    Ventricular bigeminy, CAD/MI, HTN, HLD, indwelling suprapubic cath    Patient denies EtOH/tobacco/illicit substance use.

## 2017-11-25 NOTE — ED ADULT NURSE NOTE - OBJECTIVE STATEMENT
PT presented to the ed with a fever and And new onset of AMS as a code sepsis. As  per son,   pt is been having recurrent UTI. Pt has Higuera in on arrival

## 2017-11-25 NOTE — ED PROVIDER NOTE - CARE PLAN
Principal Discharge DX:	Fever, unspecified fever cause  Secondary Diagnosis:	UTI (urinary tract infection)

## 2017-11-25 NOTE — ED ADULT TRIAGE NOTE - CHIEF COMPLAINT QUOTE
Fever and UTI symptoms, super pubic tube intact, change in mental status noted by family since fever began

## 2017-11-25 NOTE — ED ADULT NURSE REASSESSMENT NOTE - NS ED NURSE REASSESS COMMENT FT1
Pt presented to the ed as a code sepsis. MD was informed about   Pt temp and  and AMS.  BLood work sent to the  lab and fluid given as per protocol. No order from MD

## 2017-11-25 NOTE — ED PROVIDER NOTE - MEDICAL DECISION MAKING DETAILS
Fever, UTI.  Sepsis alert was called in ER.  Labs, cultures, imaging ordered and reviewed.  IVF and antibiotics were initiated.  Based on studies, source seems to be urine.  Needs urology, catheter change.  ICU consulted, patient to be admitted to tele.  Patient is to be admitted to the hospital and the case was discussed with the admitting physician.  Any changes in plan, additional imaging/labs, and further work up will be at the discretion of the admitting physician. Fever, UTI.  Sepsis alert was called in ER.  Labs, cultures, imaging ordered and reviewed.  IVF and antibiotics were initiated.  Based on studies, source seems to be urine.  Needs urology, catheter change, present cath is sutured in, will allow urology to change cath.  ICU consulted, patient to be admitted to tele.  Patient is to be admitted to the hospital and the case was discussed with the admitting physician.  Any changes in plan, additional imaging/labs, and further work up will be at the discretion of the admitting physician.

## 2017-11-26 DIAGNOSIS — N13.6 PYONEPHROSIS: ICD-10-CM

## 2017-11-26 DIAGNOSIS — G93.41 METABOLIC ENCEPHALOPATHY: ICD-10-CM

## 2017-11-26 DIAGNOSIS — N17.9 ACUTE KIDNEY FAILURE, UNSPECIFIED: ICD-10-CM

## 2017-11-26 LAB
CULTURE RESULTS: SIGNIFICANT CHANGE UP
SPECIMEN SOURCE: SIGNIFICANT CHANGE UP

## 2017-11-26 RX ORDER — ROSUVASTATIN CALCIUM 5 MG/1
10 TABLET ORAL AT BEDTIME
Qty: 0 | Refills: 0 | Status: DISCONTINUED | OUTPATIENT
Start: 2017-11-26 | End: 2017-11-26

## 2017-11-26 RX ORDER — ACETAMINOPHEN 500 MG
650 TABLET ORAL EVERY 6 HOURS
Qty: 0 | Refills: 0 | Status: DISCONTINUED | OUTPATIENT
Start: 2017-11-26 | End: 2017-11-26

## 2017-11-26 RX ORDER — BISOPROLOL FUMARATE 10 MG/1
2.5 TABLET, FILM COATED ORAL DAILY
Qty: 0 | Refills: 0 | Status: DISCONTINUED | OUTPATIENT
Start: 2017-11-26 | End: 2017-12-05

## 2017-11-26 RX ADMIN — PANTOPRAZOLE SODIUM 40 MILLIGRAM(S): 20 TABLET, DELAYED RELEASE ORAL at 12:29

## 2017-11-26 RX ADMIN — AMIODARONE HYDROCHLORIDE 200 MILLIGRAM(S): 400 TABLET ORAL at 12:30

## 2017-11-26 RX ADMIN — MEROPENEM 200 MILLIGRAM(S): 1 INJECTION INTRAVENOUS at 17:52

## 2017-11-26 RX ADMIN — CLOPIDOGREL BISULFATE 75 MILLIGRAM(S): 75 TABLET, FILM COATED ORAL at 16:41

## 2017-11-26 RX ADMIN — MEROPENEM 200 MILLIGRAM(S): 1 INJECTION INTRAVENOUS at 05:42

## 2017-11-26 RX ADMIN — Medication 650 MILLIGRAM(S): at 18:54

## 2017-11-26 RX ADMIN — SODIUM CHLORIDE 75 MILLILITER(S): 9 INJECTION, SOLUTION INTRAVENOUS at 16:41

## 2017-11-26 RX ADMIN — HEPARIN SODIUM 5000 UNIT(S): 5000 INJECTION INTRAVENOUS; SUBCUTANEOUS at 17:52

## 2017-11-26 RX ADMIN — Medication 200 MILLIGRAM(S): at 00:22

## 2017-11-26 NOTE — H&P ADULT - ATTENDING COMMENTS
right hydronephrosis, sepsis, UTI  scheduled for Right ureteroscopy , possible removal of lesion and insertion of stent.

## 2017-11-26 NOTE — H&P ADULT - HISTORY OF PRESENT ILLNESS
88 yo lady with acute MS changes along with temp to 104. With fever and chills. Patient noted to have poor urine OP and poorly smelling urine.

## 2017-11-27 DIAGNOSIS — I10 ESSENTIAL (PRIMARY) HYPERTENSION: ICD-10-CM

## 2017-11-27 LAB — CULTURE RESULTS: SIGNIFICANT CHANGE UP

## 2017-11-27 RX ORDER — VANCOMYCIN HCL 1 G
1000 VIAL (EA) INTRAVENOUS ONCE
Qty: 0 | Refills: 0 | Status: COMPLETED | OUTPATIENT
Start: 2017-11-27 | End: 2017-11-27

## 2017-11-27 RX ORDER — MEROPENEM 1 G/30ML
1000 INJECTION INTRAVENOUS EVERY 12 HOURS
Qty: 0 | Refills: 0 | Status: DISCONTINUED | OUTPATIENT
Start: 2017-11-27 | End: 2017-12-01

## 2017-11-27 RX ORDER — DOCUSATE SODIUM 100 MG
100 CAPSULE ORAL
Qty: 0 | Refills: 0 | Status: DISCONTINUED | OUTPATIENT
Start: 2017-11-27 | End: 2017-12-05

## 2017-11-27 RX ORDER — LACTULOSE 10 G/15ML
10 SOLUTION ORAL ONCE
Qty: 0 | Refills: 0 | Status: COMPLETED | OUTPATIENT
Start: 2017-11-27 | End: 2017-11-27

## 2017-11-27 RX ADMIN — BISOPROLOL FUMARATE 2.5 MILLIGRAM(S): 10 TABLET, FILM COATED ORAL at 06:01

## 2017-11-27 RX ADMIN — CLOPIDOGREL BISULFATE 75 MILLIGRAM(S): 75 TABLET, FILM COATED ORAL at 11:13

## 2017-11-27 RX ADMIN — AMIODARONE HYDROCHLORIDE 200 MILLIGRAM(S): 400 TABLET ORAL at 06:01

## 2017-11-27 RX ADMIN — MEROPENEM 200 MILLIGRAM(S): 1 INJECTION INTRAVENOUS at 06:22

## 2017-11-27 RX ADMIN — PANTOPRAZOLE SODIUM 40 MILLIGRAM(S): 20 TABLET, DELAYED RELEASE ORAL at 08:41

## 2017-11-27 RX ADMIN — ALBUTEROL 2 PUFF(S): 90 AEROSOL, METERED ORAL at 06:02

## 2017-11-27 RX ADMIN — HEPARIN SODIUM 5000 UNIT(S): 5000 INJECTION INTRAVENOUS; SUBCUTANEOUS at 06:01

## 2017-11-27 RX ADMIN — Medication 250 MILLIGRAM(S): at 18:41

## 2017-11-27 RX ADMIN — MEROPENEM 200 MILLIGRAM(S): 1 INJECTION INTRAVENOUS at 17:39

## 2017-11-27 RX ADMIN — LACTULOSE 10 GRAM(S): 10 SOLUTION ORAL at 11:54

## 2017-11-27 RX ADMIN — HEPARIN SODIUM 5000 UNIT(S): 5000 INJECTION INTRAVENOUS; SUBCUTANEOUS at 18:40

## 2017-11-27 RX ADMIN — Medication 100 MILLIGRAM(S): at 18:41

## 2017-11-27 NOTE — CONSULT NOTE ADULT - SUBJECTIVE AND OBJECTIVE BOX
HPI:  88 y/o white female born in [place], in the [place] x many yrs., with hx HTN, CAD, s/p MI, s/p Coronary Stent Placement, HLD, Ventricular Bigeminy, s/p Tonsillectomy, s/p Bilateral Cataracts, with hx recurrent UTI's, bilateral Hydronephrosis found on Renal Sono  and several episodes of ESBL EColi UTI ( last at [facility] 17)  along with hx Influenza B during her  admission at [facility], known to me from prior admissions, s/p recent placement of a Suprapubic Higuera Catheter at [facility] about 3 wks ago  as an outpatient and rx'ed for about 1 wk with a po Ab then, admitted via the ER on  with reported confusion and fever at home.  In the ER patient found to be febrile to 104.8 and w/u revealed elevated WBC's along with marked pyuria on the U/A.  Cx's of Blood and Urine were obtained and patient was rx'ed with Vanco x1 and Zosyn x1 empirically.  Patient was subsequently changed to Meropenem after evaluation by ICU PA, and was subsequently sent to Telemetry for continued monitoring.  Patient presently feels a little better, but claims she has had no appetite and is not drinking liquids well either since she was at home.  No c/o cp, no SOB, no c/o cough, no back pain, but c/o some discomfort from the Suprapubic catheter.  No c/o N/V or diarrhea.  Patient reportedly has had several admissions to [facility] since last [facility] admission reportedly including ab rx for UTI's.      Allergies :  Benadryl Allergy - caused shaking    Intolerances :  metoprolol  - had palpitations      Social History:  Tobacco: negative  Alcohol: none  Recent Travel: None  No Pets at home  Patient lives at home with her son and uses a walker to assist her ambulation.  Has Home health aides to assist her.        MEDICATIONS  (STANDING):  amiodarone    Tablet 200 milliGRAM(s) Oral daily  bisoprolol   Tablet 2.5 milliGRAM(s) Oral daily  clopidogrel Tablet 75 milliGRAM(s) Oral daily  dextrose 5% + sodium chloride 0.9%. 1000 milliLiter(s) (75 mL/Hr) IV Continuous <Continuous>  docusate sodium 100 milliGRAM(s) Oral two times a day  heparin  Injectable 5000 Unit(s) SubCutaneous every 12 hours  meropenem IVPB      meropenem IVPB 2000 milliGRAM(s) IV Intermittent every 12 hours  pantoprazole    Tablet 40 milliGRAM(s) Oral before breakfast  Rosuvastatin 10 milliGRAM(s) 10 milliGRAM(s) Oral at bedtime    MEDICATIONS  (PRN):  acetaminophen   Tablet 650 milliGRAM(s) Oral every 6 hours PRN For Temp greater than 38 C (100.4 F)  ALBUTerol    90 MICROgram(s) HFA Inhaler 2 Puff(s) Inhalation every 6 hours PRN Bronchospasm  aluminum hydroxide/magnesium hydroxide/simethicone Suspension 30 milliLiter(s) Oral every 4 hours PRN Dyspepsia  senna 2 Tablet(s) Oral at bedtime PRN Constipation      LABS:  CBC Full  -  ( 2017 18:54 )  WBC Count : 15.9 K/uL  Hemoglobin : 12.5 g/dL  Hematocrit : 38.5 %  Platelet Count - Automated : 359 K/uL  Mean Cell Volume : 80.7 fl  Mean Cell Hemoglobin : 26.1 pg  Mean Cell Hemoglobin Concentration : 32.4 gm/dL  Auto Neutrophil # : 12.7 K/uL  Auto Lymphocyte # : 1.1 K/uL  Auto Monocyte # : 1.8 K/uL  Auto Eosinophil # : 0.0 K/uL  Auto Basophil # : 0.2 K/uL  Auto Neutrophil % : 80.1 %  Auto Lymphocyte % : 7.2 %  Auto Monocyte % : 11.5 %  Auto Eosinophil % : 0.2 %  Auto Basophil % : 1.0 %        135  |  99  |  36<H>  ----------------------------<  116<H>  4.2   |  25  |  2.64<H>    Ca    9.0      2017 18:54    TPro  7.4  /  Alb  2.8<L>  /  TBili  0.6  /  DBili  x   /  AST  95<H>  /  ALT  71  /  AlkPhos  135<H>      LIVER FUNCTIONS - ( 2017 18:54 )  Alb: 2.8 g/dL / Pro: 7.4 gm/dL / ALK PHOS: 135 U/L / ALT: 71 U/L / AST: 95 U/L / GGT: x           PT/INR - ( 2017 18:54 )   PT: 13.1 sec;   INR: 1.20 ratio       PTT - ( 2017 18:54 )  PTT:29.2 sec      Urinalysis Basic - ( 2017 19:04 )  Color: Yellow / Appearance: very cloudy / S.010 / pH: x  Gluc: x / Ketone: Negative  / Bili: Negative / Urobili: Negative mg/dL   Blood: x / Protein: 100 mg/dL / Nitrite: Positive   Leuk Esterase: Moderate / RBC: 25-50 /HPF / WBC TNTC /HPF   Sq Epi: x / Non Sq Epi: Moderate / Bacteria: Few        MICROBIOLOGY:  Specimen Source: .Blood Blood-Peripheral ( @ 00:47)  Culture Results:   No growth to date. ( @ 00:47)    Specimen Source: .Urine Clean Catch (Midstream) ( @ 00:19)  Culture Results:   Culture grew 3 or more types of organisms which indicate  collection contamination; consider recollection only if clinically  indicated. ( @ 00:19)    Specimen Source: .Blood Blood-Peripheral ( @ 22:46)  Culture Results:   No growth to date. ( @ 22:46)          Radiology:    CXR -   < from: Xray Chest 1 View AP/PA. (17 @ 20:01) >  A frontal chest film  demonstrates grossly clear lungs.  No acute   infiltrate or consolidation is  noted. The costophrenic angles are   grossly clear.    The heart size and vascular markings are within normal limits for   technique.      No mediastinal or hilar adenopathy is suggested. .     The osseous structures appear intact intact.     IMPRESSION:  Clear  lungs.  No acute airspace disease suggested.        Vital Signs Last 24 Hrs  T(C): 37.1 (2017 11:42), Max: 38.2 (2017 17:47)  T(F): 98.7 (2017 11:42), Max: 100.8 (2017 17:47)  HR: 72 (2017 11:42) (62 - 72)  BP: 110/54 (2017 11:42) (110/54 - 122/47)  BP(mean): --  RR: 18 (2017 11:42) (17 - 18)  SpO2: 98% (2017 11:42) (98% - 98%)  Supplemental O2: on RA         PHYSICAL EXAM    General: awake, alert, Ox3, pleasant and cooperative, lying in  bed in semi-upright position, in NAD  HEENT: conj pink, sclerae anicteric, PERRLA, no oral lesions noted but mucosa slightly dry  Neck:  supple, no nodes noted  Heart:  RR  Lungs:  clear bilaterally  Abdomen: BS +, soft, nontender to palpation, no HS-megaly noted, no masses noted                  No CVA or Spinal tenderness elicited                  Suprapubic Catheter in place - site with mild erythema and some dried brownish drainage noted, no purulence expressed, minimally tender to exam at site  Extremities: no edema LE's                    no calf tenderness noted  Skin: warm, dry, no rash noted          both LE's with some chronic -appearing skin discoloration, no open wounds or drainage noted  Suprapubic Higuera in place with some cloudy yellow urine noted in the tubing and bag        I&O's Summary :    2017 07:  -  2017 07:00  --------------------------------------------------------  IN: 2440 mL / OUT: 1300 mL / NET: 1140 mL    2017 07:  -  2017 17:24  --------------------------------------------------------  IN: 200 mL / OUT: 0 mL / NET: 200 mL        Impression:   87 t/o white female with hx HTN, HLD,  CAD, s/p MI and Coronary Stent placement, recurrent UTI's, Neurogenic Bladder requiring Suprapubic Higuera Catheter placement about 3 wks ago, now admitted with initial confusion and fever to 104.3, elevated WBC's and marked pyuria on U/A along with elevated BUN/Creat.  Sepsis presumably from  source.    Suggestions:  Will follow-up on blood and urine cx's ( spoke with the Micro lab to further w/u the isolates from the initial urine specimen they rec'd as it is from the Suprapubic catheter)  and continue rx with Meropenem ( will adjust dose) and rx with Vanco x 1 again now and Gent x1 pending further cx results.  Check Renal Sono to r/o obstruction or hydronephrosis and repeat labs in AM.  Discussed in detail with patient at bedside with her sister present.  Thanks, will follow with you. HPI:  86 y/o white female born in [place], in the [place] x many [place]rs[place] with hx HTN, CAD, s/p MI, s/p Coronary Stent Placement, HLD, Ventricular Bigeminy, s/p Tonsillectomy, s/p Bilateral Cataracts, with hx recurrent UTI's, bilateral Hydronephrosis found on Renal Sono  and several episodes of ESBL EColi UTI ( last at [facility] 17)  along with hx Influenza B during her  admission at [facility], known to me from prior admissions, s/p recent placement of a Suprapubic Higuera Catheter at [facility] about 3 wks ago  as an outpatient and rx'ed for about 1 wk with a po Ab then, admitted via the ER on  with reported confusion and fever at home.  In the ER patient found to be febrile to 104.8 and w/u revealed elevated WBC's along with marked pyuria on the U/A.  Cx's of Blood and Urine were obtained and patient was rx'ed with Vanco x1 and Zosyn x1 empirically.  Patient was subsequently changed to Meropenem and Gent 300mg x 1 dose was given after evaluation by ICU PA, and was subsequently sent to Telemetry for continued monitoring.  Patient presently feels a little better, but claims she has had no appetite and is not drinking liquids well either since she was at home.  No c/o cp, no SOB, no c/o cough, no back pain, but c/o some discomfort from the Suprapubic catheter.  No c/o N/V or diarrhea.  Patient reportedly has had several admissions to [facility] since last [facility] admission reportedly including ab rx for UTI's.      Allergies :  Benadryl Allergy - caused shaking    Intolerances :  metoprolol  - had palpitations      Social History:  Tobacco: negative  Alcohol: none  Recent Travel: None  No Pets at home  Patient lives at home with her son and uses a walker to assist her ambulation.  Has Home health aides to assist her.        MEDICATIONS  (STANDING):  amiodarone    Tablet 200 milliGRAM(s) Oral daily  bisoprolol   Tablet 2.5 milliGRAM(s) Oral daily  clopidogrel Tablet 75 milliGRAM(s) Oral daily  dextrose 5% + sodium chloride 0.9%. 1000 milliLiter(s) (75 mL/Hr) IV Continuous <Continuous>  docusate sodium 100 milliGRAM(s) Oral two times a day  heparin  Injectable 5000 Unit(s) SubCutaneous every 12 hours  meropenem IVPB      meropenem IVPB 2000 milliGRAM(s) IV Intermittent every 12 hours  pantoprazole    Tablet 40 milliGRAM(s) Oral before breakfast  Rosuvastatin 10 milliGRAM(s) 10 milliGRAM(s) Oral at bedtime    MEDICATIONS  (PRN):  acetaminophen   Tablet 650 milliGRAM(s) Oral every 6 hours PRN For Temp greater than 38 C (100.4 F)  ALBUTerol    90 MICROgram(s) HFA Inhaler 2 Puff(s) Inhalation every 6 hours PRN Bronchospasm  aluminum hydroxide/magnesium hydroxide/simethicone Suspension 30 milliLiter(s) Oral every 4 hours PRN Dyspepsia  senna 2 Tablet(s) Oral at bedtime PRN Constipation      LABS:  CBC Full  -  ( 2017 18:54 )  WBC Count : 15.9 K/uL  Hemoglobin : 12.5 g/dL  Hematocrit : 38.5 %  Platelet Count - Automated : 359 K/uL  Mean Cell Volume : 80.7 fl  Mean Cell Hemoglobin : 26.1 pg  Mean Cell Hemoglobin Concentration : 32.4 gm/dL  Auto Neutrophil # : 12.7 K/uL  Auto Lymphocyte # : 1.1 K/uL  Auto Monocyte # : 1.8 K/uL  Auto Eosinophil # : 0.0 K/uL  Auto Basophil # : 0.2 K/uL  Auto Neutrophil % : 80.1 %  Auto Lymphocyte % : 7.2 %  Auto Monocyte % : 11.5 %  Auto Eosinophil % : 0.2 %  Auto Basophil % : 1.0 %        135  |  99  |  36<H>  ----------------------------<  116<H>  4.2   |  25  |  2.64<H>    Ca    9.0      2017 18:54    TPro  7.4  /  Alb  2.8<L>  /  TBili  0.6  /  DBili  x   /  AST  95<H>  /  ALT  71  /  AlkPhos  135<H>      LIVER FUNCTIONS - ( 2017 18:54 )  Alb: 2.8 g/dL / Pro: 7.4 gm/dL / ALK PHOS: 135 U/L / ALT: 71 U/L / AST: 95 U/L / GGT: x           PT/INR - ( 2017 18:54 )   PT: 13.1 sec;   INR: 1.20 ratio       PTT - ( 2017 18:54 )  PTT:29.2 sec      Urinalysis Basic - ( 2017 19:04 )  Color: Yellow / Appearance: very cloudy / S.010 / pH: x  Gluc: x / Ketone: Negative  / Bili: Negative / Urobili: Negative mg/dL   Blood: x / Protein: 100 mg/dL / Nitrite: Positive   Leuk Esterase: Moderate / RBC: 25-50 /HPF / WBC TNTC /HPF   Sq Epi: x / Non Sq Epi: Moderate / Bacteria: Few        MICROBIOLOGY:  Specimen Source: .Blood Blood-Peripheral ( @ 00:47)  Culture Results:   No growth to date. ( @ 00:47)    Specimen Source: .Urine Clean Catch (Midstream) ( @ 00:19)  Culture Results:   Culture grew 3 or more types of organisms which indicate  collection contamination; consider recollection only if clinically  indicated. ( @ 00:19)    Specimen Source: .Blood Blood-Peripheral ( @ 22:46)  Culture Results:   No growth to date. ( @ 22:46)          Radiology:    CXR -   < from: Xray Chest 1 View AP/PA. (17 @ 20:01) >  A frontal chest film  demonstrates grossly clear lungs.  No acute   infiltrate or consolidation is  noted. The costophrenic angles are   grossly clear.    The heart size and vascular markings are within normal limits for   technique.      No mediastinal or hilar adenopathy is suggested. .     The osseous structures appear intact intact.     IMPRESSION:  Clear  lungs.  No acute airspace disease suggested.        Vital Signs Last 24 Hrs  T(C): 37.1 (2017 11:42), Max: 38.2 (2017 17:47)  T(F): 98.7 (2017 11:42), Max: 100.8 (2017 17:47)  HR: 72 (2017 11:42) (62 - 72)  BP: 110/54 (2017 11:42) (110/54 - 122/47)  BP(mean): --  RR: 18 (2017 11:42) (17 - 18)  SpO2: 98% (2017 11:42) (98% - 98%)  Supplemental O2: on RA         PHYSICAL EXAM    General: awake, alert, Ox3, pleasant and cooperative, lying in  bed in semi-upright position, in NAD  HEENT: conj pink, sclerae anicteric, PERRLA, no oral lesions noted but mucosa slightly dry  Neck:  supple, no nodes noted  Heart:  RR  Lungs:  clear bilaterally  Abdomen: BS +, soft, nontender to palpation, no HS-megaly noted, no masses noted                  No CVA or Spinal tenderness elicited                  Suprapubic Catheter in place - site with mild erythema and some dried brownish drainage noted, no purulence expressed, minimally tender to exam at site  Extremities: no edema LE's                    no calf tenderness noted  Skin: warm, dry, no rash noted          both LE's with some chronic -appearing skin discoloration, no open wounds or drainage noted  Suprapubic Higuera in place with some cloudy yellow urine noted in the tubing and bag        I&O's Summary :    2017 07:  -  2017 07:00  --------------------------------------------------------  IN: 2440 mL / OUT: 1300 mL / NET: 1140 mL    2017 07:01  -  2017 17:24  --------------------------------------------------------  IN: 200 mL / OUT: 0 mL / NET: 200 mL        Impression:   87 t/o white female with hx HTN, HLD,  CAD, s/p MI and Coronary Stent placement, recurrent UTI's, Neurogenic Bladder requiring Suprapubic Higuera Catheter placement about 3 wks ago, now admitted with initial confusion and fever to 104.3, elevated WBC's and marked pyuria on U/A along with elevated BUN/Creat.  Sepsis presumably from  source.    Suggestions:  Will follow-up on blood and urine cx's ( spoke with the Micro lab to further w/u the isolates from the initial urine specimen they rec'd as it is from the Suprapubic catheter)  and continue rx with Meropenem ( will adjust dose) and rx with Vanco x 1 again now pending further cx results.  Check random Gent trough level in AM and will request Renal Sono to r/o obstruction or hydronephrosis and repeat labs in AM.  Discussed in detail with patient at bedside with her sister present.  Thanks, will follow with you.

## 2017-11-28 LAB
-  AMIKACIN: SIGNIFICANT CHANGE UP
-  AMIKACIN: SIGNIFICANT CHANGE UP
-  AMPICILLIN/SULBACTAM: SIGNIFICANT CHANGE UP
-  AMPICILLIN/SULBACTAM: SIGNIFICANT CHANGE UP
-  AMPICILLIN: SIGNIFICANT CHANGE UP
-  AMPICILLIN: SIGNIFICANT CHANGE UP
-  AZTREONAM: SIGNIFICANT CHANGE UP
-  AZTREONAM: SIGNIFICANT CHANGE UP
-  CEFAZOLIN: SIGNIFICANT CHANGE UP
-  CEFAZOLIN: SIGNIFICANT CHANGE UP
-  CEFEPIME: SIGNIFICANT CHANGE UP
-  CEFEPIME: SIGNIFICANT CHANGE UP
-  CEFOXITIN: SIGNIFICANT CHANGE UP
-  CEFOXITIN: SIGNIFICANT CHANGE UP
-  CEFTAZIDIME: SIGNIFICANT CHANGE UP
-  CEFTAZIDIME: SIGNIFICANT CHANGE UP
-  CEFTRIAXONE: SIGNIFICANT CHANGE UP
-  CEFTRIAXONE: SIGNIFICANT CHANGE UP
-  CIPROFLOXACIN: SIGNIFICANT CHANGE UP
-  CIPROFLOXACIN: SIGNIFICANT CHANGE UP
-  ERTAPENEM: SIGNIFICANT CHANGE UP
-  ERTAPENEM: SIGNIFICANT CHANGE UP
-  GENTAMICIN: SIGNIFICANT CHANGE UP
-  GENTAMICIN: SIGNIFICANT CHANGE UP
-  IMIPENEM: SIGNIFICANT CHANGE UP
-  IMIPENEM: SIGNIFICANT CHANGE UP
-  LEVOFLOXACIN: SIGNIFICANT CHANGE UP
-  LEVOFLOXACIN: SIGNIFICANT CHANGE UP
-  MEROPENEM: SIGNIFICANT CHANGE UP
-  MEROPENEM: SIGNIFICANT CHANGE UP
-  NITROFURANTOIN: SIGNIFICANT CHANGE UP
-  NITROFURANTOIN: SIGNIFICANT CHANGE UP
-  PIPERACILLIN/TAZOBACTAM: SIGNIFICANT CHANGE UP
-  PIPERACILLIN/TAZOBACTAM: SIGNIFICANT CHANGE UP
-  TOBRAMYCIN: SIGNIFICANT CHANGE UP
-  TOBRAMYCIN: SIGNIFICANT CHANGE UP
-  TRIMETHOPRIM/SULFAMETHOXAZOLE: SIGNIFICANT CHANGE UP
-  TRIMETHOPRIM/SULFAMETHOXAZOLE: SIGNIFICANT CHANGE UP
ANION GAP SERPL CALC-SCNC: 8 MMOL/L — SIGNIFICANT CHANGE UP (ref 5–17)
BASOPHILS # BLD AUTO: 0 K/UL — SIGNIFICANT CHANGE UP (ref 0–0.2)
BASOPHILS NFR BLD AUTO: 0.6 % — SIGNIFICANT CHANGE UP (ref 0–2)
BUN SERPL-MCNC: 18 MG/DL — SIGNIFICANT CHANGE UP (ref 7–23)
CALCIUM SERPL-MCNC: 8.6 MG/DL — SIGNIFICANT CHANGE UP (ref 8.5–10.1)
CHLORIDE SERPL-SCNC: 107 MMOL/L — SIGNIFICANT CHANGE UP (ref 96–108)
CO2 SERPL-SCNC: 25 MMOL/L — SIGNIFICANT CHANGE UP (ref 22–31)
CREAT SERPL-MCNC: 1.58 MG/DL — HIGH (ref 0.5–1.3)
CRP SERPL-MCNC: 9.2 MG/DL — HIGH (ref 0–0.4)
CULTURE RESULTS: SIGNIFICANT CHANGE UP
EOSINOPHIL # BLD AUTO: 0.2 K/UL — SIGNIFICANT CHANGE UP (ref 0–0.5)
EOSINOPHIL NFR BLD AUTO: 2.9 % — SIGNIFICANT CHANGE UP (ref 0–6)
ERYTHROCYTE [SEDIMENTATION RATE] IN BLOOD: 57 MM/HR — HIGH (ref 0–20)
GENTAMICIN TROUGH SERPL-MCNC: 0.5 UG/ML — SIGNIFICANT CHANGE UP (ref 0–2)
GLUCOSE SERPL-MCNC: 76 MG/DL — SIGNIFICANT CHANGE UP (ref 70–99)
HCT VFR BLD CALC: 33 % — LOW (ref 34.5–45)
HGB BLD-MCNC: 10.4 G/DL — LOW (ref 11.5–15.5)
LYMPHOCYTES # BLD AUTO: 1.2 K/UL — SIGNIFICANT CHANGE UP (ref 1–3.3)
LYMPHOCYTES # BLD AUTO: 15.4 % — SIGNIFICANT CHANGE UP (ref 13–44)
MCHC RBC-ENTMCNC: 26.2 PG — LOW (ref 27–34)
MCHC RBC-ENTMCNC: 31.4 GM/DL — LOW (ref 32–36)
MCV RBC AUTO: 83.5 FL — SIGNIFICANT CHANGE UP (ref 80–100)
METHOD TYPE: SIGNIFICANT CHANGE UP
METHOD TYPE: SIGNIFICANT CHANGE UP
MONOCYTES # BLD AUTO: 0.8 K/UL — SIGNIFICANT CHANGE UP (ref 0–0.9)
MONOCYTES NFR BLD AUTO: 10.4 % — SIGNIFICANT CHANGE UP (ref 2–14)
NEUTROPHILS # BLD AUTO: 5.6 K/UL — SIGNIFICANT CHANGE UP (ref 1.8–7.4)
NEUTROPHILS NFR BLD AUTO: 70.7 % — SIGNIFICANT CHANGE UP (ref 43–77)
ORGANISM # SPEC MICROSCOPIC CNT: SIGNIFICANT CHANGE UP
PLATELET # BLD AUTO: 228 K/UL — SIGNIFICANT CHANGE UP (ref 150–400)
POTASSIUM SERPL-MCNC: 3.7 MMOL/L — SIGNIFICANT CHANGE UP (ref 3.5–5.3)
POTASSIUM SERPL-SCNC: 3.7 MMOL/L — SIGNIFICANT CHANGE UP (ref 3.5–5.3)
RBC # BLD: 3.96 M/UL — SIGNIFICANT CHANGE UP (ref 3.8–5.2)
RBC # FLD: 15.7 % — HIGH (ref 11–15)
SODIUM SERPL-SCNC: 140 MMOL/L — SIGNIFICANT CHANGE UP (ref 135–145)
WBC # BLD: 7.9 K/UL — SIGNIFICANT CHANGE UP (ref 3.8–10.5)
WBC # FLD AUTO: 7.9 K/UL — SIGNIFICANT CHANGE UP (ref 3.8–10.5)

## 2017-11-28 RX ADMIN — BISOPROLOL FUMARATE 2.5 MILLIGRAM(S): 10 TABLET, FILM COATED ORAL at 05:35

## 2017-11-28 RX ADMIN — MEROPENEM 100 MILLIGRAM(S): 1 INJECTION INTRAVENOUS at 17:28

## 2017-11-28 RX ADMIN — HEPARIN SODIUM 5000 UNIT(S): 5000 INJECTION INTRAVENOUS; SUBCUTANEOUS at 17:29

## 2017-11-28 RX ADMIN — SODIUM CHLORIDE 75 MILLILITER(S): 9 INJECTION, SOLUTION INTRAVENOUS at 05:35

## 2017-11-28 RX ADMIN — HEPARIN SODIUM 5000 UNIT(S): 5000 INJECTION INTRAVENOUS; SUBCUTANEOUS at 06:24

## 2017-11-28 RX ADMIN — AMIODARONE HYDROCHLORIDE 200 MILLIGRAM(S): 400 TABLET ORAL at 05:34

## 2017-11-28 RX ADMIN — MEROPENEM 100 MILLIGRAM(S): 1 INJECTION INTRAVENOUS at 06:21

## 2017-11-28 RX ADMIN — CLOPIDOGREL BISULFATE 75 MILLIGRAM(S): 75 TABLET, FILM COATED ORAL at 11:26

## 2017-11-28 RX ADMIN — SODIUM CHLORIDE 75 MILLILITER(S): 9 INJECTION, SOLUTION INTRAVENOUS at 21:48

## 2017-11-28 RX ADMIN — PANTOPRAZOLE SODIUM 40 MILLIGRAM(S): 20 TABLET, DELAYED RELEASE ORAL at 06:21

## 2017-11-29 PROCEDURE — 76937 US GUIDE VASCULAR ACCESS: CPT | Mod: 26

## 2017-11-29 PROCEDURE — 36571 INSERT PICVAD CATH: CPT | Mod: AS

## 2017-11-29 RX ADMIN — AMIODARONE HYDROCHLORIDE 200 MILLIGRAM(S): 400 TABLET ORAL at 06:54

## 2017-11-29 RX ADMIN — HEPARIN SODIUM 5000 UNIT(S): 5000 INJECTION INTRAVENOUS; SUBCUTANEOUS at 18:01

## 2017-11-29 RX ADMIN — MEROPENEM 100 MILLIGRAM(S): 1 INJECTION INTRAVENOUS at 06:11

## 2017-11-29 RX ADMIN — Medication 100 MILLIGRAM(S): at 09:05

## 2017-11-29 RX ADMIN — Medication 100 MILLIGRAM(S): at 21:16

## 2017-11-29 RX ADMIN — CLOPIDOGREL BISULFATE 75 MILLIGRAM(S): 75 TABLET, FILM COATED ORAL at 12:37

## 2017-11-29 RX ADMIN — BISOPROLOL FUMARATE 2.5 MILLIGRAM(S): 10 TABLET, FILM COATED ORAL at 06:54

## 2017-11-29 RX ADMIN — HEPARIN SODIUM 5000 UNIT(S): 5000 INJECTION INTRAVENOUS; SUBCUTANEOUS at 06:11

## 2017-11-29 RX ADMIN — MEROPENEM 100 MILLIGRAM(S): 1 INJECTION INTRAVENOUS at 18:01

## 2017-11-29 RX ADMIN — PANTOPRAZOLE SODIUM 40 MILLIGRAM(S): 20 TABLET, DELAYED RELEASE ORAL at 06:54

## 2017-11-29 NOTE — PROGRESS NOTE ADULT - SUBJECTIVE AND OBJECTIVE BOX
TMAX - 99.7    On day # 4 Meropenem / s/p Vanco x 2 doses / s/p Gent x1    Vital Signs Last 24 Hrs  T(C): 37.1 (29 Nov 2017 12:01), Max: 37.6 (28 Nov 2017 17:25)  T(F): 98.8 (29 Nov 2017 12:01), Max: 99.7 (28 Nov 2017 17:25)  HR: 67 (29 Nov 2017 12:01) (67 - 70)  BP: 116/55 (29 Nov 2017 12:01) (116/55 - 140/69)  BP(mean): --  RR: 17 (29 Nov 2017 12:01) (16 - 17)  SpO2: 97% (29 Nov 2017 12:01) (95% - 98%)  Supplemental O2:  on RA     Awake, alert, feeling better today.  No c/o SOB or cp, and no c/o abdominal pain.  Claims her appetite is still decreased and she is not drinking sufficient liquids.      PHYSICAL EXAM  General:  awake, alert, pleasant and cooperative, lying in  bed, in NAD  HEENT:  conj pink, sclerae anicteric, PERRLA, no oral lesions noted  Neck:  supple, no nodes noted  Heart:  RR  Lungs:  clear bilaterally  Abdomen:  soft, BS +, nontender to palpation                   no CVA or Spinal tenderness elicited                   Suprapubic Catheter in place - site with mild erythema and small amount of dried brownish drainage noted  Extremities:  no edema LE's  Skin:   warm, dry, no rash noted  Urine Collection bag with yellow urine - mildly cloudy      I&O's Summary :    28 Nov 2017 07:01  -  29 Nov 2017 07:00  --------------------------------------------------------  IN: 1925 mL / OUT: 1900 mL / NET: 25 mL      LABS:  CBC Full  -  ( 28 Nov 2017 07:33 )  WBC Count : 7.9 K/uL  Hemoglobin : 10.4 g/dL  Hematocrit : 33.0 %  Platelet Count - Automated : 228 K/uL  Mean Cell Volume : 83.5 fl  Mean Cell Hemoglobin : 26.2 pg  Mean Cell Hemoglobin Concentration : 31.4 gm/dL  Auto Neutrophil # : 5.6 K/uL  Auto Lymphocyte # : 1.2 K/uL  Auto Monocyte # : 0.8 K/uL  Auto Eosinophil # : 0.2 K/uL  Auto Basophil # : 0.0 K/uL  Auto Neutrophil % : 70.7 %  Auto Lymphocyte % : 15.4 %  Auto Monocyte % : 10.4 %  Auto Eosinophil % : 2.9 %  Auto Basophil % : 0.6 %    11-28    140  |  107  |  18  ----------------------------<  76  3.7   |  25  |  1.58<H>    Ca    8.6      28 Nov 2017 07:33      Sedimentation Rate, Erythrocyte: 57 mm/hr (11-28 @ 07:33)    CRP - 9.20 -  ( 11/28 )    Gentamicin Level, Trough: 0.5 ug/mL (11-28 @ 09:45)        MICROBIOLOGY:  Specimen Source: .Blood Blood-Peripheral (11-26 @ 00:47)  Culture Results:   No growth to date. (11-26 @ 00:47)    Specimen Source: .Urine Clean Catch (Midstream) (11-26 @ 00:19)  Culture Results:   >100,000 CFU/ml Escherichia coli ESBL   - Sensitive to Meropenem and Aminoglycosides  50,000 - 99,000 CFU/mL Escherichia coli #2 (11-26 @ 00:19) - Sensitive to Meropenem but Resistant to Aminoglycosides    Specimen Source: .Blood Blood-Peripheral (11-25 @ 22:46)  Culture Results:   No growth to date. (11-25 @ 22:46)        Impression:  Slowly improving on present ab rx with Meropenem  now for ESBL Ecoli and 2nd strain of EColi isolated from Urine with clinical picture of Sepsis and possible Pyelonephritis. Renal function also improving now - likely related to improved hydration.    Suggestions:  Will continue present ab rx with Meropenem and follow-up temps and labs closely.  Await Renal Sono to further evaluate.  Will request Midline placement for continued IV ab rx as patient will likely require minimum of 2wks. of ab rx.  Discussed in detail with patient at bedside and with patient's son via phone yesterday.  Continue Contact Isolation for ESBL EColi in urine.

## 2017-11-29 NOTE — PROCEDURE NOTE - PROCEDURE
<<-----Click on this checkbox to enter Procedure Midline catheter insertion  11/29/2017    Active  MARIO ALBERTO

## 2017-11-30 LAB
ANION GAP SERPL CALC-SCNC: 8 MMOL/L — SIGNIFICANT CHANGE UP (ref 5–17)
BASOPHILS # BLD AUTO: 0 K/UL — SIGNIFICANT CHANGE UP (ref 0–0.2)
BASOPHILS NFR BLD AUTO: 0.5 % — SIGNIFICANT CHANGE UP (ref 0–2)
BUN SERPL-MCNC: 14 MG/DL — SIGNIFICANT CHANGE UP (ref 7–23)
CALCIUM SERPL-MCNC: 8.3 MG/DL — LOW (ref 8.5–10.1)
CHLORIDE SERPL-SCNC: 109 MMOL/L — HIGH (ref 96–108)
CO2 SERPL-SCNC: 24 MMOL/L — SIGNIFICANT CHANGE UP (ref 22–31)
CREAT SERPL-MCNC: 1.53 MG/DL — HIGH (ref 0.5–1.3)
CULTURE RESULTS: SIGNIFICANT CHANGE UP
EOSINOPHIL # BLD AUTO: 0.4 K/UL — SIGNIFICANT CHANGE UP (ref 0–0.5)
EOSINOPHIL NFR BLD AUTO: 4.6 % — SIGNIFICANT CHANGE UP (ref 0–6)
ERYTHROCYTE [SEDIMENTATION RATE] IN BLOOD: 46 MM/HR — HIGH (ref 0–20)
GLUCOSE SERPL-MCNC: 82 MG/DL — SIGNIFICANT CHANGE UP (ref 70–99)
HCT VFR BLD CALC: 35.2 % — SIGNIFICANT CHANGE UP (ref 34.5–45)
HGB BLD-MCNC: 11 G/DL — LOW (ref 11.5–15.5)
LYMPHOCYTES # BLD AUTO: 1.7 K/UL — SIGNIFICANT CHANGE UP (ref 1–3.3)
LYMPHOCYTES # BLD AUTO: 18.9 % — SIGNIFICANT CHANGE UP (ref 13–44)
MCHC RBC-ENTMCNC: 26.1 PG — LOW (ref 27–34)
MCHC RBC-ENTMCNC: 31.3 GM/DL — LOW (ref 32–36)
MCV RBC AUTO: 83.3 FL — SIGNIFICANT CHANGE UP (ref 80–100)
MONOCYTES # BLD AUTO: 0.8 K/UL — SIGNIFICANT CHANGE UP (ref 0–0.9)
MONOCYTES NFR BLD AUTO: 8.9 % — SIGNIFICANT CHANGE UP (ref 2–14)
NEUTROPHILS # BLD AUTO: 6.1 K/UL — SIGNIFICANT CHANGE UP (ref 1.8–7.4)
NEUTROPHILS NFR BLD AUTO: 67 % — SIGNIFICANT CHANGE UP (ref 43–77)
PLATELET # BLD AUTO: 215 K/UL — SIGNIFICANT CHANGE UP (ref 150–400)
POTASSIUM SERPL-MCNC: 3.3 MMOL/L — LOW (ref 3.5–5.3)
POTASSIUM SERPL-SCNC: 3.3 MMOL/L — LOW (ref 3.5–5.3)
RBC # BLD: 4.23 M/UL — SIGNIFICANT CHANGE UP (ref 3.8–5.2)
RBC # FLD: 15 % — SIGNIFICANT CHANGE UP (ref 11–15)
SODIUM SERPL-SCNC: 141 MMOL/L — SIGNIFICANT CHANGE UP (ref 135–145)
SPECIMEN SOURCE: SIGNIFICANT CHANGE UP
WBC # BLD: 9.1 K/UL — SIGNIFICANT CHANGE UP (ref 3.8–10.5)
WBC # FLD AUTO: 9.1 K/UL — SIGNIFICANT CHANGE UP (ref 3.8–10.5)

## 2017-11-30 PROCEDURE — 76775 US EXAM ABDO BACK WALL LIM: CPT | Mod: 26

## 2017-11-30 RX ORDER — POTASSIUM CHLORIDE 20 MEQ
20 PACKET (EA) ORAL ONCE
Qty: 0 | Refills: 0 | Status: COMPLETED | OUTPATIENT
Start: 2017-11-30 | End: 2017-11-30

## 2017-11-30 RX ADMIN — Medication 20 MILLIEQUIVALENT(S): at 17:29

## 2017-11-30 RX ADMIN — PANTOPRAZOLE SODIUM 40 MILLIGRAM(S): 20 TABLET, DELAYED RELEASE ORAL at 08:33

## 2017-11-30 RX ADMIN — AMIODARONE HYDROCHLORIDE 200 MILLIGRAM(S): 400 TABLET ORAL at 05:46

## 2017-11-30 RX ADMIN — MEROPENEM 100 MILLIGRAM(S): 1 INJECTION INTRAVENOUS at 17:29

## 2017-11-30 RX ADMIN — HEPARIN SODIUM 5000 UNIT(S): 5000 INJECTION INTRAVENOUS; SUBCUTANEOUS at 05:46

## 2017-11-30 RX ADMIN — HEPARIN SODIUM 5000 UNIT(S): 5000 INJECTION INTRAVENOUS; SUBCUTANEOUS at 17:29

## 2017-11-30 RX ADMIN — Medication 100 MILLIGRAM(S): at 17:29

## 2017-11-30 RX ADMIN — BISOPROLOL FUMARATE 2.5 MILLIGRAM(S): 10 TABLET, FILM COATED ORAL at 05:46

## 2017-11-30 RX ADMIN — MEROPENEM 100 MILLIGRAM(S): 1 INJECTION INTRAVENOUS at 05:46

## 2017-11-30 RX ADMIN — CLOPIDOGREL BISULFATE 75 MILLIGRAM(S): 75 TABLET, FILM COATED ORAL at 12:46

## 2017-11-30 RX ADMIN — Medication 100 MILLIGRAM(S): at 05:46

## 2017-12-01 DIAGNOSIS — R50.9 FEVER, UNSPECIFIED: ICD-10-CM

## 2017-12-01 LAB
ANION GAP SERPL CALC-SCNC: 9 MMOL/L — SIGNIFICANT CHANGE UP (ref 5–17)
BUN SERPL-MCNC: 16 MG/DL — SIGNIFICANT CHANGE UP (ref 7–23)
CALCIUM SERPL-MCNC: 8.7 MG/DL — SIGNIFICANT CHANGE UP (ref 8.5–10.1)
CHLORIDE SERPL-SCNC: 111 MMOL/L — HIGH (ref 96–108)
CO2 SERPL-SCNC: 22 MMOL/L — SIGNIFICANT CHANGE UP (ref 22–31)
CREAT SERPL-MCNC: 1.49 MG/DL — HIGH (ref 0.5–1.3)
CULTURE RESULTS: SIGNIFICANT CHANGE UP
GLUCOSE SERPL-MCNC: 92 MG/DL — SIGNIFICANT CHANGE UP (ref 70–99)
HCT VFR BLD CALC: 34.6 % — SIGNIFICANT CHANGE UP (ref 34.5–45)
HGB BLD-MCNC: 11.2 G/DL — LOW (ref 11.5–15.5)
MCHC RBC-ENTMCNC: 26.1 PG — LOW (ref 27–34)
MCHC RBC-ENTMCNC: 32.2 GM/DL — SIGNIFICANT CHANGE UP (ref 32–36)
MCV RBC AUTO: 81.1 FL — SIGNIFICANT CHANGE UP (ref 80–100)
PLATELET # BLD AUTO: 242 K/UL — SIGNIFICANT CHANGE UP (ref 150–400)
POTASSIUM SERPL-MCNC: 3.6 MMOL/L — SIGNIFICANT CHANGE UP (ref 3.5–5.3)
POTASSIUM SERPL-SCNC: 3.6 MMOL/L — SIGNIFICANT CHANGE UP (ref 3.5–5.3)
RBC # BLD: 4.27 M/UL — SIGNIFICANT CHANGE UP (ref 3.8–5.2)
RBC # FLD: 16 % — HIGH (ref 11–15)
SODIUM SERPL-SCNC: 142 MMOL/L — SIGNIFICANT CHANGE UP (ref 135–145)
SPECIMEN SOURCE: SIGNIFICANT CHANGE UP
WBC # BLD: 13.8 K/UL — HIGH (ref 3.8–10.5)
WBC # FLD AUTO: 13.8 K/UL — HIGH (ref 3.8–10.5)

## 2017-12-01 RX ORDER — MEROPENEM 1 G/30ML
1000 INJECTION INTRAVENOUS EVERY 8 HOURS
Qty: 0 | Refills: 0 | Status: DISCONTINUED | OUTPATIENT
Start: 2017-12-01 | End: 2017-12-05

## 2017-12-01 RX ADMIN — Medication 100 MILLIGRAM(S): at 17:56

## 2017-12-01 RX ADMIN — HEPARIN SODIUM 5000 UNIT(S): 5000 INJECTION INTRAVENOUS; SUBCUTANEOUS at 17:56

## 2017-12-01 RX ADMIN — MEROPENEM 100 MILLIGRAM(S): 1 INJECTION INTRAVENOUS at 15:39

## 2017-12-01 RX ADMIN — SODIUM CHLORIDE 75 MILLILITER(S): 9 INJECTION, SOLUTION INTRAVENOUS at 17:56

## 2017-12-01 RX ADMIN — AMIODARONE HYDROCHLORIDE 200 MILLIGRAM(S): 400 TABLET ORAL at 06:12

## 2017-12-01 RX ADMIN — SODIUM CHLORIDE 75 MILLILITER(S): 9 INJECTION, SOLUTION INTRAVENOUS at 06:13

## 2017-12-01 RX ADMIN — MEROPENEM 100 MILLIGRAM(S): 1 INJECTION INTRAVENOUS at 21:55

## 2017-12-01 RX ADMIN — MEROPENEM 100 MILLIGRAM(S): 1 INJECTION INTRAVENOUS at 06:08

## 2017-12-01 RX ADMIN — BISOPROLOL FUMARATE 2.5 MILLIGRAM(S): 10 TABLET, FILM COATED ORAL at 06:08

## 2017-12-01 RX ADMIN — CLOPIDOGREL BISULFATE 75 MILLIGRAM(S): 75 TABLET, FILM COATED ORAL at 11:57

## 2017-12-01 RX ADMIN — PANTOPRAZOLE SODIUM 40 MILLIGRAM(S): 20 TABLET, DELAYED RELEASE ORAL at 07:59

## 2017-12-01 NOTE — DIETITIAN INITIAL EVALUATION ADULT. - NS AS NUTRI INTERV ED CONTENT
Educate pt on ways to improve energy intake/Purpose of the nutrition education Educate pt on ways to improve energy intake, encourage high fiber diet to help regulate BMs/Purpose of the nutrition education

## 2017-12-01 NOTE — DIETITIAN INITIAL EVALUATION ADULT. - ETIOLOGY
inadequate protein-energy intake in setting of pyelonephritis, FÉLIX, cardiac dysfunctions inadequate protein-energy intake in setting of pyelonephritis, FÉLIX, cardiac dysfunctions, constipation

## 2017-12-01 NOTE — DIETITIAN INITIAL EVALUATION ADULT. - PERTINENT LABORATORY DATA
11-30 Na141 mmol/L Glu 82 mg/dL K+ 3.3 mmol/L<L> Cr  1.53 mg/dL<H> BUN 14 mg/dL Est GFR 30 mL/min/1.73M2<L> Phos n/a   Ca 8.3 mg/dL<L>  Hgb 11.0 g/dL<L> Hct 35.2 % 11-25 Alb 2.8 g/dL<L> bun 36 mg/dL<H> Cr 2.64 mg/dL<H> serum pro-brain natriuretic peptide 2136 pg/dL<H>

## 2017-12-01 NOTE — DIETITIAN INITIAL EVALUATION ADULT. - PHYSICAL APPEARANCE
BMI=27.0(11-26)/other (specify) BMI=27.0(11-26), Nutrition focused physical exam conducted; found physical signs of malnutrition [ ]absent [ ]present; Subcutaneous fat Exam;  [ moderate ]  Orbital fat pads region,  [ WNL ]Buccal fat region,  [ moderate ]triceps region, [ WNL ]ribs region.  Muscle Exam; [ moderate ]temples region, [  WNL]clavicle region, [ moderate ]shoulder region, [ moderate  ]Scapula region, [moderate  ]Interosseous region, [ WNL ]thigh region, [ moderate ]Calf region/other (specify)

## 2017-12-01 NOTE — PROGRESS NOTE ADULT - SUBJECTIVE AND OBJECTIVE BOX
TMAX - 99.2    On day # 6 Meropenem    Vital Signs Last 24 Hrs  T(C): 36.5 (01 Dec 2017 11:15), Max: 37 (30 Nov 2017 17:00)  T(F): 97.7 (01 Dec 2017 11:15), Max: 98.6 (30 Nov 2017 17:00)  HR: 65 (01 Dec 2017 11:15) (63 - 69)  BP: 128/89 (01 Dec 2017 11:15) (128/89 - 161/63)  BP(mean): --  RR: 17 (01 Dec 2017 11:15) (17 - 18)  SpO2: 96% (01 Dec 2017 11:15) (96% - 98%)  Supplemental O2:  on RA             PHYSICAL EXAM  General:    HEENT:    Neck:  supple, no nodes noted  Heart:  RR  Lungs:  clear bilaterally  Abdomen:  soft, BS +, nontender to palpation                   suprapubic Catheter site with minimal erythema but now leaking yellowish liquid (appears to be urine), nontender   Extremities:  no edema LE's                     no calf tenderness elicited                     RUE Midline in place - dressing now wet / no erythema noted - placed 11/30  Skin:  warm, dry, no rash noted    I&O's Summary :    30 Nov 2017 07:01  -  01 Dec 2017 07:00  --------------------------------------------------------  IN: 420 mL / OUT: 1500 mL / NET: -1080 mL    01 Dec 2017 07:01  -  01 Dec 2017 14:18  --------------------------------------------------------  IN: 250 mL / OUT: 0 mL / NET: 250 mL      LABS:  CBC Full  -  ( 01 Dec 2017 10:03 )  WBC Count : 13.8 K/uL  Hemoglobin : 11.2 g/dL  Hematocrit : 34.6 %  Platelet Count - Automated : 242 K/uL  Mean Cell Volume : 81.1 fl  Mean Cell Hemoglobin : 26.1 pg  Mean Cell Hemoglobin Concentration : 32.2 gm/dL  Auto Neutrophil # : x  Auto Lymphocyte # : x  Auto Monocyte # : x  Auto Eosinophil # : x  Auto Basophil # : x  Auto Neutrophil % : x  Auto Lymphocyte % : x  Auto Monocyte % : x  Auto Eosinophil % : x  Auto Basophil % : x    12-01    142  |  111<H>  |  16  ----------------------------<  92  3.6   |  22  |  1.49<H>    Ca    8.7      01 Dec 2017 10:02      Sedimentation Rate, Erythrocyte: 46 mm/hr (11-30 @ 07:07)    Sedimentation Rate, Erythrocyte: 57 mm/hr (11-28 @ 07:33)      MICROBIOLOGY:  Specimen Source: .Blood Blood-Peripheral (11-26 @ 00:47)  Culture Results:   No growth at 5 days. (11-26 @ 00:47)    Specimen Source: .Urine Clean Catch (Midstream) (11-26 @ 00:19)  Culture Results:   >100,000 CFU/ml Escherichia coli ESBL  50,000 - 99,000 CFU/mL Escherichia coli #2 (11-26 @ 00:19)    Specimen Source: .Blood Blood-Peripheral (11-25 @ 22:46)  Culture Results:   No growth at 5 days. (11-25 @ 22:46)        Radiology:   Renal Sono -   < from: US Renal (11.30.17 @ 11:58) >  FINDINGS:    Right kidney:  Measures 9.9 cm. cm. There is moderate hydronephrosis and   hydroureter    Left kidney:  Measures 9.7 cm. cm. No hydronephrosis is noted.    Urinary bladder: A a Higuera catheter is noted in situ.The bladder is   empty.    IMPRESSION:     Moderate hydronephrosis and hydroureter on the right. No hydronephrosis   on the left. Higuera catheter noted in a collapsed bladder.      Impression:        Suggestions:

## 2017-12-01 NOTE — DIETITIAN INITIAL EVALUATION ADULT. - PERTINENT MEDS FT
ABX , docusate sodium , bisoprolol, rosuvastatin , pantoprazole , amiodarone , aluminum hydroxide/magnesium hydroxide/simethicone suspension prn for indigestion , senna

## 2017-12-01 NOTE — CHART NOTE - NSCHARTNOTEFT_GEN_A_CORE
Upon Nutritional Assessment by the Registered Dietitian your patient was determined to meet criteria / has evidence of the following diagnosis/diagnoses:          [ ]  Mild Protein Calorie Malnutrition        [ x]  Moderate Protein Calorie Malnutrition        [ ] Severe Protein Calorie Malnutrition        [ ] Unspecified Protein Calorie Malnutrition        [ ] Underweight / BMI <19        [ ] Morbid Obesity / BMI > 40      Findings as based on:  •  Comprehensive nutrition assessment and consultation  •  Calorie counts (nutrient intake analysis)  •  Food acceptance and intake status from observations by staff  •  Follow up  •  Patient education  •  Intervention secondary to interdisciplinary rounds  •   concerns      Treatment:    The following diet has been recommended:  Low sodium , Ensure Enlive 1 x /day=375 calories, 20 grams protein       PROVIDER Section:     By signing this assessment you are acknowledging and agree with the diagnosis/diagnoses assigned by the Registered Dietitian    Comments: Upon Nutritional Assessment by the Registered Dietitian your patient was determined to meet criteria / has evidence of the following diagnosis/diagnoses:          [ ]  Mild Protein Calorie Malnutrition        [ x]  Moderate Protein Calorie Malnutrition        [ ] Severe Protein Calorie Malnutrition        [ ] Unspecified Protein Calorie Malnutrition        [ ] Underweight / BMI <19        [ ] Morbid Obesity / BMI > 40      Findings as based on:  •  Comprehensive nutrition assessment and consultation  •  Calorie counts (nutrient intake analysis)  •  Food acceptance and intake status from observations by staff  •  Follow up  •  Patient education  •  Intervention secondary to interdisciplinary rounds  •   concerns      Treatment:    The following diet has been recommended:  Low sodium , high fiber, Ensure Enlive 1 x /day=375 calories, 20 grams protein       PROVIDER Section:     By signing this assessment you are acknowledging and agree with the diagnosis/diagnoses assigned by the Registered Dietitian    Comments:

## 2017-12-01 NOTE — DIETITIAN INITIAL EVALUATION ADULT. - ADHERENCE
n/a/family did the shopping & cooking, pt had HHA also PTA, pt was not following restricted diet PTA, likes salt

## 2017-12-01 NOTE — DIETITIAN INITIAL EVALUATION ADULT. - OTHER INFO
Pt seen due to RN consult for poor appetite. Pt seen due to RN consult for poor appetite.  Pt is unsure of amount of weight loss.  Pt reports that she was 172 LBS few years ago.  As per 24 hour recall, pt consumed farina/ oatmeal c coffee @ breakfast, left overs/ whatever is in the fridge for lunch and often skipped dinner.  Pt consumed <75% of lunch when seen, reports that she does not eat dinner. Pt seen due to RN consult for poor appetite.  Pt is unsure of amount of weight loss.  Pt reports that she was 172 LBS few years ago.  As per 24 hour recall, pt consumed farina/ oatmeal c coffee @ breakfast, left overs/ whatever is in the fridge for lunch and often skipped dinner.  Pt consumed <75% of lunch when seen, reports that she does not eat dinner.  Pt adm c pressure ulcers.

## 2017-12-01 NOTE — CONSULT NOTE ADULT - SUBJECTIVE AND OBJECTIVE BOX
3 HPI:  86 yo lady with acute MS changes along with temp to 104. With fever and chills. Patient noted to have poor urine OP and poorly smelling urine. (2017 12:49)    88 y/o white female born in [place], in the [place] x many yrs[place] with hx HTN, CAD, s/p MI, s/p Coronary Stent Placement, HLD, Ventricular Bigeminy, s/p Tonsillectomy, s/p Bilateral Cataracts, with hx recurrent UTI's, bilateral Hydronephrosis found on Renal Sono  and several episodes of ESBL EColi UTI ( last at [facility] 17)  along with hx Influenza B during her  admission at [facility], known to me from prior admissions, s/p recent placement of a Suprapubic Higuera Catheter at [facility] about 3 wks ago  as an outpatient and rx'ed for about 1 wk with a po Ab then, admitted via the ER on  with reported confusion and fever at home.  In the ER patient found to be febrile to 104.8 and w/u revealed elevated WBC's along with marked pyuria on the U/A.  Cx's of Blood and Urine were obtained and patient was rx'ed with Vanco x1 and Zosyn x1 empirically.  Patient was subsequently changed to Meropenem and Gent 300mg x 1 dose was given after evaluation by ICU PA, and was subsequently sent to Telemetry for continued monitoring.  Patient presently feels a little better, but claims she has had no appetite and is not drinking liquids well either since she was at home.  No c/o cp, no SOB, no c/o cough, no back pain, but c/o some discomfort from the Suprapubic catheter.  No c/o N/V or diarrhea.  Patient reportedly has had several admissions to [facility] since last [facility] admission reportedly including ab rx for UTI's.    Renal sonogram , this admission shows right hydronephrosis, CT abdomen and Pelvis requested to evaluate right hydronephrosis.        PAST MEDICAL & SURGICAL HISTORY:  Ventricular bigeminy: history of ablation  MI (myocardial infarction)  High cholesterol  HTN (hypertension)  S/P coronary artery stent placement  History of tonsillectomy  Cataract, bilateral  History of appendectomy      Allergies    Benadryl Allergy (Other)    Intolerances    metoprolol (Other)      SOCIAL HISTORY  FAMILY HISTORY:  No pertinent family history in first degree relatives      Home Medications:  acetaminophen 325 mg oral tablet: 2 tab(s) orally every 6 hours, As needed, Mild Pain (1 - 3) (2017 18:36)  Ambien 5 mg oral tablet: 1 tab(s) orally once a day (at bedtime) (2017 18:36)  amiodarone 200 mg oral tablet: 1 tab(s) orally once a day (2017 18:36)  bisoprolol: 2.5 milligram(s) orally once a day (2017 18:36)  clopidogrel 75 mg oral tablet: 1 tab(s) orally once a day (2017 18:36)  freetext medication  -: 1 tab(s) orally once a day (06 May 2017 17:24)  furosemide 20 mg oral tablet: 1 tab(s) orally once a day (2017 18:36)  meropenem: 1000 milligram(s) intravenous 2 times a day (06 May 2017 17:24)  pantoprazole 40 mg oral delayed release tablet: 1 tab(s) orally once a day (before a meal) (06 May 2017 17:24)  predniSONE 5 mg oral tablet: 1 tab(s) orally once a day (06 May 2017 17:24)  rosuvastatin 10 mg oral tablet: 1 tab(s) orally once a day (at bedtime) (2017 13:43)  Xanax 0.25 mg oral tablet: 1 tab(s) orally , As Needed (2017 18:36)      MEDICATIONS  (STANDING):  amiodarone    Tablet 200 milliGRAM(s) Oral daily  bisoprolol   Tablet 2.5 milliGRAM(s) Oral daily  clopidogrel Tablet 75 milliGRAM(s) Oral daily  dextrose 5% + sodium chloride 0.9%. 1000 milliLiter(s) (75 mL/Hr) IV Continuous <Continuous>  docusate sodium 100 milliGRAM(s) Oral two times a day  heparin  Injectable 5000 Unit(s) SubCutaneous every 12 hours  meropenem IVPB 1000 milliGRAM(s) IV Intermittent every 8 hours  pantoprazole    Tablet 40 milliGRAM(s) Oral before breakfast  Rosuvastatin 10 milliGRAM(s) 10 milliGRAM(s) Oral at bedtime    MEDICATIONS  (PRN):  acetaminophen   Tablet 650 milliGRAM(s) Oral every 6 hours PRN For Temp greater than 38 C (100.4 F)  ALBUTerol    90 MICROgram(s) HFA Inhaler 2 Puff(s) Inhalation every 6 hours PRN Bronchospasm  aluminum hydroxide/magnesium hydroxide/simethicone Suspension 30 milliLiter(s) Oral every 4 hours PRN Dyspepsia  senna 2 Tablet(s) Oral at bedtime PRN Constipation      ROS:    General:  No wt loss, fevers, chills, night sweats  ENT:  No sore throat, pain, runny nose,   CV:  No pain, palpitatioins,  Resp:  No dyspnea, cough, tachypnea, wheezing  GI:  No pain, nausea, vomiting, diarrhea, constipatiion  :  suprapubic cystostomy present.  Neuro: lower extremity weakness   Endocrine:  No polyuria, polydypsia, cold/heat intolerance  Skin:  No rash,  edema      Physical Exam:    Vital Signs:  Vital Signs Last 24 Hrs  T(C): 36.5 (01 Dec 2017 11:15), Max: 37 (2017 17:00)  T(F): 97.7 (01 Dec 2017 11:15), Max: 98.6 (2017 17:00)  HR: 65 (01 Dec 2017 11:15) (63 - 69)  BP: 128/89 (01 Dec 2017 11:15) (128/89 - 161/63)  BP(mean): --  RR: 17 (01 Dec 2017 11:15) (17 - 18)  SpO2: 96% (01 Dec 2017 11:15) (96% - 98%)  Daily     Daily Weight in k.1 (01 Dec 2017 09:42)  I&O's Summary    2017 07  -  01 Dec 2017 07:00  --------------------------------------------------------  IN: 420 mL / OUT: 1500 mL / NET: -1080 mL    01 Dec 2017 07:  -  01 Dec 2017 16:39  --------------------------------------------------------  IN: 250 mL / OUT: 0 mL / NET: 250 mL        General:  Appears stated age,  well-nourished, no distress  HEENT:  NC/AT, patent nares w/ pink mucosa, OP moist and pink,   conjunctivae clear  Chest:  Full & symmetric excursion, no increased effort.   Abdomen:  Soft, non-tender, non-distended, normoactive bowel sounds.  Pelvic: deferred cystostomy in place  Rectal Examination: Deferred.  Extremities:  no edema, pedal pusation are present, no calf tenderness.  Skin:  No rash/erythema  Neuro/Psych:  Alert and conscious. Grossly intact and symmetrical.      LABS:                        11.2   13.8  )-----------( 242      ( 01 Dec 2017 10:03 )             34.6     12-    142  |  111<H>  |  16  ----------------------------<  92  3.6   |  22  |  1.49<H>    Ca    8.7      01 Dec 2017 10:02              RADIOLOGY & ADDITIONAL STUDIES:    < from: US Renal (11.30.17 @ 11:58) >  EXAM:  US KIDNEY(S)                            PROCEDURE DATE:  2017          INTERPRETATION:  CLINICAL INFORMATION: Suprapubic catheter. There are   degenerative bladder.    COMPARISON: Prior ultrasound 2017    TECHNIQUE: Sonography of the kidneys and bladder.     FINDINGS:    Right kidney:  Measures 9.9 cm. cm. There is moderate hydronephrosis and   hydroureter    Left kidney:  Measures 9.7 cm. cm. No hydronephrosis is noted.    Urinary bladder: A a Higuera catheter is noted in situ.The bladder is   empty.    IMPRESSION:     Moderate hydronephrosis and hydroureter on the right. No hydronephrosis   on the left. Higuera catheter noted in a collapsed bladder.                    GINA LOUIS M.D., ATTENDING RADIOLOGIST  This document has been electronically signed. 2017 12:14PM

## 2017-12-01 NOTE — DIETITIAN INITIAL EVALUATION ADULT. - FACTORS AFF FOOD INTAKE
persistent lack of appetite/persistent constipation/depressed appetite due to ongoing infection depressed appetite due to ongoing infection, pt reports BM today c use of stool softener/persistent constipation/persistent lack of appetite

## 2017-12-01 NOTE — DIETITIAN INITIAL EVALUATION ADULT. - NS AS NUTRI INTERV MEALS SNACK
Fiber - modified diet/Recommend Low sodium & high fiber diet for c/o constipation/Mineral - modified diet

## 2017-12-01 NOTE — DIETITIAN INITIAL EVALUATION ADULT. - ENERGY NEEDS
Height (cm): 160.02 (11-25)  Weight (kg): 69 (11-26)  BMI (kg/m2): 26.9 (11-26)  IBW: 52.1 kg  %IBW: 132%           UBW:              %UBW  wt. gain of 7.6 kg(11%) since adm noted, w/o edema, ? wt. Height (cm): 160.02 (11-25)  Weight (kg): 69 (11-26)  BMI (kg/m2): 26.9 (11-26)  IBW: 52.1 kg  %IBW: 132%           UBW:  78 kg     %UBW: 88%, as per adm wt.   wt. gain of 7.6 kg(11%) since adm noted, w/o edema, ? wt. accuracy

## 2017-12-02 LAB
ANION GAP SERPL CALC-SCNC: 8 MMOL/L — SIGNIFICANT CHANGE UP (ref 5–17)
BASOPHILS # BLD AUTO: 0.1 K/UL — SIGNIFICANT CHANGE UP (ref 0–0.2)
BASOPHILS NFR BLD AUTO: 0.6 % — SIGNIFICANT CHANGE UP (ref 0–2)
BUN SERPL-MCNC: 14 MG/DL — SIGNIFICANT CHANGE UP (ref 7–23)
CALCIUM SERPL-MCNC: 8.7 MG/DL — SIGNIFICANT CHANGE UP (ref 8.5–10.1)
CHLORIDE SERPL-SCNC: 109 MMOL/L — HIGH (ref 96–108)
CO2 SERPL-SCNC: 26 MMOL/L — SIGNIFICANT CHANGE UP (ref 22–31)
CREAT SERPL-MCNC: 1.4 MG/DL — HIGH (ref 0.5–1.3)
EOSINOPHIL # BLD AUTO: 0.4 K/UL — SIGNIFICANT CHANGE UP (ref 0–0.5)
EOSINOPHIL NFR BLD AUTO: 4.4 % — SIGNIFICANT CHANGE UP (ref 0–6)
ERYTHROCYTE [SEDIMENTATION RATE] IN BLOOD: 51 MM/HR — HIGH (ref 0–20)
GLUCOSE SERPL-MCNC: 92 MG/DL — SIGNIFICANT CHANGE UP (ref 70–99)
HCT VFR BLD CALC: 34.4 % — LOW (ref 34.5–45)
HGB BLD-MCNC: 10.7 G/DL — LOW (ref 11.5–15.5)
LYMPHOCYTES # BLD AUTO: 1.6 K/UL — SIGNIFICANT CHANGE UP (ref 1–3.3)
LYMPHOCYTES # BLD AUTO: 17.2 % — SIGNIFICANT CHANGE UP (ref 13–44)
MCHC RBC-ENTMCNC: 25.9 PG — LOW (ref 27–34)
MCHC RBC-ENTMCNC: 31.2 GM/DL — LOW (ref 32–36)
MCV RBC AUTO: 83 FL — SIGNIFICANT CHANGE UP (ref 80–100)
MONOCYTES # BLD AUTO: 0.7 K/UL — SIGNIFICANT CHANGE UP (ref 0–0.9)
MONOCYTES NFR BLD AUTO: 7.1 % — SIGNIFICANT CHANGE UP (ref 2–14)
NEUTROPHILS # BLD AUTO: 6.5 K/UL — SIGNIFICANT CHANGE UP (ref 1.8–7.4)
NEUTROPHILS NFR BLD AUTO: 70.7 % — SIGNIFICANT CHANGE UP (ref 43–77)
PLATELET # BLD AUTO: 210 K/UL — SIGNIFICANT CHANGE UP (ref 150–400)
POTASSIUM SERPL-MCNC: 3.6 MMOL/L — SIGNIFICANT CHANGE UP (ref 3.5–5.3)
POTASSIUM SERPL-SCNC: 3.6 MMOL/L — SIGNIFICANT CHANGE UP (ref 3.5–5.3)
RBC # BLD: 4.14 M/UL — SIGNIFICANT CHANGE UP (ref 3.8–5.2)
RBC # FLD: 15.4 % — HIGH (ref 11–15)
SODIUM SERPL-SCNC: 143 MMOL/L — SIGNIFICANT CHANGE UP (ref 135–145)
WBC # BLD: 9.2 K/UL — SIGNIFICANT CHANGE UP (ref 3.8–10.5)
WBC # FLD AUTO: 9.2 K/UL — SIGNIFICANT CHANGE UP (ref 3.8–10.5)

## 2017-12-02 PROCEDURE — 74176 CT ABD & PELVIS W/O CONTRAST: CPT | Mod: 26

## 2017-12-02 RX ORDER — LACTOBACILLUS ACIDOPHILUS 100MM CELL
1 CAPSULE ORAL EVERY 12 HOURS
Qty: 0 | Refills: 0 | Status: DISCONTINUED | OUTPATIENT
Start: 2017-12-02 | End: 2017-12-05

## 2017-12-02 RX ADMIN — MEROPENEM 100 MILLIGRAM(S): 1 INJECTION INTRAVENOUS at 13:08

## 2017-12-02 RX ADMIN — CLOPIDOGREL BISULFATE 75 MILLIGRAM(S): 75 TABLET, FILM COATED ORAL at 12:41

## 2017-12-02 RX ADMIN — MEROPENEM 100 MILLIGRAM(S): 1 INJECTION INTRAVENOUS at 05:48

## 2017-12-02 RX ADMIN — AMIODARONE HYDROCHLORIDE 200 MILLIGRAM(S): 400 TABLET ORAL at 05:48

## 2017-12-02 RX ADMIN — SODIUM CHLORIDE 75 MILLILITER(S): 9 INJECTION, SOLUTION INTRAVENOUS at 05:55

## 2017-12-02 RX ADMIN — HEPARIN SODIUM 5000 UNIT(S): 5000 INJECTION INTRAVENOUS; SUBCUTANEOUS at 17:51

## 2017-12-02 RX ADMIN — PANTOPRAZOLE SODIUM 40 MILLIGRAM(S): 20 TABLET, DELAYED RELEASE ORAL at 05:50

## 2017-12-02 RX ADMIN — MEROPENEM 100 MILLIGRAM(S): 1 INJECTION INTRAVENOUS at 21:16

## 2017-12-02 RX ADMIN — HEPARIN SODIUM 5000 UNIT(S): 5000 INJECTION INTRAVENOUS; SUBCUTANEOUS at 05:48

## 2017-12-02 RX ADMIN — BISOPROLOL FUMARATE 2.5 MILLIGRAM(S): 10 TABLET, FILM COATED ORAL at 05:49

## 2017-12-02 RX ADMIN — SODIUM CHLORIDE 75 MILLILITER(S): 9 INJECTION, SOLUTION INTRAVENOUS at 21:16

## 2017-12-02 NOTE — PROGRESS NOTE ADULT - SUBJECTIVE AND OBJECTIVE BOX
Patient seen and examined bedside resting comfortably without complaint earlier today.     T(F): 98 (12-02-17 @ 17:41), Max: 98 (12-02-17 @ 05:55)  HR: 64 (12-02-17 @ 17:41) (60 - 64)  BP: 123/61 (12-02-17 @ 17:41) (123/61 - 142/89)  RR: 18 (12-02-17 @ 17:41) (17 - 18)  SpO2: 98% (12-02-17 @ 17:41) (98% - 99%)  Wt(kg): --  CAPILLARY BLOOD GLUCOSE    PHYSICAL EXAM:  General: NAD, alert and awake  HEENT: NCAT, EOMI, conjunctiva clear  Cardio: RRR +S1/S2  Chest: CTAB, nonlabored respirations, good inspiratory effort  Abdomen: soft, NTND. normoactive BS.  Extremities: no pedal edema or calf tenderness noted   : Suprapubic catheter in situ with no purulence noted. Draining dark yellow urine. No suprapubic tenderness.    LABS:                        10.7   9.2   )-----------( 210      ( 02 Dec 2017 10:40 )             34.4   12-02    143  |  109<H>  |  14  ----------------------------<  92  3.6   |  26  |  1.40<H>    Ca    8.7      02 Dec 2017 10:40      I&O's Detail    01 Dec 2017 07:01  -  02 Dec 2017 07:00  --------------------------------------------------------  IN:    dextrose 5% + sodium chloride 0.9%.: 900 mL    Oral Fluid: 250 mL    Solution: 100 mL  Total IN: 1250 mL    OUT:    Indwelling Catheter - Suprapubic: 950 mL  Total OUT: 950 mL    Total NET: 300 mL    EXAM:  CT ABDOMEN AND PELVIS                            PROCEDURE DATE:  12/02/2017          INTERPRETATION:  Clinical information: Right hydronephrosis. Rule out   stone.    COMPARISON: Ultrasound from November 30, 2017    PROCEDURE:   CT of the abdomen and pelvis was performed.  IV contrast:  None  Oral contrast:  None  Coronal and sagittal reformatted images were obtained    FINDINGS:    LOWER CHEST: Coronary artery calcifications. Trace bilateral pleural   effusions.    LIVER:  Diffuse highattenuation of the liver parenchyma compatible with   the patient's amiodarone therapy. Stable indeterminate low-density lesion   at the posterior right hepatic dome.    SPLEEN: Stable indeterminate lesion measuring 1.7 cm.  PANCREAS: Stable 1.4 cm cystic pancreatic tail lesion (2; 43)   GALLBLADDER: Within normal limits.  BILE DUCTS: Normal caliber.  ADRENALS: Within normal limits.  KIDNEYS/URETERS: Moderate right-sided hydroureteronephrosis with tapering   of the ureter distally, a few centimeters above the ureterovesical   junction. No obstructing stone or discrete mass visualized. No left   hydronephrosis.    RETROPERITONEUM: No lymphadenopathy.    VESSELS:  Within normal limits.    BOWEL: No bowel obstruction, wall thickening or inflammatory change.   Appendix not identified. Colonic diverticulosis without diverticulitis.  PERITONEUM: No ascites or pneumoperitoneum.    REPRODUCTIVE ORGANS: Calcification in the uterine body may represent a   fibroid. Unremarkable adnexa.  BLADDER: Suprapubic cystostomy tube with balloon tip in the bladder   lumen. Evaluation of the bladder is limited due to its collapsed state.    ABDOMINAL WALL: Within normal limits.  BONES: No acute bony abnormality. Stable mild L2 compression deformity.    IMPRESSION: Moderate right-sided hydroureteronephrosis. The site of   obstruction appears to be in the distal ureter. No stone or discrete mass   is identified. Differential diagnosis includes benign versus malignant   stricture. Further evaluation with CT urogram recommended if the   patient's renal function allows for contrast administration.      Impression: 87y F with PMH of HTN, high cholesterol, ventricular bigeminy, CAD/MI admitted for fever, AMS, and sepsis 2/2 ESBL ecoli recurrent UTI s/p SPC insertion last month at OSH for urinary retention now with Right hydroureteronephrosis due to distal ureteral stricture. FÉLIX improving.     Plan:  - Continue meropenem as per ID, monitor temps  - F/U AM Labs, monitor renal function  - as discussed with Dr Bautista, patient will require SPC exchange and plan for cystoscopy/ureteroscopy/stent placement. Discussed with patient in detail at bedside, she wishes to speak to her son before SPC exchange. Continue SPC. Will continue to follow. All questions addressed  - Continue medical management; patient on plavix, request medical clearance for OR planning.

## 2017-12-02 NOTE — PROGRESS NOTE ADULT - SUBJECTIVE AND OBJECTIVE BOX
TMAX - 98.6    On day # 7 Meropenem    Vital Signs Last 24 Hrs  T(C): 36.7 (02 Dec 2017 17:41), Max: 36.7 (02 Dec 2017 05:55)  T(F): 98 (02 Dec 2017 17:41), Max: 98 (02 Dec 2017 05:55)  HR: 64 (02 Dec 2017 17:41) (60 - 64)  BP: 123/61 (02 Dec 2017 17:41) (123/61 - 142/89)  BP(mean): --  RR: 18 (02 Dec 2017 17:41) (17 - 18)  SpO2: 98% (02 Dec 2017 17:41) (98% - 99%)  Supplemental O2:  on RA      Awake, alert, feels a little better today.  No c/o pain or SOB, but appetite still poor.  Still c/o leakage from the Suprapubic catheter and from the Midline again.   Seen by Urology and CT Abdomen and Pelvis was done this AM and confirms presence of moderate Rt Hydroureteronephrosis with tapering of the ureter at the distal aspect - ? stricture?      PHYSICAL EXAM  General:  awake, alert, sitting semi-upright in bed, pleasant and cooperative, in NAD  HEENT:  conj pink, sclerae anicteric, PERRLA, no oral lesions noted  Neck: supple, no nodes noted   Heart: RR   Lungs:  clear bilaterally  Abdomen:  soft, BS+, nontender to palpation                   Suprapubic Catheter in place with small amount of leakage at site, no purulence noted and with decreased erythema at site                   no CVA or Spinal tenderness noted  Extremities:  no edema LE's                     no calf tenderness noted                     RUE with Midline in place - site obscurred/ presently with the dressing wet - placed 11/29  Skin:  warm, dry, no rash noted  Suprapubic Catheter draining livia-colored urine now with some brownish sediment noted in the tubing       I&O's Summary :    01 Dec 2017 07:01  -  02 Dec 2017 07:00  --------------------------------------------------------  IN: 1250 mL / OUT: 950 mL / NET: 300 mL      LABS:  CBC Full  -  ( 02 Dec 2017 10:40 )  WBC Count : 9.2 K/uL  Hemoglobin : 10.7 g/dL  Hematocrit : 34.4 %  Platelet Count - Automated : 210 K/uL  Mean Cell Volume : 83.0 fl  Mean Cell Hemoglobin : 25.9 pg  Mean Cell Hemoglobin Concentration : 31.2 gm/dL  Auto Neutrophil # : 6.5 K/uL  Auto Lymphocyte # : 1.6 K/uL  Auto Monocyte # : 0.7 K/uL  Auto Eosinophil # : 0.4 K/uL  Auto Basophil # : 0.1 K/uL  Auto Neutrophil % : 70.7 %  Auto Lymphocyte % : 17.2 %  Auto Monocyte % : 7.1 %  Auto Eosinophil % : 4.4 %  Auto Basophil % : 0.6 %    12-02    143  |  109<H>  |  14  ----------------------------<  92  3.6   |  26  |  1.40<H>    Ca    8.7      02 Dec 2017 10:40      Sedimentation Rate, Erythrocyte: 51 mm/hr (12-02 @ 10:40)    Sedimentation Rate, Erythrocyte: 46 mm/hr (11-30 @ 07:07)    Sedimentation Rate, Erythrocyte: 57 mm/hr (11-28 @ 07:33)        MICROBIOLOGY:  Specimen Source: .Blood Blood-Peripheral (11-26 @ 00:47)  Culture Results:   No growth at 5 days. (11-26 @ 00:47)    Specimen Source: .Urine Clean Catch (Midstream) (11-26 @ 00:19)  Culture Results:   >100,000 CFU/ml Escherichia coli ESBL  50,000 - 99,000 CFU/mL Escherichia coli #2 (11-26 @ 00:19)    Specimen Source: .Blood Blood-Peripheral (11-25 @ 22:46)  Culture Results:   No growth at 5 days. (11-25 @ 22:46)        Radiology:   < from: CT Abdomen and Pelvis No Cont (12.02.17 @ 09:54) >  FINDINGS:    LOWER CHEST: Coronary artery calcifications. Trace bilateral pleural   effusions.    LIVER:  Diffuse highattenuation of the liver parenchyma compatible with   the patient's amiodarone therapy. Stable indeterminate low-density lesion   at the posterior right hepatic dome.    SPLEEN: Stable indeterminate lesion measuring 1.7 cm.  PANCREAS: Stable 1.4 cm cystic pancreatic tail lesion (2; 43)   GALLBLADDER: Within normal limits.  BILE DUCTS: Normal caliber.  ADRENALS: Within normal limits.  KIDNEYS/URETERS: Moderate right-sided hydroureteronephrosis with tapering   of the ureter distally, a few centimeters above the ureterovesical   junction. No obstructing stone or discrete mass visualized. No left   hydronephrosis.    RETROPERITONEUM: No lymphadenopathy.    VESSELS:  Within normal limits.    BOWEL: No bowel obstruction, wall thickening or inflammatory change.   Appendix not identified. Colonic diverticulosis without diverticulitis.  PERITONEUM: No ascites or pneumoperitoneum.    REPRODUCTIVE ORGANS: Calcification in the uterine body may represent a   fibroid. Unremarkable adnexa.  BLADDER: Suprapubic cystostomy tube with balloon tip in the bladder   lumen. Evaluation of the bladder is limited due to its collapsed state.    ABDOMINAL WALL: Within normal limits.  BONES: No acute bony abnormality. Stable mild L2 compression deformity.    IMPRESSION: Moderate right-sided hydroureteronephrosis. The site of   obstruction appears to be in the distal ureter. No stone or discrete mass   is identified. Differential diagnosis includes benign versus malignant   stricture. Further evaluation with CT urogram recommended if the   patient's renal function allows for contrast administration.          Impression:  Slowly improving on present ab rx with Meropenem for rx of Sepsis secondary to ESBL EColi and 2nd strain of EColi ( also fairly resistant) in the urine, and now found to have Rt Hydroureteronephrosis on Renal Sono which is now confirmed by CT Abdomen + Pelvis and  appears to be likely secondary to a  stricture at the distal aspect of the Rt Ureter.      Suggestions:  Will continue present ab rx and follow-up temps and labs closely.  Suprapubic Catheter to be changed today and patient to be scheduled for Cystoscopy and Rt Ureteral Stent placement by Urology.  Discussed in detail with patient at bedside and her son via phone while I was in patient's room.

## 2017-12-03 RX ADMIN — HEPARIN SODIUM 5000 UNIT(S): 5000 INJECTION INTRAVENOUS; SUBCUTANEOUS at 06:13

## 2017-12-03 RX ADMIN — Medication 1 TABLET(S): at 06:11

## 2017-12-03 RX ADMIN — MEROPENEM 100 MILLIGRAM(S): 1 INJECTION INTRAVENOUS at 13:59

## 2017-12-03 RX ADMIN — Medication 1 TABLET(S): at 18:35

## 2017-12-03 RX ADMIN — MEROPENEM 100 MILLIGRAM(S): 1 INJECTION INTRAVENOUS at 06:11

## 2017-12-03 RX ADMIN — MEROPENEM 100 MILLIGRAM(S): 1 INJECTION INTRAVENOUS at 22:24

## 2017-12-03 RX ADMIN — PANTOPRAZOLE SODIUM 40 MILLIGRAM(S): 20 TABLET, DELAYED RELEASE ORAL at 06:12

## 2017-12-03 RX ADMIN — AMIODARONE HYDROCHLORIDE 200 MILLIGRAM(S): 400 TABLET ORAL at 06:12

## 2017-12-03 RX ADMIN — BISOPROLOL FUMARATE 2.5 MILLIGRAM(S): 10 TABLET, FILM COATED ORAL at 06:13

## 2017-12-03 RX ADMIN — HEPARIN SODIUM 5000 UNIT(S): 5000 INJECTION INTRAVENOUS; SUBCUTANEOUS at 18:35

## 2017-12-03 RX ADMIN — CLOPIDOGREL BISULFATE 75 MILLIGRAM(S): 75 TABLET, FILM COATED ORAL at 11:13

## 2017-12-03 NOTE — PROGRESS NOTE ADULT - SUBJECTIVE AND OBJECTIVE BOX
Patient seen and examined at bedside in no distress.  Without complaints.    T(F): 98 (12-03-17 @ 13:31), Max: 98.8 (12-03-17 @ 00:05)  HR: 66 (12-03-17 @ 13:31) (64 - 67)  BP: 130/63 (12-03-17 @ 13:31) (123/61 - 151/60)  RR: 18 (12-03-17 @ 13:31) (18 - 18)  SpO2: 98% (12-03-17 @ 13:31) (97% - 98%)    PHYSICAL EXAM:  General: Alert, oriented, NAD  CV: +S1S2  Lung: Respirations nonlabored  Abdomen: Soft, NTND  Extremities: No pedal edema or calf tenderness noted   : SPC in place draining cloudy yellow urine, output: 1L/24hrs. No suprapubic tenderness     LABS:                        10.7   9.2   )-----------( 210      ( 02 Dec 2017 10:40 )             34.4     12-02    143  |  109<H>  |  14  ----------------------------<  92  3.6   |  26  |  1.40<H>    Ca    8.7      02 Dec 2017 10:40        Impression: 87 year old female with PMH of HTN, HLD, CAD/MI a/w sepsis due to ESBL ecoli recurrent UTI s/p SPC insertion last month at OSH for urinary retention, now with right hydroureteronephrosis due to distal ureteral stricture. FÉLIX improving.   Plan:  - continue antibiotics per ID  - continue SPC for now, monitor output  - planning SPC exchange and OR for cysto/uretero/stent when medically cleared  - monitor renal function  - will d/w Dr. Bautista

## 2017-12-04 LAB
ANION GAP SERPL CALC-SCNC: 7 MMOL/L — SIGNIFICANT CHANGE UP (ref 5–17)
APTT BLD: 25.4 SEC — LOW (ref 27.5–37.4)
BASOPHILS # BLD AUTO: 0.1 K/UL — SIGNIFICANT CHANGE UP (ref 0–0.2)
BASOPHILS NFR BLD AUTO: 0.7 % — SIGNIFICANT CHANGE UP (ref 0–2)
BLD GP AB SCN SERPL QL: SIGNIFICANT CHANGE UP
BUN SERPL-MCNC: 12 MG/DL — SIGNIFICANT CHANGE UP (ref 7–23)
CALCIUM SERPL-MCNC: 8.2 MG/DL — LOW (ref 8.5–10.1)
CHLORIDE SERPL-SCNC: 110 MMOL/L — HIGH (ref 96–108)
CO2 SERPL-SCNC: 25 MMOL/L — SIGNIFICANT CHANGE UP (ref 22–31)
CREAT SERPL-MCNC: 1.14 MG/DL — SIGNIFICANT CHANGE UP (ref 0.5–1.3)
EOSINOPHIL # BLD AUTO: 0.3 K/UL — SIGNIFICANT CHANGE UP (ref 0–0.5)
EOSINOPHIL NFR BLD AUTO: 3.3 % — SIGNIFICANT CHANGE UP (ref 0–6)
GLUCOSE SERPL-MCNC: 82 MG/DL — SIGNIFICANT CHANGE UP (ref 70–99)
HCT VFR BLD CALC: 34.4 % — LOW (ref 34.5–45)
HGB BLD-MCNC: 11.2 G/DL — LOW (ref 11.5–15.5)
INR BLD: 1.14 RATIO — SIGNIFICANT CHANGE UP (ref 0.88–1.16)
LYMPHOCYTES # BLD AUTO: 1.6 K/UL — SIGNIFICANT CHANGE UP (ref 1–3.3)
LYMPHOCYTES # BLD AUTO: 16.4 % — SIGNIFICANT CHANGE UP (ref 13–44)
MCHC RBC-ENTMCNC: 26.6 PG — LOW (ref 27–34)
MCHC RBC-ENTMCNC: 32.5 GM/DL — SIGNIFICANT CHANGE UP (ref 32–36)
MCV RBC AUTO: 82.1 FL — SIGNIFICANT CHANGE UP (ref 80–100)
MONOCYTES # BLD AUTO: 0.8 K/UL — SIGNIFICANT CHANGE UP (ref 0–0.9)
MONOCYTES NFR BLD AUTO: 8 % — SIGNIFICANT CHANGE UP (ref 2–14)
NEUTROPHILS # BLD AUTO: 7.1 K/UL — SIGNIFICANT CHANGE UP (ref 1.8–7.4)
NEUTROPHILS NFR BLD AUTO: 71.5 % — SIGNIFICANT CHANGE UP (ref 43–77)
PLATELET # BLD AUTO: 213 K/UL — SIGNIFICANT CHANGE UP (ref 150–400)
POTASSIUM SERPL-MCNC: 3.4 MMOL/L — LOW (ref 3.5–5.3)
POTASSIUM SERPL-SCNC: 3.4 MMOL/L — LOW (ref 3.5–5.3)
PROTHROM AB SERPL-ACNC: 12.5 SEC — SIGNIFICANT CHANGE UP (ref 9.8–12.7)
RBC # BLD: 4.19 M/UL — SIGNIFICANT CHANGE UP (ref 3.8–5.2)
RBC # FLD: 16.4 % — HIGH (ref 11–15)
SODIUM SERPL-SCNC: 142 MMOL/L — SIGNIFICANT CHANGE UP (ref 135–145)
WBC # BLD: 10 K/UL — SIGNIFICANT CHANGE UP (ref 3.8–10.5)
WBC # FLD AUTO: 10 K/UL — SIGNIFICANT CHANGE UP (ref 3.8–10.5)

## 2017-12-04 RX ADMIN — BISOPROLOL FUMARATE 2.5 MILLIGRAM(S): 10 TABLET, FILM COATED ORAL at 07:01

## 2017-12-04 RX ADMIN — Medication 100 MILLIGRAM(S): at 17:33

## 2017-12-04 RX ADMIN — MEROPENEM 100 MILLIGRAM(S): 1 INJECTION INTRAVENOUS at 07:00

## 2017-12-04 RX ADMIN — Medication 1 TABLET(S): at 17:32

## 2017-12-04 RX ADMIN — Medication 1 TABLET(S): at 07:00

## 2017-12-04 RX ADMIN — PANTOPRAZOLE SODIUM 40 MILLIGRAM(S): 20 TABLET, DELAYED RELEASE ORAL at 07:00

## 2017-12-04 RX ADMIN — CLOPIDOGREL BISULFATE 75 MILLIGRAM(S): 75 TABLET, FILM COATED ORAL at 13:21

## 2017-12-04 RX ADMIN — MEROPENEM 100 MILLIGRAM(S): 1 INJECTION INTRAVENOUS at 13:21

## 2017-12-04 RX ADMIN — AMIODARONE HYDROCHLORIDE 200 MILLIGRAM(S): 400 TABLET ORAL at 07:00

## 2017-12-04 RX ADMIN — HEPARIN SODIUM 5000 UNIT(S): 5000 INJECTION INTRAVENOUS; SUBCUTANEOUS at 07:08

## 2017-12-04 RX ADMIN — HEPARIN SODIUM 5000 UNIT(S): 5000 INJECTION INTRAVENOUS; SUBCUTANEOUS at 17:33

## 2017-12-04 RX ADMIN — MEROPENEM 100 MILLIGRAM(S): 1 INJECTION INTRAVENOUS at 21:05

## 2017-12-04 NOTE — PROGRESS NOTE ADULT - SUBJECTIVE AND OBJECTIVE BOX
TMAX - 98.8    On day # 9 Meropenem    Vital Signs Last 24 Hrs  T(C): 36.6 (04 Dec 2017 11:10), Max: 37 (04 Dec 2017 05:09)  T(F): 97.8 (04 Dec 2017 11:10), Max: 98.6 (04 Dec 2017 05:09)  HR: 61 (04 Dec 2017 11:10) (59 - 65)  BP: 115/61 (04 Dec 2017 11:10) (115/61 - 142/61)  BP(mean): --  RR: 16 (04 Dec 2017 11:10) (16 - 17)  SpO2: 100% (04 Dec 2017 11:10) (98% - 100%)  Supplemental O2:  on RA     Awake, alert, no c/o cp, SOB, or abdominal pain now.  Rt. arm Midline removed secondary to continued leaking/ malfunction.  Peripheral IV in place at present.  Appetite slightly improved.      PHYSICAL EXAM  General:  awake, alert, sitting up in bed now, pleasant and cooperative, in NAD  HEENT:  conj pink, sclerae anicteric, PERRLA, no oral lesions noted  Neck:  supple, no nodes noted  Heart:  RR  Lungs:  clear bilaterally  Abdomen:  soft, BS +, nontender to palpation                        no CVA or spinal tenderness noted                   Suprapubic catheter in place with dressing intact and secured at site, appears dry at present - nontender  Extremities:  no edema LE's  Skin:  warm, dry, no rash noted  Suprapubic Catheter draining yellow urine now      I&O's Summary :    03 Dec 2017 07:01  -  04 Dec 2017 07:00  --------------------------------------------------------  IN: 1780 mL / OUT: 2500 mL / NET: -720 mL    04 Dec 2017 07:01  -  04 Dec 2017 16:51  --------------------------------------------------------  IN: 240 mL / OUT: 800 mL / NET: -560 mL      LABS:  CBC Full  -  ( 04 Dec 2017 09:39 )  WBC Count : 10.0 K/uL  Hemoglobin : 11.2 g/dL  Hematocrit : 34.4 %  Platelet Count - Automated : 213 K/uL  Mean Cell Volume : 82.1 fl  Mean Cell Hemoglobin : 26.6 pg  Mean Cell Hemoglobin Concentration : 32.5 gm/dL  Auto Neutrophil # : 7.1 K/uL  Auto Lymphocyte # : 1.6 K/uL  Auto Monocyte # : 0.8 K/uL  Auto Eosinophil # : 0.3 K/uL  Auto Basophil # : 0.1 K/uL  Auto Neutrophil % : 71.5 %  Auto Lymphocyte % : 16.4 %  Auto Monocyte % : 8.0 %  Auto Eosinophil % : 3.3 %  Auto Basophil % : 0.7 %    12-04    142  |  110<H>  |  12  ----------------------------<  82  3.4<L>   |  25  |  1.14    Ca    8.2<L>      04 Dec 2017 09:39      PT/INR - ( 04 Dec 2017 08:27 )   PT: 12.5 sec;   INR: 1.14 ratio       PTT - ( 04 Dec 2017 08:27 )  PTT:25.4 sec      Sedimentation Rate, Erythrocyte: 51 mm/hr (12-02 @ 10:40)    Sedimentation Rate, Erythrocyte: 46 mm/hr (11-30 @ 07:07)        Impression:  Clinically improving now on Meropenem for rx of ESBL EColi Sepsis secondary to  source  ( recurrent ) with Rt [place] seen on Renal Sono and CT Abdomen+ Pelvis, in this patient with Neurogenic Bladder requiring Suprapubic [place] Catheter placement.      Suggestions:  Will continue present ab rx and follow-up temps and labs.  For Cystoscopy and Rt Ureteral Stent placement tomorrow along with changing of the Suprapubic catheter.  Await replacement of the Midline as well.  Discussed with patient in detail at bedside.

## 2017-12-04 NOTE — PROGRESS NOTE ADULT - SUBJECTIVE AND OBJECTIVE BOX
Patient seen and examined bedside resting comfortably.  Reports she has had wet sheets but is not sure if it is from the leaking midline or suprapubic catheter. She also cannot tell if she has been voiding. She has spoken with her son and is eager to have her urology procedure.    T(F): 97.8 (12-04-17 @ 11:10), Max: 98.6 (12-04-17 @ 05:09)  HR: 61 (12-04-17 @ 11:10) (59 - 66)  BP: 115/61 (12-04-17 @ 11:10) (115/61 - 142/61)  RR: 16 (12-04-17 @ 11:10) (16 - 18)  SpO2: 100% (12-04-17 @ 11:10) (98% - 100%)    PHYSICAL EXAM:  General: NAD, A&Ox3  HEENT: NCAT, EOMI, conjunctiva clear  Chest: nonlabored respirations, good inspiratory effort  Abdomen: soft, NTND.   Extrepmities: no pedal edema or calf tenderness noted   : SPC indwelling draining cloudy urine, output 2500cc/24hrs. Dry dressing applied.    LABS:                        11.2   10.0  )-----------( 213      ( 04 Dec 2017 09:39 )             34.4   12-04    142  |  110<H>  |  12  ----------------------------<  82  3.4<L>   |  25  |  1.14    Ca    8.2<L>      04 Dec 2017 09:39    PT/INR - ( 04 Dec 2017 08:27 )   PT: 12.5 sec;   INR: 1.14 ratio         PTT - ( 04 Dec 2017 08:27 )  PTT:25.4 sec  I&O's Detail    03 Dec 2017 07:01  -  04 Dec 2017 07:00  --------------------------------------------------------  IN:    dextrose 5% + sodium chloride 0.9%.: 900 mL    Oral Fluid: 780 mL    Solution: 100 mL  Total IN: 1780 mL    OUT:    Indwelling Catheter - Suprapubic: 2500 mL  Total OUT: 2500 mL    Total NET: -720 mL      Impression: 87y F with PMH of HTN, high cholesterol, ventricular bigeminy, CAD/MI admitted for fever, AMS, and sepsis 2/2 ESBL ecoli recurrent UTI s/p SPC insertion last month at OSH for urinary retention now with Right hydroureteronephrosis due to distal ureteral stricture. FÉLIX improving.     Plan:  - Continue meropenem per ID  - patient booked for cystoscopy/ureteroscopy/retrograde pyelogram/stent placement tomorrow.   - midline RUE removed at bedside (not working), length noted to be 16cm. DSD applied. Pt has PIV left arm. Pt informed that she will require a new midline. She refuses to have midline insertion today. Will Follow up.   - continue suprapubic catheter drainage, monitor for leaking  - Continue medical management

## 2017-12-05 ENCOUNTER — TRANSCRIPTION ENCOUNTER (OUTPATIENT)
Age: 82
End: 2017-12-05

## 2017-12-05 LAB
ANION GAP SERPL CALC-SCNC: 8 MMOL/L — SIGNIFICANT CHANGE UP (ref 5–17)
BUN SERPL-MCNC: 12 MG/DL — SIGNIFICANT CHANGE UP (ref 7–23)
CALCIUM SERPL-MCNC: 8.5 MG/DL — SIGNIFICANT CHANGE UP (ref 8.5–10.1)
CHLORIDE SERPL-SCNC: 109 MMOL/L — HIGH (ref 96–108)
CO2 SERPL-SCNC: 25 MMOL/L — SIGNIFICANT CHANGE UP (ref 22–31)
CREAT SERPL-MCNC: 1.17 MG/DL — SIGNIFICANT CHANGE UP (ref 0.5–1.3)
GLUCOSE SERPL-MCNC: 84 MG/DL — SIGNIFICANT CHANGE UP (ref 70–99)
HCT VFR BLD CALC: 35.1 % — SIGNIFICANT CHANGE UP (ref 34.5–45)
HGB BLD-MCNC: 11.2 G/DL — LOW (ref 11.5–15.5)
MCHC RBC-ENTMCNC: 26 PG — LOW (ref 27–34)
MCHC RBC-ENTMCNC: 31.8 GM/DL — LOW (ref 32–36)
MCV RBC AUTO: 81.6 FL — SIGNIFICANT CHANGE UP (ref 80–100)
PLATELET # BLD AUTO: 249 K/UL — SIGNIFICANT CHANGE UP (ref 150–400)
POTASSIUM SERPL-MCNC: 3.4 MMOL/L — LOW (ref 3.5–5.3)
POTASSIUM SERPL-SCNC: 3.4 MMOL/L — LOW (ref 3.5–5.3)
RBC # BLD: 4.3 M/UL — SIGNIFICANT CHANGE UP (ref 3.8–5.2)
RBC # FLD: 16.3 % — HIGH (ref 11–15)
SODIUM SERPL-SCNC: 142 MMOL/L — SIGNIFICANT CHANGE UP (ref 135–145)
WBC # BLD: 13.8 K/UL — HIGH (ref 3.8–10.5)
WBC # FLD AUTO: 13.8 K/UL — HIGH (ref 3.8–10.5)

## 2017-12-05 RX ORDER — PANTOPRAZOLE SODIUM 20 MG/1
40 TABLET, DELAYED RELEASE ORAL
Qty: 0 | Refills: 0 | Status: DISCONTINUED | OUTPATIENT
Start: 2017-12-05 | End: 2017-12-22

## 2017-12-05 RX ORDER — FENTANYL CITRATE 50 UG/ML
50 INJECTION INTRAVENOUS
Qty: 0 | Refills: 0 | Status: DISCONTINUED | OUTPATIENT
Start: 2017-12-05 | End: 2017-12-05

## 2017-12-05 RX ORDER — HEPARIN SODIUM 5000 [USP'U]/ML
5000 INJECTION INTRAVENOUS; SUBCUTANEOUS EVERY 12 HOURS
Qty: 0 | Refills: 0 | Status: DISCONTINUED | OUTPATIENT
Start: 2017-12-05 | End: 2017-12-22

## 2017-12-05 RX ORDER — ACETAMINOPHEN 500 MG
1000 TABLET ORAL ONCE
Qty: 0 | Refills: 0 | Status: COMPLETED | OUTPATIENT
Start: 2017-12-05 | End: 2017-12-05

## 2017-12-05 RX ORDER — HYDROCORTISONE 1 %
1 OINTMENT (GRAM) TOPICAL ONCE
Qty: 0 | Refills: 0 | Status: COMPLETED | OUTPATIENT
Start: 2017-12-05 | End: 2017-12-05

## 2017-12-05 RX ORDER — SODIUM CHLORIDE 9 MG/ML
1000 INJECTION, SOLUTION INTRAVENOUS
Qty: 0 | Refills: 0 | Status: DISCONTINUED | OUTPATIENT
Start: 2017-12-05 | End: 2017-12-05

## 2017-12-05 RX ORDER — BISOPROLOL FUMARATE 10 MG/1
2.5 TABLET, FILM COATED ORAL DAILY
Qty: 0 | Refills: 0 | Status: DISCONTINUED | OUTPATIENT
Start: 2017-12-05 | End: 2017-12-22

## 2017-12-05 RX ORDER — FENTANYL CITRATE 50 UG/ML
25 INJECTION INTRAVENOUS
Qty: 0 | Refills: 0 | Status: DISCONTINUED | OUTPATIENT
Start: 2017-12-05 | End: 2017-12-05

## 2017-12-05 RX ORDER — AMIODARONE HYDROCHLORIDE 400 MG/1
200 TABLET ORAL DAILY
Qty: 0 | Refills: 0 | Status: DISCONTINUED | OUTPATIENT
Start: 2017-12-05 | End: 2017-12-22

## 2017-12-05 RX ORDER — DOCUSATE SODIUM 100 MG
100 CAPSULE ORAL
Qty: 0 | Refills: 0 | Status: DISCONTINUED | OUTPATIENT
Start: 2017-12-05 | End: 2017-12-22

## 2017-12-05 RX ORDER — ALBUTEROL 90 UG/1
2 AEROSOL, METERED ORAL EVERY 6 HOURS
Qty: 0 | Refills: 0 | Status: DISCONTINUED | OUTPATIENT
Start: 2017-12-05 | End: 2017-12-22

## 2017-12-05 RX ORDER — LACTOBACILLUS ACIDOPHILUS 100MM CELL
1 CAPSULE ORAL EVERY 12 HOURS
Qty: 0 | Refills: 0 | Status: DISCONTINUED | OUTPATIENT
Start: 2017-12-05 | End: 2017-12-22

## 2017-12-05 RX ORDER — ACETAMINOPHEN 500 MG
650 TABLET ORAL EVERY 6 HOURS
Qty: 0 | Refills: 0 | Status: DISCONTINUED | OUTPATIENT
Start: 2017-12-05 | End: 2017-12-22

## 2017-12-05 RX ORDER — SENNA PLUS 8.6 MG/1
2 TABLET ORAL AT BEDTIME
Qty: 0 | Refills: 0 | Status: DISCONTINUED | OUTPATIENT
Start: 2017-12-05 | End: 2017-12-22

## 2017-12-05 RX ORDER — FAMOTIDINE 10 MG/ML
20 INJECTION INTRAVENOUS ONCE
Qty: 0 | Refills: 0 | Status: COMPLETED | OUTPATIENT
Start: 2017-12-05 | End: 2017-12-05

## 2017-12-05 RX ORDER — MEROPENEM 1 G/30ML
1000 INJECTION INTRAVENOUS EVERY 8 HOURS
Qty: 0 | Refills: 0 | Status: DISCONTINUED | OUTPATIENT
Start: 2017-12-05 | End: 2017-12-08

## 2017-12-05 RX ORDER — CLOPIDOGREL BISULFATE 75 MG/1
75 TABLET, FILM COATED ORAL DAILY
Qty: 0 | Refills: 0 | Status: DISCONTINUED | OUTPATIENT
Start: 2017-12-05 | End: 2017-12-10

## 2017-12-05 RX ADMIN — PANTOPRAZOLE SODIUM 40 MILLIGRAM(S): 20 TABLET, DELAYED RELEASE ORAL at 06:50

## 2017-12-05 RX ADMIN — SODIUM CHLORIDE 75 MILLILITER(S): 9 INJECTION, SOLUTION INTRAVENOUS at 15:08

## 2017-12-05 RX ADMIN — PANTOPRAZOLE SODIUM 40 MILLIGRAM(S): 20 TABLET, DELAYED RELEASE ORAL at 18:46

## 2017-12-05 RX ADMIN — Medication 400 MILLIGRAM(S): at 15:07

## 2017-12-05 RX ADMIN — Medication 1 TABLET(S): at 06:50

## 2017-12-05 RX ADMIN — SODIUM CHLORIDE 75 MILLILITER(S): 9 INJECTION, SOLUTION INTRAVENOUS at 06:51

## 2017-12-05 RX ADMIN — Medication 1 TABLET(S): at 18:46

## 2017-12-05 RX ADMIN — MEROPENEM 100 MILLIGRAM(S): 1 INJECTION INTRAVENOUS at 06:51

## 2017-12-05 RX ADMIN — Medication 1 APPLICATION(S): at 06:50

## 2017-12-05 RX ADMIN — AMIODARONE HYDROCHLORIDE 200 MILLIGRAM(S): 400 TABLET ORAL at 06:50

## 2017-12-05 RX ADMIN — BISOPROLOL FUMARATE 2.5 MILLIGRAM(S): 10 TABLET, FILM COATED ORAL at 06:50

## 2017-12-05 RX ADMIN — Medication 100 MILLIGRAM(S): at 18:46

## 2017-12-05 RX ADMIN — CLOPIDOGREL BISULFATE 75 MILLIGRAM(S): 75 TABLET, FILM COATED ORAL at 18:46

## 2017-12-05 RX ADMIN — MEROPENEM 100 MILLIGRAM(S): 1 INJECTION INTRAVENOUS at 22:41

## 2017-12-05 NOTE — BRIEF OPERATIVE NOTE - PROCEDURE
<<-----Click on this checkbox to enter Procedure Ureteroscopy  12/05/2017  attempted right ureteroscopy  Active  [facility]

## 2017-12-05 NOTE — PROGRESS NOTE ADULT - SUBJECTIVE AND OBJECTIVE BOX
TMAX - 99.1    On day # 10 Meropenem now    Vital Signs Last 24 Hrs  T(C): 36.6 (05 Dec 2017 16:59), Max: 37.3 (04 Dec 2017 18:13)  T(F): 97.8 (05 Dec 2017 16:59), Max: 99.1 (04 Dec 2017 18:13)  HR: 62 (05 Dec 2017 16:59) (62 - 80)  BP: 117/69 (05 Dec 2017 16:59) (117/69 - 167/74)  BP(mean): --  RR: 17 (05 Dec 2017 16:59) (12 - 17)  SpO2: 99% (05 Dec 2017 16:59) (98% - 100%)  Supplemental O2:  on RA     Awake, alert, returned from the OR today.  No c/o pain or SOB, c/o some mild fatigue only.  Still has peripheral IV in place - presently in the Rt arm.        PHYSICAL EXAM  General:  awake, alert, pleasant and cooperative, lying in bed in semi-upright position, in NAD  HEENT:  conj pink, sclerae anicteric, PERRLA, no oral lesions noted  Neck: supple, no nodes noted  Heart:  RR  Lungs:  clear bilaterally  Abdomen:  soft, BS +, nontender to palpation                   Suprapubic Catheter in place - site clean now, no drainage noted - draining slightly cloudy pale yellow-green urine  Extremities:  no edema LE's  Skin:  warm, dry, now noted to have pale pink macular rash with a few areas coalesced on the abdomen and pelvis and faintly seen on the anterior chest wall now - nonpruritic at           present       I&O's Summary :    04 Dec 2017 07:01  -  05 Dec 2017 07:00  --------------------------------------------------------  IN: 1660 mL / OUT: 1250 mL / NET: 410 mL    05 Dec 2017 07:01  -  05 Dec 2017 18:09  --------------------------------------------------------  IN: 250 mL / OUT: 0 mL / NET: 250 mL      LABS:  CBC Full  -  ( 05 Dec 2017 07:54 )  WBC Count : 13.8 K/uL  Hemoglobin : 11.2 g/dL  Hematocrit : 35.1 %  Platelet Count - Automated : 249 K/uL  Mean Cell Volume : 81.6 fl  Mean Cell Hemoglobin : 26.0 pg  Mean Cell Hemoglobin Concentration : 31.8 gm/dL  Auto Neutrophil # : x  Auto Lymphocyte # : x  Auto Monocyte # : x  Auto Eosinophil # : x  Auto Basophil # : x  Auto Neutrophil % : x  Auto Lymphocyte % : x  Auto Monocyte % : x  Auto Eosinophil % : x  Auto Basophil % : x    12-05    142  |  109<H>  |  12  ----------------------------<  84  3.4<L>   |  25  |  1.17    Ca    8.5      05 Dec 2017 07:54      PT/INR - ( 04 Dec 2017 08:27 )   PT: 12.5 sec;   INR: 1.14 ratio         PTT - ( 04 Dec 2017 08:27 )  PTT:25.4 sec      Sedimentation Rate, Erythrocyte: 51 mm/hr (12-02 @ 10:40)        Impression:  Clinically improving on present ab rx with Meropenem for  Sepsis secondary to ESBL EColi UTI with Rt [place] found on Sono and with Suprapubic Catheter in place for Neurogenic Bladder.  S/p attempted Rt Ureteroscopy today in the OR.  Now with new rash noted on the chest, abdomen, and pelvis - ? drug rxn vs. contact dermatitis?      Suggestions:  Will continue present ab rx and await replacement of the Midline for continued IV ab rx.  Await Urology follow-up re: further intervention.  Discussed with patient at bedside.

## 2017-12-06 LAB
ANION GAP SERPL CALC-SCNC: 9 MMOL/L — SIGNIFICANT CHANGE UP (ref 5–17)
BUN SERPL-MCNC: 18 MG/DL — SIGNIFICANT CHANGE UP (ref 7–23)
CALCIUM SERPL-MCNC: 9 MG/DL — SIGNIFICANT CHANGE UP (ref 8.5–10.1)
CHLORIDE SERPL-SCNC: 106 MMOL/L — SIGNIFICANT CHANGE UP (ref 96–108)
CO2 SERPL-SCNC: 25 MMOL/L — SIGNIFICANT CHANGE UP (ref 22–31)
CREAT SERPL-MCNC: 1.35 MG/DL — HIGH (ref 0.5–1.3)
GLUCOSE SERPL-MCNC: 116 MG/DL — HIGH (ref 70–99)
HCT VFR BLD CALC: 32.6 % — LOW (ref 34.5–45)
HGB BLD-MCNC: 10.3 G/DL — LOW (ref 11.5–15.5)
MAGNESIUM SERPL-MCNC: 2 MG/DL — SIGNIFICANT CHANGE UP (ref 1.6–2.6)
MCHC RBC-ENTMCNC: 26.4 PG — LOW (ref 27–34)
MCHC RBC-ENTMCNC: 31.6 GM/DL — LOW (ref 32–36)
MCV RBC AUTO: 83.6 FL — SIGNIFICANT CHANGE UP (ref 80–100)
PHOSPHATE SERPL-MCNC: 2.1 MG/DL — LOW (ref 2.5–4.5)
PLATELET # BLD AUTO: 252 K/UL — SIGNIFICANT CHANGE UP (ref 150–400)
POTASSIUM SERPL-MCNC: 3.7 MMOL/L — SIGNIFICANT CHANGE UP (ref 3.5–5.3)
POTASSIUM SERPL-SCNC: 3.7 MMOL/L — SIGNIFICANT CHANGE UP (ref 3.5–5.3)
RBC # BLD: 3.9 M/UL — SIGNIFICANT CHANGE UP (ref 3.8–5.2)
RBC # FLD: 16.6 % — HIGH (ref 11–15)
SODIUM SERPL-SCNC: 140 MMOL/L — SIGNIFICANT CHANGE UP (ref 135–145)
WBC # BLD: 14.3 K/UL — HIGH (ref 3.8–10.5)
WBC # FLD AUTO: 14.3 K/UL — HIGH (ref 3.8–10.5)

## 2017-12-06 PROCEDURE — 36571 INSERT PICVAD CATH: CPT | Mod: AS

## 2017-12-06 PROCEDURE — 76937 US GUIDE VASCULAR ACCESS: CPT | Mod: 26,AS

## 2017-12-06 RX ORDER — POTASSIUM CHLORIDE 20 MEQ
20 PACKET (EA) ORAL
Qty: 0 | Refills: 0 | Status: COMPLETED | OUTPATIENT
Start: 2017-12-06 | End: 2017-12-06

## 2017-12-06 RX ORDER — CHLORHEXIDINE GLUCONATE 213 G/1000ML
1 SOLUTION TOPICAL DAILY
Qty: 0 | Refills: 0 | Status: DISCONTINUED | OUTPATIENT
Start: 2017-12-06 | End: 2017-12-22

## 2017-12-06 RX ORDER — SODIUM,POTASSIUM PHOSPHATES 278-250MG
1 POWDER IN PACKET (EA) ORAL
Qty: 0 | Refills: 0 | Status: COMPLETED | OUTPATIENT
Start: 2017-12-06 | End: 2017-12-08

## 2017-12-06 RX ADMIN — Medication 1 TABLET(S): at 06:12

## 2017-12-06 RX ADMIN — Medication 1 TABLET(S): at 18:22

## 2017-12-06 RX ADMIN — Medication 100 MILLIGRAM(S): at 06:12

## 2017-12-06 RX ADMIN — CLOPIDOGREL BISULFATE 75 MILLIGRAM(S): 75 TABLET, FILM COATED ORAL at 12:30

## 2017-12-06 RX ADMIN — MEROPENEM 100 MILLIGRAM(S): 1 INJECTION INTRAVENOUS at 13:35

## 2017-12-06 RX ADMIN — MEROPENEM 100 MILLIGRAM(S): 1 INJECTION INTRAVENOUS at 22:25

## 2017-12-06 RX ADMIN — AMIODARONE HYDROCHLORIDE 200 MILLIGRAM(S): 400 TABLET ORAL at 06:12

## 2017-12-06 RX ADMIN — Medication 20 MILLIEQUIVALENT(S): at 10:14

## 2017-12-06 RX ADMIN — MEROPENEM 100 MILLIGRAM(S): 1 INJECTION INTRAVENOUS at 06:54

## 2017-12-06 RX ADMIN — Medication 20 MILLIEQUIVALENT(S): at 08:46

## 2017-12-06 RX ADMIN — BISOPROLOL FUMARATE 2.5 MILLIGRAM(S): 10 TABLET, FILM COATED ORAL at 06:55

## 2017-12-06 RX ADMIN — HEPARIN SODIUM 5000 UNIT(S): 5000 INJECTION INTRAVENOUS; SUBCUTANEOUS at 06:12

## 2017-12-06 RX ADMIN — HEPARIN SODIUM 5000 UNIT(S): 5000 INJECTION INTRAVENOUS; SUBCUTANEOUS at 18:22

## 2017-12-06 RX ADMIN — PANTOPRAZOLE SODIUM 40 MILLIGRAM(S): 20 TABLET, DELAYED RELEASE ORAL at 06:12

## 2017-12-06 NOTE — PROCEDURE NOTE - ADDITIONAL PROCEDURE DETAILS
Patient seen at bedside for midline catheter placement.  Consent obtained from patients son after risks/benefits/alternatives explained via telephone.   Upper extremity prepped and draped in usual sterile fashion. Time out performed with RN. 4Fr 16cm single lumen midline catheter placed using ultrasound guided Seldinger technique, R basilic vein. Flushes well, with good venous return. Patient tolerated procedure well without complication and was left in no acute distress. Upper arm circumference noted to be 28cm
Patient seen at bedside for midline catheter placement.  Consent obtained from patient after risks/benefits/alternatives explained.   Upper extremity prepped and draped in usual sterile fashion. Time out performed with RN. 4Fr 14 cm single lumen midline catheter placed using ultrasound guided seldinger technique, L basilic vein. Flushes well, with good venous return. Patient tolerated procedure well without complication and was left in no acute distress. Upper arm circumference noted to be 28cm.

## 2017-12-06 NOTE — PROCEDURE NOTE - PROCEDURE
<<-----Click on this checkbox to enter Procedure Midline catheter insertion  12/06/2017    Active  [facility]

## 2017-12-06 NOTE — PROGRESS NOTE ADULT - SUBJECTIVE AND OBJECTIVE BOX
TMAX - 98. 9    On day # 11 Meropenem    Vital Signs Last 24 Hrs  T(C): 36.4 (06 Dec 2017 11:41), Max: 37.1 (05 Dec 2017 17:59)  T(F): 97.6 (06 Dec 2017 11:41), Max: 98.8 (05 Dec 2017 17:59)  HR: 76 (06 Dec 2017 11:41) (61 - 77)  BP: 121/63 (06 Dec 2017 11:41) (121/56 - 131/79)  BP(mean): --  RR: 16 (06 Dec 2017 11:41) (16 - 18)  SpO2: 98% (06 Dec 2017 11:41) (96% - 98%)  Supplemental O2:  on RA     Feeling better today.  No c/o abdominal pain, no cp, and no SOB.  Claims that she has not noted any urine leaking from the Suprapubic tube today so far.  Appetite is far.      PHYSICAL EXAM  General:  awake, alert, pleasant and cooperative, sitting up in bed, in NAD  HEENT:  conj pink, sclerae anicteric, PERRLA, no oral lesions noted  Neck:  supple, no nodes noted  Heart:  RR  Lungs: clear bilaterally    Abdomen:  soft, BS +, nontender to palpation                   Suprapubic catheter in place and draining some cloudy bluish-colored urine now - site noted to have some purulent drainage now but no surrounding erythema noted                    No CVA or Spinal tenderness elicited  Extremities:  no edema LE's                     peripheral IV in Rt arm  Skin:  warm dry, erythematous macular rash noted on abdomen, chest,and faintly on the back - non-pruritic at present      I&O's Summary :    05 Dec 2017 07:01  -  06 Dec 2017 07:00  --------------------------------------------------------  IN: 450 mL / OUT: 400 mL / NET: 50 mL    06 Dec 2017 07:01  -  06 Dec 2017 17:19  --------------------------------------------------------  IN: 370 mL / OUT: 0 mL / NET: 370 mL      LABS:  CBC Full  -  ( 06 Dec 2017 10:43 )  WBC Count : 14.3 K/uL  Hemoglobin : 10.3 g/dL  Hematocrit : 32.6 %  Platelet Count - Automated : 252 K/uL  Mean Cell Volume : 83.6 fl  Mean Cell Hemoglobin : 26.4 pg  Mean Cell Hemoglobin Concentration : 31.6 gm/dL  Auto Neutrophil # : x  Auto Lymphocyte # : x  Auto Monocyte # : x  Auto Eosinophil # : x  Auto Basophil # : x  Auto Neutrophil % : x  Auto Lymphocyte % : x  Auto Monocyte % : x  Auto Eosinophil % : x  Auto Basophil % : x    12-06    140  |  106  |  18  ----------------------------<  116<H>  3.7   |  25  |  1.35<H>    Ca    9.0      06 Dec 2017 10:43  Phos  2.1     12-06  Mg     2.0     12-06      Sedimentation Rate, Erythrocyte: 51 mm/hr (12-02 @ 10:40)        Impression:  Clinically improved on present ab rx with Meropenem but noted increased WBC's again and mild rash on abdomen, chest, and back noted.        Suggestions: TMAX - 98. 9    On day # 11 Meropenem    Vital Signs Last 24 Hrs  T(C): 36.4 (06 Dec 2017 11:41), Max: 37.1 (05 Dec 2017 17:59)  T(F): 97.6 (06 Dec 2017 11:41), Max: 98.8 (05 Dec 2017 17:59)  HR: 76 (06 Dec 2017 11:41) (61 - 77)  BP: 121/63 (06 Dec 2017 11:41) (121/56 - 131/79)  BP(mean): --  RR: 16 (06 Dec 2017 11:41) (16 - 18)  SpO2: 98% (06 Dec 2017 11:41) (96% - 98%)  Supplemental O2:  on RA     Feeling better today.  No c/o abdominal pain, no cp, and no SOB.  Claims that she has not noted any urine leaking from the Suprapubic tube today so far.  Appetite is far.      PHYSICAL EXAM  General:  awake, alert, pleasant and cooperative, sitting up in bed, in NAD  HEENT:  conj pink, sclerae anicteric, PERRLA, no oral lesions noted  Neck:  supple, no nodes noted  Heart:  RR  Lungs: clear bilaterally    Abdomen:  soft, BS +, nontender to palpation                   Suprapubic catheter in place and draining some cloudy bluish-colored urine now - site noted to have some purulent drainage now but no surrounding erythema noted                    No CVA or Spinal tenderness elicited  Extremities:  no edema LE's                     peripheral IV in Rt arm  Skin:  warm dry, erythematous macular rash noted on abdomen, chest,and faintly on the back - non-pruritic at present      I&O's Summary :    05 Dec 2017 07:01  -  06 Dec 2017 07:00  --------------------------------------------------------  IN: 450 mL / OUT: 400 mL / NET: 50 mL    06 Dec 2017 07:01  -  06 Dec 2017 17:19  --------------------------------------------------------  IN: 370 mL / OUT: 0 mL / NET: 370 mL      LABS:  CBC Full  -  ( 06 Dec 2017 10:43 )  WBC Count : 14.3 K/uL  Hemoglobin : 10.3 g/dL  Hematocrit : 32.6 %  Platelet Count - Automated : 252 K/uL  Mean Cell Volume : 83.6 fl  Mean Cell Hemoglobin : 26.4 pg  Mean Cell Hemoglobin Concentration : 31.6 gm/dL  Auto Neutrophil # : x  Auto Lymphocyte # : x  Auto Monocyte # : x  Auto Eosinophil # : x  Auto Basophil # : x  Auto Neutrophil % : x  Auto Lymphocyte % : x  Auto Monocyte % : x  Auto Eosinophil % : x  Auto Basophil % : x    12-06    140  |  106  |  18  ----------------------------<  116<H>  3.7   |  25  |  1.35<H>    Ca    9.0      06 Dec 2017 10:43  Phos  2.1     12-06  Mg     2.0     12-06      Sedimentation Rate, Erythrocyte: 51 mm/hr (12-02 @ 10:40)        Impression:  Clinically improved on present ab rx with Meropenem but noted increased WBC's again and mild rash on abdomen, chest, and back noted- ? contact rash vs Drug Rxn  S/p attempted Rt Ureteroscopy yesterday and replacement of the Suprapubic Catheter.    Suggestions:  Will continue present ab rx and observe rash closely.  Follow-up temps and labs.  Discussed with patient at bedside in detail and with Dr. Bautista - to repeat Renal Sono to re-evaluate now that the Suprapubic Catheter has been replaced.  Await Midline replacement.

## 2017-12-06 NOTE — PROGRESS NOTE ADULT - ATTENDING COMMENTS
discussed with Dr Jimbo MD and Yancy. Continue antibiotic and cystostomy. Will repeat renal sono to evaluate hydronephrosis.

## 2017-12-06 NOTE — PROCEDURE NOTE - NSPOSTCAREGUIDE_GEN_A_CORE
Verbal/written post procedure instructions were given to patient/caregiver/Care for catheter as per unit/ICU protocols
Verbal/written post procedure instructions were given to patient/caregiver

## 2017-12-06 NOTE — PROGRESS NOTE ADULT - SUBJECTIVE AND OBJECTIVE BOX
Patient seen and examined bedside resting comfortably.  Reports mildly pruritic rash to abdomen.  Denies abd pain. Reports cough.    T(F): 97.6 (12-06-17 @ 11:41), Max: 98.8 (12-05-17 @ 17:59)  HR: 76 (12-06-17 @ 11:41) (61 - 77)  BP: 121/63 (12-06-17 @ 11:41) (117/69 - 131/79)  RR: 16 (12-06-17 @ 11:41) (12 - 18)  SpO2: 98% (12-06-17 @ 11:41) (96% - 99%)      PHYSICAL EXAM:  General: NAD, alert and awake  HEENT: NCAT, EOMI, conjunctiva clear  Chest: nonlabored respirations, good inspiratory effort  Abdomen: soft, NTND.   Extremities: no pedal edema or calf tenderness noted   : SPC indwelling draining clear light green-tinged urine  Skin: diffuse urticaria periumbilical area, no other rashes seen on extremities/back/chest    LABS:                        10.3   14.3  )-----------( 252      ( 06 Dec 2017 10:43 )             32.6   12-06    140  |  106  |  18  ----------------------------<  116<H>  3.7   |  25  |  1.35<H>    Ca    9.0      06 Dec 2017 10:43  Phos  2.1     12-06  Mg     2.0     12-06      I&O's Detail    05 Dec 2017 07:01  -  06 Dec 2017 07:00  --------------------------------------------------------  IN:    lactated ringers.: 150 mL    Solution: 100 mL    Solution: 200 mL  Total IN: 450 mL    OUT:    Indwelling Catheter - Suprapubic: 400 mL  Total OUT: 400 mL    Total NET: 50 mL      06 Dec 2017 07:01  -  06 Dec 2017 14:50  --------------------------------------------------------  IN:    Oral Fluid: 370 mL  Total IN: 370 mL    OUT:  Total OUT: 0 mL    Total NET: 370 mL    Impression: 87y F with PMH of HTN, high cholesterol, ventricular bigeminy, CAD/MI admitted for fever, AMS, and sepsis 2/2 ESBL ecoli recurrent UTI s/p SPC insertion last month at OSH for urinary retention now with Right hydroureteronephrosis due to distal ureteral stricture. POD#1 s/p SPC exchange and attempted ureteroscopy    Plan:  - hypophosphatemia supplemented; Follow up am labs  - Continue meropenem per ID  - patient will require new midline, will obtain consent  - check am labs, trend renal function  - Incentive spirometry, increase activity/OOB/ambulate. PT consult   - continue suprapubic catheter drainage, monitor quality of urine output  - monitor rash to abdomen, patient states she is allergic to benadryl  - Continue medical management  - discussed with Dr Bautista

## 2017-12-07 LAB
ANION GAP SERPL CALC-SCNC: 5 MMOL/L — SIGNIFICANT CHANGE UP (ref 5–17)
BUN SERPL-MCNC: 16 MG/DL — SIGNIFICANT CHANGE UP (ref 7–23)
CALCIUM SERPL-MCNC: 8.6 MG/DL — SIGNIFICANT CHANGE UP (ref 8.5–10.1)
CHLORIDE SERPL-SCNC: 107 MMOL/L — SIGNIFICANT CHANGE UP (ref 96–108)
CO2 SERPL-SCNC: 29 MMOL/L — SIGNIFICANT CHANGE UP (ref 22–31)
CREAT SERPL-MCNC: 1.31 MG/DL — HIGH (ref 0.5–1.3)
CULTURE RESULTS: NO GROWTH — SIGNIFICANT CHANGE UP
GLUCOSE SERPL-MCNC: 70 MG/DL — SIGNIFICANT CHANGE UP (ref 70–99)
HCT VFR BLD CALC: 30.1 % — LOW (ref 34.5–45)
HGB BLD-MCNC: 9.4 G/DL — LOW (ref 11.5–15.5)
MCHC RBC-ENTMCNC: 26.2 PG — LOW (ref 27–34)
MCHC RBC-ENTMCNC: 31.1 GM/DL — LOW (ref 32–36)
MCV RBC AUTO: 84.3 FL — SIGNIFICANT CHANGE UP (ref 80–100)
PHOSPHATE SERPL-MCNC: 1.8 MG/DL — LOW (ref 2.5–4.5)
PLATELET # BLD AUTO: 216 K/UL — SIGNIFICANT CHANGE UP (ref 150–400)
POTASSIUM SERPL-MCNC: 4.1 MMOL/L — SIGNIFICANT CHANGE UP (ref 3.5–5.3)
POTASSIUM SERPL-SCNC: 4.1 MMOL/L — SIGNIFICANT CHANGE UP (ref 3.5–5.3)
RBC # BLD: 3.56 M/UL — LOW (ref 3.8–5.2)
RBC # FLD: 17 % — HIGH (ref 11–15)
SODIUM SERPL-SCNC: 141 MMOL/L — SIGNIFICANT CHANGE UP (ref 135–145)
SPECIMEN SOURCE: SIGNIFICANT CHANGE UP
WBC # BLD: 10.2 K/UL — SIGNIFICANT CHANGE UP (ref 3.8–10.5)
WBC # FLD AUTO: 10.2 K/UL — SIGNIFICANT CHANGE UP (ref 3.8–10.5)

## 2017-12-07 PROCEDURE — 76775 US EXAM ABDO BACK WALL LIM: CPT | Mod: 26

## 2017-12-07 RX ORDER — HYDROCORTISONE 1 %
1 OINTMENT (GRAM) TOPICAL THREE TIMES A DAY
Qty: 0 | Refills: 0 | Status: DISCONTINUED | OUTPATIENT
Start: 2017-12-07 | End: 2017-12-22

## 2017-12-07 RX ORDER — POTASSIUM PHOSPHATE, MONOBASIC POTASSIUM PHOSPHATE, DIBASIC 236; 224 MG/ML; MG/ML
15 INJECTION, SOLUTION INTRAVENOUS EVERY 6 HOURS
Qty: 0 | Refills: 0 | Status: COMPLETED | OUTPATIENT
Start: 2017-12-07 | End: 2017-12-07

## 2017-12-07 RX ORDER — POTASSIUM PHOSPHATE, MONOBASIC POTASSIUM PHOSPHATE, DIBASIC 236; 224 MG/ML; MG/ML
15 INJECTION, SOLUTION INTRAVENOUS ONCE
Qty: 0 | Refills: 0 | Status: DISCONTINUED | OUTPATIENT
Start: 2017-12-07 | End: 2017-12-07

## 2017-12-07 RX ADMIN — HEPARIN SODIUM 5000 UNIT(S): 5000 INJECTION INTRAVENOUS; SUBCUTANEOUS at 06:41

## 2017-12-07 RX ADMIN — PANTOPRAZOLE SODIUM 40 MILLIGRAM(S): 20 TABLET, DELAYED RELEASE ORAL at 08:31

## 2017-12-07 RX ADMIN — Medication 1 TABLET(S): at 17:10

## 2017-12-07 RX ADMIN — Medication 1 TABLET(S): at 21:24

## 2017-12-07 RX ADMIN — Medication 1 TABLET(S): at 17:11

## 2017-12-07 RX ADMIN — MEROPENEM 100 MILLIGRAM(S): 1 INJECTION INTRAVENOUS at 16:19

## 2017-12-07 RX ADMIN — Medication 100 MILLIGRAM(S): at 06:39

## 2017-12-07 RX ADMIN — HEPARIN SODIUM 5000 UNIT(S): 5000 INJECTION INTRAVENOUS; SUBCUTANEOUS at 17:10

## 2017-12-07 RX ADMIN — POTASSIUM PHOSPHATE, MONOBASIC POTASSIUM PHOSPHATE, DIBASIC 63.75 MILLIMOLE(S): 236; 224 INJECTION, SOLUTION INTRAVENOUS at 12:01

## 2017-12-07 RX ADMIN — AMIODARONE HYDROCHLORIDE 200 MILLIGRAM(S): 400 TABLET ORAL at 06:39

## 2017-12-07 RX ADMIN — Medication 1 TABLET(S): at 06:39

## 2017-12-07 RX ADMIN — BISOPROLOL FUMARATE 2.5 MILLIGRAM(S): 10 TABLET, FILM COATED ORAL at 06:41

## 2017-12-07 RX ADMIN — Medication 1 TABLET(S): at 08:31

## 2017-12-07 RX ADMIN — MEROPENEM 100 MILLIGRAM(S): 1 INJECTION INTRAVENOUS at 06:39

## 2017-12-07 RX ADMIN — CLOPIDOGREL BISULFATE 75 MILLIGRAM(S): 75 TABLET, FILM COATED ORAL at 12:02

## 2017-12-07 RX ADMIN — MEROPENEM 100 MILLIGRAM(S): 1 INJECTION INTRAVENOUS at 21:26

## 2017-12-07 RX ADMIN — Medication 1 APPLICATION(S): at 21:26

## 2017-12-07 RX ADMIN — CHLORHEXIDINE GLUCONATE 1 APPLICATION(S): 213 SOLUTION TOPICAL at 15:39

## 2017-12-07 NOTE — PROGRESS NOTE ADULT - ASSESSMENT
s/p TUR prostate abscess.  cystostomy  leaking of urine per urethra.  pain perineum present.    Ct scan Pelvis to evaluate prostate abscess.  cystostomy irrigated, no obstruction.  await C T scan report cystostomy functioning well. Still blue urine from Methylene Blue injection.    No incontinence.  no flank pain.

## 2017-12-07 NOTE — PROGRESS NOTE ADULT - SUBJECTIVE AND OBJECTIVE BOX
Patient seen and examined bedside resting comfortably.  Voiding spontaenously without difficulty.  perineal pain is less.  leaking urine from urethra while walking. Incontinennce.      T(F): 97.9 (12-07-17 @ 11:51), Max: 99.5 (12-06-17 @ 17:29)  HR: 67 (12-07-17 @ 11:51) (66 - 73)  BP: 125/54 (12-07-17 @ 11:51) (121/55 - 136/66)  RR: 18 (12-07-17 @ 11:51) (18 - 18)  SpO2: 95% (12-07-17 @ 11:51) (94% - 97%)        PHYSICAL EXAM:  General: NAD, WDWN  Neuro:  Alert & conscious  HEENT: NCAT, EOMI, conjunctiva clear  Lung: respirations nonlabored, good inspiratory effort  Abdomen: soft, NTND.   Extremities: no pedal edema or calf tenderness noted   : uncircumcised phallus, adequate meatus. cystostomy in place.    LABS:                        9.4    10.2  )-----------( 216      ( 07 Dec 2017 10:27 )             30.1     12-07    141  |  107  |  16  ----------------------------<  70  4.1   |  29  |  1.31<H>    Ca    8.6      07 Dec 2017 10:27  Phos  1.8     12-07  Mg     2.0     12-06        I&O's Detail    06 Dec 2017 07:01  -  07 Dec 2017 07:00  --------------------------------------------------------  IN:    Oral Fluid: 570 mL    Solution: 100 mL  Total IN: 670 mL    OUT:    Indwelling Catheter - Suprapubic: 600 mL  Total OUT: 600 mL    Total NET: 70 mL      07 Dec 2017 07:01  -  07 Dec 2017 12:26  --------------------------------------------------------  IN:    Oral Fluid: 380 mL  Total IN: 380 mL    OUT:  Total OUT: 0 mL    Total NET: 380 mL Patient seen and examined bedside resting comfortably.  cystostomy functioning well.  Denies hematuria and dysuria.        T(F): 97.9 (12-07-17 @ 11:51), Max: 99.5 (12-06-17 @ 17:29)  HR: 67 (12-07-17 @ 11:51) (66 - 73)  BP: 125/54 (12-07-17 @ 11:51) (121/55 - 136/66)  RR: 18 (12-07-17 @ 11:51) (18 - 18)  SpO2: 95% (12-07-17 @ 11:51) (94% - 97%)          PHYSICAL EXAM:  General: NAD, WDWN  Neuro:  Alert & conscious  HEENT: NCAT, EOMI, conjunctiva clear  Lung: respirations nonlabored, good inspiratory effort  Abdomen: soft, NTND.   Extremities: no pedal edema or calf tenderness noted       LABS:                        9.4    10.2  )-----------( 216      ( 07 Dec 2017 10:27 )             30.1     12-07    141  |  107  |  16  ----------------------------<  70  4.1   |  29  |  1.31<H>    Ca    8.6      07 Dec 2017 10:27  Phos  1.8     12-07  Mg     2.0     12-06        I&O's Detail    06 Dec 2017 07:01  -  07 Dec 2017 07:00  --------------------------------------------------------  IN:    Oral Fluid: 570 mL    Solution: 100 mL  Total IN: 670 mL    OUT:    Indwelling Catheter - Suprapubic: 600 mL  Total OUT: 600 mL    Total NET: 70 mL      07 Dec 2017 07:01  -  07 Dec 2017 12:31  --------------------------------------------------------  IN:    Oral Fluid: 380 mL  Total IN: 380 mL    OUT:  Total OUT: 0 mL    Total NET: 380 mL

## 2017-12-07 NOTE — PROGRESS NOTE ADULT - SUBJECTIVE AND OBJECTIVE BOX
TMAX - 99.5    On day # 12 Meropenem     Vital Signs Last 24 Hrs  T(C): 36.6 (07 Dec 2017 11:51), Max: 37.5 (06 Dec 2017 17:29)  T(F): 97.9 (07 Dec 2017 11:51), Max: 99.5 (06 Dec 2017 17:29)  HR: 74 (07 Dec 2017 13:48) (66 - 74)  BP: 147/74 (07 Dec 2017 13:48) (121/55 - 147/74)  BP(mean): --  RR: 18 (07 Dec 2017 13:48) (18 - 18)  SpO2: 97% (07 Dec 2017 13:48) (94% - 97%)  Supplemental O2:  on RA     Awake, alert, feels better but c/o weakness and difficulty with her balance when she was OOB with a walker and PT today for the first time.  No c/o pain, no SOB, no cp, and no c/o abdominal pain.  Has intermittent dry cough and c/o some pruritus on her abdomen noq.  Appetite is fair.  S/p new Lt arm Midline placement on 12/6.      PHYSICAL EXAM  General:  awake, alert, sitting semi-upright in bed, pleasant and cooperative, in NAD  HEENT:  conj pink, sclerae anicteric, PERRLA, no oral lesions noted  Neck:  supple, no nodes noted  Heart:  RR  Lungs:  clear bilaterally  Abdomen:  soft, BS+, nontender to palpation                   Suprapubic Catheter in place and draining some cloudy bluish-green tinged urine now - site remains clean with no purulent drainage now/ dressing is dry                    no CVA or Spinal tenderness elicited  Extremities:  no edema LE's                     LUE Midline in place - site with scant bloody drainage at site - placed 12/6  Skin: warm, dry, mildly erythematous macular rash with few areas of coalescence on abdomen and chest as well as on the back - no change noted - mildly pruritic       I&O's Summary :    06 Dec 2017 07:01  -  07 Dec 2017 07:00  --------------------------------------------------------  IN: 670 mL / OUT: 600 mL / NET: 70 mL    07 Dec 2017 07:01  -  07 Dec 2017 15:34  --------------------------------------------------------  IN: 380 mL / OUT: 0 mL / NET: 380 mL      LABS:  CBC Full  -  ( 07 Dec 2017 10:27 )  WBC Count : 10.2 K/uL  Hemoglobin : 9.4 g/dL  Hematocrit : 30.1 %  Platelet Count - Automated : 216 K/uL  Mean Cell Volume : 84.3 fl  Mean Cell Hemoglobin : 26.2 pg  Mean Cell Hemoglobin Concentration : 31.1 gm/dL  Auto Neutrophil # : x  Auto Lymphocyte # : x  Auto Monocyte # : x  Auto Eosinophil # : x  Auto Basophil # : x  Auto Neutrophil % : x  Auto Lymphocyte % : x  Auto Monocyte % : x  Auto Eosinophil % : x  Auto Basophil % : x    12-07    141  |  107  |  16  ----------------------------<  70  4.1   |  29  |  1.31<H>    Ca    8.6      07 Dec 2017 10:27  Phos  1.8     12-07  Mg     2.0     12-06          Radiology:  Renal Sono - < from: US Renal (12.07.17 @ 09:36) >  FINDINGS:    Right kidney:  10.0 cm. Again seen is moderate hydronephrosis and   hydroureter. No renal mass, hydronephrosis or calculi.    Left kidney:  9.6cm. No renal mass, hydronephrosis or calculi.    Urinary bladder: The bladder is decompressed - no urine present.    IMPRESSION:   Moderate hydronephrosis and hydroureter on the right - not significantly   changed c/w 11/30/17study.   No hydronephrosis on the left.  Collapsed bladder.          Impression:  Clinically improving on present ab rx with Meropenem for recurrent ESBL EColi Sepsis of  origin with persistent Rt Hydronephrosis and Hydroureter seen on repeat Renal Sono done today.  WBC's decreased again but patient with unchanged rash - ? contact dermatitis vs Drug Rxn?      Suggestions:  Will continue present ab rx for now and observe rash closely.  Will try rx with topical hydrocortisone cream and repeat labs in AM.  Unfortunately there are very limited options for ab rx in view of the very resistant  ESBL EColi which was isolated.  Await decision from Urology re: ? any intervention for the continued presence of Hydronephrosis + Hydroureter seen on repeat Sono today.  Discussed in detail with patient at bedside and patient's son via phone while I was in the patient's room.

## 2017-12-07 NOTE — PHYSICAL THERAPY INITIAL EVALUATION ADULT - MODIFIED CLINICAL TEST OF SENSORY INTEGRATION IN BALANCE TEST
Barthel Index: Feeding Score __10_, Bathing Scor0___, Grooming Score __0_, Dressing Score _5__, Bowels Score ___10, Bladder Score __5_, Toilet Score ___10, Transfers Score __10_, Mobility Score _0__, Stairs Score _0__,     Total Score __50_

## 2017-12-07 NOTE — PHYSICAL THERAPY INITIAL EVALUATION ADULT - GENERAL OBSERVATIONS, REHAB EVAL
Pt seen supine in bed, alert and Ox4, NAD, childs intact, IV intact, +contact. Pt /55, HR 67, O2 97% on room air.

## 2017-12-07 NOTE — PHYSICAL THERAPY INITIAL EVALUATION ADULT - CRITERIA FOR SKILLED THERAPEUTIC INTERVENTIONS
functional limitations in following categories/short term rehab/risk reduction/prevention/predicted duration of therapy intervention/impairments found/rehab potential/therapy frequency/anticipated discharge recommendation

## 2017-12-08 LAB
ANION GAP SERPL CALC-SCNC: 8 MMOL/L — SIGNIFICANT CHANGE UP (ref 5–17)
BASOPHILS # BLD AUTO: 0.1 K/UL — SIGNIFICANT CHANGE UP (ref 0–0.2)
BASOPHILS NFR BLD AUTO: 1 % — SIGNIFICANT CHANGE UP (ref 0–2)
BUN SERPL-MCNC: 16 MG/DL — SIGNIFICANT CHANGE UP (ref 7–23)
CALCIUM SERPL-MCNC: 8.4 MG/DL — LOW (ref 8.5–10.1)
CHLORIDE SERPL-SCNC: 107 MMOL/L — SIGNIFICANT CHANGE UP (ref 96–108)
CO2 SERPL-SCNC: 29 MMOL/L — SIGNIFICANT CHANGE UP (ref 22–31)
CREAT SERPL-MCNC: 1.28 MG/DL — SIGNIFICANT CHANGE UP (ref 0.5–1.3)
EOSINOPHIL # BLD AUTO: 0.3 K/UL — SIGNIFICANT CHANGE UP (ref 0–0.5)
EOSINOPHIL NFR BLD AUTO: 3.2 % — SIGNIFICANT CHANGE UP (ref 0–6)
ERYTHROCYTE [SEDIMENTATION RATE] IN BLOOD: 33 MM/HR — HIGH (ref 0–20)
GLUCOSE SERPL-MCNC: 73 MG/DL — SIGNIFICANT CHANGE UP (ref 70–99)
HCT VFR BLD CALC: 33.1 % — LOW (ref 34.5–45)
HGB BLD-MCNC: 10.4 G/DL — LOW (ref 11.5–15.5)
LYMPHOCYTES # BLD AUTO: 1.2 K/UL — SIGNIFICANT CHANGE UP (ref 1–3.3)
LYMPHOCYTES # BLD AUTO: 13.4 % — SIGNIFICANT CHANGE UP (ref 13–44)
MCHC RBC-ENTMCNC: 26.3 PG — LOW (ref 27–34)
MCHC RBC-ENTMCNC: 31.3 GM/DL — LOW (ref 32–36)
MCV RBC AUTO: 84.2 FL — SIGNIFICANT CHANGE UP (ref 80–100)
MONOCYTES # BLD AUTO: 0.9 K/UL — SIGNIFICANT CHANGE UP (ref 0–0.9)
MONOCYTES NFR BLD AUTO: 9.8 % — SIGNIFICANT CHANGE UP (ref 2–14)
NEUTROPHILS # BLD AUTO: 6.5 K/UL — SIGNIFICANT CHANGE UP (ref 1.8–7.4)
NEUTROPHILS NFR BLD AUTO: 72.7 % — SIGNIFICANT CHANGE UP (ref 43–77)
PLATELET # BLD AUTO: 213 K/UL — SIGNIFICANT CHANGE UP (ref 150–400)
POTASSIUM SERPL-MCNC: 3.7 MMOL/L — SIGNIFICANT CHANGE UP (ref 3.5–5.3)
POTASSIUM SERPL-SCNC: 3.7 MMOL/L — SIGNIFICANT CHANGE UP (ref 3.5–5.3)
RBC # BLD: 3.93 M/UL — SIGNIFICANT CHANGE UP (ref 3.8–5.2)
RBC # FLD: 16.3 % — HIGH (ref 11–15)
SODIUM SERPL-SCNC: 144 MMOL/L — SIGNIFICANT CHANGE UP (ref 135–145)
WBC # BLD: 9 K/UL — SIGNIFICANT CHANGE UP (ref 3.8–10.5)
WBC # FLD AUTO: 9 K/UL — SIGNIFICANT CHANGE UP (ref 3.8–10.5)

## 2017-12-08 RX ORDER — INFLUENZA VIRUS VACCINE 15; 15; 15; 15 UG/.5ML; UG/.5ML; UG/.5ML; UG/.5ML
0.5 SUSPENSION INTRAMUSCULAR ONCE
Qty: 0 | Refills: 0 | Status: COMPLETED | OUTPATIENT
Start: 2017-12-08 | End: 2017-12-08

## 2017-12-08 RX ORDER — GENTAMICIN SULFATE 40 MG/ML
120 VIAL (ML) INJECTION EVERY 24 HOURS
Qty: 0 | Refills: 0 | Status: DISCONTINUED | OUTPATIENT
Start: 2017-12-08 | End: 2017-12-13

## 2017-12-08 RX ADMIN — CLOPIDOGREL BISULFATE 75 MILLIGRAM(S): 75 TABLET, FILM COATED ORAL at 11:21

## 2017-12-08 RX ADMIN — Medication 1 APPLICATION(S): at 13:47

## 2017-12-08 RX ADMIN — MEROPENEM 100 MILLIGRAM(S): 1 INJECTION INTRAVENOUS at 13:47

## 2017-12-08 RX ADMIN — HEPARIN SODIUM 5000 UNIT(S): 5000 INJECTION INTRAVENOUS; SUBCUTANEOUS at 16:55

## 2017-12-08 RX ADMIN — CHLORHEXIDINE GLUCONATE 1 APPLICATION(S): 213 SOLUTION TOPICAL at 11:20

## 2017-12-08 RX ADMIN — BISOPROLOL FUMARATE 2.5 MILLIGRAM(S): 10 TABLET, FILM COATED ORAL at 06:17

## 2017-12-08 RX ADMIN — Medication 100 MILLIGRAM(S): at 16:55

## 2017-12-08 RX ADMIN — Medication 1 TABLET(S): at 11:22

## 2017-12-08 RX ADMIN — Medication 1 TABLET(S): at 08:40

## 2017-12-08 RX ADMIN — HEPARIN SODIUM 5000 UNIT(S): 5000 INJECTION INTRAVENOUS; SUBCUTANEOUS at 06:16

## 2017-12-08 RX ADMIN — MEROPENEM 100 MILLIGRAM(S): 1 INJECTION INTRAVENOUS at 06:17

## 2017-12-08 RX ADMIN — PANTOPRAZOLE SODIUM 40 MILLIGRAM(S): 20 TABLET, DELAYED RELEASE ORAL at 08:40

## 2017-12-08 RX ADMIN — AMIODARONE HYDROCHLORIDE 200 MILLIGRAM(S): 400 TABLET ORAL at 06:16

## 2017-12-08 RX ADMIN — Medication 1 TABLET(S): at 16:55

## 2017-12-08 RX ADMIN — Medication 100 MILLIGRAM(S): at 06:16

## 2017-12-08 RX ADMIN — Medication 1 APPLICATION(S): at 21:50

## 2017-12-08 RX ADMIN — Medication 1 APPLICATION(S): at 06:16

## 2017-12-08 RX ADMIN — Medication 1 TABLET(S): at 06:16

## 2017-12-08 RX ADMIN — Medication 200 MILLIGRAM(S): at 18:06

## 2017-12-08 NOTE — PROGRESS NOTE ADULT - SUBJECTIVE AND OBJECTIVE BOX
Patient seen and examined bedside resting comfortably. No acute issues, afebrile.   Denies abd pain.     T(F): 98.2 (12-08-17 @ 12:00), Max: 98.8 (12-07-17 @ 18:15)  HR: 68 (12-08-17 @ 12:00) (67 - 71)  BP: 122/63 (12-08-17 @ 12:00) (122/63 - 149/71)  RR: 17 (12-08-17 @ 12:00) (17 - 18)  SpO2: 100% (12-08-17 @ 12:00) (95% - 100%)  Wt(kg): --  CAPILLARY BLOOD GLUCOSE    PHYSICAL EXAM:  General: NAD, alert and awake  HEENT: NCAT, EOMI, conjunctiva clear  Chest: nonlabored respirations, good inspiratory effort  Abdomen: soft, NTND.   Extremities: no pedal edema or calf tenderness noted   : SPC indwelling         LABS:                        10.4   9.0   )-----------( 213      ( 08 Dec 2017 06:23 )             33.1   12-08    144  |  107  |  16  ----------------------------<  73  3.7   |  29  |  1.28    Ca    8.4<L>      08 Dec 2017 06:23        I&O's Detail    07 Dec 2017 07:01  -  08 Dec 2017 07:00  --------------------------------------------------------  IN:    Oral Fluid: 740 mL    Solution: 250 mL    Solution: 150 mL  Total IN: 1140 mL    OUT:    Indwelling Catheter - Suprapubic: 1500 mL  Total OUT: 1500 mL    Total NET: -360 mL      08 Dec 2017 07:01  -  08 Dec 2017 16:37  --------------------------------------------------------  IN:    Oral Fluid: 400 mL  Total IN: 400 mL    OUT:  Total OUT: 0 mL    Total NET: 400 mL          Impression: 87y F with PMH of HTN, high cholesterol, ventricular bigeminy, CAD/MI admitted for fever, AMS, and sepsis 2/2 ESBL ecoli recurrent UTI s/p SPC insertion last month at OSH for urinary retention now with Right hydroureteronephrosis due to distal ureteral stricture. POD#3 s/p SPC exchange and attempted ureteroscopy    Plan:  - hypophosphatemia supplemented; Follow up am labs  - Continue meropenem per ID  - check am labs, trend renal function  - Incentive spirometry, increase activity/OOB/ambulate. PT consult   - continue suprapubic catheter drainage, monitor quality of urine output  - Continue medical management  - discussed with Dr Bautista

## 2017-12-08 NOTE — PROGRESS NOTE ADULT - SUBJECTIVE AND OBJECTIVE BOX
TMAX - 98.8    On day #  13 Meropenem    Vital Signs Last 24 Hrs  T(C): 36.8 (08 Dec 2017 12:00), Max: 37.1 (07 Dec 2017 18:15)  T(F): 98.2 (08 Dec 2017 12:00), Max: 98.8 (07 Dec 2017 18:15)  HR: 68 (08 Dec 2017 12:00) (67 - 71)  BP: 122/63 (08 Dec 2017 12:00) (122/63 - 149/71)  BP(mean): --  RR: 17 (08 Dec 2017 12:00) (17 - 18)  SpO2: 100% (08 Dec 2017 12:00) (95% - 100%)  Supplemental O2:  on RA       Awake, c/o fatigue today, but no c/o cp, no abdominal pain, and no c/o SOB.  Still noted with intermittent dry cough though. Appetite good today - ate a bagel with cream cheese and lox which her son brought for her earlier today.  Some mild pruritus on her neck today, but the topical Hydrocortisone Cream is soothing.      PHYSICAL EXAM  General:  awake, alert, Ox3, pleasant and cooperative, lying in bed now, in NAD  HEENT:  conj pink, sclerae anicteric, PERRLA, no oral lesions noted  Neck:  supple, no nodes noted  Heart:  RR  Lungs:  clear bilaterally  Abdomen:  soft, BS+, nontender to palpation                   Suprapubic Catheter in place and draining cloudy bluish-green drainage - site with scant purulent appearing drainage but dressing is dry and no obvious leakage of urine                   now         Extremities:  no edema LE's                     Lt upper arm Midline in place - dressing dry and intact - placed 12/6  Skin:  warm dry, mildly erythematous macular rash with a few areas of confluence still noted on chest, abdomen, and back, but with some increased erythema noted now at the back of           the neck and noted a few small erythematous macular areas near the wrists of both arms        I&O's Summary :    07 Dec 2017 07:01  -  08 Dec 2017 07:00  --------------------------------------------------------  IN: 1140 mL / OUT: 1500 mL / NET: -360 mL    08 Dec 2017 07:01  -  08 Dec 2017 15:45  --------------------------------------------------------  IN: 400 mL / OUT: 0 mL / NET: 400 mL        LABS:  CBC Full  -  ( 08 Dec 2017 06:23 )  WBC Count : 9.0 K/uL  Hemoglobin : 10.4 g/dL  Hematocrit : 33.1 %  Platelet Count - Automated : 213 K/uL  Mean Cell Volume : 84.2 fl  Mean Cell Hemoglobin : 26.3 pg  Mean Cell Hemoglobin Concentration : 31.3 gm/dL  Auto Neutrophil # : 6.5 K/uL  Auto Lymphocyte # : 1.2 K/uL  Auto Monocyte # : 0.9 K/uL  Auto Eosinophil # : 0.3 K/uL  Auto Basophil # : 0.1 K/uL  Auto Neutrophil % : 72.7 %  Auto Lymphocyte % : 13.4 %  Auto Monocyte % : 9.8 %  Auto Eosinophil % : 3.2 %  Auto Basophil % : 1.0 %    12-08    144  |  107  |  16  ----------------------------<  73  3.7   |  29  |  1.28    Ca    8.4<L>      08 Dec 2017 06:23  Phos  1.8     12-07      Sedimentation Rate, Erythrocyte: 33 mm/hr (12-08 @ 06:23)          MICROBIOLOGY:  Specimen Source: .Urine Catheterized (12-07 @ 00:28)  Culture Results:   No growth (12-07 @ 00:28)        2 Blood Cx's from 12/7 - pending        Impression:  Temps remain decreased and patient clinically improving on present ab rx with Meropenem for ESBL EColi Sepsis of  origin with ongoing Rt Hydronephrosis and hydroureter as seen on repeat Sono and with noted continuation of patient's rash with some extension noted today.    Suggestions: Will change ab rx now to Gent for continued rx of patient's ESBL EColi Urosepsis as patient appears to be having a Drug Rxn with some extension of her rash possibly related to the Meropenem - unfortunately the ab choices for  continued rx are very limited with this isolate.  Awaiting Urology follow-up and recommendations regarding patient's repeat Renal Sono findings.  Discussed in detail with patient at bedside.

## 2017-12-08 NOTE — PROGRESS NOTE ADULT - ATTENDING COMMENTS
in view of persistent right hydronephrosis, sepsis, renal failure, failed attempt for right retrograde pyelogram and ureteroscopy, will request right antegrade pyelogram and possible nephrostomy to evaluate right ureter. Discussed with pt and her son.

## 2017-12-09 LAB
ANION GAP SERPL CALC-SCNC: 9 MMOL/L — SIGNIFICANT CHANGE UP (ref 5–17)
BUN SERPL-MCNC: 18 MG/DL — SIGNIFICANT CHANGE UP (ref 7–23)
CALCIUM SERPL-MCNC: 8.6 MG/DL — SIGNIFICANT CHANGE UP (ref 8.5–10.1)
CHLORIDE SERPL-SCNC: 107 MMOL/L — SIGNIFICANT CHANGE UP (ref 96–108)
CO2 SERPL-SCNC: 29 MMOL/L — SIGNIFICANT CHANGE UP (ref 22–31)
CREAT SERPL-MCNC: 1.33 MG/DL — HIGH (ref 0.5–1.3)
GLUCOSE SERPL-MCNC: 107 MG/DL — HIGH (ref 70–99)
HCT VFR BLD CALC: 35.6 % — SIGNIFICANT CHANGE UP (ref 34.5–45)
HGB BLD-MCNC: 11.2 G/DL — LOW (ref 11.5–15.5)
MCHC RBC-ENTMCNC: 26.6 PG — LOW (ref 27–34)
MCHC RBC-ENTMCNC: 31.5 GM/DL — LOW (ref 32–36)
MCV RBC AUTO: 84.6 FL — SIGNIFICANT CHANGE UP (ref 80–100)
PLATELET # BLD AUTO: 205 K/UL — SIGNIFICANT CHANGE UP (ref 150–400)
POTASSIUM SERPL-MCNC: 3.4 MMOL/L — LOW (ref 3.5–5.3)
POTASSIUM SERPL-SCNC: 3.4 MMOL/L — LOW (ref 3.5–5.3)
RBC # BLD: 4.22 M/UL — SIGNIFICANT CHANGE UP (ref 3.8–5.2)
RBC # FLD: 16.8 % — HIGH (ref 11–15)
SODIUM SERPL-SCNC: 145 MMOL/L — SIGNIFICANT CHANGE UP (ref 135–145)
WBC # BLD: 9.3 K/UL — SIGNIFICANT CHANGE UP (ref 3.8–10.5)
WBC # FLD AUTO: 9.3 K/UL — SIGNIFICANT CHANGE UP (ref 3.8–10.5)

## 2017-12-09 RX ADMIN — Medication 1 TABLET(S): at 05:57

## 2017-12-09 RX ADMIN — HEPARIN SODIUM 5000 UNIT(S): 5000 INJECTION INTRAVENOUS; SUBCUTANEOUS at 17:03

## 2017-12-09 RX ADMIN — Medication 1 APPLICATION(S): at 21:54

## 2017-12-09 RX ADMIN — Medication 200 MILLIGRAM(S): at 16:05

## 2017-12-09 RX ADMIN — Medication 1 APPLICATION(S): at 05:57

## 2017-12-09 RX ADMIN — Medication 100 MILLIGRAM(S): at 17:03

## 2017-12-09 RX ADMIN — PANTOPRAZOLE SODIUM 40 MILLIGRAM(S): 20 TABLET, DELAYED RELEASE ORAL at 07:59

## 2017-12-09 RX ADMIN — CLOPIDOGREL BISULFATE 75 MILLIGRAM(S): 75 TABLET, FILM COATED ORAL at 11:38

## 2017-12-09 RX ADMIN — Medication 1 TABLET(S): at 17:03

## 2017-12-09 RX ADMIN — CHLORHEXIDINE GLUCONATE 1 APPLICATION(S): 213 SOLUTION TOPICAL at 11:38

## 2017-12-09 RX ADMIN — Medication 1 APPLICATION(S): at 13:26

## 2017-12-09 RX ADMIN — Medication 100 MILLIGRAM(S): at 05:57

## 2017-12-09 RX ADMIN — HEPARIN SODIUM 5000 UNIT(S): 5000 INJECTION INTRAVENOUS; SUBCUTANEOUS at 05:58

## 2017-12-09 RX ADMIN — BISOPROLOL FUMARATE 2.5 MILLIGRAM(S): 10 TABLET, FILM COATED ORAL at 06:00

## 2017-12-09 RX ADMIN — AMIODARONE HYDROCHLORIDE 200 MILLIGRAM(S): 400 TABLET ORAL at 05:57

## 2017-12-09 NOTE — PROGRESS NOTE ADULT - SUBJECTIVE AND OBJECTIVE BOX
TMAX - 99.5    On day # 2 Gent /  s/p Meropenem -  total ab rx now  # 14    Vital Signs Last 24 Hrs  T(C): 37.4 (09 Dec 2017 17:15), Max: 37.4 (09 Dec 2017 17:15)  T(F): 99.4 (09 Dec 2017 17:15), Max: 99.4 (09 Dec 2017 17:15)  HR: 72 (09 Dec 2017 17:15) (64 - 74)  BP: 152/83 (09 Dec 2017 17:15) (119/62 - 153/67)  BP(mean): --  RR: 18 (09 Dec 2017 17:15) (17 - 18)  SpO2: 97% (09 Dec 2017 17:15) (97% - 100%)  Supplemental O2:  on RA    Awake, alert, c/o some fatigue and still with a non-productive cough.  No c/o cp, no SOB, and no c/o abdominal pain.  Also claims that her pruritus is decreased now.      PHYSICAL EXAM  General:  awake, alert, sitting semi-upright in bed, pleasant and cooperative, with intermittent cough noted appears to have some nasal congestion now, but in NAD  HEENT:  conj pink, sclerae anicteric, PERRLA, no oral lesions noted, mucosa moist  Neck:  supple, no nodes noted  Heart:  RR  Lungs:  clear bilaterally  Abdomen:  soft, BS +, nontender to palpation                    No CVA or Spinal tenderness elicited                     Suprapubic Catheter in place and continues to drain some slightly bluish-green cloudy urine as noted in tubing, although  urine in collection bag appears more                     livia in color                     Suprapubic Catheter site with scant tannish drainage noted and with some brownish drainage noted on the tubing  Extremities: no edema LE's                    LUE with Midline in place - dressing dry and intact - placed 12/6   Skin:  warm, dry, erythematous rash on chest, posterior neck, abdomen, back, and few areas on the arms markedly diminished now      I&O's Summary :    08 Dec 2017 07:01  -  09 Dec 2017 07:00  --------------------------------------------------------  IN: 640 mL / OUT: 600 mL / NET: 40 mL    09 Dec 2017 07:01  -  09 Dec 2017 17:42  --------------------------------------------------------  IN: 440 mL / OUT: 0 mL / NET: 440 mL        LABS:  CBC Full  -  ( 09 Dec 2017 07:07 )  WBC Count : 9.3 K/uL  Hemoglobin : 11.2 g/dL  Hematocrit : 35.6 %  Platelet Count - Automated : 205 K/uL  Mean Cell Volume : 84.6 fl  Mean Cell Hemoglobin : 26.6 pg  Mean Cell Hemoglobin Concentration : 31.5 gm/dL  Auto Neutrophil # : x  Auto Lymphocyte # : x  Auto Monocyte # : x  Auto Eosinophil # : x  Auto Basophil # : x  Auto Neutrophil % : x  Auto Lymphocyte % : x  Auto Monocyte % : x  Auto Eosinophil % : x  Auto Basophil % : x    12-09    145  |  107  |  18  ----------------------------<  107<H>  3.4<L>   |  29  |  1.33<H>    Ca    8.6      09 Dec 2017 07:07    Sedimentation Rate, Erythrocyte: 33 mm/hr (12-08 @ 06:23)        MICROBIOLOGY:  Specimen Source: .Urine Catheterized (12-07 @ 00:28)  Culture Results:   No growth (12-07 @ 00:28)          Radiology:  Renal Sono -  < from: US Renal (12.07.17 @ 09:36) >  FINDINGS:    Right kidney:  10.0 cm. Again seen is moderate hydronephrosis and   hydroureter. No renal mass, hydronephrosis or calculi.    Left kidney:  9.6cm. No renal mass, hydronephrosis or calculi.    Urinary bladder: The bladder is decompressed - no urine present.    IMPRESSION:   Moderate hydronephrosis and hydroureter on the right - not significantly   changed c/w 11/30/17study.   No hydronephrosis on the left.  Collapsed bladder.        Impression:  Clinically improving on present ab rx now with Gent for ESBL EColi Sepsis of  origin, with rash now improving, but with intercurrent URI now.  Still with underlying Rt Hydronephrosis and Hydroureter as seen on repeat Renal Sono of 12/7.      Suggestions:  Will continue present ab rx and follow-up temps and labs.  Await further w/u with Anterograde Pyelogram by IR as per Urology.  Discussed in detail with patient at  bedside.

## 2017-12-09 NOTE — PROGRESS NOTE ADULT - SUBJECTIVE AND OBJECTIVE BOX
Pt seen and examined at bedside this morning, resting comfortable. No new complaints. Denies pain, fever, SOB, CP, N/V/D.      T(C): 37 (09 Dec 2017 05:10), Max: 37.5 (08 Dec 2017 17:16)  T(F): 98.6 (09 Dec 2017 05:10), Max: 99.5 (08 Dec 2017 17:16)  HR: 69 (09 Dec 2017 05:10) (68 - 74)  BP: 153/67 (09 Dec 2017 05:10) (122/63 - 153/67)  RR: 18 (09 Dec 2017 05:10) (17 - 19)  SpO2: 97% (09 Dec 2017 05:10) (96% - 100%)                          11.2   9.3   )-----------( 205      ( 09 Dec 2017 07:07 )             35.6   12-09    145  |  107  |  18  ----------------------------<  107<H>  3.4<L>   |  29  |  1.33<H>    Ca    8.6      09 Dec 2017 07:07    I&O's Detail    08 Dec 2017 07:01  -  09 Dec 2017 07:00  --------------------------------------------------------  IN:    Oral Fluid: 640 mL  Total IN: 640 mL    OUT:    Indwelling Catheter - Suprapubic: 600 mL  Total OUT: 600 mL    Total NET: 40 mL      09 Dec 2017 07:01  -  09 Dec 2017 11:04  --------------------------------------------------------  IN:    Oral Fluid: 220 mL  Total IN: 220 mL    OUT:  Total OUT: 0 mL    Total NET: 220 mL    PE:  General- AxO x3, calm, cooperative in NAD  Cardiac- No rubs or gallops, normal S1/S2   Lung- Vesicular breath sounds b/l, no wheezing, rhonchi or rales    ABD- soft, nondistended, non tender, no CVA tenderness   - SPC indwelling (250cc), urine yellow, cloudy and sediment     Impression:  87y F with PMH of HTN, high cholesterol, ventricular bigeminy, CAD/MI admitted for fever, AMS, and sepsis 2/2 ESBL ecoli recurrent UTI s/p SPC insertion last month at OSH for urinary retention now with Right hydroureteronephrosis due to distal ureteral stricture. POD#4 s/p SPC exchange and attempted ureteroscopy. Hypokalemia.     Plan:  - replace lytes, follow am labs   - trend renal function   - Incentive spirometry, increase activity/OOB/ambulate. PT consult   - Need IR to do anterograde pyelogram Monday 12/11  - continue suprapubic catheter drainage, monitor quality of urine output  - Continue medical management  - discussed with Dr Bautista

## 2017-12-10 LAB
ANION GAP SERPL CALC-SCNC: 8 MMOL/L — SIGNIFICANT CHANGE UP (ref 5–17)
APTT BLD: 44.3 SEC — HIGH (ref 27.5–37.4)
BLD GP AB SCN SERPL QL: SIGNIFICANT CHANGE UP
BUN SERPL-MCNC: 16 MG/DL — SIGNIFICANT CHANGE UP (ref 7–23)
CALCIUM SERPL-MCNC: 8.4 MG/DL — LOW (ref 8.5–10.1)
CHLORIDE SERPL-SCNC: 103 MMOL/L — SIGNIFICANT CHANGE UP (ref 96–108)
CO2 SERPL-SCNC: 28 MMOL/L — SIGNIFICANT CHANGE UP (ref 22–31)
CREAT SERPL-MCNC: 1.39 MG/DL — HIGH (ref 0.5–1.3)
GLUCOSE SERPL-MCNC: 103 MG/DL — HIGH (ref 70–99)
HCT VFR BLD CALC: 34.8 % — SIGNIFICANT CHANGE UP (ref 34.5–45)
HGB BLD-MCNC: 10.8 G/DL — LOW (ref 11.5–15.5)
INR BLD: 1.27 RATIO — HIGH (ref 0.88–1.16)
MAGNESIUM SERPL-MCNC: 2 MG/DL — SIGNIFICANT CHANGE UP (ref 1.6–2.6)
MCHC RBC-ENTMCNC: 26.1 PG — LOW (ref 27–34)
MCHC RBC-ENTMCNC: 31 GM/DL — LOW (ref 32–36)
MCV RBC AUTO: 84.2 FL — SIGNIFICANT CHANGE UP (ref 80–100)
PHOSPHATE SERPL-MCNC: 2.1 MG/DL — LOW (ref 2.5–4.5)
PLATELET # BLD AUTO: 211 K/UL — SIGNIFICANT CHANGE UP (ref 150–400)
POTASSIUM SERPL-MCNC: 3.6 MMOL/L — SIGNIFICANT CHANGE UP (ref 3.5–5.3)
POTASSIUM SERPL-SCNC: 3.6 MMOL/L — SIGNIFICANT CHANGE UP (ref 3.5–5.3)
PROTHROM AB SERPL-ACNC: 13.9 SEC — HIGH (ref 9.8–12.7)
RBC # BLD: 4.14 M/UL — SIGNIFICANT CHANGE UP (ref 3.8–5.2)
RBC # FLD: 16.8 % — HIGH (ref 11–15)
SODIUM SERPL-SCNC: 139 MMOL/L — SIGNIFICANT CHANGE UP (ref 135–145)
WBC # BLD: 7.9 K/UL — SIGNIFICANT CHANGE UP (ref 3.8–10.5)
WBC # FLD AUTO: 7.9 K/UL — SIGNIFICANT CHANGE UP (ref 3.8–10.5)

## 2017-12-10 PROCEDURE — 71010: CPT | Mod: 26

## 2017-12-10 RX ORDER — SODIUM CHLORIDE 9 MG/ML
1000 INJECTION, SOLUTION INTRAVENOUS
Qty: 0 | Refills: 0 | Status: DISCONTINUED | OUTPATIENT
Start: 2017-12-10 | End: 2017-12-13

## 2017-12-10 RX ORDER — SODIUM,POTASSIUM PHOSPHATES 278-250MG
1 POWDER IN PACKET (EA) ORAL
Qty: 0 | Refills: 0 | Status: COMPLETED | OUTPATIENT
Start: 2017-12-10 | End: 2017-12-11

## 2017-12-10 RX ADMIN — Medication 650 MILLIGRAM(S): at 17:30

## 2017-12-10 RX ADMIN — Medication 1 APPLICATION(S): at 21:06

## 2017-12-10 RX ADMIN — CLOPIDOGREL BISULFATE 75 MILLIGRAM(S): 75 TABLET, FILM COATED ORAL at 12:35

## 2017-12-10 RX ADMIN — Medication 200 MILLIGRAM(S): at 17:29

## 2017-12-10 RX ADMIN — Medication 1 APPLICATION(S): at 14:35

## 2017-12-10 RX ADMIN — Medication 100 MILLIGRAM(S): at 19:12

## 2017-12-10 RX ADMIN — Medication 1 TABLET(S): at 21:05

## 2017-12-10 RX ADMIN — Medication 100 MILLIGRAM(S): at 17:29

## 2017-12-10 RX ADMIN — CHLORHEXIDINE GLUCONATE 1 APPLICATION(S): 213 SOLUTION TOPICAL at 12:35

## 2017-12-10 RX ADMIN — AMIODARONE HYDROCHLORIDE 200 MILLIGRAM(S): 400 TABLET ORAL at 06:28

## 2017-12-10 RX ADMIN — BISOPROLOL FUMARATE 2.5 MILLIGRAM(S): 10 TABLET, FILM COATED ORAL at 06:30

## 2017-12-10 RX ADMIN — PANTOPRAZOLE SODIUM 40 MILLIGRAM(S): 20 TABLET, DELAYED RELEASE ORAL at 08:03

## 2017-12-10 RX ADMIN — Medication 1 TABLET(S): at 17:30

## 2017-12-10 RX ADMIN — Medication 100 MILLIGRAM(S): at 06:28

## 2017-12-10 RX ADMIN — Medication 100 MILLIGRAM(S): at 21:04

## 2017-12-10 RX ADMIN — HEPARIN SODIUM 5000 UNIT(S): 5000 INJECTION INTRAVENOUS; SUBCUTANEOUS at 17:29

## 2017-12-10 RX ADMIN — Medication 1 TABLET(S): at 06:28

## 2017-12-10 RX ADMIN — Medication 1 APPLICATION(S): at 06:29

## 2017-12-10 RX ADMIN — HEPARIN SODIUM 5000 UNIT(S): 5000 INJECTION INTRAVENOUS; SUBCUTANEOUS at 06:28

## 2017-12-10 NOTE — PROGRESS NOTE ADULT - SUBJECTIVE AND OBJECTIVE BOX
Patient seen and examined at bedside with Dr. Bautista.  Reports low grade temp this evening.  Denies chills, chest pain, sob, abdominal pain.     T(F): 100.9 (12-10-17 @ 17:21), Max: 100.9 (12-10-17 @ 17:21)  HR: 69 (12-10-17 @ 17:21) (65 - 70)  BP: 142/60 (12-10-17 @ 17:21) (125/62 - 142/60)  RR: 16 (12-10-17 @ 17:21) (16 - 18)  SpO2: 96% (12-10-17 @ 17:21) (95% - 98%)    PHYSICAL EXAM:  General: Alert, oriented, NAD  CV: +S1S2 regular rate and rhythm  Lung: Respirations nonlabored  Abdomen: Soft, NTND  Extremities: No pedal edema or calf tenderness noted   : SPC in place draining purulent urine, output: 1400cc/12hrs. No suprapubic tenderness    LABS:                        10.8   7.9   )-----------( 211      ( 10 Dec 2017 10:32 )             34.8     12-10    139  |  103  |  16  ----------------------------<  103<H>  3.6   |  28  |  1.39<H>    Ca    8.4<L>      10 Dec 2017 10:32  Phos  2.1     12-10  Mg     2.0     12-10      PT/INR - ( 10 Dec 2017 10:32 )   PT: 13.9 sec;   INR: 1.27 ratio         PTT - ( 10 Dec 2017 10:32 )  PTT:44.3 sec    Impression: 87 year old female admitted with sepsis due to recurrent ecoli UTI s/p SPC insertion last month for UR, now with right hydroureteronephrosis now POD#5 s/p SPC exchange and failed ureteroscopy due to stricture  PMH HTN, HLD, ventricular bigeminy, CAD  Plan:  - for IR percutaneous nephrostomy tomorrow and antegrade pyelogram  - continue SPC, monitor urine output   - antipyretics PRN  - follow labs, monitor WBC and renal function  - OOB to ambulate as tolerated  - incentive spirometer  - continue medical management  - discussed with Dr. Bautista Patient seen and examined at bedside with Dr. Bautista.  Reports low grade temp this evening.  Denies chills, chest pain, sob, abdominal pain.     T(F): 100.9 (12-10-17 @ 17:21), Max: 100.9 (12-10-17 @ 17:21)  HR: 69 (12-10-17 @ 17:21) (65 - 70)  BP: 142/60 (12-10-17 @ 17:21) (125/62 - 142/60)  RR: 16 (12-10-17 @ 17:21) (16 - 18)  SpO2: 96% (12-10-17 @ 17:21) (95% - 98%)    PHYSICAL EXAM:  General: Alert, oriented, NAD  CV: +S1S2 regular rate and rhythm  Lung: Respirations nonlabored  Abdomen: Soft, NTND  Extremities: No pedal edema or calf tenderness noted   : SPC in place draining purulent urine, output: 1400cc/12hrs. No suprapubic tenderness    LABS:                        10.8   7.9   )-----------( 211      ( 10 Dec 2017 10:32 )             34.8     12-10    139  |  103  |  16  ----------------------------<  103<H>  3.6   |  28  |  1.39<H>    Ca    8.4<L>      10 Dec 2017 10:32  Phos  2.1     12-10  Mg     2.0     12-10      PT/INR - ( 10 Dec 2017 10:32 )   PT: 13.9 sec;   INR: 1.27 ratio         PTT - ( 10 Dec 2017 10:32 )  PTT:44.3 sec    Impression: 87 year old female admitted with sepsis due to recurrent ecoli UTI s/p SPC insertion last month for UR, now with right hydroureteronephrosis now POD#5 s/p SPC exchange and failed ureteroscopy due to stricture, hypophosphatemia   PMH HTN, HLD, ventricular bigeminy, CAD  Plan:  - for IR percutaneous nephrostomy tomorrow and antegrade pyelogram  - continue SPC, monitor urine output   - antibiotics per ID  - antipyretics PRN  - replete phos  - follow labs, monitor WBC and renal function  - OOB to ambulate as tolerated  - incentive spirometer  - continue medical management  - discussed with Dr. Bautista

## 2017-12-11 DIAGNOSIS — N13.30 UNSPECIFIED HYDRONEPHROSIS: ICD-10-CM

## 2017-12-11 LAB
ANION GAP SERPL CALC-SCNC: 9 MMOL/L — SIGNIFICANT CHANGE UP (ref 5–17)
BASOPHILS # BLD AUTO: 0.1 K/UL — SIGNIFICANT CHANGE UP (ref 0–0.2)
BASOPHILS NFR BLD AUTO: 0.9 % — SIGNIFICANT CHANGE UP (ref 0–2)
BUN SERPL-MCNC: 19 MG/DL — SIGNIFICANT CHANGE UP (ref 7–23)
CALCIUM SERPL-MCNC: 8.4 MG/DL — LOW (ref 8.5–10.1)
CHLORIDE SERPL-SCNC: 103 MMOL/L — SIGNIFICANT CHANGE UP (ref 96–108)
CO2 SERPL-SCNC: 28 MMOL/L — SIGNIFICANT CHANGE UP (ref 22–31)
CREAT SERPL-MCNC: 1.6 MG/DL — HIGH (ref 0.5–1.3)
EOSINOPHIL # BLD AUTO: 0.1 K/UL — SIGNIFICANT CHANGE UP (ref 0–0.5)
EOSINOPHIL NFR BLD AUTO: 2 % — SIGNIFICANT CHANGE UP (ref 0–6)
FLUAV SPEC QL CULT: NEGATIVE — SIGNIFICANT CHANGE UP
FLUBV AG SPEC QL IA: NEGATIVE — SIGNIFICANT CHANGE UP
GLUCOSE SERPL-MCNC: 77 MG/DL — SIGNIFICANT CHANGE UP (ref 70–99)
HCT VFR BLD CALC: 35.2 % — SIGNIFICANT CHANGE UP (ref 34.5–45)
HGB BLD-MCNC: 11 G/DL — LOW (ref 11.5–15.5)
LYMPHOCYTES # BLD AUTO: 0.9 K/UL — LOW (ref 1–3.3)
LYMPHOCYTES # BLD AUTO: 12.7 % — LOW (ref 13–44)
MCHC RBC-ENTMCNC: 26.4 PG — LOW (ref 27–34)
MCHC RBC-ENTMCNC: 31.4 GM/DL — LOW (ref 32–36)
MCV RBC AUTO: 84.2 FL — SIGNIFICANT CHANGE UP (ref 80–100)
MONOCYTES # BLD AUTO: 0.8 K/UL — SIGNIFICANT CHANGE UP (ref 0–0.9)
MONOCYTES NFR BLD AUTO: 11.5 % — SIGNIFICANT CHANGE UP (ref 2–14)
NEUTROPHILS # BLD AUTO: 5.3 K/UL — SIGNIFICANT CHANGE UP (ref 1.8–7.4)
NEUTROPHILS NFR BLD AUTO: 72.9 % — SIGNIFICANT CHANGE UP (ref 43–77)
PLATELET # BLD AUTO: 197 K/UL — SIGNIFICANT CHANGE UP (ref 150–400)
POTASSIUM SERPL-MCNC: 4 MMOL/L — SIGNIFICANT CHANGE UP (ref 3.5–5.3)
POTASSIUM SERPL-SCNC: 4 MMOL/L — SIGNIFICANT CHANGE UP (ref 3.5–5.3)
RBC # BLD: 4.18 M/UL — SIGNIFICANT CHANGE UP (ref 3.8–5.2)
RBC # FLD: 16.5 % — HIGH (ref 11–15)
SODIUM SERPL-SCNC: 140 MMOL/L — SIGNIFICANT CHANGE UP (ref 135–145)
WBC # BLD: 7.3 K/UL — SIGNIFICANT CHANGE UP (ref 3.8–10.5)
WBC # FLD AUTO: 7.3 K/UL — SIGNIFICANT CHANGE UP (ref 3.8–10.5)

## 2017-12-11 PROCEDURE — 99221 1ST HOSP IP/OBS SF/LOW 40: CPT

## 2017-12-11 RX ADMIN — Medication 200 MILLIGRAM(S): at 17:19

## 2017-12-11 RX ADMIN — PANTOPRAZOLE SODIUM 40 MILLIGRAM(S): 20 TABLET, DELAYED RELEASE ORAL at 06:12

## 2017-12-11 RX ADMIN — Medication 1 TABLET(S): at 17:19

## 2017-12-11 RX ADMIN — SODIUM CHLORIDE 75 MILLILITER(S): 9 INJECTION, SOLUTION INTRAVENOUS at 06:18

## 2017-12-11 RX ADMIN — AMIODARONE HYDROCHLORIDE 200 MILLIGRAM(S): 400 TABLET ORAL at 06:12

## 2017-12-11 RX ADMIN — Medication 650 MILLIGRAM(S): at 23:22

## 2017-12-11 RX ADMIN — CHLORHEXIDINE GLUCONATE 1 APPLICATION(S): 213 SOLUTION TOPICAL at 11:43

## 2017-12-11 RX ADMIN — Medication 1 TABLET(S): at 08:15

## 2017-12-11 RX ADMIN — Medication 100 MILLIGRAM(S): at 15:31

## 2017-12-11 RX ADMIN — Medication 100 MILLIGRAM(S): at 08:17

## 2017-12-11 RX ADMIN — Medication 100 MILLIGRAM(S): at 23:22

## 2017-12-11 RX ADMIN — Medication 1 APPLICATION(S): at 06:12

## 2017-12-11 RX ADMIN — Medication 650 MILLIGRAM(S): at 06:12

## 2017-12-11 RX ADMIN — Medication 1 APPLICATION(S): at 23:21

## 2017-12-11 RX ADMIN — BISOPROLOL FUMARATE 2.5 MILLIGRAM(S): 10 TABLET, FILM COATED ORAL at 06:14

## 2017-12-11 RX ADMIN — Medication 650 MILLIGRAM(S): at 16:30

## 2017-12-11 RX ADMIN — SODIUM CHLORIDE 75 MILLILITER(S): 9 INJECTION, SOLUTION INTRAVENOUS at 18:13

## 2017-12-11 RX ADMIN — Medication 1 TABLET(S): at 11:41

## 2017-12-11 RX ADMIN — HEPARIN SODIUM 5000 UNIT(S): 5000 INJECTION INTRAVENOUS; SUBCUTANEOUS at 17:19

## 2017-12-11 RX ADMIN — Medication 1 TABLET(S): at 06:12

## 2017-12-11 RX ADMIN — Medication 100 MILLIGRAM(S): at 06:12

## 2017-12-11 NOTE — CONSULT NOTE ADULT - ASSESSMENT
87 year old female admitted with sepsis due to recurrent ecoli UTI s/p SPC insertion last month for UR, now with right hydroureteronephrosis now POD#6 s/p SPC exchange and failed ureteroscopy due to stricture.  Pt has worsening kidney function.  Pt febrile  IR consulted for right percutaneous nephrostomy tube placement.  Pt on plavix

## 2017-12-11 NOTE — CONSULT NOTE ADULT - SUBJECTIVE AND OBJECTIVE BOX
Patient is a 87y old  Female who presents with a chief complaint of Fever and chills (26 Nov 2017 12:49)      HPI:  86 yo lady with acute MS changes along with temp to 104. With fever and chills. Patient noted to have poor urine OP and poorly smelling urine. (26 Nov 2017 12:49)      PAST MEDICAL & SURGICAL HISTORY:  Ventricular bigeminy: history of ablation  MI (myocardial infarction)  High cholesterol  HTN (hypertension)  S/P coronary artery stent placement  History of tonsillectomy  Cataract, bilateral  History of appendectomy      Allergies    Benadryl Allergy (Other)    Intolerances    metoprolol (Other)      MEDICATIONS  (STANDING):  amiodarone    Tablet 200 milliGRAM(s) Oral daily  bisoprolol   Tablet 2.5 milliGRAM(s) Oral daily  chlorhexidine 4% Liquid 1 Application(s) Topical daily  dextrose 5% + sodium chloride 0.45%. 1000 milliLiter(s) (75 mL/Hr) IV Continuous <Continuous>  docusate sodium 100 milliGRAM(s) Oral two times a day  gentamicin   IVPB 120 milliGRAM(s) IV Intermittent every 24 hours  heparin  Injectable 5000 Unit(s) SubCutaneous every 12 hours  hydrocortisone 2.5% Cream 1 Application(s) Topical three times a day  lactobacillus acidophilus 1 Tablet(s) Oral every 12 hours  pantoprazole    Tablet 40 milliGRAM(s) Oral before breakfast  potassium acid phosphate/sodium acid phosphate tablet (K-PHOS No. 2) 1 Tablet(s) Oral four times a day with meals  Rosuvastatin 10 milliGRAM(s),Rosuvastatin 10mg 10 milliGRAM(s) 10 milliGRAM(s) Oral at bedtime    MEDICATIONS  (PRN):  acetaminophen   Tablet 650 milliGRAM(s) Oral every 6 hours PRN For Temp greater than 38 C (100.4 F)  ALBUTerol    90 MICROgram(s) HFA Inhaler 2 Puff(s) Inhalation every 6 hours PRN Bronchospasm  aluminum hydroxide/magnesium hydroxide/simethicone Suspension 30 milliLiter(s) Oral every 4 hours PRN Dyspepsia  guaiFENesin    Syrup 100 milliGRAM(s) Oral every 6 hours PRN Cough  senna 2 Tablet(s) Oral at bedtime PRN Constipation        SOCIAL HISTORY:    FAMILY HISTORY:  No pertinent family history in first degree relatives        PHYSICAL EXAM:    Vital Signs Last 24 Hrs  T(C): 36.9 (11 Dec 2017 11:21), Max: 38.3 (10 Dec 2017 17:21)  T(F): 98.4 (11 Dec 2017 11:21), Max: 100.9 (10 Dec 2017 17:21)  HR: 62 (11 Dec 2017 11:21) (62 - 70)  BP: 109/59 (11 Dec 2017 11:21) (109/59 - 150/72)  BP(mean): --  RR: 16 (11 Dec 2017 11:21) (16 - 18)  SpO2: 95% (11 Dec 2017 11:21) (95% - 98%)    General:  Appears stated age, well-groomed, well-nourished, no distress  Lungs:  CTAB  Cardiovascular:  good S1, S2,   Abdomen:  Soft, non-tender, non-distended,   Extremities:  no calf tenderness/swelling b/l  Musculoskeletal:  Full ROM in all joints w/o swelling/tenderness/effusion  Neuro/Psych:  A &O x 3    LABS:                        11.0   7.3   )-----------( 197      ( 11 Dec 2017 06:24 )             35.2     12-11    140  |  103  |  19  ----------------------------<  77  4.0   |  28  |  1.60<H>    Ca    8.4<L>      11 Dec 2017 06:24  Phos  2.1     12-10  Mg     2.0     12-10      PT/INR - ( 10 Dec 2017 10:32 )   PT: 13.9 sec;   INR: 1.27 ratio         PTT - ( 10 Dec 2017 10:32 )  PTT:44.3 sec      RADIOLOGY & ADDITIONAL STUDIES: Patient is a 87y old  Female who presents with a chief complaint of Fever and chills (26 Nov 2017 12:49)      HPI:  87 year old female admitted with sepsis due to recurrent ecoli UTI s/p SPC insertion last month for UR, now with right hydroureteronephrosis now POD#6 s/p SPC exchange and failed 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        PAST MEDICAL & SURGICAL HISTORY:  Ventricular bigeminy: history of ablation  MI (myocardial infarction)  High cholesterol  HTN (hypertension)  S/P coronary artery stent placement  History of tonsillectomy  Cataract, bilateral  History of appendectomy      Allergies    Benadryl Allergy (Other)    Intolerances    metoprolol (Other)      MEDICATIONS  (STANDING):  amiodarone    Tablet 200 milliGRAM(s) Oral daily  bisoprolol   Tablet 2.5 milliGRAM(s) Oral daily  chlorhexidine 4% Liquid 1 Application(s) Topical daily  dextrose 5% + sodium chloride 0.45%. 1000 milliLiter(s) (75 mL/Hr) IV Continuous <Continuous>  docusate sodium 100 milliGRAM(s) Oral two times a day  gentamicin   IVPB 120 milliGRAM(s) IV Intermittent every 24 hours  heparin  Injectable 5000 Unit(s) SubCutaneous every 12 hours  hydrocortisone 2.5% Cream 1 Application(s) Topical three times a day  lactobacillus acidophilus 1 Tablet(s) Oral every 12 hours  pantoprazole    Tablet 40 milliGRAM(s) Oral before breakfast  potassium acid phosphate/sodium acid phosphate tablet (K-PHOS No. 2) 1 Tablet(s) Oral four times a day with meals  Rosuvastatin 10 milliGRAM(s),Rosuvastatin 10mg 10 milliGRAM(s) 10 milliGRAM(s) Oral at bedtime    MEDICATIONS  (PRN):  acetaminophen   Tablet 650 milliGRAM(s) Oral every 6 hours PRN For Temp greater than 38 C (100.4 F)  ALBUTerol    90 MICROgram(s) HFA Inhaler 2 Puff(s) Inhalation every 6 hours PRN Bronchospasm  aluminum hydroxide/magnesium hydroxide/simethicone Suspension 30 milliLiter(s) Oral every 4 hours PRN Dyspepsia  guaiFENesin    Syrup 100 milliGRAM(s) Oral every 6 hours PRN Cough  senna 2 Tablet(s) Oral at bedtime PRN Constipation        SOCIAL HISTORY:    FAMILY HISTORY:  No pertinent family history in first degree relatives        PHYSICAL EXAM:    Vital Signs Last 24 Hrs  T(C): 36.9 (11 Dec 2017 11:21), Max: 38.3 (10 Dec 2017 17:21)  T(F): 98.4 (11 Dec 2017 11:21), Max: 100.9 (10 Dec 2017 17:21)  HR: 62 (11 Dec 2017 11:21) (62 - 70)  BP: 109/59 (11 Dec 2017 11:21) (109/59 - 150/72)  BP(mean): --  RR: 16 (11 Dec 2017 11:21) (16 - 18)  SpO2: 95% (11 Dec 2017 11:21) (95% - 98%)    General:  Appears stated age, well-groomed, well-nourished, no distress  Lungs:  CTAB  Cardiovascular:  good S1, S2,   Abdomen:  Soft, non-tender, non-distended,   Extremities:  no calf tenderness/swelling b/l  Musculoskeletal:  Full ROM in all joints w/o swelling/tenderness/effusion  Neuro/Psych:  A &O x 3    LABS:                        11.0   7.3   )-----------( 197      ( 11 Dec 2017 06:24 )             35.2     12-11    140  |  103  |  19  ----------------------------<  77  4.0   |  28  |  1.60<H>    Ca    8.4<L>      11 Dec 2017 06:24  Phos  2.1     12-10  Mg     2.0     12-10      PT/INR - ( 10 Dec 2017 10:32 )   PT: 13.9 sec;   INR: 1.27 ratio         PTT - ( 10 Dec 2017 10:32 )  PTT:44.3 sec      RADIOLOGY & ADDITIONAL STUDIES: Patient is a 87y old  Female who presents with a chief complaint of Fever and chills (26 Nov 2017 12:49)      HPI:  87 year old female admitted with sepsis due to recurrent ecoli UTI s/p SPC insertion last month for UR, now with right hydroureteronephrosis now POD#6 s/p SPC exchange and failed 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  Creatinine is trending up.  Pt febrile.  Pt on plavix      PAST MEDICAL & SURGICAL HISTORY:  Ventricular bigeminy: history of ablation  MI (myocardial infarction)  High cholesterol  HTN (hypertension)  S/P coronary artery stent placement  History of tonsillectomy  Cataract, bilateral  History of appendectomy      Allergies    Benadryl Allergy (Other)    Intolerances    metoprolol (Other)      MEDICATIONS  (STANDING):  amiodarone    Tablet 200 milliGRAM(s) Oral daily  bisoprolol   Tablet 2.5 milliGRAM(s) Oral daily  chlorhexidine 4% Liquid 1 Application(s) Topical daily  dextrose 5% + sodium chloride 0.45%. 1000 milliLiter(s) (75 mL/Hr) IV Continuous <Continuous>  docusate sodium 100 milliGRAM(s) Oral two times a day  gentamicin   IVPB 120 milliGRAM(s) IV Intermittent every 24 hours  heparin  Injectable 5000 Unit(s) SubCutaneous every 12 hours  hydrocortisone 2.5% Cream 1 Application(s) Topical three times a day  lactobacillus acidophilus 1 Tablet(s) Oral every 12 hours  pantoprazole    Tablet 40 milliGRAM(s) Oral before breakfast  potassium acid phosphate/sodium acid phosphate tablet (K-PHOS No. 2) 1 Tablet(s) Oral four times a day with meals  Rosuvastatin 10 milliGRAM(s),Rosuvastatin 10mg 10 milliGRAM(s) 10 milliGRAM(s) Oral at bedtime    MEDICATIONS  (PRN):  acetaminophen   Tablet 650 milliGRAM(s) Oral every 6 hours PRN For Temp greater than 38 C (100.4 F)  ALBUTerol    90 MICROgram(s) HFA Inhaler 2 Puff(s) Inhalation every 6 hours PRN Bronchospasm  aluminum hydroxide/magnesium hydroxide/simethicone Suspension 30 milliLiter(s) Oral every 4 hours PRN Dyspepsia  guaiFENesin    Syrup 100 milliGRAM(s) Oral every 6 hours PRN Cough  senna 2 Tablet(s) Oral at bedtime PRN Constipation        SOCIAL HISTORY:    FAMILY HISTORY:  No pertinent family history in first degree relatives        PHYSICAL EXAM:    Vital Signs Last 24 Hrs  T(C): 36.9 (11 Dec 2017 11:21), Max: 38.3 (10 Dec 2017 17:21)  T(F): 98.4 (11 Dec 2017 11:21), Max: 100.9 (10 Dec 2017 17:21)  HR: 62 (11 Dec 2017 11:21) (62 - 70)  BP: 109/59 (11 Dec 2017 11:21) (109/59 - 150/72)  BP(mean): --  RR: 16 (11 Dec 2017 11:21) (16 - 18)  SpO2: 95% (11 Dec 2017 11:21) (95% - 98%)    General:  Appears stated age, well-groomed, well-nourished, no distress  Lungs:  CTAB  Cardiovascular:  good S1, S2,   Abdomen/flank:  Soft, non-tender, non-distended, spc in situ  Extremities:  no calf tenderness/swelling b/l  Neuro/Psych:  A &O x 2    LABS:                        11.0   7.3   )-----------( 197      ( 11 Dec 2017 06:24 )             35.2     12-11    140  |  103  |  19  ----------------------------<  77  4.0   |  28  |  1.60<H>    Ca    8.4<L>      11 Dec 2017 06:24  Phos  2.1     12-10  Mg     2.0     12-10      PT/INR - ( 10 Dec 2017 10:32 )   PT: 13.9 sec;   INR: 1.27 ratio         PTT - ( 10 Dec 2017 10:32 )  PTT:44.3 sec      RADIOLOGY & ADDITIONAL STUDIES:  <   from: US Renal (12.07.17 @ 09:36) >  IMPRESSION:   Moderate hydronephrosis and hydroureter on the right - not significantly   changed c/w 11/30/17study.   No hydronephrosis on the left.  Collapsed bladder.

## 2017-12-11 NOTE — PROGRESS NOTE ADULT - SUBJECTIVE AND OBJECTIVE BOX
TMAX - 100.9    On day # 16 total ab rx - S/p Meropenem - now on Gent     Vital Signs Last 24 Hrs  T(C): 36.9 (11 Dec 2017 11:21), Max: 38.3 (10 Dec 2017 17:21)  T(F): 98.4 (11 Dec 2017 11:21), Max: 100.9 (10 Dec 2017 17:21)  HR: 62 (11 Dec 2017 11:21) (62 - 69)  BP: 109/59 (11 Dec 2017 11:21) (109/59 - 150/72)  BP(mean): --  RR: 16 (11 Dec 2017 11:21) (16 - 17)  SpO2: 95% (11 Dec 2017 11:21) (95% - 98%)  Supplemental O2:  on RA    Awake, alert, c/o some fatigue and continues to cough intermittently, but otherwise claims she feels better.  Appetite is somewhat improved - ate 1/2 sandwich and soup for lunch today.  No c/o SOB, no cp, and no abdominal pain.  No c/o chills - patient unaware of her increased temp last PM.  Above noted - patient's  IR procedure ( apparently PCN is planned) is now to be re-scheduled for Friday as the Plavix was just d/c'ed today.        PHYSICAL EXAM  General: awake, alert, sitting semi-upright in bed, coughing intermittently, but in NAD, pleasant and cooperative   HEENT:  conj pink, sclerae anicteric, PERRLA, no oral lesions noted  Neck:  supple, no nodes noted  Heart:  RR  Lungs:  scattered rhonchi bilaterally now, with mild upper airway wheeze noted  Abdomen:  soft, BS +, nontender to palpation                    no CVA or Spinal tenderness elicited                   Suprapubic Catheter in place - draining cloudy livia-colored urine still with faint greenish tinge in the tubing - site with scant minimally tannish drainage  Extremities:  no edema LE's                     LUE midline in place - site clean - placed 12/6  Skin:  warm, dry, no rash noted now      I&O's Summary :    10 Dec 2017 07:01  -  11 Dec 2017 07:00  --------------------------------------------------------  IN: 765 mL / OUT: 900 mL / NET: -135 mL    11 Dec 2017 07:01  -  11 Dec 2017 15:21  --------------------------------------------------------  IN: 0 mL / OUT: 0 mL / NET: 0 mL      LABS:  CBC Full  -  ( 11 Dec 2017 06:24 )  WBC Count : 7.3 K/uL  Hemoglobin : 11.0 g/dL  Hematocrit : 35.2 %  Platelet Count - Automated : 197 K/uL  Mean Cell Volume : 84.2 fl  Mean Cell Hemoglobin : 26.4 pg  Mean Cell Hemoglobin Concentration : 31.4 gm/dL  Auto Neutrophil # : 5.3 K/uL  Auto Lymphocyte # : 0.9 K/uL  Auto Monocyte # : 0.8 K/uL  Auto Eosinophil # : 0.1 K/uL  Auto Basophil # : 0.1 K/uL  Auto Neutrophil % : 72.9 %  Auto Lymphocyte % : 12.7 %  Auto Monocyte % : 11.5 %  Auto Eosinophil % : 2.0 %  Auto Basophil % : 0.9 %    12-11    140  |  103  |  19  ----------------------------<  77  4.0   |  28  |  1.60<H>    Ca    8.4<L>      11 Dec 2017 06:24  Phos  2.1     12-10  Mg     2.0     12-10      PT/INR - ( 10 Dec 2017 10:32 )   PT: 13.9 sec;   INR: 1.27 ratio       PTT - ( 10 Dec 2017 10:32 )  PTT:44.3 sec    Sedimentation Rate, Erythrocyte: 33 mm/hr (12-08 @ 06:23)        MICROBIOLOGY:  Specimen Source: .Urine Catheterized (12-07 @ 00:28)  Culture Results:   No growth (12-07 @ 00:28)          Radiology:  CXR - 12/10/17 -  < from: Xray Chest 1 View AP/PA. (12.10.17 @ 09:18) >  FINDINGS:      ~  There are no focal air space opacities. The cardiac and mediastinal   contours are prominent, which may be due to magnification from AP   technique and shallow inspiration. The osseous structures are intact.    IMPRESSION:    No focal air space opacities.        Impression:  Recurrent fever to 100.9 on present ab rx with Gent for ESBl EColi Sepsis of  origin in this patient with neurogenic bladder requiring Suprapubic Catheter placement and now with persistent Rt Hydronephrosis and hydroureter, and with continued cough and URI findings - likely viral in origin.  Noted mild increase in BUN/Creat today.      Suggestions:  Will continue present ab rx and follow-up Gent trough level done today.  Will request Rapid Influenza with RVP to further evaluate in view of increased temp again.  Follow-up temps and labs closely.  Discussed in detail with patient at bedside - family present as well.

## 2017-12-11 NOTE — PROGRESS NOTE ADULT - SUBJECTIVE AND OBJECTIVE BOX
Patient seen and examined at bedside in no distress.  Awaiting IR procedure today.  Admits to low-grade fever overnight.  States that she has a dry, non-productive cough that began 2 days ago.  Denies chills, chest pain, shortness of breath, abdominal pain.    T(F): 100.2 (12-11-17 @ 05:30), Max: 100.9 (12-10-17 @ 17:21)  HR: 66 (12-11-17 @ 05:30) (66 - 70)  BP: 150/72 (12-11-17 @ 05:30) (125/62 - 150/72)  RR: 16 (12-11-17 @ 05:30) (16 - 18)  SpO2: 98% (12-11-17 @ 05:30) (96% - 98%)    PHYSICAL EXAM:  General: Alert, oriented, NAD  CV: +S1S2 regular rate and rhythm  Lung: CTA b/l, no w/r/r, respirations nonlabored  Abdomen: Soft, NTND  Extremities: No pedal edema or calf tenderness noted   : SPC in place draining cloudy urine with sediments, output: 1400cc/24hrs. No suprapubic tenderness.     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  Awaiting IR procedure today.  Admits to low-grade fever overnight.  States that she has a dry, non-productive cough that began 2 days ago.  Denies chills, chest pain, shortness of breath, abdominal pain.    T(F): 100.2 (12-11-17 @ 05:30), Max: 100.9 (12-10-17 @ 17:21)  HR: 66 (12-11-17 @ 05:30) (66 - 70)  BP: 150/72 (12-11-17 @ 05:30) (125/62 - 150/72)  RR: 16 (12-11-17 @ 05:30) (16 - 18)  SpO2: 98% (12-11-17 @ 05:30) (96% - 98%)    PHYSICAL EXAM:  General: Alert, oriented, NAD  CV: +S1S2 regular rate and rhythm  Lung: CTA b/l, no w/r/r, respirations nonlabored  Abdomen: Soft, NTND  Extremities: No pedal edema or calf tenderness noted   : SPC in place draining cloudy urine with sediments, output: 1400cc/24hrs. No suprapubic tenderness.     LABS:                        11.0   7.3   )-----------( 197      ( 11 Dec 2017 06:24 )             35.2     12-11    140  |  103  |  19  ----------------------------<  77  4.0   |  28  |  1.60<H>    Ca    8.4<L>      11 Dec 2017 06:24  Phos  2.1     12-10  Mg     2.0     12-10      PT/INR - ( 10 Dec 2017 10:32 )   PT: 13.9 sec;   INR: 1.27 ratio         PTT - ( 10 Dec 2017 10:32 )  PTT:44.3 sec    Xray Chest 1 View AP/PA. (12.10.17 @ 09:18)   There are no focal air space opacities. The cardiac and mediastinal   contours are prominent, which may be due to magnification from AP   technique and shallow inspiration. The osseous structures are intact.  IMPRESSION:  No focal air space opacities.    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

## 2017-12-12 LAB
ANION GAP SERPL CALC-SCNC: 10 MMOL/L — SIGNIFICANT CHANGE UP (ref 5–17)
BASOPHILS # BLD AUTO: 0.1 K/UL — SIGNIFICANT CHANGE UP (ref 0–0.2)
BASOPHILS NFR BLD AUTO: 0.8 % — SIGNIFICANT CHANGE UP (ref 0–2)
BUN SERPL-MCNC: 20 MG/DL — SIGNIFICANT CHANGE UP (ref 7–23)
CALCIUM SERPL-MCNC: 8.7 MG/DL — SIGNIFICANT CHANGE UP (ref 8.5–10.1)
CHLORIDE SERPL-SCNC: 106 MMOL/L — SIGNIFICANT CHANGE UP (ref 96–108)
CO2 SERPL-SCNC: 23 MMOL/L — SIGNIFICANT CHANGE UP (ref 22–31)
CREAT SERPL-MCNC: 1.81 MG/DL — HIGH (ref 0.5–1.3)
EOSINOPHIL # BLD AUTO: 0.1 K/UL — SIGNIFICANT CHANGE UP (ref 0–0.5)
EOSINOPHIL NFR BLD AUTO: 0.7 % — SIGNIFICANT CHANGE UP (ref 0–6)
GENTAMICIN TROUGH SERPL-MCNC: 2.1 UG/ML — CRITICAL HIGH (ref 0–2)
GLUCOSE SERPL-MCNC: 99 MG/DL — SIGNIFICANT CHANGE UP (ref 70–99)
HCT VFR BLD CALC: 37.4 % — SIGNIFICANT CHANGE UP (ref 34.5–45)
HGB BLD-MCNC: 11.8 G/DL — SIGNIFICANT CHANGE UP (ref 11.5–15.5)
LYMPHOCYTES # BLD AUTO: 2.3 K/UL — SIGNIFICANT CHANGE UP (ref 1–3.3)
LYMPHOCYTES # BLD AUTO: 26.8 % — SIGNIFICANT CHANGE UP (ref 13–44)
MCHC RBC-ENTMCNC: 26.6 PG — LOW (ref 27–34)
MCHC RBC-ENTMCNC: 31.6 GM/DL — LOW (ref 32–36)
MCV RBC AUTO: 84.2 FL — SIGNIFICANT CHANGE UP (ref 80–100)
MONOCYTES # BLD AUTO: 1.1 K/UL — HIGH (ref 0–0.9)
MONOCYTES NFR BLD AUTO: 12.1 % — SIGNIFICANT CHANGE UP (ref 2–14)
NEUTROPHILS # BLD AUTO: 5.2 K/UL — SIGNIFICANT CHANGE UP (ref 1.8–7.4)
NEUTROPHILS NFR BLD AUTO: 59.6 % — SIGNIFICANT CHANGE UP (ref 43–77)
PLATELET # BLD AUTO: 219 K/UL — SIGNIFICANT CHANGE UP (ref 150–400)
POTASSIUM SERPL-MCNC: 3.5 MMOL/L — SIGNIFICANT CHANGE UP (ref 3.5–5.3)
POTASSIUM SERPL-SCNC: 3.5 MMOL/L — SIGNIFICANT CHANGE UP (ref 3.5–5.3)
RBC # BLD: 4.44 M/UL — SIGNIFICANT CHANGE UP (ref 3.8–5.2)
RBC # FLD: 16.7 % — HIGH (ref 11–15)
SODIUM SERPL-SCNC: 139 MMOL/L — SIGNIFICANT CHANGE UP (ref 135–145)
WBC # BLD: 8.7 K/UL — SIGNIFICANT CHANGE UP (ref 3.8–10.5)
WBC # FLD AUTO: 8.7 K/UL — SIGNIFICANT CHANGE UP (ref 3.8–10.5)

## 2017-12-12 RX ADMIN — PANTOPRAZOLE SODIUM 40 MILLIGRAM(S): 20 TABLET, DELAYED RELEASE ORAL at 07:48

## 2017-12-12 RX ADMIN — AMIODARONE HYDROCHLORIDE 200 MILLIGRAM(S): 400 TABLET ORAL at 05:32

## 2017-12-12 RX ADMIN — HEPARIN SODIUM 5000 UNIT(S): 5000 INJECTION INTRAVENOUS; SUBCUTANEOUS at 17:23

## 2017-12-12 RX ADMIN — Medication 100 MILLIGRAM(S): at 11:35

## 2017-12-12 RX ADMIN — Medication 1 APPLICATION(S): at 05:33

## 2017-12-12 RX ADMIN — Medication 1 TABLET(S): at 17:23

## 2017-12-12 RX ADMIN — BISOPROLOL FUMARATE 2.5 MILLIGRAM(S): 10 TABLET, FILM COATED ORAL at 05:33

## 2017-12-12 RX ADMIN — Medication 1 TABLET(S): at 05:32

## 2017-12-12 RX ADMIN — Medication 100 MILLIGRAM(S): at 17:23

## 2017-12-12 RX ADMIN — HEPARIN SODIUM 5000 UNIT(S): 5000 INJECTION INTRAVENOUS; SUBCUTANEOUS at 05:32

## 2017-12-12 RX ADMIN — CHLORHEXIDINE GLUCONATE 1 APPLICATION(S): 213 SOLUTION TOPICAL at 11:48

## 2017-12-12 NOTE — PROGRESS NOTE ADULT - SUBJECTIVE AND OBJECTIVE BOX
Patient seen and examined bedside resting comfortably. No complaints offered.     T(F): 97.9 (17 @ 05:30), Max: 101.6 (17 @ 16:25)  HR: 58 (17 @ 05:30) (58 - 75)  BP: 125/63 (17 @ 05:30) (125/63 - 139/63)  RR: 16 (17 @ 05:30) (16 - 18)  SpO2: 96% (17 @ 05:30) (95% - 96%)    PHYSICAL EXAM:  General: NAD, WDWN, Alert  CV: +S1S2 regular rate and rhythm  Lung: respirations nonlabored, good inspiratory effort  : SPC in place draining clear yellow urine with some sediment noted in tubinml/24hours    LABS:                        11.8   8.7   )-----------( 219      ( 12 Dec 2017 07:21 )             37.4         139  |  106  |  20  ----------------------------<  99  3.5   |  23  |  1.81<H>    Ca    8.7      12 Dec 2017 07:21    I&O's Detail    11 Dec 2017 07:01  -  12 Dec 2017 07:00  --------------------------------------------------------  IN:    dextrose 5% + sodium chloride 0.45%.: 750 mL    Oral Fluid: 120 mL  Total IN: 870 mL    OUT:    Indwelling Catheter - Suprapubic: 1050 mL  Total OUT: 1050 mL    Total NET: -180 mL    Impression: 87y Female admitted with PMH Ventricular bigeminy, MI, High cholesterol, HTN admitted with sepsis due to recurrent ecoli UTI s/p SPC insertion last month for UR, now with right hydroureteronephrosis now POD#7 s/p SPC exchange and failed 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 Bautista

## 2017-12-13 LAB — GENTAMICIN TROUGH SERPL-MCNC: 2.1 UG/ML — CRITICAL HIGH (ref 0–2)

## 2017-12-13 RX ORDER — SODIUM CHLORIDE 9 MG/ML
1000 INJECTION, SOLUTION INTRAVENOUS
Qty: 0 | Refills: 0 | Status: DISCONTINUED | OUTPATIENT
Start: 2017-12-13 | End: 2017-12-20

## 2017-12-13 RX ORDER — GENTAMICIN SULFATE 40 MG/ML
80 VIAL (ML) INJECTION EVERY 24 HOURS
Qty: 0 | Refills: 0 | Status: DISCONTINUED | OUTPATIENT
Start: 2017-12-14 | End: 2017-12-22

## 2017-12-13 RX ADMIN — Medication 100 MILLIGRAM(S): at 17:14

## 2017-12-13 RX ADMIN — Medication 1 TABLET(S): at 17:14

## 2017-12-13 RX ADMIN — SODIUM CHLORIDE 75 MILLILITER(S): 9 INJECTION, SOLUTION INTRAVENOUS at 17:14

## 2017-12-13 RX ADMIN — HEPARIN SODIUM 5000 UNIT(S): 5000 INJECTION INTRAVENOUS; SUBCUTANEOUS at 06:08

## 2017-12-13 RX ADMIN — CHLORHEXIDINE GLUCONATE 1 APPLICATION(S): 213 SOLUTION TOPICAL at 11:51

## 2017-12-13 RX ADMIN — HEPARIN SODIUM 5000 UNIT(S): 5000 INJECTION INTRAVENOUS; SUBCUTANEOUS at 17:14

## 2017-12-13 RX ADMIN — Medication 100 MILLIGRAM(S): at 19:01

## 2017-12-13 RX ADMIN — Medication 1 APPLICATION(S): at 06:03

## 2017-12-13 RX ADMIN — Medication 100 MILLIGRAM(S): at 06:01

## 2017-12-13 RX ADMIN — AMIODARONE HYDROCHLORIDE 200 MILLIGRAM(S): 400 TABLET ORAL at 06:02

## 2017-12-13 RX ADMIN — BISOPROLOL FUMARATE 2.5 MILLIGRAM(S): 10 TABLET, FILM COATED ORAL at 06:08

## 2017-12-13 RX ADMIN — PANTOPRAZOLE SODIUM 40 MILLIGRAM(S): 20 TABLET, DELAYED RELEASE ORAL at 07:56

## 2017-12-13 RX ADMIN — Medication 1 TABLET(S): at 06:02

## 2017-12-13 RX ADMIN — Medication 1 APPLICATION(S): at 13:24

## 2017-12-13 NOTE — PROVIDER CONTACT NOTE (CRITICAL VALUE NOTIFICATION) - BACKGROUND
Admitted with unspecified fever and UTI.  Currently with fevers and cough.
Admitted with Fever unspecified cause and UTI
Patient admitted for fever, UTI

## 2017-12-13 NOTE — PROGRESS NOTE ADULT - SUBJECTIVE AND OBJECTIVE BOX
Patient seen and examined bedside resting comfortably.  No complaints offered.   Admits to occasional cough but denies dyspnea, headache, f/c, cp.    T(F): 98.6 (12-13-17 @ 12:33), Max: 99.8 (12-12-17 @ 17:11)  HR: 63 (12-13-17 @ 12:33) (61 - 67)  BP: 135/67 (12-13-17 @ 12:33) (114/55 - 135/67)  RR: 19 (12-13-17 @ 12:33) (18 - 19)  SpO2: 94% (12-13-17 @ 12:33) (94% - 99%)  Wt(kg): --  CAPILLARY BLOOD GLUCOSE    PHYSICAL EXAM:  General: NAD, alert and awake  HEENT: NCAT, EOMI, conjunctiva clear  Chest: nonlabored respirations, good inspiratory effort  Abdomen: soft, NTND.   Extremities: no pedal edema or calf tenderness noted   : no suprapubic tenderness. Suprapubic catheter indwelling draining cloudy yellow urine, output 800cc/24h    LABS:                        11.8   8.7   )-----------( 219      ( 12 Dec 2017 07:21 )             37.4   12-12    139  |  106  |  20  ----------------------------<  99  3.5   |  23  |  1.81<H>    Ca    8.7      12 Dec 2017 07:21      I&O's Detail    12 Dec 2017 07:01  -  13 Dec 2017 07:00  --------------------------------------------------------  IN:    Oral Fluid: 920 mL  Total IN: 920 mL    OUT:    Indwelling Catheter - Suprapubic: 800 mL  Total OUT: 800 mL    Total NET: 120 mL      Impression: 87y Female PMH Ventricular bigeminy, MI, High cholesterol, HTN admitted with sepsis due to recurrent ecoli UTI s/p SPC insertion last month for UR, now with right hydroureteronephrosis now POD#8 s/p SPC exchange and failed 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  - continue SPC, monitor urine output   - antibiotics per ID  - OOB to ambulate as tolerated  - continue medical management  - Discussed with Dr. Bautista

## 2017-12-13 NOTE — PROVIDER CONTACT NOTE (CRITICAL VALUE NOTIFICATION) - ACTION/TREATMENT ORDERED:
continue contact isolation   pt on IV antibiotic   ID consult progress
Genta dose held last night
none
No orders at this time

## 2017-12-13 NOTE — PROGRESS NOTE ADULT - SUBJECTIVE AND OBJECTIVE BOX
TMAX - 99.8 - 100    On day # 18  total ab rx - on Gent now    Vital Signs Last 24 Hrs  T(C): 37 (13 Dec 2017 12:33), Max: 37.7 (12 Dec 2017 17:11)  T(F): 98.6 (13 Dec 2017 12:33), Max: 99.8 (12 Dec 2017 17:11)  HR: 63 (13 Dec 2017 12:33) (61 - 67)  BP: 135/67 (13 Dec 2017 12:33) (114/55 - 135/67)  BP(mean): --  RR: 19 (13 Dec 2017 12:33) (18 - 19)  SpO2: 94% (13 Dec 2017 12:33) (94% - 99%)  Supplemental O2:  on RA     Awake, alert, feels a little better today but still c/o intermittent cough - non-productive.  Appetite is fair to poor though and patient claims she is drinking   only a small amount of liquids.  No c/o cp, no abdominal pain, and no c/o SOB.      PHYSICAL EXAM  General:  awake, alert, pleasant and cooperative, sitting semi-upright in bed, in NAD  HEENT:  conj pink, sclerae anicteric, PERRLA, no oral lesions noted  Neck:  supple, no nodes noted  Heart:  RR  Lungs:  clear bilaterally   Abdomen:  soft, BS +, nontender to palpation                   no CVA or spinal tenderness noted                   Suprapubic Catheter remains in place - site with scant bloody drainage at present and Urine appears livia-colored and still somewhat cloudy  Extremities: no edema LE's                   LUE with Midline in place - site clean/ dressing dry and intact - placed 12/6   Skin:  warm, dry few scattered ecchymoses on the arms          no rash noted now      I&O's Summary :    12 Dec 2017 07:01  -  13 Dec 2017 07:00  --------------------------------------------------------  IN: 920 mL / OUT: 800 mL / NET: 120 mL        LABS:  CBC Full  -  ( 12 Dec 2017 07:21 )  WBC Count : 8.7 K/uL  Hemoglobin : 11.8 g/dL  Hematocrit : 37.4 %  Platelet Count - Automated : 219 K/uL  Mean Cell Volume : 84.2 fl  Mean Cell Hemoglobin : 26.6 pg  Mean Cell Hemoglobin Concentration : 31.6 gm/dL  Auto Neutrophil # : 5.2 K/uL  Auto Lymphocyte # : 2.3 K/uL  Auto Monocyte # : 1.1 K/uL  Auto Eosinophil # : 0.1 K/uL  Auto Basophil # : 0.1 K/uL  Auto Neutrophil % : 59.6 %  Auto Lymphocyte % : 26.8 %  Auto Monocyte % : 12.1 %  Auto Eosinophil % : 0.7 %  Auto Basophil % : 0.8 %    12-12    139  |  106  |  20  ----------------------------<  99  3.5   |  23  |  1.81<H>    Ca    8.7      12 Dec 2017 07:21      Influenza A Rapid Screen: Negative (12-11 @ 17:31)  Influenza B Rapid Screen: Negative (12-11 @ 17:31)    Rapid RVP - Positive for RSV      Gentamicin Level, Trough: 2.1 ug/mL (12-13 @ 01:34)        MICROBIOLOGY:  Specimen Source: .Urine Catheterized (12-07 @ 00:28)  Culture Results:   No growth (12-07 @ 00:28)          Impression:   Low-grade temps on present ab rx with Gent now for ESBL EColi  Sepsis of  origin with underlying persistent Rt Hydronephrosis and Hydroureter possibly related to an underlying stricture, and with intercurrent Tracheobronchitis secondary to RSV,  Noted gent trough to be slightly elevated and with increased BUN/Creat,      Suggestions:  Will continue current ab rx with Gent but decrease dose to 80mg/day beginning tomorrow after holding today's and yesterday's doses.  Will add IV fluids as patient is not taking sufficient volume po.  Follow-up temps and labs.  Await IR procedure scheduled for Friday.  Discussed in detail with patient at bedside.  Maintain Contact and Droplet Precautions.

## 2017-12-13 NOTE — PROVIDER CONTACT NOTE (CRITICAL VALUE NOTIFICATION) - SITUATION
Received call from lab with harpreet level 2.1 drawn 12/12
patient had influenza panel sent.
Patient had Gentamycin level drawn

## 2017-12-14 LAB
ANION GAP SERPL CALC-SCNC: 7 MMOL/L — SIGNIFICANT CHANGE UP (ref 5–17)
BUN SERPL-MCNC: 18 MG/DL — SIGNIFICANT CHANGE UP (ref 7–23)
CALCIUM SERPL-MCNC: 8.6 MG/DL — SIGNIFICANT CHANGE UP (ref 8.5–10.1)
CHLORIDE SERPL-SCNC: 107 MMOL/L — SIGNIFICANT CHANGE UP (ref 96–108)
CO2 SERPL-SCNC: 25 MMOL/L — SIGNIFICANT CHANGE UP (ref 22–31)
CREAT SERPL-MCNC: 1.72 MG/DL — HIGH (ref 0.5–1.3)
GLUCOSE SERPL-MCNC: 93 MG/DL — SIGNIFICANT CHANGE UP (ref 70–99)
HCT VFR BLD CALC: 33.9 % — LOW (ref 34.5–45)
HGB BLD-MCNC: 11 G/DL — LOW (ref 11.5–15.5)
MCHC RBC-ENTMCNC: 27.2 PG — SIGNIFICANT CHANGE UP (ref 27–34)
MCHC RBC-ENTMCNC: 32.4 GM/DL — SIGNIFICANT CHANGE UP (ref 32–36)
MCV RBC AUTO: 84 FL — SIGNIFICANT CHANGE UP (ref 80–100)
PLATELET # BLD AUTO: 183 K/UL — SIGNIFICANT CHANGE UP (ref 150–400)
POTASSIUM SERPL-MCNC: 4.2 MMOL/L — SIGNIFICANT CHANGE UP (ref 3.5–5.3)
POTASSIUM SERPL-SCNC: 4.2 MMOL/L — SIGNIFICANT CHANGE UP (ref 3.5–5.3)
RBC # BLD: 4.04 M/UL — SIGNIFICANT CHANGE UP (ref 3.8–5.2)
RBC # FLD: 16.4 % — HIGH (ref 11–15)
SODIUM SERPL-SCNC: 139 MMOL/L — SIGNIFICANT CHANGE UP (ref 135–145)
WBC # BLD: 8.1 K/UL — SIGNIFICANT CHANGE UP (ref 3.8–10.5)
WBC # FLD AUTO: 8.1 K/UL — SIGNIFICANT CHANGE UP (ref 3.8–10.5)

## 2017-12-14 RX ADMIN — AMIODARONE HYDROCHLORIDE 200 MILLIGRAM(S): 400 TABLET ORAL at 06:24

## 2017-12-14 RX ADMIN — HEPARIN SODIUM 5000 UNIT(S): 5000 INJECTION INTRAVENOUS; SUBCUTANEOUS at 06:24

## 2017-12-14 RX ADMIN — HEPARIN SODIUM 5000 UNIT(S): 5000 INJECTION INTRAVENOUS; SUBCUTANEOUS at 17:12

## 2017-12-14 RX ADMIN — SODIUM CHLORIDE 75 MILLILITER(S): 9 INJECTION, SOLUTION INTRAVENOUS at 06:22

## 2017-12-14 RX ADMIN — Medication 100 MILLIGRAM(S): at 20:56

## 2017-12-14 RX ADMIN — Medication 1 TABLET(S): at 06:24

## 2017-12-14 RX ADMIN — SODIUM CHLORIDE 75 MILLILITER(S): 9 INJECTION, SOLUTION INTRAVENOUS at 21:51

## 2017-12-14 RX ADMIN — BISOPROLOL FUMARATE 2.5 MILLIGRAM(S): 10 TABLET, FILM COATED ORAL at 06:26

## 2017-12-14 RX ADMIN — PANTOPRAZOLE SODIUM 40 MILLIGRAM(S): 20 TABLET, DELAYED RELEASE ORAL at 08:53

## 2017-12-14 RX ADMIN — Medication 1 TABLET(S): at 17:12

## 2017-12-14 RX ADMIN — Medication 1 APPLICATION(S): at 13:37

## 2017-12-14 RX ADMIN — Medication 1 APPLICATION(S): at 06:25

## 2017-12-14 RX ADMIN — Medication 1 APPLICATION(S): at 21:51

## 2017-12-14 RX ADMIN — Medication 100 MILLIGRAM(S): at 06:21

## 2017-12-14 RX ADMIN — CHLORHEXIDINE GLUCONATE 1 APPLICATION(S): 213 SOLUTION TOPICAL at 11:50

## 2017-12-14 NOTE — PROGRESS NOTE ADULT - SUBJECTIVE AND OBJECTIVE BOX
Patient seen and examined bedside resting comfortably. No acute issues overnight, on droplet precaution for URI.  No complaints offered.   Admits to occasional cough but denies dyspnea, headache, f/c, cp.  T(F): 97.8 (12-14-17 @ 12:45), Max: 98.5 (12-13-17 @ 17:12)  HR: 64 (12-14-17 @ 12:45) (59 - 65)  BP: 112/54 (12-14-17 @ 12:45) (112/54 - 142/74)  RR: 18 (12-14-17 @ 12:45) (16 - 18)  SpO2: 100% (12-14-17 @ 12:45) (97% - 100%)  Wt(kg): --  CAPILLARY BLOOD GLUCOSE        PHYSICAL EXAM:  General: NAD, alert and awake  HEENT: NCAT, EOMI, conjunctiva clear  Chest: nonlabored respirations, good inspiratory effort  Abdomen: soft, NTND.   Extremities: no pedal edema or calf tenderness noted   : no suprapubic tenderness. Suprapubic catheter indwelling draining cloudy yellow urine, 850cc/24h  LABS:                        11.0   8.1   )-----------( 183      ( 14 Dec 2017 08:04 )             33.9   12-14    139  |  107  |  18  ----------------------------<  93  4.2   |  25  |  1.72<H>    Ca    8.6      14 Dec 2017 08:04      I&O's Detail    13 Dec 2017 07:01  -  14 Dec 2017 07:00  --------------------------------------------------------  IN:    Oral Fluid: 120 mL  Total IN: 120 mL    OUT:    Indwelling Catheter - Suprapubic: 850 mL  Total OUT: 850 mL    Total NET: -730 mL        Impression: 87y Female PMH Ventricular bigeminy, MI, High cholesterol, HTN admitted with sepsis due to recurrent ecoli UTI s/p SPC insertion last month for UR, now with right hydroureteronephrosis now POD#9 s/p SPC exchange and failed 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  - continue SPC, monitor urine output   - antibiotics per ID  - OOB to ambulate as tolerated  - continue medical management  - Discussed with Dr. Bautista

## 2017-12-14 NOTE — CHART NOTE - NSCHARTNOTEFT_GEN_A_CORE
Assessment:   Pt on abx for E.Coli/Sepsis; PICC in place; scheduled for nephrostomy on 12/15 for acute pyelonephritis.     Factors impacting intake: [ ] none [ ] nausea  [ ] vomiting [ ] diarrhea [ ] constipation  [ ]chewing problems [ ] swallowing issues  [ ] other:     Diet Presciption: Diet, Regular:   DASH/TLC {Sodium & Cholesterol Restricted} (12-05-17 @ 18:10)  Diet, NPO:   NPO for Procedure/Test     NPO Start Date: 14-Dec-2017,   NPO Start Time: 23:59  Except Medications (12-11-17 @ 13:20)    Intake: % most meals    Current Weight: 74.4 kg (12/11); previous wt 73.5 kg (12/5)  % Weight Change: 1% gain x 6 days    Pertinent Medications: MEDICATIONS  (STANDING):  amiodarone    Tablet 200 milliGRAM(s) Oral daily  bisoprolol   Tablet 2.5 milliGRAM(s) Oral daily  chlorhexidine 4% Liquid 1 Application(s) Topical daily  dextrose 5% + sodium chloride 0.45%. 1000 milliLiter(s) (75 mL/Hr) IV Continuous <Continuous>  docusate sodium 100 milliGRAM(s) Oral two times a day  gentamicin   IVPB 80 milliGRAM(s) IV Intermittent every 24 hours  heparin  Injectable 5000 Unit(s) SubCutaneous every 12 hours  hydrocortisone 2.5% Cream 1 Application(s) Topical three times a day  lactobacillus acidophilus 1 Tablet(s) Oral every 12 hours  pantoprazole    Tablet 40 milliGRAM(s) Oral before breakfast  Rosuvastatin 10 milliGRAM(s),Rosuvastatin 10mg 10 milliGRAM(s) 10 milliGRAM(s) Oral at bedtime    MEDICATIONS  (PRN):  acetaminophen   Tablet 650 milliGRAM(s) Oral every 6 hours PRN For Temp greater than 38 C (100.4 F)  ALBUTerol    90 MICROgram(s) HFA Inhaler 2 Puff(s) Inhalation every 6 hours PRN Bronchospasm  aluminum hydroxide/magnesium hydroxide/simethicone Suspension 30 milliLiter(s) Oral every 4 hours PRN Dyspepsia  guaiFENesin    Syrup 100 milliGRAM(s) Oral every 6 hours PRN Cough  senna 2 Tablet(s) Oral at bedtime PRN Constipation    Pertinent Labs: 12-14 Na139 mmol/L Glu 93 mg/dL K+ 4.2 mmol/L Cr  1.72 mg/dL<H> BUN 18 mg/dL Phos n/a   Alb n/a   PAB n/a        CAPILLARY BLOOD GLUCOSE        Skin:  WDL; no edema noted  BM regula; last x 1 (12/12);; incontinent    Estimated Needs:   X[ ] no change since previous assessment  [ ] recalculated:     Previous Nutrition Diagnosis:   [ ] Inadequate Energy Intake [ ]Inadequate Oral Intake [ ] Excessive Energy Intake   [ ] Underweight [ ] Increased Nutrient Needs [ ] Overweight/Obesity   [ ] Altered GI Function [ ] Unintended Weight Loss [ ] Food & Nutrition Related Knowledge Def  [X ] Moderate Malnutrition     Nutrition Diagnosis is [X ] ongoing  [ ] resolved [ ] not applicable     New Nutrition Diagnosis: [X ] not applicable       Interventions: Liberalize diet to Low Na; add supplement  Recommend  [ ] Change Diet To:  [X ] Nutrition Supplement; Ensure Enlive x 2/day (provides 700 kcal, 40 g protein) for additional nutrition support  [ ] Nutrition Support  [ ] Other:     Monitoring and Evaluation:   X ] PO intake [ x ] Tolerance to diet prescription [ x ] weights [ x ] labs[ x ] follow up per protocol  [ ] other:

## 2017-12-15 LAB
ANION GAP SERPL CALC-SCNC: 6 MMOL/L — SIGNIFICANT CHANGE UP (ref 5–17)
APTT BLD: 20.9 SEC — LOW (ref 27.5–37.4)
BLD GP AB SCN SERPL QL: SIGNIFICANT CHANGE UP
BUN SERPL-MCNC: 14 MG/DL — SIGNIFICANT CHANGE UP (ref 7–23)
CALCIUM SERPL-MCNC: 8.5 MG/DL — SIGNIFICANT CHANGE UP (ref 8.5–10.1)
CHLORIDE SERPL-SCNC: 107 MMOL/L — SIGNIFICANT CHANGE UP (ref 96–108)
CO2 SERPL-SCNC: 28 MMOL/L — SIGNIFICANT CHANGE UP (ref 22–31)
CREAT SERPL-MCNC: 1.56 MG/DL — HIGH (ref 0.5–1.3)
GLUCOSE SERPL-MCNC: 79 MG/DL — SIGNIFICANT CHANGE UP (ref 70–99)
GRAM STN FLD: SIGNIFICANT CHANGE UP
HCT VFR BLD CALC: 33.2 % — LOW (ref 34.5–45)
HGB BLD-MCNC: 10.4 G/DL — LOW (ref 11.5–15.5)
INR BLD: 1.01 RATIO — SIGNIFICANT CHANGE UP (ref 0.88–1.16)
MCHC RBC-ENTMCNC: 26 PG — LOW (ref 27–34)
MCHC RBC-ENTMCNC: 31.4 GM/DL — LOW (ref 32–36)
MCV RBC AUTO: 82.8 FL — SIGNIFICANT CHANGE UP (ref 80–100)
PLATELET # BLD AUTO: 174 K/UL — SIGNIFICANT CHANGE UP (ref 150–400)
POTASSIUM SERPL-MCNC: 3.5 MMOL/L — SIGNIFICANT CHANGE UP (ref 3.5–5.3)
POTASSIUM SERPL-SCNC: 3.5 MMOL/L — SIGNIFICANT CHANGE UP (ref 3.5–5.3)
PROTHROM AB SERPL-ACNC: 11 SEC — SIGNIFICANT CHANGE UP (ref 9.8–12.7)
RBC # BLD: 4.01 M/UL — SIGNIFICANT CHANGE UP (ref 3.8–5.2)
RBC # FLD: 17.1 % — HIGH (ref 11–15)
SODIUM SERPL-SCNC: 141 MMOL/L — SIGNIFICANT CHANGE UP (ref 135–145)
SPECIMEN SOURCE: SIGNIFICANT CHANGE UP
WBC # BLD: 7.7 K/UL — SIGNIFICANT CHANGE UP (ref 3.8–10.5)
WBC # FLD AUTO: 7.7 K/UL — SIGNIFICANT CHANGE UP (ref 3.8–10.5)

## 2017-12-15 PROCEDURE — 76942 ECHO GUIDE FOR BIOPSY: CPT | Mod: 26

## 2017-12-15 PROCEDURE — 50694 PLMT URETERAL STENT PRQ: CPT | Mod: RT

## 2017-12-15 RX ADMIN — Medication 100 MILLIGRAM(S): at 20:47

## 2017-12-15 RX ADMIN — Medication 1 APPLICATION(S): at 06:00

## 2017-12-15 RX ADMIN — Medication 100 MILLIGRAM(S): at 17:13

## 2017-12-15 RX ADMIN — Medication 1 APPLICATION(S): at 17:13

## 2017-12-15 RX ADMIN — HEPARIN SODIUM 5000 UNIT(S): 5000 INJECTION INTRAVENOUS; SUBCUTANEOUS at 17:14

## 2017-12-15 RX ADMIN — Medication 1 APPLICATION(S): at 21:59

## 2017-12-15 RX ADMIN — Medication 1 TABLET(S): at 17:14

## 2017-12-15 RX ADMIN — Medication 100 MILLIGRAM(S): at 05:58

## 2017-12-15 RX ADMIN — Medication 1 TABLET(S): at 05:58

## 2017-12-15 RX ADMIN — BISOPROLOL FUMARATE 2.5 MILLIGRAM(S): 10 TABLET, FILM COATED ORAL at 06:03

## 2017-12-15 RX ADMIN — AMIODARONE HYDROCHLORIDE 200 MILLIGRAM(S): 400 TABLET ORAL at 06:02

## 2017-12-15 RX ADMIN — SODIUM CHLORIDE 75 MILLILITER(S): 9 INJECTION, SOLUTION INTRAVENOUS at 21:59

## 2017-12-15 RX ADMIN — Medication 100 MILLIGRAM(S): at 03:09

## 2017-12-15 NOTE — PROGRESS NOTE ADULT - SUBJECTIVE AND OBJECTIVE BOX
Patient seen and examined bedside resting comfortably. Patient going for IR Right perc nephrostomy today, has been NPO pMN.   No complaints offered. Tolerating SPC, no new event.   On droplet precaution for URI, patient continues to c/o cough, but denies any worsening of symptoms, dyspnea, fevers/chills, or chest pain.     T(F): 98.6 (12-15-17 @ 05:11), Max: 98.8 (12-14-17 @ 17:00)  HR: 65 (12-15-17 @ 05:11) (61 - 65)  BP: 144/73 (12-15-17 @ 05:11) (112/54 - 144/73)  RR: 16 (12-15-17 @ 05:11) (16 - 18)  SpO2: 95% (12-15-17 @ 05:11) (95% - 100%)  CAPILLARY BLOOD GLUCOSE      PHYSICAL EXAM:  General: NAD, alert and awake  Chest: nonlabored respirations, CTA b/l.  Abdomen: soft, NT/ND.   Extremities: no pedal edema or calf tenderness noted   : No suprapubic tenderness. SPC intact draining cloudy yellow urine (1000cc/24hr)    LABS:                        10.4   7.7   )-----------( 174      ( 15 Dec 2017 07:54 )             33.2   12-15    141  |  107  |  14  ----------------------------<  79  3.5   |  28  |  1.56<H>    Ca    8.5      15 Dec 2017 07:54    PT/INR - ( 15 Dec 2017 07:54 )   PT: 11.0 sec;   INR: 1.01 ratio         PTT - ( 15 Dec 2017 07:54 )  PTT:20.9 sec  I&O's Detail    14 Dec 2017 07:01  -  15 Dec 2017 07:00  --------------------------------------------------------  IN:    dextrose 5% + sodium chloride 0.45%.: 900 mL    Oral Fluid: 920 mL  Total IN: 1820 mL    OUT:    Indwelling Catheter - Suprapubic: 1000 mL  Total OUT: 1000 mL    Total NET: 820 mL    Assessment: 86yo Female PMH Ventricular bigeminy, MI, High cholesterol, HTN admitted with sepsis due to recurrent ecoli UTI s/p SPC insertion last month for UR, now with right hydroureteronephrosis now POD#10 s/p SPC exchange and failed ureteroscopy due to stricture now with URI.     Plan:  - IR percutaneous nephrostomy and antegrade pyelogram planned for TODAY; plavix on hold.  - continue SPC, monitor urine output and quality  - antibiotics per ID  - OOB to ambulate as tolerated  - continue medical management  - Will d/w Dr. Bautista for further reccs

## 2017-12-15 NOTE — CHART NOTE - NSCHARTNOTEFT_GEN_A_CORE
will schedule for right ureteroscopy to evaluate the cause of stricture , possible biopsy and insertion of stent

## 2017-12-15 NOTE — PROGRESS NOTE ADULT - ATTENDING COMMENTS
right antegrade pyelogram, aspiration of renal pelvis and insertion of right ureteral stent was done. Urine sent for c/s.   will need right ureteroscopy to evaluate right ureter.

## 2017-12-15 NOTE — PROGRESS NOTE ADULT - SUBJECTIVE AND OBJECTIVE BOX
TMAX - 98.8    On  Gent now - day # 20 total ab rx     Vital Signs Last 24 Hrs  T(C): 36.7 (15 Dec 2017 13:32), Max: 37.1 (14 Dec 2017 17:00)  T(F): 98 (15 Dec 2017 13:32), Max: 98.8 (14 Dec 2017 17:00)  HR: 57 (15 Dec 2017 13:32) (57 - 65)  BP: 143/86 (15 Dec 2017 13:32) (118/55 - 144/73)  BP(mean): --  RR: 16 (15 Dec 2017 13:32) (16 - 16)  SpO2: 98% (15 Dec 2017 13:32) (95% - 98%)  Supplemental O2:  on RA       Awake, alert, s/p Rt Ureteral Stent placement today in IR.  No c/o pain now except when she coughs.  Cough persists with occassional mucous production.  No c/o cp or SOB though.  Appetite seems improved.        PHYSICAL EXAM  General:  awake, alert, sitting upright in bed now, coughing intermittently, but in NAD, pleasant and cooperative  HEENT:  conj pink, sclerae anicteric, PERRLA, no oral lesions noted  Neck: supple, no nodes noted   Heart:  RR  Lungs: bilateral moist rhonchi throughout   Abdomen:  soft, BS+, nontender to palpation                   Suprapubic Catheter in place - site with some bloody/tan drainage now and noted with purulent-bloody with some clots noted now in the urine draining from the tube and in the [place]                    bag                 Extremities:  no edema LE's                     no calf tenderness elicited                     LUE Midline in place - site clean / dressing dry and intact - placed 12/6  Skin:  warm, dry, no rash noted           few scattered ecchymoses on both arms noted      I&O's Summary :    14 Dec 2017 07:01  -  15 Dec 2017 07:00  --------------------------------------------------------  IN: 1820 mL / OUT: 1000 mL / NET: 820 mL        LABS:  CBC Full  -  ( 15 Dec 2017 07:54 )  WBC Count : 7.7 K/uL  Hemoglobin : 10.4 g/dL  Hematocrit : 33.2 %  Platelet Count - Automated : 174 K/uL  Mean Cell Volume : 82.8 fl  Mean Cell Hemoglobin : 26.0 pg  Mean Cell Hemoglobin Concentration : 31.4 gm/dL  Auto Neutrophil # : x  Auto Lymphocyte # : x  Auto Monocyte # : x  Auto Eosinophil # : x  Auto Basophil # : x  Auto Neutrophil % : x  Auto Lymphocyte % : x  Auto Monocyte % : x  Auto Eosinophil % : x  Auto Basophil % : x    12-15    141  |  107  |  14  ----------------------------<  79  3.5   |  28  |  1.56<H>    Ca    8.5      15 Dec 2017 07:54        PT/INR - ( 15 Dec 2017 07:54 )   PT: 11.0 sec;   INR: 1.01 ratio         PTT - ( 15 Dec 2017 07:54 )  PTT:20.9 sec    Influenza A Rapid Screen: Negative (12-11 @ 17:31)    Influenza B Rapid Screen: Negative (12-11 @ 17:31)    Rapid RVP - Positive for RSV ( 12/ 11 )      Gentamicin Level, Trough: 2.1 ug/mL (12-13 @ 01:34)      MICROBIOLOGY:    Urine Cx from Rt Ureter - obtained and pending        Radiology:   IR Procedure -    < from: IR Urography Ante Needle, Right (12.15.17 @ 12:04) >  Using sonographic guidance, a 21 gauge needle was advanced into the   posterior calyx of the lower pole left kidney. A 6 [place] AccuStick set   was advanced into the lower pole posterior calyx over a guidewire. After   removal of the inner dilator, injection of contrast confirmed the   catheter positionin the dilated collecting system without passage of   contrast into the urinary bladder. 10 cc of cloudy urine was aspirated   and sent for microbiological analysis. In conjunction with a hydrophilic   guidewire, a 4 [place] Berenstein catheter was advanced into the urinary   bladder. Contrast injection confirmed proper catheter position. After   removal of the catheter over the guidewire, an 8 [place] double-J stent   was deployed in the right ureter over the wire, and the pigtails   reformed. Contrast injection confirmed appropriate catheter position and   function. The catheters and wire were removed, and hemostasis achieved by   direct manual compression. A dry sterile dressing applied. The patient   tolerated procedure well, and transferred to the recovery area in stable   condition.    IMPRESSION:  ANTEGRADE NEPHROSTOGRAM DEMONSTRATING DISTAL URETERAL OBSTRUCTION.  SUCCESSFUL PLACEMENT OF 8 [place] RIGHT URETERAL STENT.         Impression:  Temps remain decreased now on present ab rx with Gent for ESBL EColi Sepsis of  origin with underlying Neurogenic bladder and found to have Rt Hydronephrosis and Hydroureter and now s/p placement of a Rt Ureteral  Double-J stent in IR today, and with intercurrent Tracheobronchitis due to RSV.      Suggestions:  Will continue present ab rx and follow-up temps and labs.  Repeat CXR in AM.  Continue IV fluids for now.  Discussed in detail with patient at bedside.  Continue Contact and Droplet precautions.

## 2017-12-15 NOTE — PROGRESS NOTE ADULT - SUBJECTIVE AND OBJECTIVE BOX
Patient s/p right ureteral stent placement with sedation.  Pt has a h/o hydroureteronephrosis, recurrent ecoli UTI, s/p SPC placement last month     Operators: Cee MOODY, Donna Shirley MD    Findings:  An 8.5 x 22cm double J ureteral stent placed into the right ureter.  10mLs of pus was aspirated and sent for gs/cx.  Hemostasis achieved.  CBC ordered for the AM.  Pt tolerated procedure well    Complications: None.    Blood loss:  minimal      ASSESSMENT:  88 yo female s/p right ureteral stent placement with sedation.  Pt has a h/o hydroureteronephrosis, recurrent ecoli UTI, s/p SPC placement last month     PLAN:  -f/u gs/cx results  -f/u AMs CBC results  -Plavix can be resumed tomorrow, if still prescribed  -management as per urology Patient s/p right ureteral stent placement with sedation.  Pt has a h/o hydroureteronephrosis, recurrent ecoli UTI, s/p SPC placement last month     Operators: Cee MOODY, Donna Shirley MD    Findings:  An 8.5 x 22cm double J ureteral stent placed into the right ureter.  10mLs of pus was aspirated and sent for gs/cx.  Hemostasis achieved.  CBC ordered for the AM.  Pt tolerated procedure well    Complications: None.    Blood loss:  minimal      ASSESSMENT:  86 yo female s/p right ureteral stent placement with sedation.  Pt has a h/o hydroureteronephrosis, recurrent ecoli UTI, s/p SPC placement last month     PLAN:  -f/u gs/cx results  -trend creatinine  -f/u AMs CBC results  -Plavix can be resumed tomorrow, if still prescribed  -management as per urology

## 2017-12-16 LAB
ANION GAP SERPL CALC-SCNC: 10 MMOL/L — SIGNIFICANT CHANGE UP (ref 5–17)
BUN SERPL-MCNC: 14 MG/DL — SIGNIFICANT CHANGE UP (ref 7–23)
CALCIUM SERPL-MCNC: 8 MG/DL — LOW (ref 8.5–10.1)
CHLORIDE SERPL-SCNC: 105 MMOL/L — SIGNIFICANT CHANGE UP (ref 96–108)
CO2 SERPL-SCNC: 24 MMOL/L — SIGNIFICANT CHANGE UP (ref 22–31)
CREAT SERPL-MCNC: 1.47 MG/DL — HIGH (ref 0.5–1.3)
ERYTHROCYTE [SEDIMENTATION RATE] IN BLOOD: 48 MM/HR — HIGH (ref 0–20)
GLUCOSE SERPL-MCNC: 116 MG/DL — HIGH (ref 70–99)
HCT VFR BLD CALC: 32.2 % — LOW (ref 34.5–45)
HGB BLD-MCNC: 10.4 G/DL — LOW (ref 11.5–15.5)
MCHC RBC-ENTMCNC: 27.2 PG — SIGNIFICANT CHANGE UP (ref 27–34)
MCHC RBC-ENTMCNC: 32.4 GM/DL — SIGNIFICANT CHANGE UP (ref 32–36)
MCV RBC AUTO: 84 FL — SIGNIFICANT CHANGE UP (ref 80–100)
PLATELET # BLD AUTO: 199 K/UL — SIGNIFICANT CHANGE UP (ref 150–400)
POTASSIUM SERPL-MCNC: 3.5 MMOL/L — SIGNIFICANT CHANGE UP (ref 3.5–5.3)
POTASSIUM SERPL-SCNC: 3.5 MMOL/L — SIGNIFICANT CHANGE UP (ref 3.5–5.3)
RBC # BLD: 3.83 M/UL — SIGNIFICANT CHANGE UP (ref 3.8–5.2)
RBC # FLD: 16.4 % — HIGH (ref 11–15)
SODIUM SERPL-SCNC: 139 MMOL/L — SIGNIFICANT CHANGE UP (ref 135–145)
WBC # BLD: 10.1 K/UL — SIGNIFICANT CHANGE UP (ref 3.8–10.5)
WBC # FLD AUTO: 10.1 K/UL — SIGNIFICANT CHANGE UP (ref 3.8–10.5)

## 2017-12-16 PROCEDURE — 71010: CPT | Mod: 26

## 2017-12-16 RX ORDER — FLUCONAZOLE 150 MG/1
100 TABLET ORAL DAILY
Qty: 0 | Refills: 0 | Status: DISCONTINUED | OUTPATIENT
Start: 2017-12-16 | End: 2017-12-18

## 2017-12-16 RX ADMIN — Medication 1 TABLET(S): at 17:25

## 2017-12-16 RX ADMIN — Medication 100 MILLIGRAM(S): at 06:13

## 2017-12-16 RX ADMIN — Medication 650 MILLIGRAM(S): at 06:12

## 2017-12-16 RX ADMIN — Medication 650 MILLIGRAM(S): at 17:25

## 2017-12-16 RX ADMIN — CHLORHEXIDINE GLUCONATE 1 APPLICATION(S): 213 SOLUTION TOPICAL at 11:59

## 2017-12-16 RX ADMIN — PANTOPRAZOLE SODIUM 40 MILLIGRAM(S): 20 TABLET, DELAYED RELEASE ORAL at 06:13

## 2017-12-16 RX ADMIN — HEPARIN SODIUM 5000 UNIT(S): 5000 INJECTION INTRAVENOUS; SUBCUTANEOUS at 06:13

## 2017-12-16 RX ADMIN — BISOPROLOL FUMARATE 2.5 MILLIGRAM(S): 10 TABLET, FILM COATED ORAL at 06:20

## 2017-12-16 RX ADMIN — Medication 100 MILLIGRAM(S): at 19:51

## 2017-12-16 RX ADMIN — SODIUM CHLORIDE 75 MILLILITER(S): 9 INJECTION, SOLUTION INTRAVENOUS at 17:25

## 2017-12-16 RX ADMIN — Medication 1 APPLICATION(S): at 22:38

## 2017-12-16 RX ADMIN — HEPARIN SODIUM 5000 UNIT(S): 5000 INJECTION INTRAVENOUS; SUBCUTANEOUS at 17:25

## 2017-12-16 RX ADMIN — FLUCONAZOLE 100 MILLIGRAM(S): 150 TABLET ORAL at 17:25

## 2017-12-16 RX ADMIN — Medication 1 APPLICATION(S): at 06:15

## 2017-12-16 RX ADMIN — AMIODARONE HYDROCHLORIDE 200 MILLIGRAM(S): 400 TABLET ORAL at 06:13

## 2017-12-16 RX ADMIN — Medication 1 TABLET(S): at 06:13

## 2017-12-16 RX ADMIN — Medication 1 APPLICATION(S): at 13:26

## 2017-12-16 NOTE — PROGRESS NOTE ADULT - SUBJECTIVE AND OBJECTIVE BOX
TMAX - 102.1 this evening    On  Gent now day # 21 total rx for ESBL EColi    Vital Signs Last 24 Hrs  T(C): 38.9 (16 Dec 2017 17:01), Max: 38.9 (16 Dec 2017 17:01)  T(F): 102.1 (16 Dec 2017 17:01), Max: 102.1 (16 Dec 2017 17:01)  HR: 69 (16 Dec 2017 17:01) (66 - 86)  BP: 146/76 (16 Dec 2017 17:01) (131/75 - 146/76)  BP(mean): --  RR: 16 (16 Dec 2017 17:01) (14 - 17)  SpO2: 96% (16 Dec 2017 17:01) (96% - 98%)  Supplemental O2: on RA     Awake, alert, c/o not feeling well today, but not specific.  Still with mostly non-productive moist cough, but denies any SOB, no cp, no abdominal pain, and no c/o HA.  Appetite is poor today and not drinking liquids well either today.       PHYSICAL EXAM  General:  awake, alert, pleasant and cooperative, appears very fatigued today, cheeks slightly flushed now, but in NAD, sitting semi-upright in bed  HEENT:  conj pink, sclerae anicteric, PERRLA, no oral lesions noted but mucosa appears somewhat dry  Neck:  supple, no nodes noted  Heart:  RR  Lungs:  few scattered bilateral moist rhonchi   Abdomen:  soft, BS+, nontender to palpation                   no CVA or Spinal tenderness noted                   Suprapubic Catheter in place and draining cloudy livia-colored urine now, no bloody drainage noted in tubing now - site with some bloody drainage though  Extremities:  no edema LE's                     LUE Midline in place - site clean/ dressing intact - placed 12/6  Skin:  warm, dry, no rash noted  Neuro: no focal deficits noted    I&O's Summary :    15 Dec 2017 07:01  -  16 Dec 2017 07:00  --------------------------------------------------------  IN: 1340 mL / OUT: 800 mL / NET: 540 mL      LABS:  CBC Full  -  ( 16 Dec 2017 07:02 )  WBC Count : 10.1 K/uL  Hemoglobin : 10.4 g/dL  Hematocrit : 32.2 %  Platelet Count - Automated : 199 K/uL  Mean Cell Volume : 84.0 fl  Mean Cell Hemoglobin : 27.2 pg  Mean Cell Hemoglobin Concentration : 32.4 gm/dL  Auto Neutrophil # : x  Auto Lymphocyte # : x  Auto Monocyte # : x  Auto Eosinophil # : x  Auto Basophil # : x  Auto Neutrophil % : x  Auto Lymphocyte % : x  Auto Monocyte % : x  Auto Eosinophil % : x  Auto Basophil % : x    12-16    139  |  105  |  14  ----------------------------<  116<H>  3.5   |  24  |  1.47<H>    Ca    8.0<L>      16 Dec 2017 07:02      PT/INR - ( 15 Dec 2017 07:54 )   PT: 11.0 sec;   INR: 1.01 ratio       PTT - ( 15 Dec 2017 07:54 )  PTT:20.9 sec      Sedimentation Rate, Erythrocyte: 48 mm/hr (12-16 @ 07:02)          MICROBIOLOGY:  Specimen Source: .Body Fluid right nephrostomy tube placement (12-15 @ 17:55)            Gram Stain:   Moderate polymorphonuclear leukocytes seen per low power field  Yeast like cells seen per oil power field (12.15.17 @ 17:55)          Radiology:   CXR -  < from: Xray Chest 1 View AP/PA. (12.16.17 @ 08:14) >  TECHNIQUE: AP chest film. Comparison is made to 12/10/2017    FINDINGS: The study is limited by suboptimal inspiration, however there   is no focal consolidation or pleural effusion.  Heart size is within   normal limits. Coronary artery calcification noted. The osseous   structures are intact.    IMPRESSION: No focal consolidation or pleural effusion.        Impression:  Recurrent fever now on present ab rx with Gent for ESBL EColi Sepsis of  origin with underlying Neurogenic bladder and Rt ureteral obstruction, s/p Rt ureteral stent placement in IR yesterday, with intercurrent RSV Tracheobronchitis and now with yeast seen on the Gram Stain of the purulent urine obtained from the ureter during the IR procedure.      Suggestions:  Will therefore re-cx blood now and continue rx with Gent and add Diflucan po for the yeast in the urine from the Rt ureter yesterday.  Follow-up temps and labs and continue IV fluids for now.  Discussed in detail with patient at bedside.  For possible Rt Ureteroscopy on Monday to further evaluate etiology of the obstruction. TMAX - 102.1 this evening    On  Gent now day # 21 total rx for ESBL EColi    Vital Signs Last 24 Hrs  T(C): 38.9 (16 Dec 2017 17:01), Max: 38.9 (16 Dec 2017 17:01)  T(F): 102.1 (16 Dec 2017 17:01), Max: 102.1 (16 Dec 2017 17:01)  HR: 69 (16 Dec 2017 17:01) (66 - 86)  BP: 146/76 (16 Dec 2017 17:01) (131/75 - 146/76)  BP(mean): --  RR: 16 (16 Dec 2017 17:01) (14 - 17)  SpO2: 96% (16 Dec 2017 17:01) (96% - 98%)  Supplemental O2: on RA     Awake, alert, c/o not feeling well today, but not specific.  Still with mostly non-productive moist cough, but denies any SOB, no cp, no abdominal pain, and no c/o HA.  Appetite is poor today and not drinking liquids well either today.       PHYSICAL EXAM  General:  awake, alert, pleasant and cooperative, appears very fatigued today, cheeks slightly flushed now, but in NAD, sitting semi-upright in bed  HEENT:  conj pink, sclerae anicteric, PERRLA, no oral lesions noted but mucosa appears somewhat dry  Neck:  supple, no nodes noted  Heart:  RR  Lungs:  few scattered bilateral moist rhonchi   Abdomen:  soft, BS+, nontender to palpation                   no CVA or Spinal tenderness noted                   Suprapubic Catheter in place and draining cloudy livia-colored urine now, no bloody drainage noted in tubing now - site with some bloody drainage though  Extremities:  no edema LE's                     LUE Midline in place - site clean/ dressing intact - placed 12/6  Skin:  warm, dry, no rash noted  Neuro: no focal deficits noted    I&O's Summary :    15 Dec 2017 07:01  -  16 Dec 2017 07:00  --------------------------------------------------------  IN: 1340 mL / OUT: 800 mL / NET: 540 mL      LABS:  CBC Full  -  ( 16 Dec 2017 07:02 )  WBC Count : 10.1 K/uL  Hemoglobin : 10.4 g/dL  Hematocrit : 32.2 %  Platelet Count - Automated : 199 K/uL  Mean Cell Volume : 84.0 fl  Mean Cell Hemoglobin : 27.2 pg  Mean Cell Hemoglobin Concentration : 32.4 gm/dL  Auto Neutrophil # : x  Auto Lymphocyte # : x  Auto Monocyte # : x  Auto Eosinophil # : x  Auto Basophil # : x  Auto Neutrophil % : x  Auto Lymphocyte % : x  Auto Monocyte % : x  Auto Eosinophil % : x  Auto Basophil % : x    12-16    139  |  105  |  14  ----------------------------<  116<H>  3.5   |  24  |  1.47<H>    Ca    8.0<L>      16 Dec 2017 07:02      PT/INR - ( 15 Dec 2017 07:54 )   PT: 11.0 sec;   INR: 1.01 ratio       PTT - ( 15 Dec 2017 07:54 )  PTT:20.9 sec      Sedimentation Rate, Erythrocyte: 48 mm/hr (12-16 @ 07:02)          MICROBIOLOGY:  Specimen Source: .Body Fluid right nephrostomy tube placement (12-15 @ 17:55)            Gram Stain:   Moderate polymorphonuclear leukocytes seen per low power field  Yeast like cells seen per oil power field (12.15.17 @ 17:55)          Radiology:   CXR -  < from: Xray Chest 1 View AP/PA. (12.16.17 @ 08:14) >  TECHNIQUE: AP chest film. Comparison is made to 12/10/2017    FINDINGS: The study is limited by suboptimal inspiration, however there   is no focal consolidation or pleural effusion.  Heart size is within   normal limits. Coronary artery calcification noted. The osseous   structures are intact.    IMPRESSION: No focal consolidation or pleural effusion.        Impression:  Recurrent fever now on present ab rx with Gent for ESBL EColi Sepsis of  origin with underlying Neurogenic bladder and Rt ureteral obstruction, s/p Rt ureteral stent placement in IR yesterday, with intercurrent RSV Tracheobronchitis and now with yeast seen on the Gram Stain of the purulent urine obtained from the ureter during the IR procedure.      Suggestions:  Will therefore re-cx blood now and continue rx with Gent and add Diflucan po for the yeast in the urine from the Rt ureter yesterday.  Follow-up temps and labs and continue IV fluids for now.  Discussed in detail with patient at bedside.  For possible Rt Ureteroscopy on Monday to further evaluate etiology of the obstruction.  Will check Gent trough level again prior to the next dose.

## 2017-12-17 LAB
ANION GAP SERPL CALC-SCNC: 11 MMOL/L — SIGNIFICANT CHANGE UP (ref 5–17)
BASOPHILS # BLD AUTO: 0.1 K/UL — SIGNIFICANT CHANGE UP (ref 0–0.2)
BASOPHILS NFR BLD AUTO: 0.6 % — SIGNIFICANT CHANGE UP (ref 0–2)
BUN SERPL-MCNC: 13 MG/DL — SIGNIFICANT CHANGE UP (ref 7–23)
CALCIUM SERPL-MCNC: 8.2 MG/DL — LOW (ref 8.5–10.1)
CHLORIDE SERPL-SCNC: 102 MMOL/L — SIGNIFICANT CHANGE UP (ref 96–108)
CO2 SERPL-SCNC: 24 MMOL/L — SIGNIFICANT CHANGE UP (ref 22–31)
CREAT SERPL-MCNC: 1.49 MG/DL — HIGH (ref 0.5–1.3)
EOSINOPHIL # BLD AUTO: 0.1 K/UL — SIGNIFICANT CHANGE UP (ref 0–0.5)
EOSINOPHIL NFR BLD AUTO: 0.7 % — SIGNIFICANT CHANGE UP (ref 0–6)
GENTAMICIN TROUGH SERPL-MCNC: 0.3 UG/ML — SIGNIFICANT CHANGE UP (ref 0–2)
GLUCOSE SERPL-MCNC: 147 MG/DL — HIGH (ref 70–99)
HCT VFR BLD CALC: 34.2 % — LOW (ref 34.5–45)
HGB BLD-MCNC: 11 G/DL — LOW (ref 11.5–15.5)
LYMPHOCYTES # BLD AUTO: 1.6 K/UL — SIGNIFICANT CHANGE UP (ref 1–3.3)
LYMPHOCYTES # BLD AUTO: 12.9 % — LOW (ref 13–44)
MCHC RBC-ENTMCNC: 26.8 PG — LOW (ref 27–34)
MCHC RBC-ENTMCNC: 32.2 GM/DL — SIGNIFICANT CHANGE UP (ref 32–36)
MCV RBC AUTO: 83.2 FL — SIGNIFICANT CHANGE UP (ref 80–100)
MONOCYTES # BLD AUTO: 1.3 K/UL — HIGH (ref 0–0.9)
MONOCYTES NFR BLD AUTO: 10.3 % — SIGNIFICANT CHANGE UP (ref 2–14)
NEUTROPHILS # BLD AUTO: 9.6 K/UL — HIGH (ref 1.8–7.4)
NEUTROPHILS NFR BLD AUTO: 75.5 % — SIGNIFICANT CHANGE UP (ref 43–77)
PLATELET # BLD AUTO: 237 K/UL — SIGNIFICANT CHANGE UP (ref 150–400)
POTASSIUM SERPL-MCNC: 3.4 MMOL/L — LOW (ref 3.5–5.3)
POTASSIUM SERPL-SCNC: 3.4 MMOL/L — LOW (ref 3.5–5.3)
RBC # BLD: 4.1 M/UL — SIGNIFICANT CHANGE UP (ref 3.8–5.2)
RBC # FLD: 16.2 % — HIGH (ref 11–15)
SODIUM SERPL-SCNC: 137 MMOL/L — SIGNIFICANT CHANGE UP (ref 135–145)
WBC # BLD: 12.7 K/UL — HIGH (ref 3.8–10.5)
WBC # FLD AUTO: 12.7 K/UL — HIGH (ref 3.8–10.5)

## 2017-12-17 RX ADMIN — Medication 650 MILLIGRAM(S): at 06:08

## 2017-12-17 RX ADMIN — AMIODARONE HYDROCHLORIDE 200 MILLIGRAM(S): 400 TABLET ORAL at 06:07

## 2017-12-17 RX ADMIN — Medication 100 MILLIGRAM(S): at 13:21

## 2017-12-17 RX ADMIN — BISOPROLOL FUMARATE 2.5 MILLIGRAM(S): 10 TABLET, FILM COATED ORAL at 06:14

## 2017-12-17 RX ADMIN — FLUCONAZOLE 100 MILLIGRAM(S): 150 TABLET ORAL at 11:26

## 2017-12-17 RX ADMIN — Medication 100 MILLIGRAM(S): at 17:21

## 2017-12-17 RX ADMIN — Medication 1 APPLICATION(S): at 06:10

## 2017-12-17 RX ADMIN — Medication 1 APPLICATION(S): at 13:22

## 2017-12-17 RX ADMIN — SODIUM CHLORIDE 75 MILLILITER(S): 9 INJECTION, SOLUTION INTRAVENOUS at 06:07

## 2017-12-17 RX ADMIN — SODIUM CHLORIDE 75 MILLILITER(S): 9 INJECTION, SOLUTION INTRAVENOUS at 19:35

## 2017-12-17 RX ADMIN — HEPARIN SODIUM 5000 UNIT(S): 5000 INJECTION INTRAVENOUS; SUBCUTANEOUS at 17:21

## 2017-12-17 RX ADMIN — Medication 1 TABLET(S): at 17:21

## 2017-12-17 RX ADMIN — CHLORHEXIDINE GLUCONATE 1 APPLICATION(S): 213 SOLUTION TOPICAL at 11:32

## 2017-12-17 RX ADMIN — HEPARIN SODIUM 5000 UNIT(S): 5000 INJECTION INTRAVENOUS; SUBCUTANEOUS at 06:08

## 2017-12-17 RX ADMIN — Medication 1 APPLICATION(S): at 22:08

## 2017-12-17 RX ADMIN — Medication 650 MILLIGRAM(S): at 17:51

## 2017-12-17 RX ADMIN — PANTOPRAZOLE SODIUM 40 MILLIGRAM(S): 20 TABLET, DELAYED RELEASE ORAL at 11:26

## 2017-12-17 RX ADMIN — Medication 1 TABLET(S): at 06:07

## 2017-12-17 NOTE — PROGRESS NOTE ADULT - SUBJECTIVE AND OBJECTIVE BOX
Patient seen and examined bedside resting comfortably.  No complaints offered.   Denies nausea and vomiting. Tolerating diet.  Denies chest pain, dyspnea, cough. Febrile overnight    T(F): 100.7 (12-17-17 @ 05:28), Max: 102.1 (12-16-17 @ 17:01)  HR: 67 (12-17-17 @ 05:28) (61 - 69)  BP: 146/66 (12-17-17 @ 05:28) (144/77 - 146/76)  RR: 16 (12-17-17 @ 05:28) (14 - 16)  SpO2: 94% (12-17-17 @ 05:28) (94% - 98%)  Wt(kg): --  CAPILLARY BLOOD GLUCOSE      PHYSICAL EXAM:  General: NAD, alert and awake  HEENT: NCAT, EOMI, conjunctiva clear  Chest: nonlabored respirations, good inspiratory effort  Abdomen: soft, NTND.   Extremities: no pedal edema or calf tenderness noted   : No CVA or SP tenderness SPC intact draining cloudy yellow urine (1975cc/24hr)      LABS:                        11.0   12.7  )-----------( 237      ( 17 Dec 2017 07:03 )             34.2   12-17    137  |  102  |  13  ----------------------------<  147<H>  3.4<L>   |  24  |  1.49<H>    Ca    8.2<L>      17 Dec 2017 07:03      I&O's Detail    16 Dec 2017 07:01  -  17 Dec 2017 07:00  --------------------------------------------------------  IN:    dextrose 5% + sodium chloride 0.45%.: 1800 mL  Total IN: 1800 mL    OUT:    Indwelling Catheter - Suprapubic: 1975 mL  Total OUT: 1975 mL    Total NET: -175 mL    Culture - Body Fluid with Gram Stain (12.15.17 @ 17:55)    Gram Stain:   Moderate polymorphonuclear leukocytes seen per low power field  Yeast like cells seen per oil power field    Specimen Source: .Body Fluid right nephrostomy tube placement    Culture Results:   Numerous Candida albicans    Assessment: 88yo Female PMH Ventricular bigeminy, MI, High cholesterol, HTN admitted with sepsis due to recurrent ecoli UTI s/p SPC insertion last month for UR, now with right hydroureteronephrosis now POD#12 s/p SPC exchange and failed ureteroscopy due to stricture now with URI. Gram stain + yeast,    Plan:  - NPO after midnight for right ureteroscopy to evaluate right ureter tomorrow, please hold of on AC and Plavix  - continue SPC, monitor urine output and quality  - antibiotics per ID  - continue medical management  - d/w Dr. Bautista

## 2017-12-18 LAB
ANION GAP SERPL CALC-SCNC: 8 MMOL/L — SIGNIFICANT CHANGE UP (ref 5–17)
BASOPHILS # BLD AUTO: 0.1 K/UL — SIGNIFICANT CHANGE UP (ref 0–0.2)
BASOPHILS NFR BLD AUTO: 0.6 % — SIGNIFICANT CHANGE UP (ref 0–2)
BUN SERPL-MCNC: 13 MG/DL — SIGNIFICANT CHANGE UP (ref 7–23)
CALCIUM SERPL-MCNC: 8.3 MG/DL — LOW (ref 8.5–10.1)
CHLORIDE SERPL-SCNC: 103 MMOL/L — SIGNIFICANT CHANGE UP (ref 96–108)
CO2 SERPL-SCNC: 26 MMOL/L — SIGNIFICANT CHANGE UP (ref 22–31)
CREAT SERPL-MCNC: 1.29 MG/DL — SIGNIFICANT CHANGE UP (ref 0.5–1.3)
EOSINOPHIL # BLD AUTO: 0.1 K/UL — SIGNIFICANT CHANGE UP (ref 0–0.5)
EOSINOPHIL NFR BLD AUTO: 1.1 % — SIGNIFICANT CHANGE UP (ref 0–6)
GLUCOSE SERPL-MCNC: 88 MG/DL — SIGNIFICANT CHANGE UP (ref 70–99)
HCT VFR BLD CALC: 32.4 % — LOW (ref 34.5–45)
HGB BLD-MCNC: 10.6 G/DL — LOW (ref 11.5–15.5)
LYMPHOCYTES # BLD AUTO: 1.6 K/UL — SIGNIFICANT CHANGE UP (ref 1–3.3)
LYMPHOCYTES # BLD AUTO: 11.6 % — LOW (ref 13–44)
MCHC RBC-ENTMCNC: 26.5 PG — LOW (ref 27–34)
MCHC RBC-ENTMCNC: 32.8 GM/DL — SIGNIFICANT CHANGE UP (ref 32–36)
MCV RBC AUTO: 80.9 FL — SIGNIFICANT CHANGE UP (ref 80–100)
MONOCYTES # BLD AUTO: 1.4 K/UL — HIGH (ref 0–0.9)
MONOCYTES NFR BLD AUTO: 10.4 % — SIGNIFICANT CHANGE UP (ref 2–14)
NEUTROPHILS # BLD AUTO: 10.3 K/UL — HIGH (ref 1.8–7.4)
NEUTROPHILS NFR BLD AUTO: 76.4 % — SIGNIFICANT CHANGE UP (ref 43–77)
PLATELET # BLD AUTO: 284 K/UL — SIGNIFICANT CHANGE UP (ref 150–400)
POTASSIUM SERPL-MCNC: 3.2 MMOL/L — LOW (ref 3.5–5.3)
POTASSIUM SERPL-SCNC: 3.2 MMOL/L — LOW (ref 3.5–5.3)
RBC # BLD: 4.01 M/UL — SIGNIFICANT CHANGE UP (ref 3.8–5.2)
RBC # FLD: 16.2 % — HIGH (ref 11–15)
SODIUM SERPL-SCNC: 137 MMOL/L — SIGNIFICANT CHANGE UP (ref 135–145)
WBC # BLD: 13.4 K/UL — HIGH (ref 3.8–10.5)
WBC # FLD AUTO: 13.4 K/UL — HIGH (ref 3.8–10.5)

## 2017-12-18 PROCEDURE — 71010: CPT | Mod: 26

## 2017-12-18 RX ORDER — POTASSIUM CHLORIDE 20 MEQ
20 PACKET (EA) ORAL ONCE
Qty: 0 | Refills: 0 | Status: COMPLETED | OUTPATIENT
Start: 2017-12-18 | End: 2017-12-18

## 2017-12-18 RX ORDER — FLUCONAZOLE 150 MG/1
200 TABLET ORAL DAILY
Qty: 0 | Refills: 0 | Status: DISCONTINUED | OUTPATIENT
Start: 2017-12-19 | End: 2017-12-22

## 2017-12-18 RX ADMIN — Medication 20 MILLIEQUIVALENT(S): at 22:07

## 2017-12-18 RX ADMIN — Medication 1 APPLICATION(S): at 22:07

## 2017-12-18 RX ADMIN — AMIODARONE HYDROCHLORIDE 200 MILLIGRAM(S): 400 TABLET ORAL at 05:38

## 2017-12-18 RX ADMIN — CHLORHEXIDINE GLUCONATE 1 APPLICATION(S): 213 SOLUTION TOPICAL at 12:43

## 2017-12-18 RX ADMIN — Medication 1 APPLICATION(S): at 13:15

## 2017-12-18 RX ADMIN — SODIUM CHLORIDE 75 MILLILITER(S): 9 INJECTION, SOLUTION INTRAVENOUS at 22:08

## 2017-12-18 RX ADMIN — Medication 1 TABLET(S): at 17:41

## 2017-12-18 RX ADMIN — Medication 100 MILLIGRAM(S): at 05:41

## 2017-12-18 RX ADMIN — BISOPROLOL FUMARATE 2.5 MILLIGRAM(S): 10 TABLET, FILM COATED ORAL at 05:42

## 2017-12-18 RX ADMIN — Medication 1 APPLICATION(S): at 05:38

## 2017-12-18 RX ADMIN — Medication 100 MILLIGRAM(S): at 12:43

## 2017-12-18 RX ADMIN — Medication 100 MILLIGRAM(S): at 17:41

## 2017-12-18 RX ADMIN — Medication 100 MILLIGRAM(S): at 22:07

## 2017-12-18 RX ADMIN — Medication 1 TABLET(S): at 05:38

## 2017-12-18 RX ADMIN — HEPARIN SODIUM 5000 UNIT(S): 5000 INJECTION INTRAVENOUS; SUBCUTANEOUS at 17:41

## 2017-12-18 RX ADMIN — FLUCONAZOLE 100 MILLIGRAM(S): 150 TABLET ORAL at 12:43

## 2017-12-18 RX ADMIN — PANTOPRAZOLE SODIUM 40 MILLIGRAM(S): 20 TABLET, DELAYED RELEASE ORAL at 07:48

## 2017-12-18 NOTE — PROGRESS NOTE ADULT - EYES
EOMI; PERRL; no drainage or redness

## 2017-12-18 NOTE — PROGRESS NOTE ADULT - ASSESSMENT
having cough , Temp 101, leucocytosis. Chest X-ray ordered. will postpone procedure for tomorrow. Continue Gentamycin and Diflucan.

## 2017-12-18 NOTE — PROGRESS NOTE ADULT - SUBJECTIVE AND OBJECTIVE BOX
TMAX - 101    On day # 23 total rx  with Gent now for ESBL EColi / # 3 Diflucan now    Vital Signs Last 24 Hrs  T(C): 36.9 (18 Dec 2017 17:41), Max: 37.5 (18 Dec 2017 05:27)  T(F): 98.4 (18 Dec 2017 17:41), Max: 99.5 (18 Dec 2017 05:27)  HR: 67 (18 Dec 2017 17:41) (61 - 67)  BP: 141/65 (18 Dec 2017 17:41) (135/81 - 148/69)  BP(mean): --  RR: 18 (18 Dec 2017 17:41) (16 - 18)  SpO2: 97% (18 Dec 2017 17:41) (95% - 98%)  Supplemental O2:  on RA     Awake, alert, no c/o pain or SOB, but still with intermittent cough with minimal mucous production.  C/o discomfort from the Suprapubic Catheter as it is leaking urine and she feels wet.  Appetite remains poor.      PHYSICAL EXAM  General: awake, alert, pleasant and cooperative, sitting semi-upright in bed, coughing intermittently, in NAD   HEENT:  conj pink, sclerae anicteric, PERRLA, no oral lesions noted  Neck:  supple, no nodes noted  Heart: RR   Lungs:  few moist rhonchi which mostly clear after coughing  Abdomen:  soft, BS +, nontender to palpation                   no CVA or Spinal tenderness noted                   Suprapubic Catheter in place with bloody drainage at site - tubing and Higuera bag with livia-colored cloudy urine noted  Extremities:  no edema LE's                     LUE with Midline in place - site clean/ dressing dry and intact - placed 12/6  Skin:  warm, dry, no rash noted           few resolving ecchymoses on the arms      I&O's Summary :    17 Dec 2017 07:01  -  18 Dec 2017 07:00  --------------------------------------------------------  IN: 900 mL / OUT: 1475 mL / NET: -575 mL    18 Dec 2017 07:01  -  18 Dec 2017 18:00  --------------------------------------------------------  IN: 320 mL / OUT: 0 mL / NET: 320 mL      LABS:  CBC Full  -  ( 18 Dec 2017 10:10 )  WBC Count : 13.4 K/uL  Hemoglobin : 10.6 g/dL  Hematocrit : 32.4 %  Platelet Count - Automated : 284 K/uL  Mean Cell Volume : 80.9 fl  Mean Cell Hemoglobin : 26.5 pg  Mean Cell Hemoglobin Concentration : 32.8 gm/dL  Auto Neutrophil # : 10.3 K/uL  Auto Lymphocyte # : 1.6 K/uL  Auto Monocyte # : 1.4 K/uL  Auto Eosinophil # : 0.1 K/uL  Auto Basophil # : 0.1 K/uL  Auto Neutrophil % : 76.4 %  Auto Lymphocyte % : 11.6 %  Auto Monocyte % : 10.4 %  Auto Eosinophil % : 1.1 %  Auto Basophil % : 0.6 %    12-18    137  |  103  |  13  ----------------------------<  88  3.2<L>   |  26  |  1.29    Ca    8.3<L>      18 Dec 2017 10:10      Sedimentation Rate, Erythrocyte: 48 mm/hr (12-16 @ 07:02)      Gentamicin Level, Trough: 0.3 ug/mL (12-17 @ 11:01)        MICROBIOLOGY:  Specimen Source: .Blood Blood (12-16 @ 23:19)  Culture Results:   No growth to date. (12-16 @ 23:19)    Specimen Source: .Blood Blood (12-16 @ 23:19)  Culture Results:   No growth to date. (12-16 @ 23:19)    Specimen Source: .Body Fluid right nephrostomy tube placement (12-15 @ 17:55)  Culture Results:   Numerous Candida albicans (12-15 @ 17:55)          Radiology:  CXR -  12/18 -  < from: Xray Chest 1 View AP/PA. (12.18.17 @ 12:01) >  FINDINGS: Comparison radiographs including from 12/16/2017 are  available for review.    There is a poor inspiratory effort. The lungs are free of consolidation..   The heart and mediastinum are unchanged.  The bones are unchanged.    IMPRESSION: No consolidation. No significant interval change as compared   to the prior examination.        Impression:  Temps appear to be trending downwards now on rx with Gent and Diflucan now added for the Candida Albicans which grew from the urine/purulent material obtained from the Rt ureter when the IR procedure was done for placement of the Rt Ureteral Stent.  WBC's slightly increased but BUN/Creat appear to be improving.  Still with ongoing Tracheobronchitis from the RSV.      Suggestions:  Will continue present rx with Gent and Diflucan and change the Diflucan to 200mg/day now as her renal function is continuing to improve.  Follow-up temps and labs.  Await further  w/u.  Discussed with patient at bedside in detail.

## 2017-12-18 NOTE — PROGRESS NOTE ADULT - SUBJECTIVE AND OBJECTIVE BOX
Patient seen and examined bedside resting comfortably.  suprapubic cystostomy functioning weel.       T(F): 99.5 (12-18-17 @ 05:27), Max: 101 (12-17-17 @ 17:05)  HR: 63 (12-18-17 @ 05:27) (61 - 65)  BP: 148/69 (12-18-17 @ 05:27) (142/53 - 148/69)  RR: 18 (12-18-17 @ 05:27) (14 - 18)  SpO2: 98% (12-18-17 @ 05:27) (95% - 98%)          PHYSICAL EXAM:  General: NAD, WDWN  Neuro:  Alert & conscious  HEENT: NCAT, EOMI, conjunctiva clear  Lung: respirations nonlabored, good inspiratory effort  Abdomen: soft, NTND.   Extremities: no pedal edema or calf tenderness noted   LABS:                        10.6   13.4  )-----------( 284      ( 18 Dec 2017 10:10 )             32.4     12-18    137  |  103  |  13  ----------------------------<  88  3.2<L>   |  26  |  1.29    Ca    8.3<L>      18 Dec 2017 10:10        I&O's Detail    17 Dec 2017 07:01  -  18 Dec 2017 07:00  --------------------------------------------------------  IN:    dextrose 5% + sodium chloride 0.45%.: 900 mL  Total IN: 900 mL    OUT:    Indwelling Catheter - Suprapubic: 1475 mL  Total OUT: 1475 mL    Total NET: -575 mL

## 2017-12-19 ENCOUNTER — RESULT REVIEW (OUTPATIENT)
Age: 82
End: 2017-12-19

## 2017-12-19 ENCOUNTER — TRANSCRIPTION ENCOUNTER (OUTPATIENT)
Age: 82
End: 2017-12-19

## 2017-12-19 PROCEDURE — 88300 SURGICAL PATH GROSS: CPT | Mod: 26

## 2017-12-19 RX ORDER — SODIUM CHLORIDE 9 MG/ML
1000 INJECTION, SOLUTION INTRAVENOUS
Qty: 0 | Refills: 0 | Status: DISCONTINUED | OUTPATIENT
Start: 2017-12-19 | End: 2017-12-19

## 2017-12-19 RX ORDER — SODIUM CHLORIDE 9 MG/ML
1000 INJECTION, SOLUTION INTRAVENOUS
Qty: 0 | Refills: 0 | Status: DISCONTINUED | OUTPATIENT
Start: 2017-12-19 | End: 2017-12-22

## 2017-12-19 RX ORDER — MEROPENEM 1 G/30ML
1000 INJECTION INTRAVENOUS EVERY 8 HOURS
Qty: 0 | Refills: 0 | Status: DISCONTINUED | OUTPATIENT
Start: 2017-12-19 | End: 2017-12-20

## 2017-12-19 RX ORDER — POTASSIUM CHLORIDE 20 MEQ
40 PACKET (EA) ORAL ONCE
Qty: 0 | Refills: 0 | Status: COMPLETED | OUTPATIENT
Start: 2017-12-19 | End: 2017-12-19

## 2017-12-19 RX ORDER — FENTANYL CITRATE 50 UG/ML
25 INJECTION INTRAVENOUS
Qty: 0 | Refills: 0 | Status: DISCONTINUED | OUTPATIENT
Start: 2017-12-19 | End: 2017-12-19

## 2017-12-19 RX ADMIN — HEPARIN SODIUM 5000 UNIT(S): 5000 INJECTION INTRAVENOUS; SUBCUTANEOUS at 17:55

## 2017-12-19 RX ADMIN — PANTOPRAZOLE SODIUM 40 MILLIGRAM(S): 20 TABLET, DELAYED RELEASE ORAL at 06:36

## 2017-12-19 RX ADMIN — BISOPROLOL FUMARATE 2.5 MILLIGRAM(S): 10 TABLET, FILM COATED ORAL at 06:36

## 2017-12-19 RX ADMIN — Medication 100 MILLIGRAM(S): at 06:36

## 2017-12-19 RX ADMIN — MEROPENEM 100 MILLIGRAM(S): 1 INJECTION INTRAVENOUS at 23:50

## 2017-12-19 RX ADMIN — Medication 1 TABLET(S): at 06:36

## 2017-12-19 RX ADMIN — FLUCONAZOLE 200 MILLIGRAM(S): 150 TABLET ORAL at 11:07

## 2017-12-19 RX ADMIN — Medication 1 TABLET(S): at 17:55

## 2017-12-19 RX ADMIN — Medication 100 MILLIGRAM(S): at 17:55

## 2017-12-19 RX ADMIN — CHLORHEXIDINE GLUCONATE 1 APPLICATION(S): 213 SOLUTION TOPICAL at 11:13

## 2017-12-19 RX ADMIN — AMIODARONE HYDROCHLORIDE 200 MILLIGRAM(S): 400 TABLET ORAL at 06:36

## 2017-12-19 RX ADMIN — Medication 1 APPLICATION(S): at 06:36

## 2017-12-19 RX ADMIN — Medication 40 MILLIEQUIVALENT(S): at 18:03

## 2017-12-19 RX ADMIN — Medication 100 MILLIGRAM(S): at 13:32

## 2017-12-19 RX ADMIN — Medication 1 APPLICATION(S): at 23:51

## 2017-12-19 RX ADMIN — SODIUM CHLORIDE 60 MILLILITER(S): 9 INJECTION, SOLUTION INTRAVENOUS at 22:22

## 2017-12-19 RX ADMIN — Medication 1 APPLICATION(S): at 13:36

## 2017-12-19 NOTE — BRIEF OPERATIVE NOTE - PROCEDURE
<<-----Click on this checkbox to enter Procedure Ureteroscopy  12/05/2017  attempted right ureteroscopy  Active  Miguelito Bautista Ureteroscopic dilatation of ureter  12/20/2017  Right ureteral stricture and insertion of stent  Active  [facility]

## 2017-12-19 NOTE — PROGRESS NOTE ADULT - GUM GEN PE MLT EXAM PC
Normal genitalia; no lesions; no discharge
detailed exam

## 2017-12-19 NOTE — PROGRESS NOTE ADULT - SUBJECTIVE AND OBJECTIVE BOX
TMAX - 100.1    On day # 24 total rx for ESBL EColi with Gent now / # 4 Diflucan    Vital Signs Last 24 Hrs  T(C): 36.7 (19 Dec 2017 11:30), Max: 37.8 (18 Dec 2017 23:40)  T(F): 98.1 (19 Dec 2017 11:30), Max: 100.1 (18 Dec 2017 23:40)  HR: 59 (19 Dec 2017 11:30) (59 - 67)  BP: 138/64 (19 Dec 2017 11:30) (137/60 - 146/68)  BP(mean): --  RR: 18 (19 Dec 2017 11:30) (18 - 18)  SpO2: 95% (19 Dec 2017 11:30) (95% - 97%)  Supplemental O2:  on RA    Awake, alert, feels a little better today, but frustrated that her procedure was cancelled this AM after initially being prepped.  Still with cough, but no c/o SOB, no cp, and no c/o abdominal pain.  C/o that she is still leaking urine from around the Suprapubic Catheter site.  Apparently she refused to have her labs drawn this AM - agrees to have them done tomorrow AM.      PHYSICAL EXAM  General:  awake, alert, sitting semi-upright in bed now, coughing intermittently, in NAD, pleasant and cooperative  HEENT:  conj pink, sclerae anicteric, PERRLA, no oral lesions noted  Neck:  supple, no nodes noted  Heart:  RR  Lungs:  few moist rhonchi bilaterally mostly upper airway  Abdomen: soft, BS+, nontender to palpation                  Suprapubic Catheter in place with some bloody drainage at site, no erythema noted - draining cloudy livia-colored urine                  no CVA or Spinal tenderness noted   Extremities:  no edema LE's                     LUE Midline in place - site clean/ dressing intact and dry - placed 12/6  Skin:  warm, dry, no rash noted           resolving ecchymoses on both arms      I&O's Summary :    18 Dec 2017 07:01  -  19 Dec 2017 07:00  --------------------------------------------------------  IN: 2120 mL / OUT: 2000 mL / NET: 120 mL    19 Dec 2017 07:01  -  19 Dec 2017 14:58  --------------------------------------------------------  IN: 210 mL / OUT: 0 mL / NET: 210 mL      LABS:  CBC Full  -  ( 18 Dec 2017 10:10 )  WBC Count : 13.4 K/uL  Hemoglobin : 10.6 g/dL  Hematocrit : 32.4 %  Platelet Count - Automated : 284 K/uL  Mean Cell Volume : 80.9 fl  Mean Cell Hemoglobin : 26.5 pg  Mean Cell Hemoglobin Concentration : 32.8 gm/dL  Auto Neutrophil # : 10.3 K/uL  Auto Lymphocyte # : 1.6 K/uL  Auto Monocyte # : 1.4 K/uL  Auto Eosinophil # : 0.1 K/uL  Auto Basophil # : 0.1 K/uL  Auto Neutrophil % : 76.4 %  Auto Lymphocyte % : 11.6 %  Auto Monocyte % : 10.4 %  Auto Eosinophil % : 1.1 %  Auto Basophil % : 0.6 %    12-18    137  |  103  |  13  ----------------------------<  88  3.2<L>   |  26  |  1.29    Ca    8.3<L>      18 Dec 2017 10:10    Sedimentation Rate, Erythrocyte: 48 mm/hr (12-16 @ 07:02)      Gentamicin Level, Trough: 0.3 ug/mL (12-17 @ 11:01)        MICROBIOLOGY:  Specimen Source: .Blood Blood (12-16 @ 23:19)  Culture Results:   No growth to date. (12-16 @ 23:19)    Specimen Source: .Blood Blood (12-16 @ 23:19)  Culture Results:   No growth to date. (12-16 @ 23:19)    Specimen Source: .Body Fluid right nephrostomy tube placement (12-15 @ 17:55)  Culture Results:   Numerous Candida albicans (12-15 @ 17:55)          Impression:  Temps trending downwards now on present ab rx with Gent + Diflucan for rx of Sepsis of  origin/ Pyelonephritis with ESBL EColi and now with Candida Albicans, s/p Rt Ureteral Stent placement and with intercurrent RSV Tracheobronchitis.       Suggestions:  Will continue present ab rx and follow-up temps and labs.  Await Urologic procedure - ? to be done tonight?  Discussed with patient in detail at bedside.

## 2017-12-20 LAB
ANION GAP SERPL CALC-SCNC: 8 MMOL/L — SIGNIFICANT CHANGE UP (ref 5–17)
BASOPHILS # BLD AUTO: 0.1 K/UL — SIGNIFICANT CHANGE UP (ref 0–0.2)
BASOPHILS NFR BLD AUTO: 0.7 % — SIGNIFICANT CHANGE UP (ref 0–2)
BUN SERPL-MCNC: 15 MG/DL — SIGNIFICANT CHANGE UP (ref 7–23)
CALCIUM SERPL-MCNC: 8.5 MG/DL — SIGNIFICANT CHANGE UP (ref 8.5–10.1)
CHLORIDE SERPL-SCNC: 106 MMOL/L — SIGNIFICANT CHANGE UP (ref 96–108)
CO2 SERPL-SCNC: 26 MMOL/L — SIGNIFICANT CHANGE UP (ref 22–31)
CREAT SERPL-MCNC: 1.62 MG/DL — HIGH (ref 0.5–1.3)
CULTURE RESULTS: SIGNIFICANT CHANGE UP
EOSINOPHIL # BLD AUTO: 0.4 K/UL — SIGNIFICANT CHANGE UP (ref 0–0.5)
EOSINOPHIL NFR BLD AUTO: 3.2 % — SIGNIFICANT CHANGE UP (ref 0–6)
GLUCOSE SERPL-MCNC: 92 MG/DL — SIGNIFICANT CHANGE UP (ref 70–99)
GRAM STN FLD: SIGNIFICANT CHANGE UP
HCT VFR BLD CALC: 30.1 % — LOW (ref 34.5–45)
HGB BLD-MCNC: 9.7 G/DL — LOW (ref 11.5–15.5)
LYMPHOCYTES # BLD AUTO: 1.7 K/UL — SIGNIFICANT CHANGE UP (ref 1–3.3)
LYMPHOCYTES # BLD AUTO: 15.5 % — SIGNIFICANT CHANGE UP (ref 13–44)
MCHC RBC-ENTMCNC: 26.2 PG — LOW (ref 27–34)
MCHC RBC-ENTMCNC: 32.1 GM/DL — SIGNIFICANT CHANGE UP (ref 32–36)
MCV RBC AUTO: 81.7 FL — SIGNIFICANT CHANGE UP (ref 80–100)
MONOCYTES # BLD AUTO: 1.3 K/UL — HIGH (ref 0–0.9)
MONOCYTES NFR BLD AUTO: 11.6 % — SIGNIFICANT CHANGE UP (ref 2–14)
NEUTROPHILS # BLD AUTO: 7.5 K/UL — HIGH (ref 1.8–7.4)
NEUTROPHILS NFR BLD AUTO: 68.9 % — SIGNIFICANT CHANGE UP (ref 43–77)
PLATELET # BLD AUTO: 359 K/UL — SIGNIFICANT CHANGE UP (ref 150–400)
POTASSIUM SERPL-MCNC: 3.8 MMOL/L — SIGNIFICANT CHANGE UP (ref 3.5–5.3)
POTASSIUM SERPL-SCNC: 3.8 MMOL/L — SIGNIFICANT CHANGE UP (ref 3.5–5.3)
RBC # BLD: 3.69 M/UL — LOW (ref 3.8–5.2)
RBC # FLD: 16.5 % — HIGH (ref 11–15)
SODIUM SERPL-SCNC: 140 MMOL/L — SIGNIFICANT CHANGE UP (ref 135–145)
SPECIMEN SOURCE: SIGNIFICANT CHANGE UP
WBC # BLD: 10.9 K/UL — HIGH (ref 3.8–10.5)
WBC # FLD AUTO: 10.9 K/UL — HIGH (ref 3.8–10.5)

## 2017-12-20 RX ADMIN — HEPARIN SODIUM 5000 UNIT(S): 5000 INJECTION INTRAVENOUS; SUBCUTANEOUS at 17:18

## 2017-12-20 RX ADMIN — BISOPROLOL FUMARATE 2.5 MILLIGRAM(S): 10 TABLET, FILM COATED ORAL at 06:07

## 2017-12-20 RX ADMIN — HEPARIN SODIUM 5000 UNIT(S): 5000 INJECTION INTRAVENOUS; SUBCUTANEOUS at 06:07

## 2017-12-20 RX ADMIN — Medication 1 APPLICATION(S): at 22:47

## 2017-12-20 RX ADMIN — Medication 1 APPLICATION(S): at 13:04

## 2017-12-20 RX ADMIN — Medication 1 TABLET(S): at 17:22

## 2017-12-20 RX ADMIN — CHLORHEXIDINE GLUCONATE 1 APPLICATION(S): 213 SOLUTION TOPICAL at 11:13

## 2017-12-20 RX ADMIN — SODIUM CHLORIDE 75 MILLILITER(S): 9 INJECTION, SOLUTION INTRAVENOUS at 00:58

## 2017-12-20 RX ADMIN — Medication 100 MILLIGRAM(S): at 12:24

## 2017-12-20 RX ADMIN — Medication 1 TABLET(S): at 06:06

## 2017-12-20 RX ADMIN — Medication 100 MILLIGRAM(S): at 17:18

## 2017-12-20 RX ADMIN — PANTOPRAZOLE SODIUM 40 MILLIGRAM(S): 20 TABLET, DELAYED RELEASE ORAL at 06:06

## 2017-12-20 RX ADMIN — MEROPENEM 100 MILLIGRAM(S): 1 INJECTION INTRAVENOUS at 13:03

## 2017-12-20 RX ADMIN — MEROPENEM 100 MILLIGRAM(S): 1 INJECTION INTRAVENOUS at 06:07

## 2017-12-20 RX ADMIN — Medication 100 MILLIGRAM(S): at 06:07

## 2017-12-20 RX ADMIN — Medication 1 APPLICATION(S): at 06:07

## 2017-12-20 RX ADMIN — FLUCONAZOLE 200 MILLIGRAM(S): 150 TABLET ORAL at 11:12

## 2017-12-20 RX ADMIN — AMIODARONE HYDROCHLORIDE 200 MILLIGRAM(S): 400 TABLET ORAL at 06:06

## 2017-12-20 NOTE — PROGRESS NOTE ADULT - SKIN/BREAST
details…
negative

## 2017-12-20 NOTE — PROGRESS NOTE ADULT - LYMPH NODES
No lymphadedenopathy

## 2017-12-20 NOTE — PROGRESS NOTE ADULT - SUBJECTIVE AND OBJECTIVE BOX
Patient seen and examined bedside resting comfortably.   No complaints offered. No acute overnight event.   SPC functioning well.   Tolerating diet, Denies nausea and vomiting.  Denies fevers, cp, palpitations, dyspnea.     T(F): 98.7 (12-20-17 @ 10:00), Max: 98.8 (12-20-17 @ 06:00)  HR: 76 (12-20-17 @ 10:00) (59 - 76)  BP: 124/64 (12-20-17 @ 10:00) (114/60 - 143/57)  RR: 18 (12-20-17 @ 10:00) (14 - 23)  SpO2: 97% (12-20-17 @ 10:00) (94% - 100%)    PHYSICAL EXAM:  General: NAD, alert and awake  Chest: nonlabored respirations, CTA b/l.  Abdomen: soft, NT/ND.   Extremities: no pedal edema or calf tenderness noted.   LUE- midline intact, minimal erythema and swelling at site, nontender. Midline flushes well.  : SPC intact, draining clear yellow urine with +sediment in tubing.  (1280cc/24hr)    LABS:                        9.7    10.9  )-----------( 359      ( 20 Dec 2017 06:25 )             30.1   12-20    140  |  106  |  15  ----------------------------<  92  3.8   |  26  |  1.62<H>    Ca    8.5      20 Dec 2017 06:25    I&O's Detail    19 Dec 2017 07:01  -  20 Dec 2017 07:00  --------------------------------------------------------  IN:    lactated ringers.: 725 mL    lactated ringers.: 60 mL    Oral Fluid: 360 mL    Solution: 200 mL  Total IN: 1345 mL    OUT:    Indwelling Catheter - Suprapubic: 1280 mL  Total OUT: 1280 mL    Total NET: 65 mL    Assessment: 88yo Female PMH Ventricular bigeminy, MI, High cholesterol, HTN admitted with sepsis due to recurrent ecoli UTI s/p SPC insertion last month for UR, now with right hydroureteronephrosis POD#14 s/p SPC exchange and failed ureteroscopy due to stricture now with URI. Gram stain + yeast, now POD #1 s/p right ureteroscopy with dilation of right ureteral stricture and right ureteral stent insertion.    Plan:  - continue SPC, monitor urine output and quality  - Continue ID f/u, antibiotics per ID  - continue medical management and supportive care  - f/u labs, trend renal function, trend WBC  - discussed with Dr. Bautista

## 2017-12-20 NOTE — PROGRESS NOTE ADULT - SUBJECTIVE AND OBJECTIVE BOX
TMAX - 99.7    On Gent now day # 25 total rx for ESBL EColi / # 5 Diflucan    Vital Signs Last 24 Hrs  T(C): 36.8 (20 Dec 2017 13:47), Max: 37.1 (19 Dec 2017 17:25)  T(F): 98.3 (20 Dec 2017 13:47), Max: 98.8 (20 Dec 2017 06:00)  HR: 72 (20 Dec 2017 13:47) (60 - 76)  BP: 121/64 (20 Dec 2017 13:47) (114/60 - 143/57)  BP(mean): --  RR: 18 (20 Dec 2017 13:47) (14 - 23)  SpO2: 96% (20 Dec 2017 13:47) (94% - 100%)  Supplemental O2: on RA     Awake, alert, s/p Rt Ureteroscopy with dilatation of the Rt Ureteral stricture and exchange of the Rt Ureteral Stent last PM in the OR.  No c/o pain, no SOB, feels better today.  Still with intermittent cough but seems to slowly be resolving.  Appetite remains poor though.  Looking forward to going home.       PHYSICAL EXAM  General:  awake, alert, sitting upright in bed, smiling today, pleasant and cooperative, in NAD  HEENT:  conj pink, sclerae anicteric, PERRLA, no oral lesions noted  Neck:  supple, no nodes noted  Heart: RR   Lungs:  clear bilaterally  Abdomen:  soft, BS +, nontender to palpation                   no CVA or Spinal tenderness noted                   Suprapubic Catheter in place and draining cloudy livia-colored urine - site with small amount of bloody/tan drainage, but no erythema noted  Extremities:  no edema LE's                    LUE Midline in place - site clean/ dressing intact - placed 12/6  Skin:  warm, dry, no rash noted      I&O's Summary :    19 Dec 2017 07:01  -  20 Dec 2017 07:00  --------------------------------------------------------  IN: 1345 mL / OUT: 1280 mL / NET: 65 mL      LABS:  CBC Full  -  ( 20 Dec 2017 06:25 )  WBC Count : 10.9 K/uL  Hemoglobin : 9.7 g/dL  Hematocrit : 30.1 %  Platelet Count - Automated : 359 K/uL  Mean Cell Volume : 81.7 fl  Mean Cell Hemoglobin : 26.2 pg  Mean Cell Hemoglobin Concentration : 32.1 gm/dL  Auto Neutrophil # : 7.5 K/uL  Auto Lymphocyte # : 1.7 K/uL  Auto Monocyte # : 1.3 K/uL  Auto Eosinophil # : 0.4 K/uL  Auto Basophil # : 0.1 K/uL  Auto Neutrophil % : 68.9 %  Auto Lymphocyte % : 15.5 %  Auto Monocyte % : 11.6 %  Auto Eosinophil % : 3.2 %  Auto Basophil % : 0.7 %    12-20    140  |  106  |  15  ----------------------------<  92  3.8   |  26  |  1.62<H>    Ca    8.5      20 Dec 2017 06:25      Sedimentation Rate, Erythrocyte: 48 mm/hr (12-16 @ 07:02)      MICROBIOLOGY:  Specimen Source: .Blood Blood (12-16 @ 23:19)  Culture Results:   No growth to date. (12-16 @ 23:19)    Specimen Source: .Blood Blood (12-16 @ 23:19)  Culture Results:   No growth to date. (12-16 @ 23:19)    Specimen Source: .Body Fluid right nephrostomy tube placement (12-15 @ 17:55)  Culture Results:   Numerous Candida albicans (12-15 @ 17:55)        Impression:  Clinically improving on present ab rx with Gent + Diflucan for ESBL EColi and now Candida Albicans Pyelonephritis with underlying Neurogenic Bladder and Rt [place] - now s/p Rt Ureteroscopy with dilatation of the Rt Ureteral stricture and exchange of the Rt Ureteral Stent.  Slowly resolving RSV Tracheobronchitis.    Suggestions: If patient remains afebrile and clinically improved, would d/c Gent tomorrow, d/c the Midline, and would continue the Diflucan 200mg/day for 3 more weeks of rx.  Discussed in detail with patient at bedside and patient's son via phone.

## 2017-12-20 NOTE — CHART NOTE - NSCHARTNOTEFT_GEN_A_CORE
Assessment:   Sepsis due to recurrent E.Coli UT; now s/p right ureteroscopy and dilation of right ureteral stricture and right ureteral stent insertion; POD #1    Factors impacting intake: [ ] none [ ] nausea  [ ] vomiting [ ] diarrhea [ ] constipation  [ ]chewing problems [ ] swallowing issues  [X ] other: persistent dereased appetite    Diet Presciption: Diet, Regular:   Low Sodium  Supplement Feeding Modality:  Oral  Ensure Enlive Cans or Servings Per Day:  1       Frequency:  Two Times a day (12-14-17 @ 11:27)    Intake: 50% most meals; pt reports consuming supplement    Current Weight: Weight (kg): 69.4 (12/18); previous 74.4 kg (12/11); loss of 5 kg  % Weight Change: 7% x 1 week; pt now back to baseline wt    Pertinent Medications: MEDICATIONS  (STANDING):  amiodarone    Tablet 200 milliGRAM(s) Oral daily  bisoprolol   Tablet 2.5 milliGRAM(s) Oral daily  chlorhexidine 4% Liquid 1 Application(s) Topical daily  docusate sodium 100 milliGRAM(s) Oral two times a day  fluconAZOLE   Tablet 200 milliGRAM(s) Oral daily  gentamicin   IVPB 80 milliGRAM(s) IV Intermittent every 24 hours  heparin  Injectable 5000 Unit(s) SubCutaneous every 12 hours  hydrocortisone 2.5% Cream 1 Application(s) Topical three times a day  lxactated ringers. 1000 milliLiter(s) (75 mL/Hr) IV Continuous <Continuous>  lactobacillus acidophilus 1 Tablet(s) Oral every 12 hours  pantoprazole    Tablet 40 milliGRAM(s) Oral before breakfast  Rosuvastatin 10 milliGRxAM(s),Rosuvastatin 10mg 10 milliGRAM(s) 10 milliGRAM(s) Oral at bedtime    MEDICATIONS  (PRN):  acetaminophen   Tablet 650 milliGRAM(s) Oral every 6 hours PRN For Temp greater than 38 C (100.4 F)  ALBUTerol    90 MICROgram(s) HFA Inhaler 2 Puff(s) Inhalation every 6 hours PRN Bronchospasm  aluminum hydroxide/magnesium hydroxide/simethicone Suspension 30 milliLiter(s) Oral every 4 hours PRN Dyspepsia  guaiFENesin    Syrup 100 milliGRAM(s) Oral every 6 hours PRN Cough  senna 2 Tablet(s) Oral at bedtime PRN Constipation    Pertinent Labs: 12-20 Na140 mmol/L Glu 92 mg/dL K+ 3.8 mmol/L Cr  1.62 mg/dL<H> BUN 15 mg/dL Phos n/a   Alb n/a   PAB n/a, GFR 28        CAPILLARY BLOOD GLUCOSE        Skin:  WDL; no edema noted    Estimated Needs:   [X ] no change since previous assessment  [ ] recalculated:     Previous Nutrition Diagnosis:   [ ] Inadequate Energy Intake [ ]Inadequate Oral Intake [ ] Excessive Energy Intake   [ ] Underweight [ ] Increased Nutrient Needs [ ] Overweight/Obesity   [ ] Altered GI Function [ ] Unintended Weight Loss [ ] Food & Nutrition Related Knowledge Deficit  [X ] Moderate Malnutrition     Nutrition Diagnosis is [X ] ongoing  [ ] resolved [ ] not applicable     New Nutrition Diagnosis: [X ] not applicable       Interventions:  continue current diet rx  Recommend  [ ] Change Diet To:  [ ] Nutrition Supplement  [ ] Nutrition Support  [ ] Other:     Monitoring and Evaluation:   [X ] PO intake [ x ] Tolerance to diet prescription [ x ] weights [ x ] labs[ x ] follow up per protocol  [ ] other:

## 2017-12-21 ENCOUNTER — TRANSCRIPTION ENCOUNTER (OUTPATIENT)
Age: 82
End: 2017-12-21

## 2017-12-21 LAB
ANION GAP SERPL CALC-SCNC: 7 MMOL/L — SIGNIFICANT CHANGE UP (ref 5–17)
BUN SERPL-MCNC: 14 MG/DL — SIGNIFICANT CHANGE UP (ref 7–23)
CALCIUM SERPL-MCNC: 8.8 MG/DL — SIGNIFICANT CHANGE UP (ref 8.5–10.1)
CHLORIDE SERPL-SCNC: 103 MMOL/L — SIGNIFICANT CHANGE UP (ref 96–108)
CO2 SERPL-SCNC: 28 MMOL/L — SIGNIFICANT CHANGE UP (ref 22–31)
CREAT SERPL-MCNC: 1.36 MG/DL — HIGH (ref 0.5–1.3)
GLUCOSE SERPL-MCNC: 77 MG/DL — SIGNIFICANT CHANGE UP (ref 70–99)
HCT VFR BLD CALC: 31.8 % — LOW (ref 34.5–45)
HGB BLD-MCNC: 10.1 G/DL — LOW (ref 11.5–15.5)
MCHC RBC-ENTMCNC: 26.2 PG — LOW (ref 27–34)
MCHC RBC-ENTMCNC: 31.9 GM/DL — LOW (ref 32–36)
MCV RBC AUTO: 82.2 FL — SIGNIFICANT CHANGE UP (ref 80–100)
PLATELET # BLD AUTO: 372 K/UL — SIGNIFICANT CHANGE UP (ref 150–400)
POTASSIUM SERPL-MCNC: 3.7 MMOL/L — SIGNIFICANT CHANGE UP (ref 3.5–5.3)
POTASSIUM SERPL-SCNC: 3.7 MMOL/L — SIGNIFICANT CHANGE UP (ref 3.5–5.3)
RBC # BLD: 3.86 M/UL — SIGNIFICANT CHANGE UP (ref 3.8–5.2)
RBC # FLD: 16.3 % — HIGH (ref 11–15)
SODIUM SERPL-SCNC: 138 MMOL/L — SIGNIFICANT CHANGE UP (ref 135–145)
SURGICAL PATHOLOGY FINAL REPORT - CH: SIGNIFICANT CHANGE UP
WBC # BLD: 10.8 K/UL — HIGH (ref 3.8–10.5)
WBC # FLD AUTO: 10.8 K/UL — HIGH (ref 3.8–10.5)

## 2017-12-21 RX ORDER — DOCUSATE SODIUM 100 MG
1 CAPSULE ORAL
Qty: 60 | Refills: 0 | OUTPATIENT
Start: 2017-12-21 | End: 2018-01-19

## 2017-12-21 RX ORDER — BISOPROLOL FUMARATE 10 MG/1
0.5 TABLET, FILM COATED ORAL
Qty: 0 | Refills: 0 | COMMUNITY
Start: 2017-12-21

## 2017-12-21 RX ORDER — LACTOBACILLUS ACIDOPHILUS 100MM CELL
1 CAPSULE ORAL
Qty: 60 | Refills: 0 | OUTPATIENT
Start: 2017-12-21 | End: 2018-01-19

## 2017-12-21 RX ORDER — ALBUTEROL 90 UG/1
2 AEROSOL, METERED ORAL
Qty: 0 | Refills: 0 | COMMUNITY
Start: 2017-12-21

## 2017-12-21 RX ORDER — SENNA PLUS 8.6 MG/1
2 TABLET ORAL
Qty: 60 | Refills: 0 | OUTPATIENT
Start: 2017-12-21 | End: 2018-01-19

## 2017-12-21 RX ORDER — ZOLPIDEM TARTRATE 10 MG/1
1 TABLET ORAL
Qty: 0 | Refills: 0 | COMMUNITY

## 2017-12-21 RX ORDER — FLUCONAZOLE 150 MG/1
1 TABLET ORAL
Qty: 42 | Refills: 0 | OUTPATIENT
Start: 2017-12-21 | End: 2018-01-31

## 2017-12-21 RX ORDER — AMIODARONE HYDROCHLORIDE 400 MG/1
1 TABLET ORAL
Qty: 0 | Refills: 0 | COMMUNITY
Start: 2017-12-21

## 2017-12-21 RX ORDER — FUROSEMIDE 40 MG
1 TABLET ORAL
Qty: 0 | Refills: 0 | COMMUNITY

## 2017-12-21 RX ORDER — PANTOPRAZOLE SODIUM 20 MG/1
1 TABLET, DELAYED RELEASE ORAL
Qty: 0 | Refills: 0 | COMMUNITY
Start: 2017-12-21

## 2017-12-21 RX ADMIN — HEPARIN SODIUM 5000 UNIT(S): 5000 INJECTION INTRAVENOUS; SUBCUTANEOUS at 05:12

## 2017-12-21 RX ADMIN — BISOPROLOL FUMARATE 2.5 MILLIGRAM(S): 10 TABLET, FILM COATED ORAL at 05:12

## 2017-12-21 RX ADMIN — SODIUM CHLORIDE 75 MILLILITER(S): 9 INJECTION, SOLUTION INTRAVENOUS at 17:56

## 2017-12-21 RX ADMIN — CHLORHEXIDINE GLUCONATE 1 APPLICATION(S): 213 SOLUTION TOPICAL at 12:40

## 2017-12-21 RX ADMIN — Medication 1 APPLICATION(S): at 23:05

## 2017-12-21 RX ADMIN — SODIUM CHLORIDE 75 MILLILITER(S): 9 INJECTION, SOLUTION INTRAVENOUS at 23:10

## 2017-12-21 RX ADMIN — Medication 100 MILLIGRAM(S): at 05:19

## 2017-12-21 RX ADMIN — HEPARIN SODIUM 5000 UNIT(S): 5000 INJECTION INTRAVENOUS; SUBCUTANEOUS at 17:55

## 2017-12-21 RX ADMIN — Medication 1 TABLET(S): at 17:55

## 2017-12-21 RX ADMIN — FLUCONAZOLE 200 MILLIGRAM(S): 150 TABLET ORAL at 12:41

## 2017-12-21 RX ADMIN — Medication 100 MILLIGRAM(S): at 17:56

## 2017-12-21 RX ADMIN — Medication 100 MILLIGRAM(S): at 23:08

## 2017-12-21 RX ADMIN — Medication 100 MILLIGRAM(S): at 12:40

## 2017-12-21 RX ADMIN — Medication 1 APPLICATION(S): at 13:46

## 2017-12-21 RX ADMIN — AMIODARONE HYDROCHLORIDE 200 MILLIGRAM(S): 400 TABLET ORAL at 05:11

## 2017-12-21 RX ADMIN — PANTOPRAZOLE SODIUM 40 MILLIGRAM(S): 20 TABLET, DELAYED RELEASE ORAL at 07:43

## 2017-12-21 RX ADMIN — Medication 1 APPLICATION(S): at 05:19

## 2017-12-21 RX ADMIN — Medication 1 TABLET(S): at 05:11

## 2017-12-21 NOTE — PROGRESS NOTE ADULT - SUBJECTIVE AND OBJECTIVE BOX
Patient seen and examined bedside resting comfortably.   No complaints offered. No acute overnight event.   SPC functioning well.   Tolerating diet, although states she is not eating a lot, does not have an appetite.  Denies nausea and vomiting.  Denies fevers, cp, palpitations, dyspnea.    T(F): 96.8 (12-21-17 @ 05:23), Max: 98.7 (12-20-17 @ 10:00)  HR: 64 (12-21-17 @ 05:23) (64 - 93)  BP: 148/62 (12-21-17 @ 05:23) (108/55 - 148/62)  RR: 18 (12-21-17 @ 05:23) (18 - 18)  SpO2: 99% (12-21-17 @ 05:23) (95% - 99%)    PHYSICAL EXAM:  General: NAD, alert and awake.  Chest: nonlabored respirations, CTA b/l.  Abdomen: soft, NT/ND.   : SPC intact, draining clear yellow urine with +sediment in tubing.  (1800cc/24hr)  Extremities: no pedal edema or calf tenderness noted.       LABS:                        10.1   10.8  )-----------( 372      ( 21 Dec 2017 07:55 )             31.8   12-20    140  |  106  |  15  ----------------------------<  92  3.8   |  26  |  1.62<H>    Ca    8.5      20 Dec 2017 06:25      I&O's Detail    20 Dec 2017 07:01  -  21 Dec 2017 07:00  --------------------------------------------------------  IN:  Total IN: 0 mL    OUT:    Indwelling Catheter - Suprapubic: 1800 mL  Total OUT: 1800 mL    Total NET: -1800 mL    Assessment: 86yo Female PMH [facility], [place], High cholesterol, HTN admitted with sepsis due to recurrent ecoli UTI s/p SPC insertion last month for UR, now with right hydroureteronephrosis POD#15 s/p SPC exchange and failed ureteroscopy due to stricture now with URI. Gram stain + yeast, now POD #2 s/p right ureteroscopy with dilation of right ureteral stricture and right ureteral stent insertion. Afebrile.     Plan:  - continue SPC, monitor urine output and quality  - ID follow up appreciated-- patient can be discharged on PO diflucan for 3 more weeks if remains afebrile and clinically improved.  - continue medical management and supportive care  - f/u labs, trend renal function  - patient  stable for discharge with SPC  - discussed with Dr. Bautista

## 2017-12-21 NOTE — DISCHARGE NOTE ADULT - PLAN OF CARE
Resolved Continue SPC + follow up with Dr. Bautista within 2 weeks  Ureteral stent exchange every 3 months Continue SPC + follow up with Dr. Bautista within 2 weeks  Continue PO ABX x 3 weeks + follow up with Dr. Choi  Ureteral stent exchange every 3 months Continue home blood pressure medications  Follow up with PCP  Low-sodium diet  AHA Recipes - [email or web address] Follow up with PMD to adjust BP meds  Low-sodium diet  AHA Recipes - [email or web address]

## 2017-12-21 NOTE — DISCHARGE NOTE ADULT - HOSPITAL COURSE
88 yo lady with acute MS changes along with temp to 104. With fever and chills. Patient noted to have poor urine OP and poorly smelling urine.     Pt's fever resolved s/p ureteroscopy. Pt to continue with SPC and follow up with Dr. Bautista and Dr. Choi as outpt. Complete 3 more weeks PO ABX. 86 yo lady with acute MS changes along with temp to 104. With fever and chills. Patient noted to have poor urine OP and poorly smelling urine.     Pt's fever resolved s/p ureteroscopy. Pt to continue with SPC and follow up with Dr. Bautista and Dr. Choi as outpt. Complete 3 more weeks PO ABX. Follow up with PMD within 1 week.

## 2017-12-21 NOTE — DISCHARGE NOTE ADULT - CARE PLAN
Principal Discharge DX:	Fever, unspecified fever cause  Goal:	Resolved  Instructions for follow-up, activity and diet:	Continue SPC + follow up with Dr. Bautista within 2 weeks  Ureteral stent exchange every 3 months  Secondary Diagnosis:	FÉLIX (acute kidney injury)  Instructions for follow-up, activity and diet:	Resolved  Secondary Diagnosis:	Acute pyelonephritis with renal medullary necrosis  Instructions for follow-up, activity and diet:	Continue SPC + follow up with Dr. Bautista within 2 weeks  Continue PO ABX x 3 weeks + follow up with Dr. Choi  Ureteral stent exchange every 3 months  Secondary Diagnosis:	HTN (hypertension)  Instructions for follow-up, activity and diet:	Continue home blood pressure medications  Follow up with PCP  Low-sodium diet  AHA Recipes - [email or web address]  Secondary Diagnosis:	Hydroureteronephrosis  Instructions for follow-up, activity and diet:	Continue SPC + follow up with Dr. Bautista within 2 weeks  Ureteral stent exchange every 3 months  Secondary Diagnosis:	Septic encephalopathy  Instructions for follow-up, activity and diet:	Continue SPC + follow up with Dr. Bautista within 2 weeks  Ureteral stent exchange every 3 months Principal Discharge DX:	Fever, unspecified fever cause  Goal:	Resolved  Instructions for follow-up, activity and diet:	Continue SPC + follow up with Dr. Bautista within 2 weeks  Ureteral stent exchange every 3 months  Secondary Diagnosis:	FÉLIX (acute kidney injury)  Instructions for follow-up, activity and diet:	Resolved  Secondary Diagnosis:	Acute pyelonephritis with renal medullary necrosis  Instructions for follow-up, activity and diet:	Continue SPC + follow up with Dr. Bautista within 2 weeks  Continue PO ABX x 3 weeks + follow up with Dr. Choi  Ureteral stent exchange every 3 months  Secondary Diagnosis:	HTN (hypertension)  Instructions for follow-up, activity and diet:	Follow up with PMD to adjust BP meds  Low-sodium diet  AHA Recipes - [email or web address]  Secondary Diagnosis:	Hydroureteronephrosis  Instructions for follow-up, activity and diet:	Continue SPC + follow up with Dr. Bautista within 2 weeks  Ureteral stent exchange every 3 months  Secondary Diagnosis:	Septic encephalopathy  Instructions for follow-up, activity and diet:	Continue SPC + follow up with Dr. Bautista within 2 weeks  Ureteral stent exchange every 3 months

## 2017-12-21 NOTE — DISCHARGE NOTE ADULT - PATIENT PORTAL LINK FT
“You can access the [facility] Patient Portal, offered by [facility], by registering with the following website: [email or web address]”

## 2017-12-21 NOTE — PROGRESS NOTE ADULT - EXTREMITIES
No cyanosis, clubbing or edema

## 2017-12-21 NOTE — PROGRESS NOTE ADULT - VASCULAR
Equal and normal pulses (carotid, femoral, dorsalis pedis)

## 2017-12-21 NOTE — DISCHARGE NOTE ADULT - MEDICATION SUMMARY - MEDICATIONS TO TAKE
I will START or STAY ON the medications listed below when I get home from the hospital:    acetaminophen 325 mg oral tablet  -- 2 tab(s) by mouth every 6 hours, As needed, Mild Pain (1 - 3)  -- Indication: For Pain    amiodarone 200 mg oral tablet  -- 1 tab(s) by mouth once a day  -- Indication: For Dysrhythmia    fluconazole 200 mg oral tablet  -- 1 tab(s) by mouth once a day  -- Indication: For UTI    rosuvastatin 10 mg oral tablet  -- 1 tab(s) by mouth once a day (at bedtime)  -- Indication: For HLD    clopidogrel 75 mg oral tablet  -- 1 tab(s) by mouth once a day  -- Indication: For Preventative    bisoprolol 5 mg oral tablet  -- 0.5 tab(s) by mouth once a day  -- Indication: For HTN (hypertension)    albuterol 90 mcg/inh inhalation aerosol  -- 2 puff(s) inhaled every 6 hours, As needed, Bronchospasm  -- Indication: For Asthma    guaiFENesin 100 mg/5 mL oral liquid  -- 5 milliliter(s) by mouth every 6 hours, As needed, Cough  -- Indication: For Cough    docusate sodium 100 mg oral capsule  -- 1 cap(s) by mouth 2 times a day  -- Indication: For Constipation    senna oral tablet  -- 2 tab(s) by mouth once a day (at bedtime), As needed, Constipation  -- Indication: For Constipation    lactobacillus acidophilus oral capsule  -- 1 cap(s) by mouth 2 times a day   -- Indication: For Probiotic    pantoprazole 40 mg oral delayed release tablet  -- 1 tab(s) by mouth once a day (before a meal)  -- Indication: For GERD

## 2017-12-21 NOTE — DISCHARGE NOTE ADULT - CARE PROVIDER_API CALL
Jimmie Encinas), Medicine  [address] [place] [address]  Phone: [phone]  Fax: [phone]    Yvette Choi), Internal Medicine  [address] [place] [address]  Phone: [phone]  Fax: [phone]    Miguelito Bautista), Urology  [address] [place] [address]  Phone: [phone]  Fax: [phone]

## 2017-12-21 NOTE — CHART NOTE - NSCHARTNOTEFT_GEN_A_CORE
Hospitalist Medicine PA    Spoke w/ Dr. Choi. Per Dr. Choi patient IV Gentamicin course is complete as of 12/20. Per  aid and son will not be available until 12/22 so pt will keep midline and IV ABX until discharge on 12/22. Midline to be removed prior to discharge.

## 2017-12-21 NOTE — PROGRESS NOTE ADULT - NS NEC GEN PE MLT EXAM PC
No bruits; no thyromegaly or nodules

## 2017-12-21 NOTE — PROGRESS NOTE ADULT - RESPIRATORY AND THORAX
negative

## 2017-12-21 NOTE — PROGRESS NOTE ADULT - RESPIRATORY
Breath Sounds equal & clear to percussion & auscultation, no accessory muscle use

## 2017-12-21 NOTE — DISCHARGE NOTE ADULT - MEDICATION SUMMARY - MEDICATIONS TO CHANGE
I will SWITCH the dose or number of times a day I take the medications listed below when I get home from the hospital:    bisoprolol  -- 2.5 milligram(s) by mouth once a day

## 2017-12-21 NOTE — DISCHARGE NOTE ADULT - MEDICATION SUMMARY - MEDICATIONS TO STOP TAKING
I will STOP taking the medications listed below when I get home from the hospital:    Xanax 0.25 mg oral tablet  -- 1 tab(s) by mouth , As Needed    furosemide 20 mg oral tablet  -- 1 tab(s) by mouth once a day    Ambien 5 mg oral tablet  -- 1 tab(s) by mouth once a day (at bedtime)    meropenem  -- 1000 milligram(s) intravenous 2 times a day    predniSONE 5 mg oral tablet  -- 1 tab(s) by mouth once a day

## 2017-12-21 NOTE — PROGRESS NOTE ADULT - BACK
No deformity or limitation of movement

## 2017-12-21 NOTE — DISCHARGE NOTE ADULT - SECONDARY DIAGNOSIS.
FÉLIX (acute kidney injury) Acute pyelonephritis with renal medullary necrosis HTN (hypertension) Hydroureteronephrosis Septic encephalopathy

## 2017-12-21 NOTE — PROGRESS NOTE ADULT - GENITOURINARY
negative
details…
negative

## 2017-12-22 VITALS
TEMPERATURE: 99 F | OXYGEN SATURATION: 95 % | DIASTOLIC BLOOD PRESSURE: 63 MMHG | HEART RATE: 64 BPM | RESPIRATION RATE: 17 BRPM | SYSTOLIC BLOOD PRESSURE: 154 MMHG

## 2017-12-22 DIAGNOSIS — R33.9 RETENTION OF URINE, UNSPECIFIED: ICD-10-CM

## 2017-12-22 DIAGNOSIS — N39.0 URINARY TRACT INFECTION, SITE NOT SPECIFIED: ICD-10-CM

## 2017-12-22 LAB
CULTURE RESULTS: SIGNIFICANT CHANGE UP
CULTURE RESULTS: SIGNIFICANT CHANGE UP
SPECIMEN SOURCE: SIGNIFICANT CHANGE UP
SPECIMEN SOURCE: SIGNIFICANT CHANGE UP

## 2017-12-22 RX ADMIN — Medication 100 MILLIGRAM(S): at 17:19

## 2017-12-22 RX ADMIN — AMIODARONE HYDROCHLORIDE 200 MILLIGRAM(S): 400 TABLET ORAL at 05:01

## 2017-12-22 RX ADMIN — Medication 100 MILLIGRAM(S): at 05:01

## 2017-12-22 RX ADMIN — BISOPROLOL FUMARATE 2.5 MILLIGRAM(S): 10 TABLET, FILM COATED ORAL at 05:42

## 2017-12-22 RX ADMIN — HEPARIN SODIUM 5000 UNIT(S): 5000 INJECTION INTRAVENOUS; SUBCUTANEOUS at 05:01

## 2017-12-22 RX ADMIN — Medication 1 APPLICATION(S): at 05:02

## 2017-12-22 RX ADMIN — Medication 100 MILLIGRAM(S): at 13:17

## 2017-12-22 RX ADMIN — FLUCONAZOLE 200 MILLIGRAM(S): 150 TABLET ORAL at 11:09

## 2017-12-22 RX ADMIN — Medication 1 TABLET(S): at 17:19

## 2017-12-22 RX ADMIN — Medication 1 TABLET(S): at 05:01

## 2017-12-22 RX ADMIN — CHLORHEXIDINE GLUCONATE 1 APPLICATION(S): 213 SOLUTION TOPICAL at 07:00

## 2017-12-22 RX ADMIN — PANTOPRAZOLE SODIUM 40 MILLIGRAM(S): 20 TABLET, DELAYED RELEASE ORAL at 07:27

## 2017-12-22 NOTE — PROGRESS NOTE ADULT - PROVIDER SPECIALTY LIST ADULT
Infectious Disease
Internal Medicine
Pulmonology
Surgery
Urology
Infectious Disease
Infectious Disease
Intervent Radiology
Urology
Internal Medicine
Pulmonology
Internal Medicine
Intervent Cardiology
Internal Medicine

## 2017-12-22 NOTE — CHART NOTE - NSCHARTNOTEFT_GEN_A_CORE
Hospitalist Medicine PA    PROCEDURE NOTE:  Midline removal    Site: L Midline Catheter placement    Requested by PMD to remove the midline. Explained the procedure. Catheter removed and tip intact, 13 cm catheter removed. Pressure applied for greater than 5 mins, no hematoma or bleeding noted. Patient tolerated procedure well. A dry sterile dressing applied. RN aware.

## 2017-12-22 NOTE — PROGRESS NOTE ADULT - SUBJECTIVE AND OBJECTIVE BOX
Patient seen and examined bedside resting comfortably.  cystostomy changed today.      T(F): 99.4 (12-22-17 @ 16:47), Max: 99.4 (12-22-17 @ 16:47)  HR: 64 (12-22-17 @ 16:47) (58 - 64)  BP: 154/63 (12-22-17 @ 16:47) (131/59 - 157/51)  RR: 17 (12-22-17 @ 16:47) (17 - 18)  SpO2: 95% (12-22-17 @ 16:47) (95% - 98%)        PHYSICAL EXAM:  General: NAD, WDWN  Neuro:  Alert & conscious  HEENT: NCAT, EOMI, conjunctiva clear  Lung: respirations nonlabored, good inspiratory effort  Abdomen: soft, NTND. Cystostomy functioning well.  Extremities: no pedal edema or calf tenderness noted     LABS:                        10.1   10.8  )-----------( 372      ( 21 Dec 2017 07:55 )             31.8     12-21    138  |  103  |  14  ----------------------------<  77  3.7   |  28  |  1.36<H>    Ca    8.8      21 Dec 2017 07:55        I&O's Detail    21 Dec 2017 07:01  -  22 Dec 2017 07:00  --------------------------------------------------------  IN:    lactated ringers.: 1650 mL  Total IN: 1650 mL    OUT:    Indwelling Catheter - Suprapubic: 2350 mL  Total OUT: 2350 mL    Total NET: -700 mL      22 Dec 2017 07:01  -  22 Dec 2017 18:17  --------------------------------------------------------  IN:    Oral Fluid: 370 mL  Total IN: 370 mL    OUT:    Indwelling Catheter - Suprapubic: 320 mL  Total OUT: 320 mL    Total NET: 50 mL          wbc    12-21 @ 07:55    10.8    12-20 @ 06:25    10.9    12-18 @ 10:10    13.4    12-17 @ 07:03    12.7    12-16 @ 07:02    10.1        cr   12-21 @ 07:55   1.36    12-20 @ 06:25   1.62    12-18 @ 10:10   1.29    12-17 @ 07:03   1.49    12-16 @ 07:02   1.47

## 2017-12-22 NOTE — PROGRESS NOTE ADULT - PROBLEM SELECTOR PROBLEM 1
Hydroureteronephrosis
Acute pyelonephritis with renal medullary necrosis

## 2017-12-22 NOTE — PROGRESS NOTE ADULT - PROBLEM SELECTOR PROBLEM 2
Urinary retention
HTN (hypertension)
HTN (hypertension)
Septic encephalopathy
Hydroureteronephrosis
FÉILX (acute kidney injury)
HTN (hypertension)
HTN (hypertension)
Hydroureteronephrosis
Fever, unspecified fever cause
FÉLIX (acute kidney injury)
Hydroureteronephrosis
HTN (hypertension)
Hydroureteronephrosis
Septic encephalopathy
HTN (hypertension)
Septic encephalopathy
HTN (hypertension)
HTN (hypertension)
Septic encephalopathy

## 2017-12-28 DIAGNOSIS — E83.39 OTHER DISORDERS OF PHOSPHORUS METABOLISM: ICD-10-CM

## 2017-12-28 DIAGNOSIS — R33.9 RETENTION OF URINE, UNSPECIFIED: ICD-10-CM

## 2017-12-28 DIAGNOSIS — Z98.42 CATARACT EXTRACTION STATUS, LEFT EYE: ICD-10-CM

## 2017-12-28 DIAGNOSIS — I25.10 ATHEROSCLEROTIC HEART DISEASE OF NATIVE CORONARY ARTERY WITHOUT ANGINA PECTORIS: ICD-10-CM

## 2017-12-28 DIAGNOSIS — Z88.8 ALLERGY STATUS TO OTHER DRUGS, MEDICAMENTS AND BIOLOGICAL SUBSTANCES STATUS: ICD-10-CM

## 2017-12-28 DIAGNOSIS — A41.51 SEPSIS DUE TO ESCHERICHIA COLI [E. COLI]: ICD-10-CM

## 2017-12-28 DIAGNOSIS — B97.4 RESPIRATORY SYNCYTIAL VIRUS AS THE CAUSE OF DISEASES CLASSIFIED ELSEWHERE: ICD-10-CM

## 2017-12-28 DIAGNOSIS — N13.6 PYONEPHROSIS: ICD-10-CM

## 2017-12-28 DIAGNOSIS — I25.2 OLD MYOCARDIAL INFARCTION: ICD-10-CM

## 2017-12-28 DIAGNOSIS — B37.49 OTHER UROGENITAL CANDIDIASIS: ICD-10-CM

## 2017-12-28 DIAGNOSIS — N10 ACUTE PYELONEPHRITIS: ICD-10-CM

## 2017-12-28 DIAGNOSIS — Z98.41 CATARACT EXTRACTION STATUS, RIGHT EYE: ICD-10-CM

## 2017-12-28 DIAGNOSIS — I10 ESSENTIAL (PRIMARY) HYPERTENSION: ICD-10-CM

## 2017-12-28 DIAGNOSIS — N31.9 NEUROMUSCULAR DYSFUNCTION OF BLADDER, UNSPECIFIED: ICD-10-CM

## 2017-12-28 DIAGNOSIS — A41.9 SEPSIS, UNSPECIFIED ORGANISM: ICD-10-CM

## 2017-12-28 DIAGNOSIS — J20.8 ACUTE BRONCHITIS DUE TO OTHER SPECIFIED ORGANISMS: ICD-10-CM

## 2017-12-28 DIAGNOSIS — G93.41 METABOLIC ENCEPHALOPATHY: ICD-10-CM

## 2017-12-28 DIAGNOSIS — E87.6 HYPOKALEMIA: ICD-10-CM

## 2017-12-28 DIAGNOSIS — T50.995A ADVERSE EFFECT OF OTHER DRUGS, MEDICAMENTS AND BIOLOGICAL SUBSTANCES, INITIAL ENCOUNTER: ICD-10-CM

## 2017-12-28 DIAGNOSIS — L29.8 OTHER PRURITUS: ICD-10-CM

## 2017-12-28 DIAGNOSIS — E78.00 PURE HYPERCHOLESTEROLEMIA, UNSPECIFIED: ICD-10-CM

## 2017-12-28 DIAGNOSIS — N17.2 ACUTE KIDNEY FAILURE WITH MEDULLARY NECROSIS: ICD-10-CM

## 2017-12-28 DIAGNOSIS — B96.20 UNSPECIFIED ESCHERICHIA COLI [E. COLI] AS THE CAUSE OF DISEASES CLASSIFIED ELSEWHERE: ICD-10-CM

## 2017-12-28 DIAGNOSIS — F03.90 UNSPECIFIED DEMENTIA WITHOUT BEHAVIORAL DISTURBANCE: ICD-10-CM

## 2017-12-28 DIAGNOSIS — Z87.440 PERSONAL HISTORY OF URINARY (TRACT) INFECTIONS: ICD-10-CM

## 2017-12-28 DIAGNOSIS — Z95.5 PRESENCE OF CORONARY ANGIOPLASTY IMPLANT AND GRAFT: ICD-10-CM

## 2017-12-28 DIAGNOSIS — E44.0 MODERATE PROTEIN-CALORIE MALNUTRITION: ICD-10-CM

## 2018-02-07 ENCOUNTER — INPATIENT (INPATIENT)
Facility: HOSPITAL | Age: 83
LOS: 4 days | Discharge: HOSPICE | End: 2018-02-12
Attending: INTERNAL MEDICINE | Admitting: INTERNAL MEDICINE
Payer: MEDICARE

## 2018-02-07 VITALS
WEIGHT: 119.93 LBS | HEIGHT: 65 IN | SYSTOLIC BLOOD PRESSURE: 107 MMHG | RESPIRATION RATE: 16 BRPM | TEMPERATURE: 99 F | DIASTOLIC BLOOD PRESSURE: 75 MMHG | HEART RATE: 90 BPM | OXYGEN SATURATION: 95 %

## 2018-02-07 DIAGNOSIS — N31.9 NEUROMUSCULAR DYSFUNCTION OF BLADDER, UNSPECIFIED: ICD-10-CM

## 2018-02-07 DIAGNOSIS — Z98.890 OTHER SPECIFIED POSTPROCEDURAL STATES: Chronic | ICD-10-CM

## 2018-02-07 DIAGNOSIS — Z51.5 ENCOUNTER FOR PALLIATIVE CARE: ICD-10-CM

## 2018-02-07 DIAGNOSIS — H10.503 UNSPECIFIED BLEPHAROCONJUNCTIVITIS, BILATERAL: ICD-10-CM

## 2018-02-07 DIAGNOSIS — E78.5 HYPERLIPIDEMIA, UNSPECIFIED: ICD-10-CM

## 2018-02-07 DIAGNOSIS — A41.50 GRAM-NEGATIVE SEPSIS, UNSPECIFIED: ICD-10-CM

## 2018-02-07 DIAGNOSIS — A41.9 SEPSIS, UNSPECIFIED ORGANISM: ICD-10-CM

## 2018-02-07 DIAGNOSIS — I49.9 CARDIAC ARRHYTHMIA, UNSPECIFIED: ICD-10-CM

## 2018-02-07 DIAGNOSIS — N10 ACUTE PYELONEPHRITIS: ICD-10-CM

## 2018-02-07 DIAGNOSIS — I25.2 OLD MYOCARDIAL INFARCTION: ICD-10-CM

## 2018-02-07 DIAGNOSIS — E78.00 PURE HYPERCHOLESTEROLEMIA, UNSPECIFIED: ICD-10-CM

## 2018-02-07 DIAGNOSIS — Z90.49 ACQUIRED ABSENCE OF OTHER SPECIFIED PARTS OF DIGESTIVE TRACT: Chronic | ICD-10-CM

## 2018-02-07 DIAGNOSIS — Z66 DO NOT RESUSCITATE: ICD-10-CM

## 2018-02-07 DIAGNOSIS — L88 PYODERMA GANGRENOSUM: ICD-10-CM

## 2018-02-07 DIAGNOSIS — T83.598A INFECTION AND INFLAMMATORY REACTION DUE TO OTHER PROSTHETIC DEVICE, IMPLANT AND GRAFT IN URINARY SYSTEM, INITIAL ENCOUNTER: ICD-10-CM
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
## 2018-02-07 DIAGNOSIS — N39.0 URINARY TRACT INFECTION, SITE NOT SPECIFIED: ICD-10-CM

## 2018-02-07 DIAGNOSIS — H26.9 UNSPECIFIED CATARACT: Chronic | ICD-10-CM

## 2018-02-07 DIAGNOSIS — I25.10 ATHEROSCLEROTIC HEART DISEASE OF NATIVE CORONARY ARTERY WITHOUT ANGINA PECTORIS: ICD-10-CM

## 2018-02-07 DIAGNOSIS — I10 ESSENTIAL (PRIMARY) HYPERTENSION: ICD-10-CM

## 2018-02-07 DIAGNOSIS — Z95.5 PRESENCE OF CORONARY ANGIOPLASTY IMPLANT AND GRAFT: Chronic | ICD-10-CM

## 2018-02-07 DIAGNOSIS — Z90.89 ACQUIRED ABSENCE OF OTHER ORGANS: Chronic | ICD-10-CM

## 2018-02-07 DIAGNOSIS — I21.4 NON-ST ELEVATION (NSTEMI) MYOCARDIAL INFARCTION: ICD-10-CM

## 2018-02-07 LAB
ALBUMIN SERPL ELPH-MCNC: 2.3 G/DL — LOW (ref 3.3–5)
ALP SERPL-CCNC: 178 U/L — HIGH (ref 40–120)
ALT FLD-CCNC: 198 U/L — HIGH (ref 12–78)
ANION GAP SERPL CALC-SCNC: 9 MMOL/L — SIGNIFICANT CHANGE UP (ref 5–17)
APPEARANCE UR: ABNORMAL
AST SERPL-CCNC: 276 U/L — HIGH (ref 15–37)
BASOPHILS # BLD AUTO: 0.07 K/UL — SIGNIFICANT CHANGE UP (ref 0–0.2)
BASOPHILS NFR BLD AUTO: 0.3 % — SIGNIFICANT CHANGE UP (ref 0–2)
BILIRUB SERPL-MCNC: 0.7 MG/DL — SIGNIFICANT CHANGE UP (ref 0.2–1.2)
BILIRUB UR-MCNC: NEGATIVE — SIGNIFICANT CHANGE UP
BUN SERPL-MCNC: 37 MG/DL — HIGH (ref 7–23)
CALCIUM SERPL-MCNC: 9.3 MG/DL — SIGNIFICANT CHANGE UP (ref 8.5–10.1)
CHLORIDE SERPL-SCNC: 103 MMOL/L — SIGNIFICANT CHANGE UP (ref 96–108)
CO2 SERPL-SCNC: 25 MMOL/L — SIGNIFICANT CHANGE UP (ref 22–31)
COLOR SPEC: YELLOW — SIGNIFICANT CHANGE UP
CREAT SERPL-MCNC: 2.09 MG/DL — HIGH (ref 0.5–1.3)
DIFF PNL FLD: ABNORMAL
EOSINOPHIL # BLD AUTO: 0 K/UL — SIGNIFICANT CHANGE UP (ref 0–0.5)
EOSINOPHIL NFR BLD AUTO: 0 % — SIGNIFICANT CHANGE UP (ref 0–6)
GLUCOSE SERPL-MCNC: 121 MG/DL — HIGH (ref 70–99)
GLUCOSE UR QL: NEGATIVE MG/DL — SIGNIFICANT CHANGE UP
HCT VFR BLD CALC: 41.5 % — SIGNIFICANT CHANGE UP (ref 34.5–45)
HGB BLD-MCNC: 13.4 G/DL — SIGNIFICANT CHANGE UP (ref 11.5–15.5)
IMM GRANULOCYTES NFR BLD AUTO: 0.7 % — SIGNIFICANT CHANGE UP (ref 0–1.5)
KETONES UR-MCNC: NEGATIVE — SIGNIFICANT CHANGE UP
LACTATE SERPL-SCNC: 1.8 MMOL/L — SIGNIFICANT CHANGE UP (ref 0.7–2)
LEUKOCYTE ESTERASE UR-ACNC: ABNORMAL
LYMPHOCYTES # BLD AUTO: 1.8 K/UL — SIGNIFICANT CHANGE UP (ref 1–3.3)
LYMPHOCYTES # BLD AUTO: 7.7 % — LOW (ref 13–44)
MCHC RBC-ENTMCNC: 26.9 PG — LOW (ref 27–34)
MCHC RBC-ENTMCNC: 32.3 GM/DL — SIGNIFICANT CHANGE UP (ref 32–36)
MCV RBC AUTO: 83.2 FL — SIGNIFICANT CHANGE UP (ref 80–100)
MONOCYTES # BLD AUTO: 2.38 K/UL — HIGH (ref 0–0.9)
MONOCYTES NFR BLD AUTO: 10.2 % — SIGNIFICANT CHANGE UP (ref 2–14)
NEUTROPHILS # BLD AUTO: 19.01 K/UL — HIGH (ref 1.8–7.4)
NEUTROPHILS NFR BLD AUTO: 81.1 % — HIGH (ref 43–77)
NITRITE UR-MCNC: POSITIVE
PH UR: 6.5 — SIGNIFICANT CHANGE UP (ref 5–8)
PLATELET # BLD AUTO: 304 K/UL — SIGNIFICANT CHANGE UP (ref 150–400)
POTASSIUM SERPL-MCNC: 4.4 MMOL/L — SIGNIFICANT CHANGE UP (ref 3.5–5.3)
POTASSIUM SERPL-SCNC: 4.4 MMOL/L — SIGNIFICANT CHANGE UP (ref 3.5–5.3)
PROT SERPL-MCNC: 7.5 GM/DL — SIGNIFICANT CHANGE UP (ref 6–8.3)
PROT UR-MCNC: 100 MG/DL
RBC # BLD: 4.99 M/UL — SIGNIFICANT CHANGE UP (ref 3.8–5.2)
RBC # FLD: 17.2 % — HIGH (ref 10.3–14.5)
SODIUM SERPL-SCNC: 137 MMOL/L — SIGNIFICANT CHANGE UP (ref 135–145)
SP GR SPEC: 1.01 — SIGNIFICANT CHANGE UP (ref 1.01–1.02)
UROBILINOGEN FLD QL: 1 MG/DL
WBC # BLD: 23.43 K/UL — HIGH (ref 3.8–10.5)
WBC # FLD AUTO: 23.43 K/UL — HIGH (ref 3.8–10.5)

## 2018-02-07 PROCEDURE — 71045 X-RAY EXAM CHEST 1 VIEW: CPT | Mod: 26

## 2018-02-07 PROCEDURE — 93010 ELECTROCARDIOGRAM REPORT: CPT

## 2018-02-07 PROCEDURE — 74176 CT ABD & PELVIS W/O CONTRAST: CPT | Mod: 26

## 2018-02-07 PROCEDURE — 99285 EMERGENCY DEPT VISIT HI MDM: CPT

## 2018-02-07 RX ORDER — MEROPENEM 1 G/30ML
1000 INJECTION INTRAVENOUS ONCE
Qty: 0 | Refills: 0 | Status: COMPLETED | OUTPATIENT
Start: 2018-02-07 | End: 2018-02-07

## 2018-02-07 RX ORDER — AMIODARONE HYDROCHLORIDE 400 MG/1
200 TABLET ORAL DAILY
Qty: 0 | Refills: 0 | Status: DISCONTINUED | OUTPATIENT
Start: 2018-02-07 | End: 2018-02-12

## 2018-02-07 RX ORDER — GENTAMICIN SULFATE 40 MG/ML
80 VIAL (ML) INJECTION ONCE
Qty: 0 | Refills: 0 | Status: COMPLETED | OUTPATIENT
Start: 2018-02-07 | End: 2018-02-07

## 2018-02-07 RX ORDER — PANTOPRAZOLE SODIUM 20 MG/1
40 TABLET, DELAYED RELEASE ORAL
Qty: 0 | Refills: 0 | Status: DISCONTINUED | OUTPATIENT
Start: 2018-02-07 | End: 2018-02-12

## 2018-02-07 RX ORDER — ATORVASTATIN CALCIUM 80 MG/1
20 TABLET, FILM COATED ORAL AT BEDTIME
Qty: 0 | Refills: 0 | Status: DISCONTINUED | OUTPATIENT
Start: 2018-02-07 | End: 2018-02-08

## 2018-02-07 RX ORDER — ACETAMINOPHEN 500 MG
650 TABLET ORAL EVERY 6 HOURS
Qty: 0 | Refills: 0 | Status: DISCONTINUED | OUTPATIENT
Start: 2018-02-07 | End: 2018-02-12

## 2018-02-07 RX ORDER — HEPARIN SODIUM 5000 [USP'U]/ML
5000 INJECTION INTRAVENOUS; SUBCUTANEOUS EVERY 12 HOURS
Qty: 0 | Refills: 0 | Status: DISCONTINUED | OUTPATIENT
Start: 2018-02-07 | End: 2018-02-12

## 2018-02-07 RX ORDER — LACTOBACILLUS ACIDOPHILUS 100MM CELL
1 CAPSULE ORAL
Qty: 0 | Refills: 0 | Status: DISCONTINUED | OUTPATIENT
Start: 2018-02-07 | End: 2018-02-12

## 2018-02-07 RX ORDER — SODIUM CHLORIDE 9 MG/ML
500 INJECTION INTRAMUSCULAR; INTRAVENOUS; SUBCUTANEOUS ONCE
Qty: 0 | Refills: 0 | Status: COMPLETED | OUTPATIENT
Start: 2018-02-07 | End: 2018-02-07

## 2018-02-07 RX ORDER — ACETAMINOPHEN 500 MG
1000 TABLET ORAL ONCE
Qty: 0 | Refills: 0 | Status: COMPLETED | OUTPATIENT
Start: 2018-02-07 | End: 2018-02-07

## 2018-02-07 RX ORDER — DOCUSATE SODIUM 100 MG
100 CAPSULE ORAL
Qty: 0 | Refills: 0 | Status: DISCONTINUED | OUTPATIENT
Start: 2018-02-07 | End: 2018-02-12

## 2018-02-07 RX ORDER — FLUCONAZOLE 150 MG/1
200 TABLET ORAL ONCE
Qty: 0 | Refills: 0 | Status: COMPLETED | OUTPATIENT
Start: 2018-02-07 | End: 2018-02-07

## 2018-02-07 RX ORDER — SENNA PLUS 8.6 MG/1
2 TABLET ORAL AT BEDTIME
Qty: 0 | Refills: 0 | Status: DISCONTINUED | OUTPATIENT
Start: 2018-02-07 | End: 2018-02-12

## 2018-02-07 RX ORDER — GLYCERIN 1 %
1 DROPS OPHTHALMIC (EYE) EVERY 6 HOURS
Qty: 0 | Refills: 0 | Status: DISCONTINUED | OUTPATIENT
Start: 2018-02-07 | End: 2018-02-12

## 2018-02-07 RX ORDER — CLOPIDOGREL BISULFATE 75 MG/1
75 TABLET, FILM COATED ORAL DAILY
Qty: 0 | Refills: 0 | Status: DISCONTINUED | OUTPATIENT
Start: 2018-02-07 | End: 2018-02-12

## 2018-02-07 RX ADMIN — Medication 100 MILLIGRAM(S): at 16:59

## 2018-02-07 RX ADMIN — MEROPENEM 100 MILLIGRAM(S): 1 INJECTION INTRAVENOUS at 10:59

## 2018-02-07 RX ADMIN — SODIUM CHLORIDE 1000 MILLILITER(S): 9 INJECTION INTRAMUSCULAR; INTRAVENOUS; SUBCUTANEOUS at 10:50

## 2018-02-07 RX ADMIN — Medication 1000 MILLIGRAM(S): at 12:48

## 2018-02-07 RX ADMIN — FLUCONAZOLE 100 MILLIGRAM(S): 150 TABLET ORAL at 11:48

## 2018-02-07 RX ADMIN — ATORVASTATIN CALCIUM 20 MILLIGRAM(S): 80 TABLET, FILM COATED ORAL at 21:35

## 2018-02-07 RX ADMIN — Medication 400 MILLIGRAM(S): at 11:01

## 2018-02-07 RX ADMIN — Medication 100 MILLIGRAM(S): at 11:14

## 2018-02-07 RX ADMIN — Medication 1 TABLET(S): at 16:59

## 2018-02-07 NOTE — PROGRESS NOTE ADULT - SUBJECTIVE AND OBJECTIVE BOX
· HPI Objective Statement: 86 y/o female hx cad, htn, chronic uti with suprapubic catheter and right ureteral stricture s/p ureteral stent in 12/2017 with multiple admissions for esbl (and recently candida) uti over past 1.5 years present with 1-2 days of right flank pain, low grade fever (no antipyretics today, temp ~100 yesterday per son), and lethargy/less speaking which usually happens when pt has infection. Had slight hematuria in suprapubic bag. Per son pt was to be seen by hospice tomorrow, but too sick today. discussed with pt/son palliative, would like to move towards that direction, likely to become dnr/dni.   limited ros because not speaking much/weakness.	    PAST MEDICAL/SURGICAL/FAMILY/SOCIAL HISTORY:    Past Medical History:  High cholesterol    HTN (hypertension)    MI (myocardial infarction)    Ventricular bigeminy  history of ablation.     Tobacco Usage:  · Tobacco Usage	Unknown if ever smoked	    ALLERGIES AND HOME MEDICATIONS:   Allergies:        Allergies:  	Benadryl Allergy: Drug, Other, shaking       Intolerances:  	metoprolol: Drug, Other, palpitation    Home Medications:   * Patient Currently Takes Medications as of 21-Dec-2017 12:29 documented in Structured Notes  · 	pantoprazole 40 mg oral delayed release tablet: 1 tab(s) orally once a day (before a meal)  · 	lactobacillus acidophilus oral capsule: 1 cap(s) orally 2 times a day   · 	senna oral tablet: 2 tab(s) orally once a day (at bedtime), As needed, Constipation  · 	docusate sodium 100 mg oral capsule: 1 cap(s) orally 2 times a day  · 	guaiFENesin 100 mg/5 mL oral liquid: 5 milliliter(s) orally every 6 hours, As needed, Cough  · 	albuterol 90 mcg/inh inhalation aerosol: 2 puff(s) inhaled every 6 hours, As needed, Bronchospasm  · 	bisoprolol 5 mg oral tablet: 0.5 tab(s) orally once a day  · 	fluconazole 200 mg oral tablet: 1 tab(s) orally once a day  · 	amiodarone 200 mg oral tablet: 1 tab(s) orally once a day  · 	acetaminophen 325 mg oral tablet: 2 tab(s) orally every 6 hours, As needed, Mild Pain (1 - 3)  · 	clopidogrel 75 mg oral tablet: 1 tab(s) orally once a day  · 	rosuvastatin 10 mg oral tablet: 1 tab(s) orally once a day (at bedtime)    VITAL SIGNS( Pullset):    ,,ED ADULT Flow Sheet:    07-Feb-2018 09:30	  · Temp (F): 98.7	  · Temp (C) Temp (C): 37.1	  · Temp site Temp Site: oral	  · Heart Rate Heart Rate (beats/min): 90	  · BP Systolic Systolic: 107	  · BP Diastolic Diastolic (mm Hg): 75	  · Respiration Rate (breaths/min) Respiration Rate (breaths/min): 16	  · SpO2 (%) SpO2 (%): 95	  · O2 delivery Patient On: room air	  · Dosing Weight (KILOGRAMS) Dosing Weight (KILOGRAMS): 54.4	  · Dosing Weight  (POUNDS) Dosing Weight (POUNDS): 119.9	  · Height (FEET) Height (FEET): 5	  · Height (INCHES) Height (INCHES): 5	  · Height (CENTIMETERS) Height (CENTIMETERS): 165.1	  · BSA (m2): 1.59	  · BMI (kG/m2) BMI (kG/m2): 20	  · SpO2 (%) SpO2 (%): 95	  · O2 delivery Patient On: room air	    PHYSICAL EXAM:   · Physical Examination: Gen: lethargic appearing, weak  HEENT: dry mucus membranes pink conjunctivae, EOMI  CV: RRR, nl s1/s2.  Resp: CTAB, normal rate and effort  GI: Abdomen soft, NT, ND. No rebound, no guarding  : right cvat, suprapubic cath with cloudy yellow urine in bag.   Neuro: lethargic/somewhat sleepy but wakes up, nods and acknoweledges some questions  moving all 4 extremities  MSK: No spine or joint tenderness to palpation Skin: No rashes. intact	    CURRENT ORDERS/:   · 	fluconAZOLE IVPB, [Known as DIFLUCAN IVPB]  	200 milliGRAM(s) in sodium chloride 0.9% 100 milliLiter(s), IV Intermittent, once, infuse over 60 Minute(s), Stop After 1 Doses  	Indication: candida uti  	Administration Instructions: DO NOT Refrigerate  	This is a Look-alike/Sound-alike Medication  	Provider's Contact #: [phone], 07-Feb-2018, Active, Standard  · 	meropenem  IVPB, 1000 milliGRAM(s) in sodium chloride 0.9% 50 milliLiter(s), IV Intermittent, once, infuse over 30 Minute(s), Stop After 1 Doses  	Indication: empiric uti  	Provider's Contact #: [phone], 07-Feb-2018, Active, Standard  · 	gentamicin   IVPB, 80 milliGRAM(s) in sodium chloride 0.9% 50 milliLiter(s), IV Intermittent, once, infuse over 30 Minute(s), Stop After 1 Doses  	Indication: esbl uti  	Provider's Contact #: [phone], 07-Feb-2018, Active, Standard  · 	sodium chloride 0.9% Bolus, 500 milliLiter(s), IV Bolus, once, infuse over 30 Minute(s), Stop After 1 Doses  	Provider's Contact #: [phone], 07-Feb-2018, Active, Standard  · 	acetaminophen  IVPB., [Known as OFIRMEV for PAIN.]  	1000 milliGRAM(s) in IV Solution 100 milliLiter(s), IV Intermittent, once, infuse over 15 Minute(s), Stop After 1 Doses  	Special Instructions: Acetaminophen dosage should not exceed 4,000 milliGRAM(s) in 24 hours  	Administration Instructions: MAX DAILY DOSE:  ADULT = 4,000 mG/Day  	Provider's Contact #: [phone], 07-Feb-2018, Active, Standard  · 	Cardiac Monitor Bedside,   Time/Priority:  STAT, 07-Feb-2018, Active, Standard    RESULTS:   · EKG Date/Time: 07-Feb-2018 11:42	  · Rate: 82	  · Interpretation: normal sinus rhythm	  · MT: 208	  · QRS: 96	  · ST/T Wave: no st e/d, flat t waves throughout	  · Other Findings: qtc 567 but flat t waves. q waves iii, avf, v3-v4	    PROGRESS NOTE:   Date: 07-Feb-2018 14:37.     Progress: Juan J: Dr. correa seeing pt. paged dr. myers, no answer yet. spoke to dr. cano, will admit pt. pt more awake and alert. will admit for abx/fluiid and setting up hospice. son and pt agreeable.    DISPOSITION:    Care Plan - Instructions:  Principal Discharge DX:	Urinary tract infection.     Impression:  Principal Discharge Dx Urinary tract infection.    · Medical Decision Making Details: likely continued uti/pyelo, possibly esbl ecoli and/or candida. labs, gentle hydration (pt to become palliative, not aggressive sepsis protocol), abx and antifungal, ct. spoke to Dr. Correa from palliative, will see pt. will page dr. myers though pt likely does not want any intervention. admission for hospice set up.	     Disposition:  Disposition: ADMIT.     Division: [facility].     Admitting Service: MEDICAL/SURGICAL.     Isolation: None.     Special Consideration (Choose all that apply): None.     This patient is expected to require at least two days of inpatient care: Yes.     Admitted Pediatric Patient? No.     Present on Admission - Central Line: No.     Present on Admission - Urethral Catheter: No.     Present on Admission - Pressure Ulcer: No.     Case plan discussed with Inpatient Resident/ACP on: 07-Feb-2018 14:38.     Inpatient Resident/ACP sign out complete, Patient endorsed to: michael cano PA.    Accepting Physician:   Provider Role	Provider Name	  · Accepting Physician	Jimmie Encinas	  · Consultant	Marcial Correa	    Prescriptions:   * Outpatient Medication Status not yet specified     EM Selection:  · Physician E/M Selection: [id number] Comprehensive	    ATTESTATION STATEMENT:    Attestations Statements:  Attending Statement: Attending Only.    PROVIDER CARE INITIATION:   · Care Initiated by:	Sony Arita(Attending)	  · Provider Care Initiated at:	07-Feb-2018 09:41	      Electronic Signatures:  Sony Arita)  (Signed 07-Feb-2018 14:38)  	Entered: HISTORY OF PRESENT ILLNESS, PAST MEDICAL/SURGICAL/FAMILY/SOCIAL HISTORY, ALLERGIES AND HOME MEDICATIONS, VITAL SIGNS( Pullset), PHYSICAL EXAM, RESULTS, PROGRESS NOTE, DISPOSITION, ATTESTATION STATEMENT, PROVIDER CARE INITIATION  	Authored: HISTORY OF PRESENT ILLNESS, PAST MEDICAL/SURGICAL/FAMILY/SOCIAL HISTORY, ALLERGIES AND HOME MEDICATIONS, VITAL SIGNS( Pullset), PHYSICAL EXAM, CURRENT ORDERS/, RESULTS, PROGRESS NOTE, DISPOSITION, ATTESTATION STATEMENT, PROVIDER CARE INITIATION      Last Updated: 07-Feb-2018 14:38 by Sony Arita)    References:  1.  Data Referenced From "ED ADULT Triage Note" 2/7/2018 9:30 AM

## 2018-02-07 NOTE — H&P ADULT - HISTORY OF PRESENT ILLNESS
87 yop female pmh of obstructive uropathy ,multiple   esbl uti dced 12/21 with 3 week po abx , hld, neurogenic bladder, htn, arrythmia ,cad, pyoderma gangrenosum,  present with dysuria weakness , ams , lethargy, poor po intake ,tactile fevers ,treated with iv abx in er and mental status improving

## 2018-02-07 NOTE — ED PROVIDER NOTE - OBJECTIVE STATEMENT
86 y/o female hx cad, htn, chronic uti with suprapubic catheter and right ureteral stricture s/p ureteral stent in 12/2017 with multiple admissions for esbl (and recently candida) uti over past 1.5 years present with 1-2 days of right flank pain, low grade fever (no antipyretics today, temp ~100 yesterday per son), and lethargy/less speaking which usually happens when pt has infection. Had slight hematuria in suprapubic bag. Per son pt was to be seen by hospice tomorrow, but too sick today. discussed with pt/son palliative, would like to move towards that direction, likely to become dnr/dni.    limited ros because not speaking much/weakness.

## 2018-02-07 NOTE — ED PROVIDER NOTE - PROGRESS NOTE DETAILS
Juan J: Dr. correa seeing pt. paged dr. myers, no answer yet. spoke to dr. cano, will admit pt. pt more awake and alert. will admit for abx/fluiid and setting up hospice. son and pt agreeable.

## 2018-02-07 NOTE — H&P ADULT - PROBLEM SELECTOR PLAN 7
substitute toprol 15 for bisoprolol  seen by palliative in er and placed dnr -will fu palliative consult  brett d/w alison Medina

## 2018-02-07 NOTE — ED ADULT NURSE REASSESSMENT NOTE - COMFORT CARE
po fluids offered/wait time explained/warm blanket provided/side rails up
po fluids offered/wait time explained/warm blanket provided

## 2018-02-07 NOTE — H&P ADULT - PSH
Cataract, bilateral    History of appendectomy    History of renal stent    History of tonsillectomy    S/P coronary artery stent placement

## 2018-02-07 NOTE — GOALS OF CARE CONVERSATION - PERSONAL ADVANCE DIRECTIVE - CONVERSATION DETAILS
87 yop female pmh of obstructive uropathy ,multiple   esbl uti dced 12/21 with 3 week po abx , hld, neurogenic bladder, htn, arrythmia ,cad, pyoderma gangrenosum,  present with dysuria weakness , ams , lethargy, poor po intake ,tactile fevers ,treated with iv abx in er and mental status improving       Met with son Hermilo and discussed goals of care and advance care planning. MOLST form completed, DNR/ DNI, Comfort measures only. no hospitalization, I V fluids. Discuss about Hospice care and requested for Hospice care at home for symptom management. Hospice evaluation will be called in.

## 2018-02-07 NOTE — H&P ADULT - NSHPPHYSICALEXAM_GEN_ALL_CORE
PHYSICAL EXAM:    GENERAL: NAD,  HEAD:  Atraumatic, Normocephalic  EYES: EOMI, PERRLA, erythema to palpebral conjunctiva with dry discharge   ENMT: moist no erythema   NECK: Supple, No JVD  NERVOUS SYSTEM:  Alert & Oriented X2,  CHEST/LUNG: Clear to percussion bilaterally; No rales, rhonchi, wheezing, or rubs  HEART: Regular rate and rhythm; No murmurs, rubs, or gallops  ABDOMEN: Soft, Nontender, Nondistended; Bowel sounds present suiprapubic childs present site c/d/i no discharge  EXTREMITIES:  1/2+ Peripheral Pulses, No clubbing, cyanosis, or edema  LYMPH: No lymphadenopathy noted  SKIN: left calf anterior wound

## 2018-02-07 NOTE — H&P ADULT - NSHPREVIEWOFSYSTEMS_GEN_ALL_CORE
dysuria   polyuria  weakness  lethargy  poor po intake   itchy watery eyes  discharge from eyes slight   ulcer left calf reoccurrence

## 2018-02-07 NOTE — GOALS OF CARE CONVERSATION - PERSONAL ADVANCE DIRECTIVE - WHAT MATTERS MOST
Met with froy Akhtar and discussed goals of care and advance care planning. MOLST form completed, DNR/ DNI, Comfort measures only. no hospitalization, I V fluids. Discuss about Hospice care and requested for Hospice care at home for symptom management. Hospice evaluation will be called in.

## 2018-02-07 NOTE — H&P ADULT - ASSESSMENT
uti with indwelling childs , pmh of multiple uti esbl ecoli started on meropenam and gent and diflucan in er crcl =17 pt on amiodarone   arrythmia  uti  htn  hld  conjunctivitis palpebral

## 2018-02-07 NOTE — ED PROVIDER NOTE - PHYSICAL EXAMINATION
Gen: lethargic appearing, weak  HEENT: dry mucus membranes pink conjunctivae, EOMI  CV: RRR, nl s1/s2.  Resp: CTAB, normal rate and effort  GI: Abdomen soft, NT, ND. No rebound, no guarding  : right cvat, suprapubic cath with cloudy yellow urine in bag.   Neuro: lethargic/somewhat sleepy but wakes up, nods and acknoweledges some questions  moving all 4 extremities  MSK: No spine or joint tenderness to palpation  Skin: No rashes. intact

## 2018-02-07 NOTE — H&P ADULT - PMH
High cholesterol    HTN (hypertension)    MI (myocardial infarction)    Neurogenic bladder    Ventricular bigeminy  history of ablation

## 2018-02-07 NOTE — H&P ADULT - NSHPLABSRESULTS_GEN_ALL_CORE
CBC Full  -  ( 2018 11:07 )  WBC Count : 23.43 K/uL  Hemoglobin : 13.4 g/dL  Hematocrit : 41.5 %  Platelet Count - Automated : 304 K/uL  Mean Cell Volume : 83.2 fl  Mean Cell Hemoglobin : 26.9 pg  Mean Cell Hemoglobin Concentration : 32.3 gm/dL  Auto Neutrophil # : 19.01 K/uL  Auto Lymphocyte # : 1.80 K/uL  Auto Monocyte # : 2.38 K/uL  Auto Eosinophil # : 0.00 K/uL  Auto Basophil # : 0.07 K/uL  Auto Neutrophil % : 81.1 %  Auto Lymphocyte % : 7.7 %  Auto Monocyte % : 10.2 %  Auto Eosinophil % : 0.0 %  Auto Basophil % : 0.3 %          137  |  103  |  37<H>  ----------------------------<  121<H>  4.4   |  25  |  2.09<H>    Ca    9.3      2018 11:07    TPro  7.5  /  Alb  2.3<L>  /  TBili  0.7  /  DBili  x   /  AST  276<H>  /  ALT  198<H>  /  AlkPhos  178<H>      LIVER FUNCTIONS - ( 2018 11:07 )  Alb: 2.3 g/dL / Pro: 7.5 gm/dL / ALK PHOS: 178 U/L / ALT: 198 U/L / AST: 276 U/L / GGT: x           CAPILLARY BLOOD GLUCOSE          Urinalysis Basic - ( 2018 11:53 )    Color: Yellow / Appearance: Slightly Turbid / S.015 / pH: x  Gluc: x / Ketone: Negative  / Bili: Negative / Urobili: 1 mg/dL   Blood: x / Protein: 100 mg/dL / Nitrite: Positive   Leuk Esterase: Moderate / RBC: 25-50 /HPF / WBC >50   Sq Epi: x / Non Sq Epi: Occasional / Bacteria: Moderate    c xr a/p IMPRESSION: No nephrolithiasis or hydronephrosis.   cxr clear

## 2018-02-07 NOTE — PATIENT PROFILE ADULT. - NS PRO TALK SOMEONE YN
SUBJECTIVE:   Bud Merritt is a 89 year old male who presents to clinic today for the following health issues:      Checking right lower leg, wound area. Was weeping yesterday and day prior. Has procedure scheduled for  9/27/17        Problem list and histories reviewed & adjusted, as indicated.  Additional history: as documented        Reviewed and updated as needed this visit by clinical staff       Reviewed and updated as needed this visit by Provider        SUBJECTIVE:  Bud  is a 89 year old male who presents for:  Review of his open wound on his lower shin that has been healing up nicely. Cardiac surgery wanted this evaluated before proceeding next week. They state that has been weeping a little bit. When I saw him a week ago it looked fine. And we contacted our wound care nurse who stated it was doing very well they're here today to have this looked at    Past Medical History:   Diagnosis Date     Atrial fibrillation (H) 07/01/2016     Cardiomyopathy (H)      COPD exacerbation (H) 3/2/2017     Depressive disorder      Paroxysmal atrial fibrillation (H) 7/6/2016     Pure hypercholesterolemia      Rheumatoid arthritis(714.0)      Unspecified essential hypertension      Weakness generalized 3/2/2017     Past Surgical History:   Procedure Laterality Date     ARTHROPLASTY KNEE Left 1/12/2016    Procedure: ARTHROPLASTY KNEE;  Surgeon: Cesar Walker DO;  Location: [facility] OR     COLONOSCOPY  08/24/09     HC COLONOSCOPY THRU STOMA W BIOPSY/CAUTERY TUMOR/POLYP/LESION  8/31/2004     HC REPAIR ING HERNIA,5+Y/O,REDUCIBL  1996    Marlex mesh repair of bilateral inguinal hernias and drainage of bilateral scrotal hydroceles.     IMPLANT PACEMAKER  03/10/2017     IRRIGATION AND DEBRIDEMENT SOFT TISSUE LOWER EXTREMITY, COMBINED Left 3/15/2016    Procedure: COMBINED IRRIGATION AND DEBRIDEMENT SOFT TISSUE LOWER EXTREMITY;  Surgeon: Cesar Walker DO;  Location: [facility] OR     Social History   Substance Use  Topics     Smoking status: Former Smoker     Packs/day: 0.50     Years: 15.00     Types: Cigarettes     Quit date: 11/12/1998     Smokeless tobacco: Never Used      Comment: quit 15 yrs ago     Alcohol use No     Current Outpatient Prescriptions   Medication Sig Dispense Refill     HYDROcodone-acetaminophen (NORCO) 5-325 MG per tablet TAKE 1 TABLET BY MOUTH EVERY 6 HOURS AS NEEDED FOR MODERATE TO SEVERE PAIN 30 tablet 0     traMADol (ULTRAM) 50 MG tablet TAKE 1 TABLET 3 TIMES DAILY AS NEEDED FOR MODERATE PAIN 90 tablet 0     leflunomide (ARAVA) 10 MG tablet Take 1 tablet (10 mg) by mouth daily 30 tablet 11     dofetilide (TIKOSYN) 125 MCG capsule Take 1 capsule (125 mcg) by mouth 2 times daily 60 capsule 5     metoprolol (LOPRESSOR) 50 MG tablet TAKE 1 TABLET (50 MG) BY MOUTH 2 TIMES DAILY 120 tablet 3     atorvastatin (LIPITOR) 40 MG tablet Take 1 tablet (40 mg) by mouth daily 90 tablet 2     predniSONE (DELTASONE) 5 MG tablet Take 1 tablet (5 mg) by mouth daily 100 tablet 4     apixaban ANTICOAGULANT (ELIQUIS) 2.5 MG tablet Take 1 tablet (2.5 mg) by mouth 2 times daily 180 tablet 3     Leuprolide Acetate (LUPRON DEPOT IM) Inject into the muscle every 4 months Reported on 5/3/2017       spironolactone (ALDACTONE) 25 MG tablet Take 1 tablet (25 mg) by mouth daily 30 tablet 11     acetaminophen (TYLENOL) 650 MG CR tablet Take 650 mg by mouth 2 times daily Reported on 4/5/2017       alendronate (FOSAMAX) 70 MG tablet Take 1 tablet (70 mg) by mouth every 7 days Take with over 8 ounces water and stay upright for at least 30 minutes after dose.  Take at least 60 minutes before breakfast 12 tablet 3     tamsulosin (FLOMAX) 0.4 MG 24 hr capsule Take 1 capsule (0.4 mg) by mouth 2 times daily 60 capsule      ascorbic acid (VITAMIN C) 500 MG CPCR Take 500 mg by mouth daily       VITAMIN D, CHOLECALCIFEROL, PO Take 400 Units by mouth daily Reported on 5/3/2017       calcium carbonate (OS-SHELIA 500 MG [place]. [place]) 500 MG tablet Take  600 mg by mouth daily       lisinopril (PRINIVIL,ZESTRIL) 10 MG tablet Take 1 tablet (10 mg) by mouth 2 times daily 62 tablet 11     calcium carbonate (TUMS) 500 MG chewable tablet Take 1 chew tab by mouth every 4 hours as needed for heartburn Reported on 5/3/2017         REVIEW OF SYSTEMS:   5 point ROS negative except as noted above in HPI, including Gen., Resp, CV, GI &  system review.     OBJECTIVE:  Vitals: /80  Pulse 60  Temp 97.2  F (36.2  C) (Tympanic)  Wt 148 lb (67.1 kg)  SpO2 98%  BMI 23.18 kg/m2  BMI= Body mass index is 23.18 kg/(m^2).  He is alert and in no distress. This wound has healed up very nicely on the right lower shin. There is no drainage. There is no redness or warmth around it. He has no edema in the leg at this time. 1+ edema in the foot on both sides.    ASSESSMENT:  Healed venous stasis ulcer of the right leg    PLAN:  He been on his feet all day for the last couple of days and yesterday there was a little drainage on noted it sounds like serous drainage. I told them especially with these hot days coming up he needs to be off his feet keep it elevated to keep pressure off the area. They understand this. It is my opinion this is healed well and would not be concerning for an infection.        Camron Aragon MD  [facility]             no

## 2018-02-07 NOTE — ED PROVIDER NOTE - MEDICAL DECISION MAKING DETAILS
likely continued uti/pyelo, possibly esbl ecoli and/or candida. labs, gentle hydration (pt to become palliative, not aggressive sepsis protocol), abx and antifungal, ct. spoke to Dr. León from palliative, will see pt. will page dr. myers though pt likely does not want any intervention. admission for hospice set up.

## 2018-02-08 LAB
ALBUMIN SERPL ELPH-MCNC: 1.9 G/DL — LOW (ref 3.3–5)
ALP SERPL-CCNC: 151 U/L — HIGH (ref 40–120)
ALT FLD-CCNC: 140 U/L — HIGH (ref 12–78)
ANION GAP SERPL CALC-SCNC: 9 MMOL/L — SIGNIFICANT CHANGE UP (ref 5–17)
AST SERPL-CCNC: 148 U/L — HIGH (ref 15–37)
BILIRUB SERPL-MCNC: 0.5 MG/DL — SIGNIFICANT CHANGE UP (ref 0.2–1.2)
BUN SERPL-MCNC: 38 MG/DL — HIGH (ref 7–23)
CALCIUM SERPL-MCNC: 8.9 MG/DL — SIGNIFICANT CHANGE UP (ref 8.5–10.1)
CHLORIDE SERPL-SCNC: 105 MMOL/L — SIGNIFICANT CHANGE UP (ref 96–108)
CO2 SERPL-SCNC: 23 MMOL/L — SIGNIFICANT CHANGE UP (ref 22–31)
CREAT SERPL-MCNC: 1.92 MG/DL — HIGH (ref 0.5–1.3)
GLUCOSE SERPL-MCNC: 148 MG/DL — HIGH (ref 70–99)
HCT VFR BLD CALC: 38.5 % — SIGNIFICANT CHANGE UP (ref 34.5–45)
HGB BLD-MCNC: 12.4 G/DL — SIGNIFICANT CHANGE UP (ref 11.5–15.5)
MCHC RBC-ENTMCNC: 27.2 PG — SIGNIFICANT CHANGE UP (ref 27–34)
MCHC RBC-ENTMCNC: 32.2 GM/DL — SIGNIFICANT CHANGE UP (ref 32–36)
MCV RBC AUTO: 84.4 FL — SIGNIFICANT CHANGE UP (ref 80–100)
NRBC # BLD: 0 /100 WBCS — SIGNIFICANT CHANGE UP (ref 0–0)
PLATELET # BLD AUTO: 279 K/UL — SIGNIFICANT CHANGE UP (ref 150–400)
POTASSIUM SERPL-MCNC: 4.1 MMOL/L — SIGNIFICANT CHANGE UP (ref 3.5–5.3)
POTASSIUM SERPL-SCNC: 4.1 MMOL/L — SIGNIFICANT CHANGE UP (ref 3.5–5.3)
PROT SERPL-MCNC: 6.5 GM/DL — SIGNIFICANT CHANGE UP (ref 6–8.3)
RBC # BLD: 4.56 M/UL — SIGNIFICANT CHANGE UP (ref 3.8–5.2)
RBC # FLD: 17.2 % — HIGH (ref 10.3–14.5)
SODIUM SERPL-SCNC: 137 MMOL/L — SIGNIFICANT CHANGE UP (ref 135–145)
WBC # BLD: 18.07 K/UL — HIGH (ref 3.8–10.5)
WBC # FLD AUTO: 18.07 K/UL — HIGH (ref 3.8–10.5)

## 2018-02-08 PROCEDURE — 99497 ADVNCD CARE PLAN 30 MIN: CPT

## 2018-02-08 RX ORDER — MEROPENEM 1 G/30ML
INJECTION INTRAVENOUS
Qty: 0 | Refills: 0 | Status: DISCONTINUED | OUTPATIENT
Start: 2018-02-09 | End: 2018-02-09

## 2018-02-08 RX ORDER — ROSUVASTATIN CALCIUM 5 MG/1
10 TABLET ORAL DAILY
Qty: 0 | Refills: 0 | Status: DISCONTINUED | OUTPATIENT
Start: 2018-02-08 | End: 2018-02-08

## 2018-02-08 RX ORDER — GENTAMICIN SULFATE 3 MG/ML
2 SOLUTION/ DROPS OPHTHALMIC
Qty: 0 | Refills: 0 | Status: DISCONTINUED | OUTPATIENT
Start: 2018-02-08 | End: 2018-02-12

## 2018-02-08 RX ORDER — MEROPENEM 1 G/30ML
1000 INJECTION INTRAVENOUS ONCE
Qty: 0 | Refills: 0 | Status: COMPLETED | OUTPATIENT
Start: 2018-02-08 | End: 2018-02-09

## 2018-02-08 RX ORDER — INFLUENZA VIRUS VACCINE 15; 15; 15; 15 UG/.5ML; UG/.5ML; UG/.5ML; UG/.5ML
0.5 SUSPENSION INTRAMUSCULAR ONCE
Qty: 0 | Refills: 0 | Status: COMPLETED | OUTPATIENT
Start: 2018-02-08 | End: 2018-02-08

## 2018-02-08 RX ORDER — BISOPROLOL FUMARATE 10 MG/1
5 TABLET, FILM COATED ORAL DAILY
Qty: 0 | Refills: 0 | Status: DISCONTINUED | OUTPATIENT
Start: 2018-02-08 | End: 2018-02-12

## 2018-02-08 RX ORDER — MEROPENEM 1 G/30ML
1000 INJECTION INTRAVENOUS EVERY 12 HOURS
Qty: 0 | Refills: 0 | Status: DISCONTINUED | OUTPATIENT
Start: 2018-02-09 | End: 2018-02-09

## 2018-02-08 RX ORDER — ALPRAZOLAM 0.25 MG
0.25 TABLET ORAL EVERY 8 HOURS
Qty: 0 | Refills: 0 | Status: DISCONTINUED | OUTPATIENT
Start: 2018-02-08 | End: 2018-02-12

## 2018-02-08 RX ORDER — BISOPROLOL FUMARATE 10 MG/1
5 TABLET, FILM COATED ORAL DAILY
Qty: 0 | Refills: 0 | Status: DISCONTINUED | OUTPATIENT
Start: 2018-02-08 | End: 2018-02-08

## 2018-02-08 RX ADMIN — Medication 100 MILLIGRAM(S): at 05:40

## 2018-02-08 RX ADMIN — Medication 1 TABLET(S): at 18:36

## 2018-02-08 RX ADMIN — HEPARIN SODIUM 5000 UNIT(S): 5000 INJECTION INTRAVENOUS; SUBCUTANEOUS at 18:36

## 2018-02-08 RX ADMIN — Medication 1 TABLET(S): at 08:47

## 2018-02-08 RX ADMIN — Medication 100 MILLIGRAM(S): at 18:36

## 2018-02-08 RX ADMIN — PANTOPRAZOLE SODIUM 40 MILLIGRAM(S): 20 TABLET, DELAYED RELEASE ORAL at 08:47

## 2018-02-08 RX ADMIN — Medication 1 DROP(S): at 11:32

## 2018-02-08 RX ADMIN — Medication 650 MILLIGRAM(S): at 11:41

## 2018-02-08 RX ADMIN — GENTAMICIN SULFATE 2 DROP(S): 3 SOLUTION/ DROPS OPHTHALMIC at 19:51

## 2018-02-08 RX ADMIN — Medication 0.25 MILLIGRAM(S): at 18:40

## 2018-02-08 RX ADMIN — Medication 1 DROP(S): at 18:37

## 2018-02-08 RX ADMIN — Medication 1 DROP(S): at 05:40

## 2018-02-08 RX ADMIN — AMIODARONE HYDROCHLORIDE 200 MILLIGRAM(S): 400 TABLET ORAL at 05:39

## 2018-02-08 RX ADMIN — CLOPIDOGREL BISULFATE 75 MILLIGRAM(S): 75 TABLET, FILM COATED ORAL at 11:32

## 2018-02-08 RX ADMIN — Medication 650 MILLIGRAM(S): at 17:56

## 2018-02-08 RX ADMIN — HEPARIN SODIUM 5000 UNIT(S): 5000 INJECTION INTRAVENOUS; SUBCUTANEOUS at 05:40

## 2018-02-08 NOTE — PROGRESS NOTE ADULT - SUBJECTIVE AND OBJECTIVE BOX
PALLIATIVE CARE CONSULTATION NOTE            PALLIATIVE CARE NP NOTE :  Date/ Time: 2/8/18  HPI:  87 yop female pmh of obstructive uropathy ,multiple   esbl uti dced 12/21 with 3 week po abx , hld, neurogenic bladder, htn, arrythmia ,cad, pyoderma gangrenosum,  present with dysuria weakness , ams , lethargy, poor po intake ,tactile fevers ,treated with iv abx in ER and mental status improving.    PAST MEDICAL & SURGICAL HISTORY:  Neurogenic bladder  Ventricular bigeminy: history of ablation  MI (myocardial infarction)  High cholesterol  HTN (hypertension)  History of renal stent  S/P coronary artery stent placement  History of tonsillectomy  Cataract, bilateral  History of appendectomy    SOCIAL HISTORY: Admitted from: home [ X] SNF [ ] LING [ ] AL [ ]                                                                smoker [ ] past smoker [ ] non smoker[ ]                                                              no alcohol [ ] social alcohol [ ] alcohol [ ]  Surrogate/ HCP/ Guardian: [ ] YES [ ] NO.     NAME / PHONE #: Hermilo Degroot (son/ HCP) [phone]    Significant other/partner:                     Children:                         Holiness/Spirituality:    FAMILY HISTORY:  No pertinent family history in first degree relatives      Baseline ADLs (Prior to admission)  Independent:  Moderately [ ] fully [ ]   Dependent: moderately [ ] fully [x ]    ADVANCE DIRECTIVES:  [ ] YES [ ] NO   DNR: YES [ ] NO [  ]  Completed on:                     MOLST: YES [x ] NO [  ]  Completed on:2/7/18  Living Will: YES [ ] NO [  ]  Completed on:    Allergies  Benadryl Allergy (Other)  Intolerances  metoprolol (Other)    MEDICATIONS  (STANDING):  amiodarone    Tablet 200 milliGRAM(s) Oral daily  atorvastatin 20 milliGRAM(s) Oral at bedtime  clopidogrel Tablet 75 milliGRAM(s) Oral daily  docusate sodium 100 milliGRAM(s) Oral two times a day  heparin  Injectable 5000 Unit(s) SubCutaneous every 12 hours  influenza   Vaccine 0.5 milliLiter(s) IntraMuscular once  lactobacillus acidophilus 1 Tablet(s) Oral two times a day with meals  naphazoline/pheniramine Solution 1 Drop(s) Both EYES every 6 hours  pantoprazole    Tablet 40 milliGRAM(s) Oral before breakfast    MEDICATIONS  (PRN):  acetaminophen   Tablet 650 milliGRAM(s) Oral every 6 hours PRN Mild Pain (1 - 3)  senna 2 Tablet(s) Oral at bedtime PRN Constipation    OPIATE NAIVE: (Y/N)  I STOP REVIEWED: (Y/N) : (#-------------------)     REVIEW OF SYSTEM:     PAIN : (Y/N) (0-10)  PAIN SITE :                           QUALITY/QUANTITY OF PAIN :             PAIN RADIATING :( Y/N) SEVERITY :            FREQUENCY :  IMPACT ON ADLs : total care   DYSPNEA: Yes [x  ] No [  ] Mild [ ] Moderate [ x] Severe [ ]  NAUSEA/VOMITING: Yes [  ] No [ x ]  EXCESSIVE SECRETIONS: YES [  ] NO [ x]  DEPRESSION: Yes [x  ] No [  ] Mild [ ]Moderate [ x] Severe [ ]  ANXIETY: Yes [  ] No [ x ]  AGITATION: Yes [  ] No [ x ]  CONSTIPATION : Yes [  ] No [ x ]  DIARRHEA : Yes [  ] No [ x ]  FRAILTY SYNDROME: Yes [x  ] No [  ]  FAILURE TO THRIVE: Yes [ x ] No [  ]  DIBILITY: Yes [ x ] No [  ]  OTHER SYMPTOMS :  UNABLE TO OBTAIN DUE TO POOR MENTATION [  ]    PHYSICAL EXAM:  T(F): 98 (02-08-18 @ 04:36), Max: 98.4 (02-07-18 @ 18:48)  HR: 86 (02-08-18 @ 04:36) (72 - 86)  BP: 122/77 (02-08-18 @ 04:36) (112/74 - 122/77)  RR: 15 (02-08-18 @ 04:36) (15 - 22)  SpO2: 96% (02-08-18 @ 04:36) (94% - 96%)  Wt(kg): --   WEIGHT :                      BMI:  I&O's Summary    CAPILLARY BLOOD GLUCOSE  GENERAL: alert: Yes [ x] No [  ]oriented x ______confused [ ] lethargic [x] agitated [ ] cachexia [x] nonverbal [ ] coma [ ]  HEENT: normal [x] dry mouth [ ] excessive secretions [ ] Bipap [  ] ET tube/trach [ ] Vent [  ]  LUNGS: Comfortable [x] tachypnea/ labored breathing[ ]   CV :  normal [x ] tachycardia [ ]bradycardia [  ]   GI : normal [x ] distended [ ] tender [ ] no BS [ ]  PEG /NG tube [ ]   : normal [ ] incontinent [ ] oliguria/anuria [ ] childs [ ] Supra pubic catheter [x]  MSK : normal [ ] weakness [ ] edema [ ] ambulatory [ ] bedbound/ wheelchair bound [x ]   SKIN : normal [x ] pressure ulcer: Yes [  ] No [  ] Stage ______________ rash: Yes [  ] No [  ]    FUNCTIONAL ASSESSMENT:   Karnofsky Performance Score : <20  Palliative Performance Status Version 2:         %    LABS:                        13.4   23.43 )-----------( 304      ( 07 Feb 2018 11:07 )             41.5     02-07    137  |  103  |  37<H>  ----------------------------<  121<H>  4.4   |  25  |  2.09<H>    Ca    9.3      07 Feb 2018 11:07    TPro  7.5  /  Alb  2.3<L>  /  TBili  0.7  /  DBili  x   /  AST  276<H>  /  ALT  198<H>  /  AlkPhos  178<H>  02-07  I&O's Detail  ASSESSMENT:   87y Female admitted with URINARY TRACT INFECTION  PAIN ON URINATION  PROBLEM LIST :  PROBLEM/RECOMMENDATION:   1) PROBLEM  : Goals of care, counseling/ discussion.  RECOMMENDATION : Meet with patient/ family and discussed GOC & Advanced care planning.                                     Palliative care information /counseling provided.                                       Advanced Directives addressed     PROBLEM/ RECOMMENDATION:  2)PROBLEM :Resuscitation/ DNR/ DNI,   RECOMMENDATION: DNR/ DNI    PROBLEM/ RECOMMENDATION :  3)PROBLEM : Medical order for life sustaining treatment  RECOMMENDATION : MOLST Form completed 2/8/18    PROBLEM/RECOMMENDATION :  4)PROBLEM: Advanced care planning  RECOMMENDATION : Family meeting on: Met and spoke to son Hermilo Degroot and discussed goals of care and advance care planning.  MOLST Form completed, DNR/DNI, Comfort measures only.  Hospice care explained and accepted.  Hospice evaluation called in by  all script 2/8/18. Pending Hospice evaluation. Dr. Encinas aware.    PLAN:  REFERRALS  HOSPICE: YES [ x ] NO [  ]                         PALLIATIVE /MEDICAL SOCIAL WORKER AND : YES [x ] NO [  ]                         : YES [  ] NO [  ]                         SPEECH/ SWALLOW: YES [  ] NO [  ]                         PSYHCH CONSULT: YES [ ] NO [ ]                          PAIN MANAGEMENT: YES [  ] NO [  ]                         NUTRITION: YES [  ] NO [  ]                         ETHICS: YES [  ] NO [  ]                         PT/OT: YES [  ] NO [  ]  RECOMMENDATIONS   CONSIDER COMFORT CARE.  DNR/ DNI.  HOSPICE CARE.  SYMPTOM MANAGEMENT WITH HOSPICE CARE.  PAIN MANAGEMENT.

## 2018-02-08 NOTE — CONSULT NOTE ADULT - ASSESSMENT
87 year old female with PMH HTN, CAD, MI, Coronary Stent Placement, HLD, Ventricular Bigeminy, with hx recurrent UTI's, bilateral Hydronephrosis found on Renal Sono 5/17 and several episodes of ESBL EColi UTI ( [facility]). Patient was admitted to [facility] on 12/1/17 with sepsis due to recurrent ecoli UTI, with right hydroureteronephrosis. Discharged from that admission on 12/22 after ureteroscopy insertion of stent and change of SPC. Patient now with leukocytosis, Urince culture positive for stenotrophomonas maltophilia, SPC in place draining well. Patient also noted to have elevated LFT's    - continue medical management  - recommend IV antibiotics and reconsulting Dr. Negron who has previously been consulted on this patient  - trend BUN/Cr, continue SPC, monitor urine output  - follow up urine culture sensitivities  - trend WBC and vital signs  - Trend LFT's  - palliative consult noted  - Will discuss with Dr. Bautista

## 2018-02-08 NOTE — CONSULT NOTE ADULT - SUBJECTIVE AND OBJECTIVE BOX
HPI:  88 y/o white female with hx HTN, CAD, s/p MI, s/p Coronary stent placement, HLD, Neurogenic Bladder, s/p Suprapubic Catheter placement, hx Rt Hydronephrosis, s/p Rt Ureteral stent placement          Allergies :  Benadryl Allergy (Other)  Intolerances  metoprolol (Other)      Social History:  Tobacco:  Alcohol:  Recent Travel: none  Immunizations:  Patient lives at home with her son        MEDICATIONS  (STANDING):  amiodarone    Tablet 200 milliGRAM(s) Oral daily  bisoprolol   Tablet 5 milliGRAM(s) Oral daily  clopidogrel Tablet 75 milliGRAM(s) Oral daily  docusate sodium 100 milliGRAM(s) Oral two times a day  heparin  Injectable 5000 Unit(s) SubCutaneous every 12 hours  influenza   Vaccine 0.5 milliLiter(s) IntraMuscular once  lactobacillus acidophilus 1 Tablet(s) Oral two times a day with meals  naphazoline/pheniramine Solution 1 Drop(s) Both EYES every 6 hours  pantoprazole    Tablet 40 milliGRAM(s) Oral before breakfast  Rosuvastatin Calcium 10 milliGRAM(s) 10 milliGRAM(s) Oral daily    MEDICATIONS  (PRN):  acetaminophen   Tablet 650 milliGRAM(s) Oral every 6 hours PRN Mild Pain (1 - 3)  ALPRAZolam 0.25 milliGRAM(s) Oral every 8 hours PRN anxiety  senna 2 Tablet(s) Oral at bedtime PRN Constipation        LABS:  CBC Full  -  ( 2018 10:51 )  WBC Count : 18.07 K/uL  Hemoglobin : 12.4 g/dL  Hematocrit : 38.5 %  Platelet Count - Automated : 279 K/uL  Mean Cell Volume : 84.4 fl  Mean Cell Hemoglobin : 27.2 pg  Mean Cell Hemoglobin Concentration : 32.2 gm/dL  Auto Neutrophil # : x  Auto Lymphocyte # : x  Auto Monocyte # : x  Auto Eosinophil # : x  Auto Basophil # : x  Auto Neutrophil % : x  Auto Lymphocyte % : x  Auto Monocyte % : x  Auto Eosinophil % : x  Auto Basophil % : x        137  |  105  |  38<H>  ----------------------------<  148<H>  4.1   |  23  |  1.92<H>    Ca    8.9      2018 10:51    TPro  6.5  /  Alb  1.9<L>  /  TBili  0.5  /  DBili  x   /  AST  148<H>  /  ALT  140<H>  /  AlkPhos  151<H>      LIVER FUNCTIONS - ( 2018 10:51 )  Alb: 1.9 g/dL / Pro: 6.5 gm/dL / ALK PHOS: 151 U/L / ALT: 140 U/L / AST: 148 U/L / GGT: x             Urinalysis Basic - ( 2018 11:53 )  Color: Yellow / Appearance: Slightly Turbid / S.015 / pH: x  Gluc: x / Ketone: Negative  / Bili: Negative / Urobili: 1 mg/dL   Blood: x / Protein: 100 mg/dL / Nitrite: Positive   Leuk Esterase: Moderate / RBC: 25-50 /HPF / WBC >50   Sq Epi: x / Non Sq Epi: Occasional / Bacteria: Moderate          MICROBIOLOGY:  Specimen Source: .Blood Blood-Peripheral ( @ 14:09)  Culture Results:   No growth to date. ( @ 14:09)    Specimen Source: .Blood Blood-Venous ( @ 14:09)  Culture Results:   No growth to date. ( @ 14:09)    Specimen Source: .Urine Suprapubic ( @ 14:06)  Culture Results:   >100,000 CFU/ml Stenotrophomonas maltophilia ( @ 14:06)                Radiology:   CXR -  < from: Xray Chest 1 View- PORTABLE-Urgent (18 @ 11:23) >    FINDINGS:    LUNGS/PLEURA: No focal consolidation, pleural effusion, or pneumothorax.  MEDIASTINUM: Cardiomediastinal silhouette is unremarkable.  OTHER: None.    IMPRESSION:     No focal consolidation or pleural effusion.                               CT ABDOMEN + PELVIS -  < from: CT Abdomen and Pelvis No Cont (18 @ 12:24) >  FINDINGS:    LOWER CHEST: Trace right pleural effusion with atelectasis.    LIVER: Unchanged hyperdenseappearance of the liver, probably related to   amiodarone therapy.  BILE DUCTS: Normal caliber.   GALLBLADDER: Within normal limits.  SPLEEN: Previously seen hypodense lesion within the spleen is less   conspicuous.  PANCREAS: Unchanged hypodense lesion in the pancreatic tail (series 2,   image 20), measuring 1.2 cm.  ADRENALS: Within normal limits.  KIDNEYS/URETERS: Right ureteral stent.     BLADDER: Suprapubic cystostomy catheter is present within the urinary   bladder, which is filled with air.  REPRODUCTIVE ORGANS: The uterus and adnexa are within normal limits.    BOWEL: No bowel obstruction. Normal appendix.  Colonic diverticulosis.  PERITONEUM: No ascites.  VESSELS:  Atherosclerotic change of the abdominal aorta and its branches.  RETROPERITONEUM: No lymphadenopathy.    ABDOMINAL WALL: Within normal limits.  BONES: Degenerative changes of the spine.    IMPRESSION: No nephrolithiasis or hydronephrosis.           Vital Signs Last 24 Hrs  T(C): 36.2 (2018 13:18), Max: 36.9 (2018 18:48)  T(F): 97.2 (2018 13:18), Max: 98.4 (2018 18:48)  HR: 81 (2018 13:18) (73 - 86)  BP: 112/78 (2018 13:18) (112/74 - 122/77)  BP(mean): --  RR: 17 (2018 13:18) (15 - 22)  SpO2: 95% (2018 13:18) (94% - 96%)  Supplemental O2: on RA now      PHYSICAL EXAM    General:  88 y/o white female awake, alert, appears fatigued though, lying in bed, pleasant and cooperative, in NAD now  HEENT:   Neck:  Heart:  RR  Lungs: decreased BS at bases, no rhonchi or wheezing noted  Abdomen:  Extremities: trace edema LE's  Skin: warm, dry, no rash noted  Suprapubic Catheter in place and draining cloudy yellow urine now        I&O's Summary :    2018 07:01  -  2018 18:24  --------------------------------------------------------  IN: 360 mL / OUT: 0 mL / NET: 360 mL        Impression:    Suggestions:
HPI:  87 yop female pmh of obstructive uropathy ,multiple esbl uti dced  with 3 week po abx, hld, neurogenic bladder, htn, arrythmia ,cad, pyoderma gangrenosum,  present with dysuria weakness , ams , lethargy, poor po intake ,tactile fevers ,treated with iv abx in er and mental status improving (2018 16:05)    86 y/o white female born in [place], in the [place]rs[place] with hx HTN, CAD, s/p MI, s/p Coronary Stent Placement, HLD, Ventricular Bigeminy, s/p Tonsillectomy, s/p Bilateral Cataracts, with hx recurrent UTI's, bilateral Hydronephrosis found on Renal Sono  and several episodes of ESBL EColi UTI ( last at [facility] 17)  along with hx Influenza B during her  admission at [facility], known to me from prior admissions, s/p placement of a Suprapubic Higuera Catheter at [facility] as an outpatient and rx'ed for about 1 wk with a po Ab then, admitted via the ER on  with reported confusion and fever at home.  Patient was admitted to [facility] on 17 with sepsis due to recurrent ecoli UTI, with right hydroureteronephrosis. Discharged from that admission on  after ureteroscopy insertion of stent and change of SPC.     Patient now returns to the ED with weakness, lethargy, and found to have leukocytosis. Patient seen and examined at bedside with son present. Patients son states that he is thinking about hospice care because his mother is very lethargic and unable to ambulate well. It is difficult for her to make outpatient appointments required for follow up.     PAST MEDICAL & SURGICAL HISTORY:  Ventricular bigeminy: history of ablation  MI (myocardial infarction)  High cholesterol  HTN (hypertension)  S/P coronary artery stent placement  History of tonsillectomy  Cataract, bilateral  History of appendectomy    Allergies: Benadryl Allergy (Other)    Intolerances: metoprolol (Other)    Home Medications:  acetaminophen 325 mg oral tablet: 2 tab(s) orally every 6 hours, As needed, Mild Pain (1 - 3) (2017 18:36)  Ambien 5 mg oral tablet: 1 tab(s) orally once a day (at bedtime) (2017 18:36)  amiodarone 200 mg oral tablet: 1 tab(s) orally once a day (2017 18:36)  bisoprolol: 2.5 milligram(s) orally once a day (2017 18:36)  clopidogrel 75 mg oral tablet: 1 tab(s) orally once a day (2017 18:36)  freetext medication  -: 1 tab(s) orally once a day (06 May 2017 17:24)  furosemide 20 mg oral tablet: 1 tab(s) orally once a day (2017 18:36)  meropenem: 1000 milligram(s) intravenous 2 times a day (06 May 2017 17:24)  pantoprazole 40 mg oral delayed release tablet: 1 tab(s) orally once a day (before a meal) (06 May 2017 17:24)  predniSONE 5 mg oral tablet: 1 tab(s) orally once a day (06 May 2017 17:24)  rosuvastatin 10 mg oral tablet: 1 tab(s) orally once a day (at bedtime) (2017 13:43)  Xanax 0.25 mg oral tablet: 1 tab(s) orally , As Needed (2017 18:36)    MEDICATIONS  (STANDING):  amiodarone    Tablet 200 milliGRAM(s) Oral daily  bisoprolol   Tablet 2.5 milliGRAM(s) Oral daily  clopidogrel Tablet 75 milliGRAM(s) Oral daily  dextrose 5% + sodium chloride 0.9%. 1000 milliLiter(s) (75 mL/Hr) IV Continuous <Continuous>  docusate sodium 100 milliGRAM(s) Oral two times a day  heparin  Injectable 5000 Unit(s) SubCutaneous every 12 hours  meropenem IVPB 1000 milliGRAM(s) IV Intermittent every 8 hours  pantoprazole    Tablet 40 milliGRAM(s) Oral before breakfast  Rosuvastatin 10 milliGRAM(s) 10 milliGRAM(s) Oral at bedtime    MEDICATIONS  (PRN):  acetaminophen   Tablet 650 milliGRAM(s) Oral every 6 hours PRN For Temp greater than 38 C (100.4 F)  ALBUTerol    90 MICROgram(s) HFA Inhaler 2 Puff(s) Inhalation every 6 hours PRN Bronchospasm  aluminum hydroxide/magnesium hydroxide/simethicone Suspension 30 milliLiter(s) Oral every 4 hours PRN Dyspepsia  senna 2 Tablet(s) Oral at bedtime PRN Constipation    ROS:  Patient lethargic, but does complain of right sided flank pain.    Physical Exam:    Vital Signs:  Vital Signs Last 24 Hrs  T(C): 36.5 (01 Dec 2017 11:15), Max: 37 (2017 17:00)  T(F): 97.7 (01 Dec 2017 11:15), Max: 98.6 (2017 17:00)  HR: 65 (01 Dec 2017 11:15) (63 - 69)  BP: 128/89 (01 Dec 2017 11:15) (128/89 - 161/63)  RR: 17 (01 Dec 2017 11:15) (17 - 18)  SpO2: 96% (01 Dec 2017 11:15) (96% - 98%)  Daily     Daily Weight in k.1 (01 Dec 2017 09:42)  I&O's Summary    2017 07:  -  01 Dec 2017 07:00  --------------------------------------------------------  IN: 420 mL / OUT: 1500 mL / NET: -1080 mL    01 Dec 2017 07:  -  01 Dec 2017 16:39  --------------------------------------------------------  IN: 250 mL / OUT: 0 mL / NET: 250 mL    General:  Lethargic, but easily arousable, responsive to questions, Pleasant  Lungs:  Full & symmetric excursion, no increased effort.   CV: S1S2, RRR  Abdomen:  Soft, +ttp on left side, non-distended, normoactive bowel sounds.  : SPC in place draining clear yellow urine with sediment noted in tubing. Insertion site, dry and without erythema. No right CVA tenderness  Extremities:  no edema, DSD on LLE.  Skin:  No rash/erythema  Neuro/Psych: Alert    LABS:                             12.4   18.07 )-----------( 279      ( 2018 10:51 )             38.5       137  |  105  |  38<H>  ----------------------------<  148<H>  4.1   |  23  |  1.92<H>    Ca    8.9      2018 10:51    TPro  6.5  /  Alb  1.9<L>  /  TBili  0.5  /  DBili  x   /  AST  148<H>  /  ALT  140<H>  /  AlkPhos  151<H>      Culture Results:   >100,000 CFU/ml Stenotrophomonas maltophilia (18 @ 14:06)    RADIOLOGY & ADDITIONAL STUDIES:    < from: CT Abdomen and Pelvis No Cont (18 @ 12:24) >  CT of the Abdomen and Pelviswas performed without intravenous contrast.   Intravenous contrast: None.  Oral contrast: None.  Sagittal and coronal reformats were performed.    FINDINGS:    LOWER CHEST: Trace right pleural effusion with atelectasis.    LIVER: Unchanged hyperdenseappearance of the liver, probably related to   amiodarone therapy.  BILE DUCTS: Normal caliber.   GALLBLADDER: Within normal limits.  SPLEEN: Previously seen hypodense lesion within the spleen is less   conspicuous.  PANCREAS: Unchanged hypodense lesion in the pancreatic tail (series 2,   image 20), measuring 1.2 cm.  ADRENALS: Within normal limits.  KIDNEYS/URETERS: Right ureteral stent.     BLADDER: Suprapubic cystostomy catheter is present within the urinary   bladder, which is filled with air.  REPRODUCTIVE ORGANS: The uterus and adnexa are within normal limits.    BOWEL: No bowel obstruction. Normal appendix.  Colonic diverticulosis.  PERITONEUM: No ascites.  VESSELS:  Atherosclerotic change of the abdominal aorta and its branches.  RETROPERITONEUM: No lymphadenopathy.    ABDOMINAL WALL: Within normal limits.  BONES: Degenerative changes of the spine.    IMPRESSION: No nephrolithiasis or hydronephrosis.

## 2018-02-09 LAB
-  CEFTAZIDIME: SIGNIFICANT CHANGE UP
-  LEVOFLOXACIN: SIGNIFICANT CHANGE UP
-  TRIMETHOPRIM/SULFAMETHOXAZOLE: SIGNIFICANT CHANGE UP
ANION GAP SERPL CALC-SCNC: 12 MMOL/L — SIGNIFICANT CHANGE UP (ref 5–17)
BUN SERPL-MCNC: 40 MG/DL — HIGH (ref 7–23)
CALCIUM SERPL-MCNC: 9 MG/DL — SIGNIFICANT CHANGE UP (ref 8.5–10.1)
CHLORIDE SERPL-SCNC: 102 MMOL/L — SIGNIFICANT CHANGE UP (ref 96–108)
CO2 SERPL-SCNC: 22 MMOL/L — SIGNIFICANT CHANGE UP (ref 22–31)
CREAT SERPL-MCNC: 1.89 MG/DL — HIGH (ref 0.5–1.3)
CRP SERPL-MCNC: 40.4 MG/DL — HIGH (ref 0–0.4)
CULTURE RESULTS: SIGNIFICANT CHANGE UP
ERYTHROCYTE [SEDIMENTATION RATE] IN BLOOD: 97 MM/HR — HIGH (ref 0–20)
GLUCOSE SERPL-MCNC: 130 MG/DL — HIGH (ref 70–99)
HCT VFR BLD CALC: 35.3 % — SIGNIFICANT CHANGE UP (ref 34.5–45)
HGB BLD-MCNC: 11.3 G/DL — LOW (ref 11.5–15.5)
MCHC RBC-ENTMCNC: 26.7 PG — LOW (ref 27–34)
MCHC RBC-ENTMCNC: 32 GM/DL — SIGNIFICANT CHANGE UP (ref 32–36)
MCV RBC AUTO: 83.5 FL — SIGNIFICANT CHANGE UP (ref 80–100)
METHOD TYPE: SIGNIFICANT CHANGE UP
NRBC # BLD: 0 /100 WBCS — SIGNIFICANT CHANGE UP (ref 0–0)
ORGANISM # SPEC MICROSCOPIC CNT: SIGNIFICANT CHANGE UP
ORGANISM # SPEC MICROSCOPIC CNT: SIGNIFICANT CHANGE UP
PLATELET # BLD AUTO: 272 K/UL — SIGNIFICANT CHANGE UP (ref 150–400)
POTASSIUM SERPL-MCNC: 3.7 MMOL/L — SIGNIFICANT CHANGE UP (ref 3.5–5.3)
POTASSIUM SERPL-SCNC: 3.7 MMOL/L — SIGNIFICANT CHANGE UP (ref 3.5–5.3)
RBC # BLD: 4.23 M/UL — SIGNIFICANT CHANGE UP (ref 3.8–5.2)
RBC # FLD: 17.2 % — HIGH (ref 10.3–14.5)
SODIUM SERPL-SCNC: 136 MMOL/L — SIGNIFICANT CHANGE UP (ref 135–145)
SPECIMEN SOURCE: SIGNIFICANT CHANGE UP
WBC # BLD: 16.86 K/UL — HIGH (ref 3.8–10.5)
WBC # FLD AUTO: 16.86 K/UL — HIGH (ref 3.8–10.5)

## 2018-02-09 RX ORDER — DEXTROSE MONOHYDRATE, SODIUM CHLORIDE, AND POTASSIUM CHLORIDE 50; .745; 4.5 G/1000ML; G/1000ML; G/1000ML
1000 INJECTION, SOLUTION INTRAVENOUS
Qty: 0 | Refills: 0 | Status: DISCONTINUED | OUTPATIENT
Start: 2018-02-09 | End: 2018-02-12

## 2018-02-09 RX ADMIN — HEPARIN SODIUM 5000 UNIT(S): 5000 INJECTION INTRAVENOUS; SUBCUTANEOUS at 17:43

## 2018-02-09 RX ADMIN — Medication 100 MILLIGRAM(S): at 05:22

## 2018-02-09 RX ADMIN — Medication 1 TABLET(S): at 17:44

## 2018-02-09 RX ADMIN — MEROPENEM 100 MILLIGRAM(S): 1 INJECTION INTRAVENOUS at 00:12

## 2018-02-09 RX ADMIN — GENTAMICIN SULFATE 2 DROP(S): 3 SOLUTION/ DROPS OPHTHALMIC at 12:08

## 2018-02-09 RX ADMIN — DEXTROSE MONOHYDRATE, SODIUM CHLORIDE, AND POTASSIUM CHLORIDE 75 MILLILITER(S): 50; .745; 4.5 INJECTION, SOLUTION INTRAVENOUS at 18:40

## 2018-02-09 RX ADMIN — GENTAMICIN SULFATE 2 DROP(S): 3 SOLUTION/ DROPS OPHTHALMIC at 17:43

## 2018-02-09 RX ADMIN — Medication 173.33 MILLIGRAM(S): at 17:43

## 2018-02-09 RX ADMIN — CLOPIDOGREL BISULFATE 75 MILLIGRAM(S): 75 TABLET, FILM COATED ORAL at 12:08

## 2018-02-09 RX ADMIN — PANTOPRAZOLE SODIUM 40 MILLIGRAM(S): 20 TABLET, DELAYED RELEASE ORAL at 08:18

## 2018-02-09 RX ADMIN — BISOPROLOL FUMARATE 5 MILLIGRAM(S): 10 TABLET, FILM COATED ORAL at 05:22

## 2018-02-09 RX ADMIN — Medication 173.33 MILLIGRAM(S): at 05:25

## 2018-02-09 RX ADMIN — MEROPENEM 100 MILLIGRAM(S): 1 INJECTION INTRAVENOUS at 05:24

## 2018-02-09 RX ADMIN — AMIODARONE HYDROCHLORIDE 200 MILLIGRAM(S): 400 TABLET ORAL at 05:23

## 2018-02-09 RX ADMIN — Medication 650 MILLIGRAM(S): at 05:27

## 2018-02-09 RX ADMIN — Medication 0.25 MILLIGRAM(S): at 05:26

## 2018-02-09 RX ADMIN — GENTAMICIN SULFATE 2 DROP(S): 3 SOLUTION/ DROPS OPHTHALMIC at 05:23

## 2018-02-09 RX ADMIN — Medication 100 MILLIGRAM(S): at 17:43

## 2018-02-09 RX ADMIN — Medication 1 DROP(S): at 12:08

## 2018-02-09 RX ADMIN — Medication 1 DROP(S): at 05:23

## 2018-02-09 RX ADMIN — Medication 1 DROP(S): at 17:43

## 2018-02-09 RX ADMIN — Medication 1 TABLET(S): at 08:19

## 2018-02-09 RX ADMIN — HEPARIN SODIUM 5000 UNIT(S): 5000 INJECTION INTRAVENOUS; SUBCUTANEOUS at 05:24

## 2018-02-09 NOTE — PROGRESS NOTE ADULT - SUBJECTIVE AND OBJECTIVE BOX
Patient seen and examined bedside resting comfortably.  Pt is known to me from prior admissions and she recognizes me but cannot say why she is presently in the hospital.  Feels alright. No fever or chills.     T(F): 98 (02-09-18 @ 12:43), Max: 98 (02-09-18 @ 04:53)  HR: 80 (02-09-18 @ 12:43) (80 - 90)  BP: 120/63 (02-09-18 @ 12:43) (112/78 - 132/72)  RR: 17 (02-09-18 @ 12:43) (16 - 17)  SpO2: 95% (02-09-18 @ 12:43) (95% - 96%)  Wt(kg): --  CAPILLARY BLOOD GLUCOSE      PHYSICAL EXAM:  General: NAD, alert and awake  HEENT: NCAT, EOMI, conjunctiva clear  Chest: nonlabored respirations, good inspiratory effort  Abdomen: soft, NTND.   Extremities: no pedal edema or calf tenderness noted   :  suprapubic catheter indwelling draining yellow urine    LABS:                        11.3   16.86 )-----------( 272      ( 09 Feb 2018 08:20 )             35.3   02-09    136  |  102  |  40<H>  ----------------------------<  130<H>  3.7   |  22  |  1.89<H>    Ca    9.0      09 Feb 2018 08:20    TPro  6.5  /  Alb  1.9<L>  /  TBili  0.5  /  DBili  x   /  AST  148<H>  /  ALT  140<H>  /  AlkPhos  151<H>  02-08    I&O's Detail    08 Feb 2018 07:01  -  09 Feb 2018 07:00  --------------------------------------------------------  IN:    Oral Fluid: 2610 mL  Total IN: 2610 mL    OUT:    Indwelling Catheter - Suprapubic: 800 mL  Total OUT: 800 mL    Total NET: 1810 mL      09 Feb 2018 07:01 - 09 Feb 2018 13:03  --------------------------------------------------------  IN:    Oral Fluid: 240 mL  Total IN: 240 mL    OUT:  Total OUT: 0 mL    Total NET: 240 mL      Culture - Urine (02.07.18 @ 14:06)    -  Trimethoprim/Sulfamethoxazole: S <=0.5/9.5    -  Ceftazidime: S <=1    -  Levofloxacin: S 2    Specimen Source: .Urine Suprapubic    Culture Results:   >100,000 CFU/ml Stenotrophomonas maltophilia    Organism Identification: Stenotrophomonas maltophilia    Organism: Stenotrophomonas maltophilia    Method Type: ALDO      Impression: 87y Female PMH Neurogenic bladder, MI, HTN, HLD, ventricular bigeminy and h/o multiple recent admissions for ESBL UTIs   (Patient was admitted to [facility] on 12/1/17 with sepsis due to recurrent ecoli UTI, with right hydroureteronephrosis. Discharged from that admission on 12/22 after ureteroscopy insertion of stent and change of SPC) now admitted with leukocytosis and stenotrophomonas maltophilia UTI, blood cultures negative.    Plan:  -continue suprapubic catheter. Abx therapy. Medical management palliative care, hospice consult pending. OOB/increase activity with PT, PT ordered. Patient seen and examined bedside resting comfortably.  Pt is known to me from prior admissions and she recognizes me but cannot say why she is presently in the hospital.  Feels alright. No fever or chills.     T(F): 98 (02-09-18 @ 12:43), Max: 98 (02-09-18 @ 04:53)  HR: 80 (02-09-18 @ 12:43) (80 - 90)  BP: 120/63 (02-09-18 @ 12:43) (112/78 - 132/72)  RR: 17 (02-09-18 @ 12:43) (16 - 17)  SpO2: 95% (02-09-18 @ 12:43) (95% - 96%)  Wt(kg): --  CAPILLARY BLOOD GLUCOSE      PHYSICAL EXAM:  General: NAD, alert and awake  HEENT: NCAT, EOMI, conjunctiva clear  Chest: nonlabored respirations, good inspiratory effort  Abdomen: soft, NTND.   Extremities: no pedal edema or calf tenderness noted   :  suprapubic catheter indwelling draining yellow urine    LABS:                        11.3   16.86 )-----------( 272      ( 09 Feb 2018 08:20 )             35.3   02-09    136  |  102  |  40<H>  ----------------------------<  130<H>  3.7   |  22  |  1.89<H>    Ca    9.0      09 Feb 2018 08:20    TPro  6.5  /  Alb  1.9<L>  /  TBili  0.5  /  DBili  x   /  AST  148<H>  /  ALT  140<H>  /  AlkPhos  151<H>  02-08    I&O's Detail    08 Feb 2018 07:01  -  09 Feb 2018 07:00  --------------------------------------------------------  IN:    Oral Fluid: 2610 mL  Total IN: 2610 mL    OUT:    Indwelling Catheter - Suprapubic: 800 mL  Total OUT: 800 mL    Total NET: 1810 mL      09 Feb 2018 07:01 - 09 Feb 2018 13:03  --------------------------------------------------------  IN:    Oral Fluid: 240 mL  Total IN: 240 mL    OUT:  Total OUT: 0 mL    Total NET: 240 mL      Culture - Urine (02.07.18 @ 14:06)    -  Trimethoprim/Sulfamethoxazole: S <=0.5/9.5    -  Ceftazidime: S <=1    -  Levofloxacin: S 2    Specimen Source: .Urine Suprapubic    Culture Results:   >100,000 CFU/ml Stenotrophomonas maltophilia    Organism Identification: Stenotrophomonas maltophilia    Organism: Stenotrophomonas maltophilia    Method Type: ALDO    < from: CT Abdomen and Pelvis No Cont (02.07.18 @ 12:24) >  IMPRESSION: No nephrolithiasis or hydronephrosis.     < end of copied text >      Impression: 87y Female PMH Neurogenic bladder, MI, HTN, HLD, ventricular bigeminy and h/o multiple recent admissions for ESBL UTIs   (Patient was admitted to [facility] on 12/1/17 with sepsis due to recurrent ecoli UTI, with right hydroureteronephrosis. Discharged from that admission on 12/22 after ureteroscopy insertion of stent and change of SPC) now admitted with leukocytosis and stenotrophomonas maltophilia UTI, blood cultures negative.    Plan:  -continue suprapubic catheter. Abx therapy. Medical management palliative care, hospice consult pending. OOB/increase activity with PT, PT ordered.

## 2018-02-09 NOTE — PROGRESS NOTE ADULT - SUBJECTIVE AND OBJECTIVE BOX
TMAX -  98    On day # 2 Meropenem / # 1 Bactrim now    Vital Signs Last 24 Hrs  T(C): 36.7 (09 Feb 2018 12:43), Max: 36.7 (09 Feb 2018 04:53)  T(F): 98 (09 Feb 2018 12:43), Max: 98 (09 Feb 2018 04:53)  HR: 80 (09 Feb 2018 12:43) (80 - 90)  BP: 120/63 (09 Feb 2018 12:43) (113/68 - 132/72)  BP(mean): --  RR: 17 (09 Feb 2018 12:43) (16 - 17)  SpO2: 95% (09 Feb 2018 12:43) (95% - 96%)  Supplemental O2:  on RA now      Awake, alert,  claims to be feeling better today.  No c/o abdominal pain, no SOB, no cp, and no c/o cough.  Told me she drank some liquids and food that her son brought her today.        PHYSICAL EXAM  General:  awake, alert, pleasant and cooperative, lying in bed,  seems less fatigued than last evening and in NAD now  HEENT:  conjunctivae injected bilaterally and with some dried/ crusted drainage both eyes, PERRLA, no oral lesions noted but mucosa still somewhat dry  Neck:  supple, no nodes noted  Heart:  RR  Lungs:  clear bilaterally now  Abdomen:  soft, BS+, nontender to palpation, Suprapubic Catheter in place - site clean, no CVA tenderness noted  Extremities: trace edema LE's, no calf tenderness elicited  Skin:  warm, dry, no rash noted  Suprapubic Catheter draining cloudy yellow urine now - 800 cc. output recorded for the prior 24hrs.    I&O's Summary :    08 Feb 2018 07:01  -  09 Feb 2018 07:00  --------------------------------------------------------  IN: 2610 mL / OUT: 800 mL / NET: 1810 mL    09 Feb 2018 07:01  -  09 Feb 2018 15:59  --------------------------------------------------------  IN: 360 mL / OUT: 0 mL / NET: 360 mL        LABS:  CBC Full  -  ( 09 Feb 2018 08:20 )  WBC Count : 16.86 K/uL  Hemoglobin : 11.3 g/dL  Hematocrit : 35.3 %  Platelet Count - Automated : 272 K/uL  Mean Cell Volume : 83.5 fl  Mean Cell Hemoglobin : 26.7 pg  Mean Cell Hemoglobin Concentration : 32.0 gm/dL  Auto Neutrophil # : x  Auto Lymphocyte # : x  Auto Monocyte # : x  Auto Eosinophil # : x  Auto Basophil # : x  Auto Neutrophil % : x  Auto Lymphocyte % : x  Auto Monocyte % : x  Auto Eosinophil % : x  Auto Basophil % : x    02-09    136  |  102  |  40<H>  ----------------------------<  130<H>  3.7   |  22  |  1.89<H>    Ca    9.0      09 Feb 2018 08:20    TPro  6.5  /  Alb  1.9<L>  /  TBili  0.5  /  DBili  x   /  AST  148<H>  /  ALT  140<H>  /  AlkPhos  151<H>  02-08    LIVER FUNCTIONS - ( 08 Feb 2018 10:51 )  Alb: 1.9 g/dL / Pro: 6.5 gm/dL / ALK PHOS: 151 U/L / ALT: 140 U/L / AST: 148 U/L / GGT: x             Sedimentation Rate, Erythrocyte: 97 mm/hr (02-09 @ 08:20)    CRP - 40.40        MICROBIOLOGY:  Specimen Source: .Blood Blood-Peripheral (02-07 @ 14:09)  Culture Results:   No growth to date. (02-07 @ 14:09)    Specimen Source: .Blood Blood-Venous (02-07 @ 14:09)  Culture Results:   No growth to date. (02-07 @ 14:09)    Specimen Source: .Urine Suprapubic (02-07 @ 14:06)  Culture Results:   >100,000 CFU/ml Stenotrophomonas maltophilia (02-07 @ 14:06)          Radiology:    CT ABD + PELVIS -  < from: CT Abdomen and Pelvis No Cont (02.07.18 @ 12:24) >  FINDINGS:    LOWER CHEST: Trace right pleural effusion with atelectasis.    LIVER: Unchanged hyperdenseappearance of the liver, probably related to   amiodarone therapy.  BILE DUCTS: Normal caliber.   GALLBLADDER: Within normal limits.  SPLEEN: Previously seen hypodense lesion within the spleen is less   conspicuous.  PANCREAS: Unchanged hypodense lesion in the pancreatic tail (series 2,   image 20), measuring 1.2 cm.  ADRENALS: Within normal limits.  KIDNEYS/URETERS: Right ureteral stent.     BLADDER: Suprapubic cystostomy catheter is present within the urinary   bladder, which is filled with air.  REPRODUCTIVE ORGANS: The uterus and adnexa are within normal limits.    BOWEL: No bowel obstruction. Normal appendix.  Colonic diverticulosis.  PERITONEUM: No ascites.  VESSELS:  Atherosclerotic change of the abdominal aorta and its branches.  RETROPERITONEUM: No lymphadenopathy.    ABDOMINAL WALL: Within normal limits.  BONES: Degenerative changes of the spine.    IMPRESSION: No nephrolithiasis or hydronephrosis.         Impression:   Temps remain decreased now on present ab rx with Meropenem + Bactrim for rx of Sepsis secondary to  origin ( presumably Rt Pyelonephritis in view of underlying Rt Ureteral Stent in place for prior Rt Hydronephrosis )and now with Urine Cx finalized as Stenotrophomonas Maltophilia which is sensitive to the Bactrim.  WBC's trending downwards and BUN/Creat remain somewhat elevated.  Noted very elevated ESR and CRP along with some increase noted in LFT's.  Bilateral Conjunctivitis slightly improved today.      Suggestions:  Will follow-up Blood CX's and continue present ab rx with Bactrim IVPB for now but d/c Meropenem as Urine CX has only grown the single isolate of Stenotrophomonas.  Will add IV fluids as patient does not appear to be drinking sufficient fluids at this time.  Follow-up temps and labs.  Discussed in detail with patient's son at bedside - patient being evaluated for Hospice Care but he still wishes to continue treatment for her infection at this time.  Dr. Bautista considering possibility of exchanging the Rt ureteral stent as well.  Will continue rx with Gent Ophthalmic Solution for her bilateral conjunctivitis.

## 2018-02-10 LAB
ALBUMIN SERPL ELPH-MCNC: 1.7 G/DL — LOW (ref 3.3–5)
ALP SERPL-CCNC: 201 U/L — HIGH (ref 40–120)
ALT FLD-CCNC: 99 U/L — HIGH (ref 12–78)
ANION GAP SERPL CALC-SCNC: 10 MMOL/L — SIGNIFICANT CHANGE UP (ref 5–17)
AST SERPL-CCNC: 143 U/L — HIGH (ref 15–37)
BASOPHILS # BLD AUTO: 0.06 K/UL — SIGNIFICANT CHANGE UP (ref 0–0.2)
BASOPHILS NFR BLD AUTO: 0.4 % — SIGNIFICANT CHANGE UP (ref 0–2)
BILIRUB SERPL-MCNC: 0.6 MG/DL — SIGNIFICANT CHANGE UP (ref 0.2–1.2)
BUN SERPL-MCNC: 38 MG/DL — HIGH (ref 7–23)
CALCIUM SERPL-MCNC: 8.7 MG/DL — SIGNIFICANT CHANGE UP (ref 8.5–10.1)
CHLORIDE SERPL-SCNC: 99 MMOL/L — SIGNIFICANT CHANGE UP (ref 96–108)
CO2 SERPL-SCNC: 23 MMOL/L — SIGNIFICANT CHANGE UP (ref 22–31)
CREAT SERPL-MCNC: 1.78 MG/DL — HIGH (ref 0.5–1.3)
EOSINOPHIL # BLD AUTO: 0.07 K/UL — SIGNIFICANT CHANGE UP (ref 0–0.5)
EOSINOPHIL NFR BLD AUTO: 0.5 % — SIGNIFICANT CHANGE UP (ref 0–6)
GLUCOSE SERPL-MCNC: 133 MG/DL — HIGH (ref 70–99)
HCT VFR BLD CALC: 37.1 % — SIGNIFICANT CHANGE UP (ref 34.5–45)
HGB BLD-MCNC: 11.7 G/DL — SIGNIFICANT CHANGE UP (ref 11.5–15.5)
IMM GRANULOCYTES NFR BLD AUTO: 0.8 % — SIGNIFICANT CHANGE UP (ref 0–1.5)
LYMPHOCYTES # BLD AUTO: 1.7 K/UL — SIGNIFICANT CHANGE UP (ref 1–3.3)
LYMPHOCYTES # BLD AUTO: 11.9 % — LOW (ref 13–44)
MCHC RBC-ENTMCNC: 26.4 PG — LOW (ref 27–34)
MCHC RBC-ENTMCNC: 31.5 GM/DL — LOW (ref 32–36)
MCV RBC AUTO: 83.7 FL — SIGNIFICANT CHANGE UP (ref 80–100)
MONOCYTES # BLD AUTO: 1.64 K/UL — HIGH (ref 0–0.9)
MONOCYTES NFR BLD AUTO: 11.5 % — SIGNIFICANT CHANGE UP (ref 2–14)
NEUTROPHILS # BLD AUTO: 10.68 K/UL — HIGH (ref 1.8–7.4)
NEUTROPHILS NFR BLD AUTO: 74.9 % — SIGNIFICANT CHANGE UP (ref 43–77)
NRBC # BLD: 0 /100 WBCS — SIGNIFICANT CHANGE UP (ref 0–0)
PLATELET # BLD AUTO: 290 K/UL — SIGNIFICANT CHANGE UP (ref 150–400)
POTASSIUM SERPL-MCNC: 3.9 MMOL/L — SIGNIFICANT CHANGE UP (ref 3.5–5.3)
POTASSIUM SERPL-SCNC: 3.9 MMOL/L — SIGNIFICANT CHANGE UP (ref 3.5–5.3)
PROT SERPL-MCNC: 6.2 GM/DL — SIGNIFICANT CHANGE UP (ref 6–8.3)
RBC # BLD: 4.43 M/UL — SIGNIFICANT CHANGE UP (ref 3.8–5.2)
RBC # FLD: 17.2 % — HIGH (ref 10.3–14.5)
SODIUM SERPL-SCNC: 132 MMOL/L — LOW (ref 135–145)
WBC # BLD: 14.26 K/UL — HIGH (ref 3.8–10.5)
WBC # FLD AUTO: 14.26 K/UL — HIGH (ref 3.8–10.5)

## 2018-02-10 RX ADMIN — GENTAMICIN SULFATE 2 DROP(S): 3 SOLUTION/ DROPS OPHTHALMIC at 00:28

## 2018-02-10 RX ADMIN — GENTAMICIN SULFATE 2 DROP(S): 3 SOLUTION/ DROPS OPHTHALMIC at 05:19

## 2018-02-10 RX ADMIN — Medication 173.33 MILLIGRAM(S): at 05:19

## 2018-02-10 RX ADMIN — AMIODARONE HYDROCHLORIDE 200 MILLIGRAM(S): 400 TABLET ORAL at 05:19

## 2018-02-10 RX ADMIN — DEXTROSE MONOHYDRATE, SODIUM CHLORIDE, AND POTASSIUM CHLORIDE 75 MILLILITER(S): 50; .745; 4.5 INJECTION, SOLUTION INTRAVENOUS at 15:26

## 2018-02-10 RX ADMIN — Medication 1 TABLET(S): at 17:58

## 2018-02-10 RX ADMIN — Medication 100 MILLIGRAM(S): at 17:57

## 2018-02-10 RX ADMIN — HEPARIN SODIUM 5000 UNIT(S): 5000 INJECTION INTRAVENOUS; SUBCUTANEOUS at 05:21

## 2018-02-10 RX ADMIN — GENTAMICIN SULFATE 2 DROP(S): 3 SOLUTION/ DROPS OPHTHALMIC at 23:13

## 2018-02-10 RX ADMIN — GENTAMICIN SULFATE 2 DROP(S): 3 SOLUTION/ DROPS OPHTHALMIC at 12:46

## 2018-02-10 RX ADMIN — Medication 173.33 MILLIGRAM(S): at 19:21

## 2018-02-10 RX ADMIN — PANTOPRAZOLE SODIUM 40 MILLIGRAM(S): 20 TABLET, DELAYED RELEASE ORAL at 08:33

## 2018-02-10 RX ADMIN — Medication 1 DROP(S): at 12:48

## 2018-02-10 RX ADMIN — BISOPROLOL FUMARATE 5 MILLIGRAM(S): 10 TABLET, FILM COATED ORAL at 05:20

## 2018-02-10 RX ADMIN — HEPARIN SODIUM 5000 UNIT(S): 5000 INJECTION INTRAVENOUS; SUBCUTANEOUS at 17:58

## 2018-02-10 RX ADMIN — Medication 1 DROP(S): at 00:28

## 2018-02-10 RX ADMIN — Medication 100 MILLIGRAM(S): at 05:19

## 2018-02-10 RX ADMIN — Medication 1 DROP(S): at 05:19

## 2018-02-10 RX ADMIN — GENTAMICIN SULFATE 2 DROP(S): 3 SOLUTION/ DROPS OPHTHALMIC at 17:57

## 2018-02-10 RX ADMIN — CLOPIDOGREL BISULFATE 75 MILLIGRAM(S): 75 TABLET, FILM COATED ORAL at 12:49

## 2018-02-10 RX ADMIN — Medication 1 TABLET(S): at 08:34

## 2018-02-10 NOTE — PHYSICAL THERAPY INITIAL EVALUATION ADULT - ADDITIONAL COMMENTS
Pt's son reports prior to admission pt was able to ambulate short distances with assistance and RW, pt requires assist with all ADL's.

## 2018-02-10 NOTE — PROGRESS NOTE ADULT - SUBJECTIVE AND OBJECTIVE BOX
Patient seen and examined bedside resting comfortably. No complaints offered. SPC in place. Denies nausea and vomiting. Tolerating diet. Denies chest pain, dyspnea, cough.    T(F): 98 (02-10-18 @ 10:24), Max: 98 (02-10-18 @ 10:24)  HR: 74 (02-10-18 @ 11:52) (70 - 74)  BP: 128/69 (02-10-18 @ 11:52) (106/63 - 138/71)  RR: 16 (02-10-18 @ 11:52) (14 - 18)  SpO2: 97% (02-10-18 @ 11:52) (94% - 97%)    PHYSICAL EXAM:  General: NAD, WDWN  Neuro:  Alert. Follows commands.   HEENT: NCAT, EOMI, conjunctiva clear  CV: +S1+S2 regular rate and rhythm  Lung: clear to ausculation bilaterally, respirations nonlabored, good inspiratory effort  Abdomen: soft, NTND. Normoactive BS  : suprpubic catheter in place draining clear yellow urine. No erythema/edema/discharge around site of SPC insertion. I/O: 2790/1500mL  Extremities: no pedal edema or calf tenderness noted. 2+ distal pulses b/l    LABS:                        11.7   14.26 )-----------( 290      ( 10 Feb 2018 08:15 )             37.1     02-10    132<L>  |  99  |  38<H>  ----------------------------<  133<H>  3.9   |  23  |  1.78<H>    Ca    8.7      10 Feb 2018 08:15    TPro  6.2  /  Alb  1.7<L>  /  TBili  0.6  /  DBili  x   /  AST  143<H>  /  ALT  99<H>  /  AlkPhos  201<H>  02-10    I&O:     Culture Results:   No growth to date. (02-07 @ 14:09)  Culture Results:   No growth to date. (02-07 @ 14:09)  Culture Results:   >100,000 CFU/ml Stenotrophomonas maltophilia (02-07 @ 14:06)    Impression: 87y Female PMH Neurogenic bladder, MI, HTN, HLD, ventricular bigeminy and h/o multiple recent admissions for ESBL UTIs   (Patient was admitted to [facility] on 12/1/17 with sepsis due to recurrent ecoli UTI, with right hydroureteronephrosis. Discharged from that admission on 12/22 after ureteroscopy insertion of stent and change of SPC) now admitted with leukocytosis and stenotrophomonas maltophilia UTI, blood cultures negative.    Plan:  -continue SPC with I+O monitoring  -continue palliative/hospice/medicine management.  -continue bactrim q12 per ID  -continue VTE prophylaxis, PT, OOB, Ambulate  -f/u AM labs  -discuss with Dr. Bautista

## 2018-02-10 NOTE — PHYSICAL THERAPY INITIAL EVALUATION ADULT - CRITERIA FOR SKILLED THERAPEUTIC INTERVENTIONS
functional limitations in following categories/therapy frequency/impairments found/risk reduction/prevention/rehab potential

## 2018-02-10 NOTE — PROGRESS NOTE ADULT - SUBJECTIVE AND OBJECTIVE BOX
TMAX - 98    On day # 2 Bactrim now / #2 Gent Ophthalmic Solution    Vital Signs Last 24 Hrs  T(C): 37.2 (10 Feb 2018 17:20), Max: 37.2 (10 Feb 2018 17:20)  T(F): 98.9 (10 Feb 2018 17:20), Max: 98.9 (10 Feb 2018 17:20)  HR: 73 (10 Feb 2018 17:20) (70 - 74)  BP: 121/58 (10 Feb 2018 17:20) (106/63 - 138/71)  BP(mean): --  RR: 15 (10 Feb 2018 17:20) (14 - 18)  SpO2: 95% (10 Feb 2018 17:20) (94% - 97%)  Supplemental O2:  on RA now      Awake, alert, no c/o cp, no abdominal pain, and no c/o SOB today.  Claims she is feeling better and with no discomfort from her eyes.  Family at bedside including her son, 2 grandsons, and her sister.  Appetite still poor, but drinking a little better.      PHYSICAL EXAM  General:  awake, alert, sitting upright in bed, pleasant and cooperative, in NAD  HEENT:  conj slightly less injected, sclerae anicteric, no drainage or crusting noted today, PERRLA, no oral lesions noted  Neck:  supple, no nodes noted  Heart: RR   Lungs:  decreased BS at bases bilaterally, otherwise clear  Abdomen:  BS+, soft, nontender to palpation                   Suprapubic Catheter in place - site clean  No CVA or Spinal tenderness noted  Extremities:  no edema LE's  Skin:  warm, dry, no rash noted  [place] draining yellow urine - much less cloudy now      I&O's Summary :    09 Feb 2018 07:01  -  10 Feb 2018 07:00  --------------------------------------------------------  IN: 360 mL / OUT: 700 mL / NET: -340 mL    10 Feb 2018 07:01  -  10 Feb 2018 17:53  --------------------------------------------------------  IN: 120 mL / OUT: 0 mL / NET: 120 mL      LABS:  CBC Full  -  ( 10 Feb 2018 08:15 )  WBC Count : 14.26 K/uL  Hemoglobin : 11.7 g/dL  Hematocrit : 37.1 %  Platelet Count - Automated : 290 K/uL  Mean Cell Volume : 83.7 fl  Mean Cell Hemoglobin : 26.4 pg  Mean Cell Hemoglobin Concentration : 31.5 gm/dL  Auto Neutrophil # : 10.68 K/uL  Auto Lymphocyte # : 1.70 K/uL  Auto Monocyte # : 1.64 K/uL  Auto Eosinophil # : 0.07 K/uL  Auto Basophil # : 0.06 K/uL  Auto Neutrophil % : 74.9 %  Auto Lymphocyte % : 11.9 %  Auto Monocyte % : 11.5 %  Auto Eosinophil % : 0.5 %  Auto Basophil % : 0.4 %    02-10    132<L>  |  99  |  38<H>  ----------------------------<  133<H>  3.9   |  23  |  1.78<H>    Ca    8.7      10 Feb 2018 08:15    TPro  6.2  /  Alb  1.7<L>  /  TBili  0.6  /  DBili  x   /  AST  143<H>  /  ALT  99<H>  /  AlkPhos  201<H>  02-10    LIVER FUNCTIONS - ( 10 Feb 2018 08:15 )  Alb: 1.7 g/dL / Pro: 6.2 gm/dL / ALK PHOS: 201 U/L / ALT: 99 U/L / AST: 143 U/L / GGT: x             Sedimentation Rate, Erythrocyte: 97 mm/hr (02-09 @ 08:20)          MICROBIOLOGY:    Specimen Source: .Blood Blood-Peripheral (02-07 @ 14:09)  Culture Results:   No growth to date. (02-07 @ 14:09)    Specimen Source: .Blood Blood-Venous (02-07 @ 14:09)  Culture Results:   No growth to date. (02-07 @ 14:09)    Specimen Source: .Urine Suprapubic (02-07 @ 14:06)  Culture Results:   >100,000 CFU/ml Stenotrophomonas maltophilia (02-07 @ 14:06)          Impression:  Slowly improving on present ab rx with Bactrim for rx of Sepsis of  origin with Stenotrophomonas in Cx with underlying Urinary Retention with SPC in place and Rt Ureteral Stent for Rt Hydronephrosis.  Bilateral Conjunctivitis improving on present Gent. eye drops.  WBC's slowly decreasing along with slowly improving BUN/ Creat while receiving IV fluids now.      Suggestions: Will continue present ab rx and follow temps and labs.  If temps remain decreased and WBC's continue to improve, will consider change to PO Bactrim in a few days to continue rx, as patient will likely require total of 2-3 wks of rx depending on her progress.  Discussed in detail with patient's son Jean-Paul at [facility].

## 2018-02-11 LAB
ANION GAP SERPL CALC-SCNC: 9 MMOL/L — SIGNIFICANT CHANGE UP (ref 5–17)
BASOPHILS # BLD AUTO: 0.04 K/UL — SIGNIFICANT CHANGE UP (ref 0–0.2)
BASOPHILS NFR BLD AUTO: 0.3 % — SIGNIFICANT CHANGE UP (ref 0–2)
BUN SERPL-MCNC: 28 MG/DL — HIGH (ref 7–23)
CALCIUM SERPL-MCNC: 8.2 MG/DL — LOW (ref 8.5–10.1)
CHLORIDE SERPL-SCNC: 100 MMOL/L — SIGNIFICANT CHANGE UP (ref 96–108)
CO2 SERPL-SCNC: 21 MMOL/L — LOW (ref 22–31)
CREAT SERPL-MCNC: 1.38 MG/DL — HIGH (ref 0.5–1.3)
EOSINOPHIL # BLD AUTO: 0.22 K/UL — SIGNIFICANT CHANGE UP (ref 0–0.5)
EOSINOPHIL NFR BLD AUTO: 1.8 % — SIGNIFICANT CHANGE UP (ref 0–6)
GLUCOSE SERPL-MCNC: 108 MG/DL — HIGH (ref 70–99)
HCT VFR BLD CALC: 32.5 % — LOW (ref 34.5–45)
HGB BLD-MCNC: 10.5 G/DL — LOW (ref 11.5–15.5)
IMM GRANULOCYTES NFR BLD AUTO: 0.7 % — SIGNIFICANT CHANGE UP (ref 0–1.5)
LYMPHOCYTES # BLD AUTO: 1.17 K/UL — SIGNIFICANT CHANGE UP (ref 1–3.3)
LYMPHOCYTES # BLD AUTO: 9.8 % — LOW (ref 13–44)
MCHC RBC-ENTMCNC: 27.1 PG — SIGNIFICANT CHANGE UP (ref 27–34)
MCHC RBC-ENTMCNC: 32.3 GM/DL — SIGNIFICANT CHANGE UP (ref 32–36)
MCV RBC AUTO: 84 FL — SIGNIFICANT CHANGE UP (ref 80–100)
MONOCYTES # BLD AUTO: 1 K/UL — HIGH (ref 0–0.9)
MONOCYTES NFR BLD AUTO: 8.4 % — SIGNIFICANT CHANGE UP (ref 2–14)
NEUTROPHILS # BLD AUTO: 9.43 K/UL — HIGH (ref 1.8–7.4)
NEUTROPHILS NFR BLD AUTO: 79 % — HIGH (ref 43–77)
PLATELET # BLD AUTO: 266 K/UL — SIGNIFICANT CHANGE UP (ref 150–400)
POTASSIUM SERPL-MCNC: 4.5 MMOL/L — SIGNIFICANT CHANGE UP (ref 3.5–5.3)
POTASSIUM SERPL-SCNC: 4.5 MMOL/L — SIGNIFICANT CHANGE UP (ref 3.5–5.3)
RBC # BLD: 3.87 M/UL — SIGNIFICANT CHANGE UP (ref 3.8–5.2)
RBC # FLD: 16.8 % — HIGH (ref 10.3–14.5)
SODIUM SERPL-SCNC: 130 MMOL/L — LOW (ref 135–145)
WBC # BLD: 11.94 K/UL — HIGH (ref 3.8–10.5)
WBC # FLD AUTO: 11.94 K/UL — HIGH (ref 3.8–10.5)

## 2018-02-11 RX ADMIN — GENTAMICIN SULFATE 2 DROP(S): 3 SOLUTION/ DROPS OPHTHALMIC at 05:48

## 2018-02-11 RX ADMIN — Medication 173.33 MILLIGRAM(S): at 17:46

## 2018-02-11 RX ADMIN — Medication 1 TABLET(S): at 17:46

## 2018-02-11 RX ADMIN — BISOPROLOL FUMARATE 5 MILLIGRAM(S): 10 TABLET, FILM COATED ORAL at 05:48

## 2018-02-11 RX ADMIN — DEXTROSE MONOHYDRATE, SODIUM CHLORIDE, AND POTASSIUM CHLORIDE 75 MILLILITER(S): 50; .745; 4.5 INJECTION, SOLUTION INTRAVENOUS at 09:00

## 2018-02-11 RX ADMIN — GENTAMICIN SULFATE 2 DROP(S): 3 SOLUTION/ DROPS OPHTHALMIC at 22:35

## 2018-02-11 RX ADMIN — CLOPIDOGREL BISULFATE 75 MILLIGRAM(S): 75 TABLET, FILM COATED ORAL at 11:10

## 2018-02-11 RX ADMIN — DEXTROSE MONOHYDRATE, SODIUM CHLORIDE, AND POTASSIUM CHLORIDE 75 MILLILITER(S): 50; .745; 4.5 INJECTION, SOLUTION INTRAVENOUS at 22:38

## 2018-02-11 RX ADMIN — HEPARIN SODIUM 5000 UNIT(S): 5000 INJECTION INTRAVENOUS; SUBCUTANEOUS at 17:45

## 2018-02-11 RX ADMIN — Medication 100 MILLIGRAM(S): at 17:46

## 2018-02-11 RX ADMIN — Medication 173.33 MILLIGRAM(S): at 05:46

## 2018-02-11 RX ADMIN — GENTAMICIN SULFATE 2 DROP(S): 3 SOLUTION/ DROPS OPHTHALMIC at 11:10

## 2018-02-11 RX ADMIN — AMIODARONE HYDROCHLORIDE 200 MILLIGRAM(S): 400 TABLET ORAL at 05:46

## 2018-02-11 RX ADMIN — Medication 1 TABLET(S): at 07:51

## 2018-02-11 RX ADMIN — Medication 100 MILLIGRAM(S): at 05:46

## 2018-02-11 RX ADMIN — GENTAMICIN SULFATE 2 DROP(S): 3 SOLUTION/ DROPS OPHTHALMIC at 17:46

## 2018-02-11 RX ADMIN — PANTOPRAZOLE SODIUM 40 MILLIGRAM(S): 20 TABLET, DELAYED RELEASE ORAL at 07:48

## 2018-02-11 RX ADMIN — HEPARIN SODIUM 5000 UNIT(S): 5000 INJECTION INTRAVENOUS; SUBCUTANEOUS at 05:48

## 2018-02-12 ENCOUNTER — TRANSCRIPTION ENCOUNTER (OUTPATIENT)
Age: 83
End: 2018-02-12

## 2018-02-12 VITALS
DIASTOLIC BLOOD PRESSURE: 58 MMHG | HEART RATE: 69 BPM | SYSTOLIC BLOOD PRESSURE: 104 MMHG | TEMPERATURE: 98 F | OXYGEN SATURATION: 96 % | RESPIRATION RATE: 18 BRPM

## 2018-02-12 RX ORDER — GENTAMICIN SULFATE 3 MG/ML
2 SOLUTION/ DROPS OPHTHALMIC
Qty: 1 | Refills: 0 | OUTPATIENT
Start: 2018-02-12 | End: 2018-02-16

## 2018-02-12 RX ADMIN — Medication 173.33 MILLIGRAM(S): at 05:50

## 2018-02-12 RX ADMIN — Medication 100 MILLIGRAM(S): at 05:48

## 2018-02-12 RX ADMIN — CLOPIDOGREL BISULFATE 75 MILLIGRAM(S): 75 TABLET, FILM COATED ORAL at 11:17

## 2018-02-12 RX ADMIN — BISOPROLOL FUMARATE 5 MILLIGRAM(S): 10 TABLET, FILM COATED ORAL at 05:48

## 2018-02-12 RX ADMIN — Medication 100 MILLIGRAM(S): at 18:05

## 2018-02-12 RX ADMIN — Medication 1 TABLET(S): at 08:47

## 2018-02-12 RX ADMIN — GENTAMICIN SULFATE 2 DROP(S): 3 SOLUTION/ DROPS OPHTHALMIC at 11:17

## 2018-02-12 RX ADMIN — GENTAMICIN SULFATE 2 DROP(S): 3 SOLUTION/ DROPS OPHTHALMIC at 05:50

## 2018-02-12 RX ADMIN — HEPARIN SODIUM 5000 UNIT(S): 5000 INJECTION INTRAVENOUS; SUBCUTANEOUS at 05:49

## 2018-02-12 RX ADMIN — Medication 1 TABLET(S): at 18:11

## 2018-02-12 RX ADMIN — GENTAMICIN SULFATE 2 DROP(S): 3 SOLUTION/ DROPS OPHTHALMIC at 18:04

## 2018-02-12 RX ADMIN — PANTOPRAZOLE SODIUM 40 MILLIGRAM(S): 20 TABLET, DELAYED RELEASE ORAL at 08:46

## 2018-02-12 RX ADMIN — AMIODARONE HYDROCHLORIDE 200 MILLIGRAM(S): 400 TABLET ORAL at 05:48

## 2018-02-12 NOTE — PROGRESS NOTE ADULT - SUBJECTIVE AND OBJECTIVE BOX
PALLIATIVE CARE NP NOTE            Date/ Time: 2/12/18  HPI:  87 year old female pmh of obstructive uropathy ,multiple   esbl uti dced 12/21 with 3 week po abx , hld, neurogenic bladder, htn, arrythmia ,cad, pyoderma gangrenosum,  present with dysuria weakness , ams , lethargy, poor po intake ,tactile fevers ,treated with iv abx in er and mental status improving.  PAST MEDICAL & SURGICAL HISTORY:  Neurogenic bladder  Ventricular bigeminy: history of ablation  MI (myocardial infarction)  High cholesterol  HTN (hypertension)  History of renal stent  S/P coronary artery stent placement  History of tonsillectomy  Cataract, bilateral  History of appendectomy      SOCIAL HISTORY: Admitted from: home [x ] SNF [ ] LING [ ] AL [ ]                                                                smoker [ ] past smoker [ ] non smoker[ ]                                                              no alcohol [ ] social alcohol [ ] alcohol [ ]  Surrogate/ HCP/ Guardian: [ ] YES [ ] NO.     NAME / PHONE #: Hermilo Degroot     Significant other/partner:                     Children:                         Mosque/Spirituality:    FAMILY HISTORY:  No pertinent family history in first degree relatives    Baseline ADLs (Prior to admission)  Independent:  Moderately [ ] fully [ ]   Dependent: moderately [x ] fully [ ]    ADVANCE DIRECTIVES:  [x ] YES [ ] NO   DNR: YES [x ] NO [  ]  Completed on:                     MOLST: YES [x ] NO [  ]  Completed on:  Living Will: YES [ ] NO [  ]  Completed on:    Allergies  Benadryl Allergy (Other)  Intolerances  metoprolol (Other)    MEDICATIONS  (STANDING):  amiodarone    Tablet 200 milliGRAM(s) Oral daily  bisoprolol   Tablet 5 milliGRAM(s) Oral daily  clopidogrel Tablet 75 milliGRAM(s) Oral daily  dextrose 5% + sodium chloride 0.45% with potassium chloride 20 mEq/L 1000 milliLiter(s) (75 mL/Hr) IV Continuous <Continuous>  docusate sodium 100 milliGRAM(s) Oral two times a day  gentamicin 0.3% Ophthalmic Solution 2 Drop(s) Both EYES four times a day  heparin  Injectable 5000 Unit(s) SubCutaneous every 12 hours  influenza   Vaccine 0.5 milliLiter(s) IntraMuscular once  lactobacillus acidophilus 1 Tablet(s) Oral two times a day with meals  naphazoline/pheniramine Solution 1 Drop(s) Both EYES every 6 hours  pantoprazole    Tablet 40 milliGRAM(s) Oral before breakfast  Rosuvastatin Calcium 10 milliGRAM(s) 10 milliGRAM(s) Oral daily  trimethoprim / sulfamethoxazole IVPB 160 milliGRAM(s) IV Intermittent every 12 hours    MEDICATIONS  (PRN):  acetaminophen   Tablet 650 milliGRAM(s) Oral every 6 hours PRN Mild Pain (1 - 3)  ALPRAZolam 0.25 milliGRAM(s) Oral every 8 hours PRN anxiety  senna 2 Tablet(s) Oral at bedtime PRN Constipation    OPIATE NAIVE: (Y/N)  I STOP REVIEWED: (Y/N) : (#-------------------)     REVIEW OF SYSTEM:     PAIN : (Y/N) (0-10)  PAIN SITE :                           QUALITY/QUANTITY OF PAIN :             PAIN RADIATING :( Y/N) SEVERITY :            FREQUENCY :  IMPACT ON ADLs : total care      DYSPNEA: Yes [ x ] No [  ] Mild [ ] Moderate [x ] Severe [ ]  NAUSEA/VOMITING: Yes [  ] No [ x ]  EXCESSIVE SECRETIONS: YES [  ] NO [ x ]  DEPRESSION: Yes [ x ] No [  ] Mild [ ]Moderate [x ] Severe [ ]  ANXIETY: Yes [ x ] No [  ]  AGITATION: Yes [  ] No [x  ]  CONSTIPATION : Yes [x  ] No [  ]  DIARRHEA : Yes [  ] No [x  ]  FRAILTY SYNDROME: Yes [ x ] No [  ]  FAILURE TO THRIVE: Yes [ x ] No [  ]  DEBILITY Yes [x  ] No [  ]  OTHER SYMPTOMS :  UNABLE TO OBTAIN DUE TO POOR MENTATION [  ]    PHYSICAL EXAM:  T(F): 98.3 (02-12-18 @ 11:03), Max: 98.3 (02-12-18 @ 11:03)  HR: 60 (02-12-18 @ 11:53) (60 - 68)  BP: 112/56 (02-12-18 @ 11:53) (100/54 - 114/51)  RR: 19 (02-12-18 @ 11:03) (16 - 19)  SpO2: 97% (02-12-18 @ 11:53) (95% - 98%)  Wt(kg): --   WEIGHT :                      BMI:  I&O's Summary    11 Feb 2018 07:01  -  12 Feb 2018 07:00  --------------------------------------------------------  IN: 0 mL / OUT: 500 mL / NET: -500 mL    12 Feb 2018 07:01  -  12 Feb 2018 15:17  --------------------------------------------------------  IN: 120 mL / OUT: 0 mL / NET: 120 mL    CAPILLARY BLOOD GLUCOSE  GENERAL: alert: Yes [X ] No [  ]oriented x __2____confused [ ] lethargic [ ] agitated [ ] cachexia [ ] nonverbal [ ] coma [ ]  HEENT: normal [x] dry mouth [ ] excessive secretions [ ] Bipap [  ] ET tube/trach [ ] Vent [  ]  LUNGS: Comfortable [x ] tachypnea/ labored breathing[ ]   CV :  normal [ x] tachycardia [ ]bradycardia [  ]   GI : normal [x ] distended [ ] tender [ ] no BS [ ]  PEG /NG tube [ ]   : normal [ ] incontinent [ ] oliguria/anuria [ ] Higuera [ ] Supra pubic catheter [x]  MSK : normal [ ] weakness [ ] edema [ ] ambulatory [ ] bedbound/ wheelchair bound [x ]   SKIN : normal [ ] pressure ulcer: Yes [  ] No [  ] Stage ______________ rash: Yes [  ] No [  ]    FUNCTIONAL ASSESSMENT:   Karnofsky Performance Score : <20  Palliative Performance Status Version 2:         %    LABS:                        10.5   11.94 )-----------( 266      ( 11 Feb 2018 10:48 )             32.5     02-11    130<L>  |  100  |  28<H>  ----------------------------<  108<H>  4.5   |  21<L>  |  1.38<H>    Ca    8.2<L>      11 Feb 2018 07:52  I&O's Detail    11 Feb 2018 07:01  -  12 Feb 2018 07:00  --------------------------------------------------------  IN:  Total IN: 0 mL    OUT:    Indwelling Catheter - Suprapubic: 500 mL  Total OUT: 500 mL    Total NET: -500 mL      12 Feb 2018 07:01  -  12 Feb 2018 15:17  --------------------------------------------------------  IN:    Oral Fluid: 120 mL  Total IN: 120 mL    OUT:  Total OUT: 0 mL    Total NET: 120 mL  ASSESSMENT:   87y Female admitted with URINARY TRACT INFECTION  PAIN ON URINATION  PROBLEM LIST :  PROBLEM/RECOMMENDATION:   1) PROBLEM  : Goals of care, counseling/ discussion.  RECOMMENDATION : Meet with patient/ family and discussed GOC & Advanced care planning.                                     Palliative care information /counseling provided.                                       Advanced Directives addressed     PROBLEM/ RECOMMENDATION:  2)PROBLEM :Resuscitation/ DNR/ DNI,   RECOMMENDATION: DNR/ DNI    PROBLEM/ RECOMMENDATION :  3)PROBLEM : Medical order for life sustaining treatment  RECOMMENDATION : MOLST Form ( initiated/ completed)    PROBLEM/RECOMMENDATION :  4)PROBLEM: Advanced care planning  RECOMMENDATION : Family meeting on: Met and spoke to froy Akhtar and discussed goals of care and advance care planning.  MOLST Form completed, DNR/DNI, Comfort measures only.  Hospice care explained and accepted.  Hospice evaluation done and plan is to D/C HOME WITH HOSPICE.  PLAN:  REFERRALS'  HOSPICE: YES [ X ] NO [  ]                         PALLIATIVE /MEDICAL SOCIAL WORKER AND : YES [X  ] NO [  ]                         : YES [X  ] NO [  ]                         SPEECH/ SWALLOW: YES [  ] NO [  ]                         PSYHCH CONSULT: YES [ ] NO [ ]                          PAIN MANAGEMENT: YES [  ] NO [  ]                         NUTRITION: YES [  ] NO [  ]                         ETHICS: YES [  ] NO [  ]                         PT/OT: YES [  ] NO [  ]  RECOMMENDATIONS   CONSIDER COMFORT CARE.  DNR/ DNI.  HOSPICE CARE.  SYMPTOM MANAGEMENT WITH HOSPICE CARE.  PAIN MANAGEMENT.

## 2018-02-12 NOTE — DISCHARGE NOTE ADULT - PATIENT PORTAL LINK FT
You can access the [facility] Patient Portal, offered by [facility], by registering with the following website: [email or web address]

## 2018-02-12 NOTE — DISCHARGE NOTE ADULT - CARE PROVIDER_API CALL
Jimmie Encinas (MD), Medicine  [address] [place] [address]  Phone: [phone]  Fax: [phone]

## 2018-02-12 NOTE — PROGRESS NOTE ADULT - MUSCULOSKELETAL
negative
negative
No joint pain, swelling or deformity; no limitation of movement
No joint pain, swelling or deformity; no limitation of movement

## 2018-02-12 NOTE — DISCHARGE NOTE ADULT - PLAN OF CARE
resolution of symptoms continue antibiotics as prescribed  please have BMP check every friday while on bactrim continue amioadarone (antI arrhythmic) continue gentamycin eye ointment

## 2018-02-12 NOTE — DISCHARGE NOTE ADULT - SECONDARY DIAGNOSIS.
Hypertension, unspecified type Blepharoconjunctivitis of both eyes, unspecified blepharoconjunctivitis type High cholesterol

## 2018-02-12 NOTE — PROGRESS NOTE ADULT - SUBJECTIVE AND OBJECTIVE BOX
Patient seen and examined bedside resting comfortably.  cystostomy functioning well. Looks better, more awake and cheerful      T(F): 98.3 (02-12-18 @ 11:03), Max: 98.3 (02-12-18 @ 11:03)  HR: 60 (02-12-18 @ 11:53) (60 - 68)  BP: 112/56 (02-12-18 @ 11:53) (100/54 - 114/51)  RR: 19 (02-12-18 @ 11:03) (16 - 19)  SpO2: 97% (02-12-18 @ 11:53) (95% - 98%)      PHYSICAL EXAM:  General: NAD, WDWN  Neuro:  Alert & conscious  HEENT: NCAT, EOMI, conjunctiva clear  Lung: respirations nonlabored, good inspiratory effort  Abdomen: soft, NTND.   Extremities: no pedal edema or calf tenderness noted        LABS:                        10.5   11.94 )-----------( 266      ( 11 Feb 2018 10:48 )             32.5     02-11    130<L>  |  100  |  28<H>  ----------------------------<  108<H>  4.5   |  21<L>  |  1.38<H>    Ca    8.2<L>      11 Feb 2018 07:52        I&O's Detail    11 Feb 2018 07:01  -  12 Feb 2018 07:00  --------------------------------------------------------  IN:  Total IN: 0 mL    OUT:    Indwelling Catheter - Suprapubic: 500 mL  Total OUT: 500 mL    Total NET: -500 mL      12 Feb 2018 07:01  -  12 Feb 2018 16:23  --------------------------------------------------------  IN:    Oral Fluid: 120 mL  Total IN: 120 mL    OUT:  Total OUT: 0 mL    Total NET: 120 mL          wbc    02-11 @ 10:48    11.94    02-10 @ 08:15    14.26    02-09 @ 08:20    16.86    02-08 @ 10:51    18.07    02-07 @ 11:07    23.43        cr   02-11 @ 07:52   1.38    02-10 @ 08:15   1.78    02-09 @ 08:20   1.89    02-08 @ 10:51   1.92    02-07 @ 11:07   2.09

## 2018-02-12 NOTE — DISCHARGE NOTE ADULT - HOSPITAL COURSE
87 year old female Impression:  Slowly improving on present ab rx with Bactrim for rx of Sepsis of  origin with Stenotrophomonas in Cx with underlying Urinary Retention with SPC in place and Rt Ureteral Stent for Rt Hydronephrosis.  Bilateral Conjunctivitis improving on present Gent. eye drops.  WBC's slowly decreasing along with slowly improving BUN/ Creat while receiving IV fluids now.  Patient to discharge home with hospice, continue on PO Bactrim, will have BMP checked weekly while on antibiotics.  follow up with Dr Encinas 87 year old female Impression:  Slowly improving on present ab rx with Bactrim for rx of Sepsis of  origin with Stenotrophomonas in Cx with underlying Urinary Retention with SPC in place and Rt Ureteral Stent for Rt Hydronephrosis.  Bilateral Conjunctivitis improving on present Gent. eye drops.  WBC's slowly decreasing along with slowly improving BUN/ Creat while receiving IV fluids now.  Patient to discharge home with hospice, continue on PO Bactrim, will have BMP checked weekly while on antibiotics. Patient developed acute Pyelonephritis secondary to indwelling childs. She had a gram negative sepsis due to   acute pyelo and the indwelling childs.  follow up with Dr Encinas

## 2018-02-12 NOTE — DISCHARGE NOTE ADULT - CARE PLAN
Principal Discharge DX:	Urinary tract infection  Goal:	resolution of symptoms  Assessment and plan of treatment:	continue antibiotics as prescribed  please have BMP check every friday while on bactrim  Secondary Diagnosis:	Hypertension, unspecified type  Assessment and plan of treatment:	continue amioadarone (antI arrhythmic)  Secondary Diagnosis:	Blepharoconjunctivitis of both eyes, unspecified blepharoconjunctivitis type  Assessment and plan of treatment:	continue gentamycin eye ointment  Secondary Diagnosis:	High cholesterol

## 2018-02-12 NOTE — DISCHARGE NOTE ADULT - MEDICATION SUMMARY - MEDICATIONS TO TAKE
I will START or STAY ON the medications listed below when I get home from the hospital:    acetaminophen 325 mg oral tablet  -- 2 tab(s) by mouth every 6 hours, As needed, Mild Pain (1 - 3)  -- Indication: For pain    amiodarone 200 mg oral tablet  -- 1 tab(s) by mouth once a day  -- Indication: For Antiarrhythmic    rosuvastatin 10 mg oral tablet  -- 1 tab(s) by mouth once a day (at bedtime)  -- Indication: For cholesterol    clopidogrel 75 mg oral tablet  -- 1 tab(s) by mouth once a day  -- Indication: For Antiplatelet     Xanax 0.25 mg oral tablet  -- 1 tab(s) by mouth 3 times a day  -- Indication: For Anxiety    bisoprolol 5 mg oral tablet  -- 0.5 tab(s) by mouth once a day  -- Indication: For cardiac indications    albuterol 90 mcg/inh inhalation aerosol  -- 2 puff(s) inhaled every 6 hours, As needed, Bronchospasm  -- Indication: For Bronchodilators    guaiFENesin 100 mg/5 mL oral liquid  -- 5 milliliter(s) by mouth every 6 hours, As needed, Cough  -- Indication: For cough    Garamycin 0.3% ophthalmic solution  -- 2 drop(s) to each affected eye 4 times a day  -- Indication: For conjunctivitis    lactobacillus acidophilus oral capsule  -- 1 cap(s) by mouth 2 times a day   -- Indication: For probiotics    pantoprazole 40 mg oral delayed release tablet  -- 1 tab(s) by mouth once a day (before a meal)  -- Indication: For gerd    sulfamethoxazole-trimethoprim 800 mg-160 mg oral tablet  -- 1 tab(s) by mouth 2 times a day   questions please call Dr LORENA [phone]  -- Indication: For Uti

## 2018-02-12 NOTE — PROGRESS NOTE ADULT - PROBLEM SELECTOR PROBLEM 2
Neurogenic bladder
Hypertension, unspecified type
Neurogenic bladder

## 2018-02-12 NOTE — PROGRESS NOTE ADULT - ASSESSMENT
GOC comfort care   Adv Dir  DNR/DNI  no artificial feeding   no hospitalization  hospice care  All discussed w froy Leon
Looks better, more awake and cheerful  leucocytosis and creatinine improving  Continue antibiotics , will need change of ureteral stent
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
Patient is slowly improving. WBC has dropped. Home hospice.
88 yo lady on abx. Feeling better.
Patient remains on abx for pyelo. Home hospice.
88 yo lady with acute Pyelo slowly improving.

## 2018-02-12 NOTE — PROGRESS NOTE ADULT - PROVIDER SPECIALTY LIST ADULT
Infectious Disease
Internal Medicine
Palliative Care
Urology
Urology
Infectious Disease
Urology

## 2018-02-12 NOTE — PROGRESS NOTE ADULT - PROBLEM SELECTOR PROBLEM 1
R/O Acute cystitis with hematuria

## 2018-02-12 NOTE — DISCHARGE NOTE ADULT - ADDITIONAL INSTRUCTIONS
Please check BMP friday, and every friday while on bactrim. Results reported to Dr Clifford.   Please encourage Fliud intake  Please notify MD if urine output decreased

## 2018-02-23 NOTE — PATIENT PROFILE ADULT. - NSSUBSTANCEUSE_GEN_ALL_CORE_SD
For information on Fall & Injury Prevention, visit [email or web address] never used

## 2018-06-11 ENCOUNTER — INPATIENT (INPATIENT)
Facility: HOSPITAL | Age: 83
LOS: 0 days | Discharge: HOSPICE | End: 2018-06-12
Attending: INTERNAL MEDICINE | Admitting: INTERNAL MEDICINE
Payer: OTHER MISCELLANEOUS

## 2018-06-11 VITALS
TEMPERATURE: 98 F | RESPIRATION RATE: 16 BRPM | HEART RATE: 69 BPM | HEIGHT: 61 IN | SYSTOLIC BLOOD PRESSURE: 134 MMHG | OXYGEN SATURATION: 95 % | WEIGHT: 149.91 LBS | DIASTOLIC BLOOD PRESSURE: 63 MMHG

## 2018-06-11 DIAGNOSIS — Z98.890 OTHER SPECIFIED POSTPROCEDURAL STATES: Chronic | ICD-10-CM

## 2018-06-11 DIAGNOSIS — Z95.5 PRESENCE OF CORONARY ANGIOPLASTY IMPLANT AND GRAFT: Chronic | ICD-10-CM

## 2018-06-11 DIAGNOSIS — Z90.49 ACQUIRED ABSENCE OF OTHER SPECIFIED PARTS OF DIGESTIVE TRACT: Chronic | ICD-10-CM

## 2018-06-11 DIAGNOSIS — L97.512 NON-PRESSURE CHRONIC ULCER OF OTHER PART OF RIGHT FOOT WITH FAT LAYER EXPOSED: ICD-10-CM

## 2018-06-11 DIAGNOSIS — H26.9 UNSPECIFIED CATARACT: Chronic | ICD-10-CM

## 2018-06-11 DIAGNOSIS — Z90.89 ACQUIRED ABSENCE OF OTHER ORGANS: Chronic | ICD-10-CM

## 2018-06-11 DIAGNOSIS — L03.119 CELLULITIS OF UNSPECIFIED PART OF LIMB: ICD-10-CM

## 2018-06-11 LAB
ALBUMIN SERPL ELPH-MCNC: 2.4 G/DL — LOW (ref 3.3–5)
ALP SERPL-CCNC: 85 U/L — SIGNIFICANT CHANGE UP (ref 40–120)
ALT FLD-CCNC: 58 U/L — SIGNIFICANT CHANGE UP (ref 12–78)
ANION GAP SERPL CALC-SCNC: 8 MMOL/L — SIGNIFICANT CHANGE UP (ref 5–17)
APTT BLD: 26.3 SEC — LOW (ref 27.5–37.4)
AST SERPL-CCNC: 65 U/L — HIGH (ref 15–37)
BASOPHILS # BLD AUTO: 0.06 K/UL — SIGNIFICANT CHANGE UP (ref 0–0.2)
BASOPHILS NFR BLD AUTO: 0.6 % — SIGNIFICANT CHANGE UP (ref 0–2)
BILIRUB SERPL-MCNC: 0.2 MG/DL — SIGNIFICANT CHANGE UP (ref 0.2–1.2)
BUN SERPL-MCNC: 52 MG/DL — HIGH (ref 7–23)
CALCIUM SERPL-MCNC: 8.9 MG/DL — SIGNIFICANT CHANGE UP (ref 8.5–10.1)
CHLORIDE SERPL-SCNC: 106 MMOL/L — SIGNIFICANT CHANGE UP (ref 96–108)
CO2 SERPL-SCNC: 27 MMOL/L — SIGNIFICANT CHANGE UP (ref 22–31)
CREAT SERPL-MCNC: 2.98 MG/DL — HIGH (ref 0.5–1.3)
EOSINOPHIL # BLD AUTO: 0.75 K/UL — HIGH (ref 0–0.5)
EOSINOPHIL NFR BLD AUTO: 7.5 % — HIGH (ref 0–6)
GLUCOSE SERPL-MCNC: 83 MG/DL — SIGNIFICANT CHANGE UP (ref 70–99)
HCT VFR BLD CALC: 34.6 % — SIGNIFICANT CHANGE UP (ref 34.5–45)
HGB BLD-MCNC: 10.5 G/DL — LOW (ref 11.5–15.5)
IMM GRANULOCYTES NFR BLD AUTO: 0.1 % — SIGNIFICANT CHANGE UP (ref 0–1.5)
INR BLD: 1.03 RATIO — SIGNIFICANT CHANGE UP (ref 0.88–1.16)
LACTATE SERPL-SCNC: 1.7 MMOL/L — SIGNIFICANT CHANGE UP (ref 0.7–2)
LYMPHOCYTES # BLD AUTO: 2.07 K/UL — SIGNIFICANT CHANGE UP (ref 1–3.3)
LYMPHOCYTES # BLD AUTO: 20.8 % — SIGNIFICANT CHANGE UP (ref 13–44)
MCHC RBC-ENTMCNC: 24.9 PG — LOW (ref 27–34)
MCHC RBC-ENTMCNC: 30.3 GM/DL — LOW (ref 32–36)
MCV RBC AUTO: 82 FL — SIGNIFICANT CHANGE UP (ref 80–100)
MONOCYTES # BLD AUTO: 1.26 K/UL — HIGH (ref 0–0.9)
MONOCYTES NFR BLD AUTO: 12.6 % — SIGNIFICANT CHANGE UP (ref 2–14)
NEUTROPHILS # BLD AUTO: 5.82 K/UL — SIGNIFICANT CHANGE UP (ref 1.8–7.4)
NEUTROPHILS NFR BLD AUTO: 58.4 % — SIGNIFICANT CHANGE UP (ref 43–77)
NRBC # BLD: 0 /100 WBCS — SIGNIFICANT CHANGE UP (ref 0–0)
PLATELET # BLD AUTO: 276 K/UL — SIGNIFICANT CHANGE UP (ref 150–400)
POTASSIUM SERPL-MCNC: 5.1 MMOL/L — SIGNIFICANT CHANGE UP (ref 3.5–5.3)
POTASSIUM SERPL-SCNC: 5.1 MMOL/L — SIGNIFICANT CHANGE UP (ref 3.5–5.3)
PROT SERPL-MCNC: 6.3 GM/DL — SIGNIFICANT CHANGE UP (ref 6–8.3)
PROTHROM AB SERPL-ACNC: 11.2 SEC — SIGNIFICANT CHANGE UP (ref 9.8–12.7)
RBC # BLD: 4.22 M/UL — SIGNIFICANT CHANGE UP (ref 3.8–5.2)
RBC # FLD: 15.9 % — HIGH (ref 10.3–14.5)
SODIUM SERPL-SCNC: 141 MMOL/L — SIGNIFICANT CHANGE UP (ref 135–145)
WBC # BLD: 9.97 K/UL — SIGNIFICANT CHANGE UP (ref 3.8–10.5)
WBC # FLD AUTO: 9.97 K/UL — SIGNIFICANT CHANGE UP (ref 3.8–10.5)

## 2018-06-11 PROCEDURE — 73660 X-RAY EXAM OF TOE(S): CPT | Mod: 26,RT

## 2018-06-11 PROCEDURE — 99284 EMERGENCY DEPT VISIT MOD MDM: CPT

## 2018-06-11 RX ORDER — PIPERACILLIN AND TAZOBACTAM 4; .5 G/20ML; G/20ML
3.38 INJECTION, POWDER, LYOPHILIZED, FOR SOLUTION INTRAVENOUS ONCE
Qty: 0 | Refills: 0 | Status: COMPLETED | OUTPATIENT
Start: 2018-06-11 | End: 2018-06-11

## 2018-06-11 RX ORDER — OXYCODONE AND ACETAMINOPHEN 5; 325 MG/1; MG/1
1 TABLET ORAL ONCE
Qty: 0 | Refills: 0 | Status: DISCONTINUED | OUTPATIENT
Start: 2018-06-11 | End: 2018-06-11

## 2018-06-11 RX ORDER — VANCOMYCIN HCL 1 G
1000 VIAL (EA) INTRAVENOUS ONCE
Qty: 0 | Refills: 0 | Status: COMPLETED | OUTPATIENT
Start: 2018-06-11 | End: 2018-06-11

## 2018-06-11 RX ORDER — VANCOMYCIN HCL 1 G
1000 VIAL (EA) INTRAVENOUS EVERY 24 HOURS
Qty: 0 | Refills: 0 | Status: DISCONTINUED | OUTPATIENT
Start: 2018-06-11 | End: 2018-06-11

## 2018-06-11 RX ORDER — VANCOMYCIN HCL 1 G
750 VIAL (EA) INTRAVENOUS
Qty: 0 | Refills: 0 | Status: DISCONTINUED | OUTPATIENT
Start: 2018-06-13 | End: 2018-06-12

## 2018-06-11 RX ORDER — MUPIROCIN 20 MG/G
1 OINTMENT TOPICAL ONCE
Qty: 0 | Refills: 0 | Status: COMPLETED | OUTPATIENT
Start: 2018-06-11 | End: 2018-06-11

## 2018-06-11 RX ORDER — ACETAMINOPHEN 500 MG
650 TABLET ORAL EVERY 6 HOURS
Qty: 0 | Refills: 0 | Status: DISCONTINUED | OUTPATIENT
Start: 2018-06-11 | End: 2018-06-12

## 2018-06-11 RX ORDER — POVIDONE-IODINE 5 %
1 AEROSOL (ML) TOPICAL ONCE
Qty: 0 | Refills: 0 | Status: COMPLETED | OUTPATIENT
Start: 2018-06-11 | End: 2018-06-11

## 2018-06-11 RX ORDER — PIPERACILLIN AND TAZOBACTAM 4; .5 G/20ML; G/20ML
3.38 INJECTION, POWDER, LYOPHILIZED, FOR SOLUTION INTRAVENOUS EVERY 12 HOURS
Qty: 0 | Refills: 0 | Status: DISCONTINUED | OUTPATIENT
Start: 2018-06-11 | End: 2018-06-12

## 2018-06-11 RX ADMIN — OXYCODONE AND ACETAMINOPHEN 1 TABLET(S): 5; 325 TABLET ORAL at 17:17

## 2018-06-11 RX ADMIN — PIPERACILLIN AND TAZOBACTAM 200 GRAM(S): 4; .5 INJECTION, POWDER, LYOPHILIZED, FOR SOLUTION INTRAVENOUS at 17:20

## 2018-06-11 RX ADMIN — OXYCODONE AND ACETAMINOPHEN 1 TABLET(S): 5; 325 TABLET ORAL at 15:28

## 2018-06-11 RX ADMIN — Medication 250 MILLIGRAM(S): at 17:46

## 2018-06-11 NOTE — ED ADULT TRIAGE NOTE - CHIEF COMPLAINT QUOTE
sent from [facility] nurse for evaluation of right foot swelling s/p podiatrist treatment. Pt c/o severe pain to foot

## 2018-06-11 NOTE — ED PROVIDER NOTE - OBJECTIVE STATEMENT
88 year old female presents today biba accompanied with her home attendant for evaluation, pt presents c/o right great toe wound and pain which she describes as excruciating, pt had an ingrown to 88 year old female presents today biba accompanied with her home attendant for evaluation, pt presents c/o right great toe wound and pain which she describes as excruciating and a blood filled blister on the plantar aspect of the foot, pt had an ingrown toenail removed three weeks ago, since then the wound has slowly worsened (-) fevers or chills

## 2018-06-11 NOTE — CONSULT NOTE ADULT - SUBJECTIVE AND OBJECTIVE BOX
HPI:    cloudy urine, neurogenic bladder and right double J stent    PAST MEDICAL & SURGICAL HISTORY:  Neurogenic bladder  Ventricular bigeminy: history of ablation  MI (myocardial infarction)  High cholesterol  HTN (hypertension)  History of renal stent  S/P coronary artery stent placement  History of tonsillectomy  Cataract, bilateral  History of appendectomy      Allergies    Benadryl Allergy (Other)        metoprolol (Other)        FAMILY HISTORY:  No pertinent family history in first degree relatives      Home Medications:  acetaminophen 325 mg oral tablet: 2 tab(s) orally every 6 hours, As needed, Mild Pain (1 - 3) (13 Apr 2017 18:36)  albuterol 90 mcg/inh inhalation aerosol: 2 puff(s) inhaled every 6 hours, As needed, Bronchospasm (21 Dec 2017 12:28)  amiodarone 200 mg oral tablet: 1 tab(s) orally once a day (21 Dec 2017 12:28)  bisoprolol 5 mg oral tablet: 0.5 tab(s) orally once a day (21 Dec 2017 12:28)  clopidogrel 75 mg oral tablet: 1 tab(s) orally once a day (13 Apr 2017 18:36)  guaiFENesin 100 mg/5 mL oral liquid: 5 milliliter(s) orally every 6 hours, As needed, Cough (21 Dec 2017 12:28)  pantoprazole 40 mg oral delayed release tablet: 1 tab(s) orally once a day (before a meal) (21 Dec 2017 12:28)  rosuvastatin 10 mg oral tablet: 1 tab(s) orally once a day (at bedtime) (19 Apr 2017 13:43)  Xanax 0.25 mg oral tablet: 1 tab(s) orally 3 times a day (08 Feb 2018 17:37)      MEDICATIONS  (STANDING):  piperacillin/tazobactam IVPB. 3.375 Gram(s) IV Intermittent every 12 hours    MEDICATIONS  (PRN):  acetaminophen   Tablet. 650 milliGRAM(s) Oral every 6 hours PRN Mild Pain (1 - 3)      ROS:    General:  No wt loss, fevers, chills, night sweats  ENT:  No sore throat, pain, runny nose,   CV:  No pain, palpitatioins,  Resp:  No dyspnea, cough, tachypnea, wheezing  GI:  No pain, nausea, vomiting, diarrhea, constipatiion  :  cystostomy functioning well. Cloudy urine  Neuro:  No weakness, tingling,   Endocrine:  No polyuria, polydypsia, cold/heat intolerance  Skin:  No rash,  edema      Physical Exam:    Vital Signs:  Vital Signs Last 24 Hrs  T(C): 36.8 (11 Jun 2018 20:11), Max: 36.8 (11 Jun 2018 14:21)  T(F): 98.3 (11 Jun 2018 20:11), Max: 98.3 (11 Jun 2018 20:11)  HR: 62 (11 Jun 2018 20:11) (62 - 69)  BP: 92/51 (11 Jun 2018 20:11) (92/51 - 134/63)  BP(mean): 65 (11 Jun 2018 20:11) (65 - 65)  RR: 18 (11 Jun 2018 20:11) (16 - 18)  SpO2: 95% (11 Jun 2018 20:11) (95% - 95%)  Daily Height in cm: 154.94 (11 Jun 2018 14:21)    Daily   I&O's Summary      General:  Appears stated age,  well-nourished, no distress  HEENT:  NC/AT, patent nares w/ pink mucosa, OP moist and pink,   conjunctivae clear  Chest:  Full & symmetric excursion, no increased effort.   Abdomen:  Soft, non-tender, non-distended, cystostomy functioning well. Cloudy urine  Pelvic Exam: deferred  Rectal Examination: Deferred.  Extremities:  no edema, pedal pusation are present, no calf tenderness.  Skin:  No rash/erythema  Neuro/Psych:  Alert and conscious. Grossly intact and symmetrical.      LABS:                        10.5   9.97  )-----------( 276      ( 11 Jun 2018 16:41 )             34.6     06-11    141  |  106  |  52<H>  ----------------------------<  83  5.1   |  27  |  2.98<H>    Ca    8.9      11 Jun 2018 16:41    TPro  6.3  /  Alb  2.4<L>  /  TBili  0.2  /  DBili  x   /  AST  65<H>  /  ALT  58  /  AlkPhos  85  06-11    PT/INR - ( 11 Jun 2018 16:41 )   PT: 11.2 sec;   INR: 1.03 ratio         PTT - ( 11 Jun 2018 16:41 )  PTT:26.3 sec        RADIOLOGY & ADDITIONAL STUDIES:

## 2018-06-11 NOTE — CONSULT NOTE ADULT - ASSESSMENT
Cloudy urine    cloudy urine . S/P right ureteral stent and cystostomy    will discuss with Dr. Workman regarding changing the ureteral stent

## 2018-06-11 NOTE — CONSULT NOTE ADULT - SUBJECTIVE AND OBJECTIVE BOX
HPI:  Presents with painful right foot. Unable to walk She stated she had her nails cut at the house by another podiatrist and soon after developed pain and ulceration to right hallux. Has a hx of Acute interstiial cystitis and has a catheter attached    PAST MEDICAL & SURGICAL HISTORY:  Neurogenic bladder  Ventricular bigeminy: history of ablation  MI (myocardial infarction)  High cholesterol  HTN (hypertension)  History of renal stent  S/P coronary artery stent placement  History of tonsillectomy  Cataract, bilateral  History of appendectomy      REVIEW OF SYSTEMS:      MEDICATIONS  (STANDING):    MEDICATIONS  (PRN):      Allergies    Benadryl Allergy (Other)    Intolerances    metoprolol (Other)      SOCIAL HISTORY:Smoking history  Alcohol history  Drug history    FAMILY HISTORY:  No pertinent family history in first degree relatives      Vital Signs Last 24 Hrs  T(C): 36.8 (11 Jun 2018 14:21), Max: 36.8 (11 Jun 2018 14:21)  T(F): 98.2 (11 Jun 2018 14:21), Max: 98.2 (11 Jun 2018 14:21)  HR: 69 (11 Jun 2018 14:21) (69 - 69)  BP: 134/63 (11 Jun 2018 14:21) (134/63 - 134/63)  BP(mean): --  RR: 16 (11 Jun 2018 14:21) (16 - 16)  SpO2: 95% (11 Jun 2018 14:21) (95% - 95%)    PHYSICAL EXAM:    GENERAL: weak  NERVOUS SYSTEM:  Alert & Oriented X3, Weak lower extermity  Ct  EXTREMITIES: Non palpable DP and PT  Peripheral Pulses, Skin is warm and supple    SKIN: Scars on lower legs from previous ulcerations  ORTHOPEDIC:  hammer toes 2-5  ULCER site: right hallux  size  : width 2.3cm  length 2.5cm depth .8cm   appearence base: granular drainage serosanguinous  odo: noner cellulitis :local to toe  granulation 75%  necrotic tissue 25%  PAIN: 10   Abcess sub 2nd met with seropurulent dranaige Very painful    Dry escar 3rd toe left foot  LABS:                    RADIOLOGY & ADDITIONAL STUDIES:

## 2018-06-11 NOTE — CHART NOTE - NSCHARTNOTEFT_GEN_A_CORE
House [facility]    Called by Dr. Laguerre to place ED bridge orders for Dr. Workman. Patient seen and examined at bedside. Patient is an 87 YO F with a sig PMHx of HTN, MI, HLD, neurogenic bladder, and ventricular bigeminy with history of ablation. Patient is being admitted for right great toe wound and pain which she describes as excruciating and a blood filled blister on the plantar aspect of the foot. Patient had an ingrown toenail removed 3 weeks ago with worsening symptoms. Patient was seen by Dr. Rosales in the ER who dressed the wound and took a culture. Patient was started on Zosyn and Vancomycin in the ED. ED bridge orders placed, and antibiotics continued.

## 2018-06-11 NOTE — ED PROVIDER NOTE - PROGRESS NOTE DETAILS
pt seen by dr pagan in the ER wgho dressed the wound and took a culture, admission is recommended, dr hernández called for admission pt's son is in the ER, updated, he states that she has a ureteral stent which dr myers put in the past, he would like dr myers consulted dr myers called and informed of the consult

## 2018-06-12 ENCOUNTER — TRANSCRIPTION ENCOUNTER (OUTPATIENT)
Age: 83
End: 2018-06-12

## 2018-06-12 VITALS
OXYGEN SATURATION: 96 % | TEMPERATURE: 98 F | DIASTOLIC BLOOD PRESSURE: 44 MMHG | HEART RATE: 64 BPM | RESPIRATION RATE: 16 BRPM | SYSTOLIC BLOOD PRESSURE: 113 MMHG

## 2018-06-12 DIAGNOSIS — M06.9 RHEUMATOID ARTHRITIS, UNSPECIFIED: ICD-10-CM

## 2018-06-12 DIAGNOSIS — L88 PYODERMA GANGRENOSUM: ICD-10-CM

## 2018-06-12 DIAGNOSIS — N31.9 NEUROMUSCULAR DYSFUNCTION OF BLADDER, UNSPECIFIED: ICD-10-CM

## 2018-06-12 DIAGNOSIS — I10 ESSENTIAL (PRIMARY) HYPERTENSION: ICD-10-CM

## 2018-06-12 DIAGNOSIS — L97.519 NON-PRESSURE CHRONIC ULCER OF OTHER PART OF RIGHT FOOT WITH UNSPECIFIED SEVERITY: ICD-10-CM

## 2018-06-12 DIAGNOSIS — E78.00 PURE HYPERCHOLESTEROLEMIA, UNSPECIFIED: ICD-10-CM

## 2018-06-12 DIAGNOSIS — I49.9 CARDIAC ARRHYTHMIA, UNSPECIFIED: ICD-10-CM

## 2018-06-12 LAB
GRAM STN FLD: SIGNIFICANT CHANGE UP
SPECIMEN SOURCE: SIGNIFICANT CHANGE UP

## 2018-06-12 RX ORDER — LACTOBACILLUS ACIDOPHILUS 100MM CELL
1 CAPSULE ORAL
Qty: 0 | Refills: 0 | Status: DISCONTINUED | OUTPATIENT
Start: 2018-06-12 | End: 2018-06-12

## 2018-06-12 RX ORDER — PIPERACILLIN AND TAZOBACTAM 4; .5 G/20ML; G/20ML
3.38 INJECTION, POWDER, LYOPHILIZED, FOR SOLUTION INTRAVENOUS
Qty: 0 | Refills: 0 | COMMUNITY
Start: 2018-06-12

## 2018-06-12 RX ORDER — ALBUTEROL 90 UG/1
2 AEROSOL, METERED ORAL EVERY 6 HOURS
Qty: 0 | Refills: 0 | Status: DISCONTINUED | OUTPATIENT
Start: 2018-06-12 | End: 2018-06-12

## 2018-06-12 RX ORDER — PANTOPRAZOLE SODIUM 20 MG/1
40 TABLET, DELAYED RELEASE ORAL
Qty: 0 | Refills: 0 | Status: DISCONTINUED | OUTPATIENT
Start: 2018-06-12 | End: 2018-06-12

## 2018-06-12 RX ORDER — ATORVASTATIN CALCIUM 80 MG/1
40 TABLET, FILM COATED ORAL AT BEDTIME
Qty: 0 | Refills: 0 | Status: DISCONTINUED | OUTPATIENT
Start: 2018-06-12 | End: 2018-06-12

## 2018-06-12 RX ORDER — VANCOMYCIN HCL 1 G
750 VIAL (EA) INTRAVENOUS
Qty: 0 | Refills: 0 | COMMUNITY
Start: 2018-06-12

## 2018-06-12 RX ORDER — MORPHINE SULFATE 50 MG/1
1 CAPSULE, EXTENDED RELEASE ORAL
Qty: 0 | Refills: 0 | Status: DISCONTINUED | OUTPATIENT
Start: 2018-06-12 | End: 2018-06-12

## 2018-06-12 RX ORDER — GENTAMICIN SULFATE 3 MG/ML
1 SOLUTION/ DROPS OPHTHALMIC
Qty: 0 | Refills: 0 | Status: DISCONTINUED | OUTPATIENT
Start: 2018-06-12 | End: 2018-06-12

## 2018-06-12 RX ORDER — ACETAMINOPHEN 500 MG
2 TABLET ORAL
Qty: 0 | Refills: 0 | COMMUNITY
Start: 2018-06-12

## 2018-06-12 RX ORDER — AMIODARONE HYDROCHLORIDE 400 MG/1
1 TABLET ORAL
Qty: 0 | Refills: 0 | COMMUNITY
Start: 2018-06-12

## 2018-06-12 RX ORDER — ROSUVASTATIN CALCIUM 5 MG/1
1 TABLET ORAL
Qty: 0 | Refills: 0 | COMMUNITY

## 2018-06-12 RX ORDER — CLOPIDOGREL BISULFATE 75 MG/1
75 TABLET, FILM COATED ORAL DAILY
Qty: 0 | Refills: 0 | Status: DISCONTINUED | OUTPATIENT
Start: 2018-06-12 | End: 2018-06-12

## 2018-06-12 RX ORDER — LACTOBACILLUS ACIDOPHILUS 100MM CELL
1 CAPSULE ORAL
Qty: 0 | Refills: 0 | COMMUNITY
Start: 2018-06-12

## 2018-06-12 RX ORDER — ATORVASTATIN CALCIUM 80 MG/1
1 TABLET, FILM COATED ORAL
Qty: 0 | Refills: 0 | COMMUNITY
Start: 2018-06-12

## 2018-06-12 RX ORDER — GENTAMICIN SULFATE 3 MG/ML
1 SOLUTION/ DROPS OPHTHALMIC
Qty: 0 | Refills: 0 | COMMUNITY
Start: 2018-06-12

## 2018-06-12 RX ORDER — ALPRAZOLAM 0.25 MG
1 TABLET ORAL
Qty: 0 | Refills: 0 | COMMUNITY

## 2018-06-12 RX ORDER — MORPHINE SULFATE 50 MG/1
1 CAPSULE, EXTENDED RELEASE ORAL
Qty: 0 | Refills: 0 | COMMUNITY
Start: 2018-06-12

## 2018-06-12 RX ORDER — ALBUTEROL 90 UG/1
2 AEROSOL, METERED ORAL
Qty: 0 | Refills: 0 | COMMUNITY
Start: 2018-06-12

## 2018-06-12 RX ORDER — ALPRAZOLAM 0.25 MG
0.25 TABLET ORAL THREE TIMES A DAY
Qty: 0 | Refills: 0 | Status: DISCONTINUED | OUTPATIENT
Start: 2018-06-12 | End: 2018-06-12

## 2018-06-12 RX ORDER — ALPRAZOLAM 0.25 MG
1 TABLET ORAL
Qty: 0 | Refills: 0 | COMMUNITY
Start: 2018-06-12

## 2018-06-12 RX ORDER — PANTOPRAZOLE SODIUM 20 MG/1
1 TABLET, DELAYED RELEASE ORAL
Qty: 0 | Refills: 0 | COMMUNITY
Start: 2018-06-12

## 2018-06-12 RX ORDER — AMIODARONE HYDROCHLORIDE 400 MG/1
200 TABLET ORAL DAILY
Qty: 0 | Refills: 0 | Status: DISCONTINUED | OUTPATIENT
Start: 2018-06-12 | End: 2018-06-12

## 2018-06-12 RX ORDER — CLOPIDOGREL BISULFATE 75 MG/1
1 TABLET, FILM COATED ORAL
Qty: 0 | Refills: 0 | COMMUNITY
Start: 2018-06-12

## 2018-06-12 RX ADMIN — PIPERACILLIN AND TAZOBACTAM 25 GRAM(S): 4; .5 INJECTION, POWDER, LYOPHILIZED, FOR SOLUTION INTRAVENOUS at 06:09

## 2018-06-12 RX ADMIN — GENTAMICIN SULFATE 1 DROP(S): 3 SOLUTION/ DROPS OPHTHALMIC at 11:26

## 2018-06-12 RX ADMIN — CLOPIDOGREL BISULFATE 75 MILLIGRAM(S): 75 TABLET, FILM COATED ORAL at 11:26

## 2018-06-12 RX ADMIN — Medication 0.25 MILLIGRAM(S): at 13:43

## 2018-06-12 RX ADMIN — AMIODARONE HYDROCHLORIDE 200 MILLIGRAM(S): 400 TABLET ORAL at 11:26

## 2018-06-12 NOTE — DISCHARGE NOTE ADULT - SECONDARY DIAGNOSIS.
Essential hypertension MI (myocardial infarction) S/P coronary artery stent placement Ventricular bigeminy History of renal stent High cholesterol

## 2018-06-12 NOTE — H&P ADULT - HISTORY OF PRESENT ILLNESS
87 year old female brought to ED with infected right great toe  Had ingrown toe nail removed approximately 3 weeks ago and toe has worsened since then.

## 2018-06-12 NOTE — PROGRESS NOTE ADULT - SUBJECTIVE AND OBJECTIVE BOX
Patient is a 88y old  Female who presents with a chief complaint of very painful ulcer right big toe  and post I and D babcess sub second met. Patient is requesting sedation for pain, "like the anaesthesia you get for endoscopy". PAtient refused all oral pain meds and iv morphine  No growth from wound   INTERVAL HPI/OVERNIGHT EVENTS:    MEDICATIONS  (STANDING):  ALPRAZolam 0.25 milliGRAM(s) Oral three times a day  amiodarone    Tablet 200 milliGRAM(s) Oral daily  atorvastatin 40 milliGRAM(s) Oral at bedtime  clopidogrel Tablet 75 milliGRAM(s) Oral daily  gentamicin 0.3% Ophthalmic Solution 1 Drop(s) Both EYES four times a day  lactobacillus acidophilus 1 Tablet(s) Oral two times a day with meals  pantoprazole    Tablet 40 milliGRAM(s) Oral before breakfast  piperacillin/tazobactam IVPB. 3.375 Gram(s) IV Intermittent every 12 hours    MEDICATIONS  (PRN):  acetaminophen   Tablet. 650 milliGRAM(s) Oral every 6 hours PRN Mild Pain (1 - 3)  ALBUTerol    90 MICROgram(s) HFA Inhaler 2 Puff(s) Inhalation every 6 hours PRN Bronchospasm  guaiFENesin    Syrup 100 milliGRAM(s) Oral every 6 hours PRN Cough  morphine  - Injectable 1 milliGRAM(s) IV Push every 3 hours PRN Moderate Pain (4 - 6)      Allergies    Benadryl Allergy (Other)    Intolerances    metoprolol (Other)      Vital Signs Last 24 Hrs  T(C): 36.6 (12 Jun 2018 11:52), Max: 36.8 (11 Jun 2018 20:11)  T(F): 97.9 (12 Jun 2018 11:52), Max: 98.3 (11 Jun 2018 20:11)  HR: 64 (12 Jun 2018 11:52) (62 - 76)  BP: 113/44 (12 Jun 2018 11:52) (92/51 - 129/62)  BP(mean): 65 (11 Jun 2018 20:11) (65 - 65)  RR: 16 (12 Jun 2018 11:52) (16 - 18)  SpO2: 96% (12 Jun 2018 11:52) (95% - 99%)    PHYSICAL EXAM:    ULCER site: medial right big toe  width:2.5cm diameter depth:.3cm  appearence base: yellow neccrotic  drainage :scant odor: none cellulitis:none  Pain:"15"  I&O's Detail    11 Jun 2018 07:01  -  12 Jun 2018 07:00  --------------------------------------------------------  IN:  Total IN: 0 mL    OUT:    Indwelling Catheter - Suprapubic: 1600 mL  Total OUT: 1600 mL    Total NET: -1600 mL          LABS:                        10.5   9.97  )-----------( 276      ( 11 Jun 2018 16:41 )             34.6     06-11    141  |  106  |  52<H>  ----------------------------<  83  5.1   |  27  |  2.98<H>    Ca    8.9      11 Jun 2018 16:41    TPro  6.3  /  Alb  2.4<L>  /  TBili  0.2  /  DBili  x   /  AST  65<H>  /  ALT  58  /  AlkPhos  85  06-11    PT/INR - ( 11 Jun 2018 16:41 )   PT: 11.2 sec;   INR: 1.03 ratio         PTT - ( 11 Jun 2018 16:41 )  PTT:26.3 sec    CAPILLARY BLOOD GLUCOSE          RADIOLOGY & ADDITIONAL TESTS:    Imaging Personally Reviewed:  [ x] YES  [ ] NO possible hallux fracture    Consultant(s) Notes Reviewed:  [x ] YES  [ ] NO    Care Discussed with Consultants/Other Providers [ x] YES  [ ] NO Spoke to Dr Hays. VEry Familiar with her case. Severe Rheumatoid Arthiritc  and pyoderma gangrenosum lower legs     HTN (hypertension)  High cholesterol  Ventricular bigeminy  Neurogenic bladder  Toe ulcer, right  Skin ulcer of great toe with fat layer exposed, right  Cellulitis and abscess of foot

## 2018-06-12 NOTE — DISCHARGE NOTE ADULT - MEDICATION SUMMARY - MEDICATIONS TO STOP TAKING
I will STOP taking the medications listed below when I get home from the hospital:    bisoprolol 5 mg oral tablet  -- 0.5 tab(s) by mouth once a day    sulfamethoxazole-trimethoprim 800 mg-160 mg oral tablet  -- 1 tab(s) by mouth 2 times a day   questions please call Dr LORENA [phone]    sulfamethoxazole-trimethoprim 200 mg-40 mg/5 mL oral suspension  -- 20 milliliter(s) by mouth every 12 hours   please call Dr Choi with questions   [phone]  (dose Bactrim /160mg)   -- Avoid prolonged or excessive exposure to direct and/or artificial sunlight while taking this medication.  Finish all this medication unless otherwise directed by prescriber.  Medication should be taken with plenty of water.  Shake well before use.

## 2018-06-12 NOTE — DISCHARGE NOTE ADULT - HOSPITAL COURSE
87 year old female brought to ED with infected right great toe  Had ingrown toe nail removed approximately 3 weeks ago and toe has worsened since then. patient treated with IV antibiotics and now being transferred to Hospice care.

## 2018-06-12 NOTE — DISCHARGE NOTE ADULT - CARE PLAN
Principal Discharge DX:	Skin ulcer of toe of right foot with necrosis of muscle  Goal:	Continue Antibiotic therapy  Assessment and plan of treatment:	Discharged to Hospice service at [facility]  Secondary Diagnosis:	Essential hypertension  Secondary Diagnosis:	MI (myocardial infarction)  Secondary Diagnosis:	S/P coronary artery stent placement  Secondary Diagnosis:	Ventricular bigeminy  Secondary Diagnosis:	History of renal stent  Secondary Diagnosis:	High cholesterol

## 2018-06-12 NOTE — H&P ADULT - ASSESSMENT
88 year old with peripheral vascular disease and history of ASHD admitted with infected right great toe

## 2018-06-12 NOTE — DISCHARGE NOTE ADULT - PATIENT PORTAL LINK FT
You can access the [facility], offered by [facility], by registering with the following website: [email or web address]

## 2018-06-12 NOTE — PROGRESS NOTE ADULT - ASSESSMENT
Recommending a Rhematologic work up  ordered Sed rate MICHAEL and rheumatoid factor.  Steroids have helped this condition in the past

## 2018-06-16 LAB
-  AMPICILLIN/SULBACTAM: SIGNIFICANT CHANGE UP
-  CEFAZOLIN: SIGNIFICANT CHANGE UP
-  CLINDAMYCIN: SIGNIFICANT CHANGE UP
-  ERYTHROMYCIN: SIGNIFICANT CHANGE UP
-  GENTAMICIN: SIGNIFICANT CHANGE UP
-  OXACILLIN: SIGNIFICANT CHANGE UP
-  PENICILLIN: SIGNIFICANT CHANGE UP
-  RIFAMPIN: SIGNIFICANT CHANGE UP
-  TETRACYCLINE: SIGNIFICANT CHANGE UP
-  TRIMETHOPRIM/SULFAMETHOXAZOLE: SIGNIFICANT CHANGE UP
-  VANCOMYCIN: SIGNIFICANT CHANGE UP
CULTURE RESULTS: SIGNIFICANT CHANGE UP
GRAM STN FLD: SIGNIFICANT CHANGE UP
METHOD TYPE: SIGNIFICANT CHANGE UP
ORGANISM # SPEC MICROSCOPIC CNT: SIGNIFICANT CHANGE UP
ORGANISM # SPEC MICROSCOPIC CNT: SIGNIFICANT CHANGE UP
SPECIMEN SOURCE: SIGNIFICANT CHANGE UP

## 2018-06-18 DIAGNOSIS — L88 PYODERMA GANGRENOSUM: ICD-10-CM

## 2018-06-18 DIAGNOSIS — L97.519 NON-PRESSURE CHRONIC ULCER OF OTHER PART OF RIGHT FOOT WITH UNSPECIFIED SEVERITY: ICD-10-CM

## 2018-06-18 DIAGNOSIS — I73.9 PERIPHERAL VASCULAR DISEASE, UNSPECIFIED: ICD-10-CM

## 2018-06-18 DIAGNOSIS — Z98.42 CATARACT EXTRACTION STATUS, LEFT EYE: ICD-10-CM

## 2018-06-18 DIAGNOSIS — Z95.5 PRESENCE OF CORONARY ANGIOPLASTY IMPLANT AND GRAFT: ICD-10-CM

## 2018-06-18 DIAGNOSIS — K59.00 CONSTIPATION, UNSPECIFIED: ICD-10-CM

## 2018-06-18 DIAGNOSIS — Z98.41 CATARACT EXTRACTION STATUS, RIGHT EYE: ICD-10-CM

## 2018-06-18 DIAGNOSIS — I10 ESSENTIAL (PRIMARY) HYPERTENSION: ICD-10-CM

## 2018-06-18 DIAGNOSIS — I25.10 ATHEROSCLEROTIC HEART DISEASE OF NATIVE CORONARY ARTERY WITHOUT ANGINA PECTORIS: ICD-10-CM

## 2018-06-18 DIAGNOSIS — E78.00 PURE HYPERCHOLESTEROLEMIA, UNSPECIFIED: ICD-10-CM

## 2018-06-18 DIAGNOSIS — N31.9 NEUROMUSCULAR DYSFUNCTION OF BLADDER, UNSPECIFIED: ICD-10-CM

## 2018-06-18 DIAGNOSIS — L03.115 CELLULITIS OF RIGHT LOWER LIMB: ICD-10-CM

## 2018-06-18 DIAGNOSIS — L97.512 NON-PRESSURE CHRONIC ULCER OF OTHER PART OF RIGHT FOOT WITH FAT LAYER EXPOSED: ICD-10-CM

## 2018-06-18 DIAGNOSIS — I25.2 OLD MYOCARDIAL INFARCTION: ICD-10-CM

## 2018-06-18 DIAGNOSIS — Z96.0 PRESENCE OF UROGENITAL IMPLANTS: ICD-10-CM

## 2018-06-18 DIAGNOSIS — L02.612 CUTANEOUS ABSCESS OF LEFT FOOT: ICD-10-CM

## 2018-08-20 ENCOUNTER — EMERGENCY (EMERGENCY)
Facility: HOSPITAL | Age: 83
LOS: 0 days | Discharge: HOME | End: 2018-08-20
Attending: EMERGENCY MEDICINE
Payer: COMMERCIAL

## 2018-08-20 VITALS
OXYGEN SATURATION: 97 % | TEMPERATURE: 98 F | DIASTOLIC BLOOD PRESSURE: 54 MMHG | HEART RATE: 77 BPM | RESPIRATION RATE: 20 BRPM | SYSTOLIC BLOOD PRESSURE: 107 MMHG

## 2018-08-20 VITALS
RESPIRATION RATE: 18 BRPM | OXYGEN SATURATION: 100 % | TEMPERATURE: 98 F | SYSTOLIC BLOOD PRESSURE: 109 MMHG | DIASTOLIC BLOOD PRESSURE: 68 MMHG | HEART RATE: 68 BPM

## 2018-08-20 DIAGNOSIS — Z90.89 ACQUIRED ABSENCE OF OTHER ORGANS: Chronic | ICD-10-CM

## 2018-08-20 DIAGNOSIS — Z90.49 ACQUIRED ABSENCE OF OTHER SPECIFIED PARTS OF DIGESTIVE TRACT: Chronic | ICD-10-CM

## 2018-08-20 DIAGNOSIS — Z98.890 OTHER SPECIFIED POSTPROCEDURAL STATES: Chronic | ICD-10-CM

## 2018-08-20 DIAGNOSIS — Z93.59 OTHER CYSTOSTOMY STATUS: Chronic | ICD-10-CM

## 2018-08-20 DIAGNOSIS — H26.9 UNSPECIFIED CATARACT: Chronic | ICD-10-CM

## 2018-08-20 DIAGNOSIS — R10.9 UNSPECIFIED ABDOMINAL PAIN: ICD-10-CM

## 2018-08-20 DIAGNOSIS — N39.0 URINARY TRACT INFECTION, SITE NOT SPECIFIED: ICD-10-CM

## 2018-08-20 DIAGNOSIS — Z95.5 PRESENCE OF CORONARY ANGIOPLASTY IMPLANT AND GRAFT: Chronic | ICD-10-CM

## 2018-08-20 DIAGNOSIS — N31.9 NEUROMUSCULAR DYSFUNCTION OF BLADDER, UNSPECIFIED: ICD-10-CM

## 2018-08-20 LAB
ALBUMIN SERPL ELPH-MCNC: 2.3 G/DL — LOW (ref 3.3–5)
ALP SERPL-CCNC: 76 U/L — SIGNIFICANT CHANGE UP (ref 40–120)
ALT FLD-CCNC: 24 U/L — SIGNIFICANT CHANGE UP (ref 12–78)
ANION GAP SERPL CALC-SCNC: 8 MMOL/L — SIGNIFICANT CHANGE UP (ref 5–17)
APPEARANCE UR: ABNORMAL
APTT BLD: 28.6 SEC — SIGNIFICANT CHANGE UP (ref 27.5–37.4)
AST SERPL-CCNC: 18 U/L — SIGNIFICANT CHANGE UP (ref 15–37)
BACTERIA # UR AUTO: ABNORMAL
BASOPHILS # BLD AUTO: 0.05 K/UL — SIGNIFICANT CHANGE UP (ref 0–0.2)
BASOPHILS NFR BLD AUTO: 0.6 % — SIGNIFICANT CHANGE UP (ref 0–2)
BILIRUB SERPL-MCNC: 0.2 MG/DL — SIGNIFICANT CHANGE UP (ref 0.2–1.2)
BILIRUB UR-MCNC: NEGATIVE — SIGNIFICANT CHANGE UP
BUN SERPL-MCNC: 37 MG/DL — HIGH (ref 7–23)
CALCIUM SERPL-MCNC: 8.5 MG/DL — SIGNIFICANT CHANGE UP (ref 8.5–10.1)
CHLORIDE SERPL-SCNC: 104 MMOL/L — SIGNIFICANT CHANGE UP (ref 96–108)
CO2 SERPL-SCNC: 28 MMOL/L — SIGNIFICANT CHANGE UP (ref 22–31)
COLOR SPEC: YELLOW — SIGNIFICANT CHANGE UP
CREAT SERPL-MCNC: 1.94 MG/DL — HIGH (ref 0.5–1.3)
DIFF PNL FLD: ABNORMAL
EOSINOPHIL # BLD AUTO: 0.07 K/UL — SIGNIFICANT CHANGE UP (ref 0–0.5)
EOSINOPHIL NFR BLD AUTO: 0.9 % — SIGNIFICANT CHANGE UP (ref 0–6)
EPI CELLS # UR: SIGNIFICANT CHANGE UP
GLUCOSE SERPL-MCNC: 162 MG/DL — HIGH (ref 70–99)
GLUCOSE UR QL: NEGATIVE MG/DL — SIGNIFICANT CHANGE UP
HCT VFR BLD CALC: 36.1 % — SIGNIFICANT CHANGE UP (ref 34.5–45)
HGB BLD-MCNC: 10.6 G/DL — LOW (ref 11.5–15.5)
IMM GRANULOCYTES NFR BLD AUTO: 1.9 % — HIGH (ref 0–1.5)
INR BLD: 0.96 RATIO — SIGNIFICANT CHANGE UP (ref 0.88–1.16)
KETONES UR-MCNC: NEGATIVE — SIGNIFICANT CHANGE UP
LACTATE SERPL-SCNC: 1.9 MMOL/L — SIGNIFICANT CHANGE UP (ref 0.7–2)
LEUKOCYTE ESTERASE UR-ACNC: ABNORMAL
LYMPHOCYTES # BLD AUTO: 1.33 K/UL — SIGNIFICANT CHANGE UP (ref 1–3.3)
LYMPHOCYTES # BLD AUTO: 16.5 % — SIGNIFICANT CHANGE UP (ref 13–44)
MCHC RBC-ENTMCNC: 23.3 PG — LOW (ref 27–34)
MCHC RBC-ENTMCNC: 29.4 GM/DL — LOW (ref 32–36)
MCV RBC AUTO: 79.5 FL — LOW (ref 80–100)
MONOCYTES # BLD AUTO: 0.93 K/UL — HIGH (ref 0–0.9)
MONOCYTES NFR BLD AUTO: 11.6 % — SIGNIFICANT CHANGE UP (ref 2–14)
NEUTROPHILS # BLD AUTO: 5.52 K/UL — SIGNIFICANT CHANGE UP (ref 1.8–7.4)
NEUTROPHILS NFR BLD AUTO: 68.5 % — SIGNIFICANT CHANGE UP (ref 43–77)
NITRITE UR-MCNC: POSITIVE
NRBC # BLD: 0 /100 WBCS — SIGNIFICANT CHANGE UP (ref 0–0)
PH UR: 6.5 — SIGNIFICANT CHANGE UP (ref 5–8)
PLATELET # BLD AUTO: 202 K/UL — SIGNIFICANT CHANGE UP (ref 150–400)
POTASSIUM SERPL-MCNC: 4.5 MMOL/L — SIGNIFICANT CHANGE UP (ref 3.5–5.3)
POTASSIUM SERPL-SCNC: 4.5 MMOL/L — SIGNIFICANT CHANGE UP (ref 3.5–5.3)
PROT SERPL-MCNC: 6.1 GM/DL — SIGNIFICANT CHANGE UP (ref 6–8.3)
PROT UR-MCNC: 100 MG/DL
PROTHROM AB SERPL-ACNC: 10.5 SEC — SIGNIFICANT CHANGE UP (ref 9.8–12.7)
RBC # BLD: 4.54 M/UL — SIGNIFICANT CHANGE UP (ref 3.8–5.2)
RBC # FLD: 17 % — HIGH (ref 10.3–14.5)
RBC CASTS # UR COMP ASSIST: >50 /HPF (ref 0–4)
SODIUM SERPL-SCNC: 140 MMOL/L — SIGNIFICANT CHANGE UP (ref 135–145)
SP GR SPEC: 1.01 — SIGNIFICANT CHANGE UP (ref 1.01–1.02)
UROBILINOGEN FLD QL: NEGATIVE MG/DL — SIGNIFICANT CHANGE UP
WBC # BLD: 8.05 K/UL — SIGNIFICANT CHANGE UP (ref 3.8–10.5)
WBC # FLD AUTO: 8.05 K/UL — SIGNIFICANT CHANGE UP (ref 3.8–10.5)
WBC UR QL: >50

## 2018-08-20 PROCEDURE — 99284 EMERGENCY DEPT VISIT MOD MDM: CPT

## 2018-08-20 RX ORDER — PIPERACILLIN AND TAZOBACTAM 4; .5 G/20ML; G/20ML
3.38 INJECTION, POWDER, LYOPHILIZED, FOR SOLUTION INTRAVENOUS ONCE
Qty: 0 | Refills: 0 | Status: DISCONTINUED | OUTPATIENT
Start: 2018-08-20 | End: 2018-08-20

## 2018-08-20 RX ORDER — AZTREONAM 2 G
1 VIAL (EA) INJECTION
Qty: 14 | Refills: 0 | OUTPATIENT
Start: 2018-08-20 | End: 2018-08-26

## 2018-08-20 RX ORDER — PIPERACILLIN AND TAZOBACTAM 4; .5 G/20ML; G/20ML
3.38 INJECTION, POWDER, LYOPHILIZED, FOR SOLUTION INTRAVENOUS ONCE
Qty: 0 | Refills: 0 | Status: COMPLETED | OUTPATIENT
Start: 2018-08-20 | End: 2018-08-20

## 2018-08-20 RX ADMIN — PIPERACILLIN AND TAZOBACTAM 200 GRAM(S): 4; .5 INJECTION, POWDER, LYOPHILIZED, FOR SOLUTION INTRAVENOUS at 18:48

## 2018-08-20 NOTE — ED PROVIDER NOTE - OBJECTIVE STATEMENT
87 yo female presents with neurogenic bladder, suprapubic catheter placed years ago presents with dark urine from suprapubic tube over several days. Patient denies fever, chills, nausea, vomiting, chest pain, shortness of breath. Patient seen by hospice nurse and referred to ED for evaluation.

## 2018-08-20 NOTE — ED ADULT NURSE NOTE - PSH
Cataract, bilateral    Chronic suprapubic catheter    History of appendectomy    History of renal stent    History of tonsillectomy    S/P coronary artery stent placement

## 2018-08-20 NOTE — ED PROVIDER NOTE - MEDICAL DECISION MAKING DETAILS
Patient with history of neurogenic bladder, s/p suprapubic tube presents with uti Patient with history of neurogenic bladder, s/p suprapubic tube presents with uti on home hospice, dnr; will dc with bactrim, case discussed with Dr. Cortes

## 2018-08-20 NOTE — ED ADULT NURSE NOTE - OBJECTIVE STATEMENT
The patient is a 88y Female complaining of abdominal pain. Pt presents with neurogenic bladder, suprapubic catheter placed years ago. Sent to ED for evaluation by Hospice nurse for dark urine from suprapubic tube over several days. Patient denies fever, chills, nausea, vomiting, chest pain, shortness of breath.

## 2018-08-20 NOTE — ED ADULT NURSE NOTE - NSIMPLEMENTINTERV_GEN_ALL_ED
Implemented All Fall with Harm Risk Interventions:  [place] to call system. Call bell, personal items and telephone within reach. Instruct patient to call for assistance. Room bathroom lighting operational. Non-slip footwear when patient is off stretcher. Physically safe environment: no spills, clutter or unnecessary equipment. Stretcher in lowest position, wheels locked, appropriate side rails in place. Provide visual cue, wrist band, yellow gown, etc. Monitor gait and stability. Monitor for mental status changes and reorient to person, place, and time. Review medications for side effects contributing to fall risk. Reinforce activity limits and safety measures with patient and family. Provide visual clues: red socks.

## 2018-08-20 NOTE — ED ADULT TRIAGE NOTE - CHIEF COMPLAINT QUOTE
pt complaining lower abdominal pain and foul smelling urine from suprapubic catheter. sent in by home care nurse to be evaluated for possible UTI.

## 2018-08-21 PROBLEM — N31.9 NEUROMUSCULAR DYSFUNCTION OF BLADDER, UNSPECIFIED: Chronic | Status: ACTIVE | Noted: 2018-02-07

## 2018-11-01 ENCOUNTER — OUTPATIENT (OUTPATIENT)
Dept: OUTPATIENT SERVICES | Facility: HOSPITAL | Age: 83
LOS: 1 days | End: 2018-11-01
Payer: MEDICARE

## 2018-11-01 DIAGNOSIS — H26.9 UNSPECIFIED CATARACT: Chronic | ICD-10-CM

## 2018-11-01 DIAGNOSIS — Z90.89 ACQUIRED ABSENCE OF OTHER ORGANS: Chronic | ICD-10-CM

## 2018-11-01 DIAGNOSIS — Z90.49 ACQUIRED ABSENCE OF OTHER SPECIFIED PARTS OF DIGESTIVE TRACT: Chronic | ICD-10-CM

## 2018-11-01 DIAGNOSIS — Z98.890 OTHER SPECIFIED POSTPROCEDURAL STATES: Chronic | ICD-10-CM

## 2018-11-01 DIAGNOSIS — Z93.59 OTHER CYSTOSTOMY STATUS: Chronic | ICD-10-CM

## 2018-11-01 DIAGNOSIS — Z95.5 PRESENCE OF CORONARY ANGIOPLASTY IMPLANT AND GRAFT: Chronic | ICD-10-CM

## 2018-11-01 PROCEDURE — G9001: CPT

## 2018-11-09 ENCOUNTER — EMERGENCY (EMERGENCY)
Facility: HOSPITAL | Age: 83
LOS: 0 days | Discharge: HOME | End: 2018-11-09
Attending: EMERGENCY MEDICINE
Payer: COMMERCIAL

## 2018-11-09 VITALS
OXYGEN SATURATION: 99 % | HEART RATE: 78 BPM | TEMPERATURE: 98 F | RESPIRATION RATE: 20 BRPM | SYSTOLIC BLOOD PRESSURE: 117 MMHG | DIASTOLIC BLOOD PRESSURE: 74 MMHG

## 2018-11-09 DIAGNOSIS — Z90.49 ACQUIRED ABSENCE OF OTHER SPECIFIED PARTS OF DIGESTIVE TRACT: ICD-10-CM

## 2018-11-09 DIAGNOSIS — Z90.89 ACQUIRED ABSENCE OF OTHER ORGANS: Chronic | ICD-10-CM

## 2018-11-09 DIAGNOSIS — Z98.890 OTHER SPECIFIED POSTPROCEDURAL STATES: Chronic | ICD-10-CM
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
## 2018-11-09 DIAGNOSIS — Z90.49 ACQUIRED ABSENCE OF OTHER SPECIFIED PARTS OF DIGESTIVE TRACT: Chronic | ICD-10-CM

## 2018-11-09 DIAGNOSIS — I10 ESSENTIAL (PRIMARY) HYPERTENSION: ICD-10-CM

## 2018-11-09 DIAGNOSIS — Z95.1 PRESENCE OF AORTOCORONARY BYPASS GRAFT: ICD-10-CM
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
## 2018-11-09 DIAGNOSIS — T83.89XA OTHER SPECIFIED COMPLICATION OF GENITOURINARY PROSTHETIC DEVICES, IMPLANTS AND GRAFTS, INITIAL ENCOUNTER: ICD-10-CM

## 2018-11-09 DIAGNOSIS — Z79.51 LONG TERM (CURRENT) USE OF INHALED STEROIDS: ICD-10-CM

## 2018-11-09 DIAGNOSIS — I25.2 OLD MYOCARDIAL INFARCTION: ICD-10-CM

## 2018-11-09 DIAGNOSIS — H26.9 UNSPECIFIED CATARACT: ICD-10-CM

## 2018-11-09 DIAGNOSIS — Z93.59 OTHER CYSTOSTOMY STATUS: Chronic | ICD-10-CM

## 2018-11-09 DIAGNOSIS — Z95.5 PRESENCE OF CORONARY ANGIOPLASTY IMPLANT AND GRAFT: Chronic | ICD-10-CM

## 2018-11-09 DIAGNOSIS — Z88.9 ALLERGY STATUS TO UNSPECIFIED DRUGS, MEDICAMENTS AND BIOLOGICAL SUBSTANCES: ICD-10-CM

## 2018-11-09 DIAGNOSIS — E78.00 PURE HYPERCHOLESTEROLEMIA, UNSPECIFIED: ICD-10-CM

## 2018-11-09 DIAGNOSIS — H26.9 UNSPECIFIED CATARACT: Chronic | ICD-10-CM

## 2018-11-09 PROCEDURE — 99282 EMERGENCY DEPT VISIT SF MDM: CPT

## 2018-11-09 NOTE — ED ADULT NURSE NOTE - NSIMPLEMENTINTERV_GEN_ALL_ED
Implemented All Universal Safety Interventions:  [place] to call system. Call bell, personal items and telephone within reach. Instruct patient to call for assistance. Room bathroom lighting operational. Non-slip footwear when patient is off stretcher. Physically safe environment: no spills, clutter or unnecessary equipment. Stretcher in lowest position, wheels locked, appropriate side rails in place.

## 2018-11-09 NOTE — CONSULT NOTE ADULT - ASSESSMENT
Urinary retention , Suprapubic cystostomy tube, urine  leaking around , though Cathter was changed 2 weeks ago.  Obstructed.                             Suprapubic cathter was changed with # 22 F 5 cc balloon Higu[place] catheter and irrigated. Leaking may be secondary to irritation from Stent. Continue Cystostomy tube.    UTI    S/P double J ureteral stent    Discussed with pt"s son in details

## 2018-11-09 NOTE — PROCEDURE NOTE - PROCEDURE
<<-----Click on this checkbox to enter Procedure Suprapubic catheter replacement  11/09/2018    Active  [facility]

## 2018-11-09 NOTE — PROCEDURE NOTE - NSURITECHNIQUE_GU_A_CORE
The catheter was secured with a securement device (e.g. StatLock)/The urinary drainage system is closed at the end of the procedure/All applicable medical record documentation is completed/Sterile gloves were worn for the duration of the procedure/The collection bag is below the level of the patient and urinary bladder/Proper hand hygiene was performed/A sterile drape was used to cover all adjacent areas/The catheter was appropriately lubricated/The site was cleaned with soap/water and sterile solution (betadine)

## 2018-11-09 NOTE — ED ADULT NURSE NOTE - OBJECTIVE STATEMENT
pt BIBA for leaking suprapubic catheter. son wants pt to be evaluated for a urinary tract infection.

## 2018-11-09 NOTE — ED PROVIDER NOTE - MEDICAL DECISION MAKING DETAILS
Patient in home hospice presents for evaluation of suprapubic tube which has been in place for months; patient aware of plan;  Catheter changed by Dr. Bautista

## 2018-11-09 NOTE — CONSULT NOTE ADULT - SUBJECTIVE AND OBJECTIVE BOX
HPI:    · Chief Complaint: The patient is a 88y Female complaining of urinary catheter complications.	  · HPI Objective Statement: 87 yo female in home hospice presents for evaluation of dysfunctional suprapubic catheter. Patient denies fever, chills, nausea, abdominal pain.	    PAST MEDICAL/SURGICAL/FAMILY/SOCIAL HISTORY:    Past Medical History:  High cholesterol    HTN (hypertension)    MI (myocardial infarction)    Neurogenic bladder    Ventricular bigeminy  history of ablation.  H/O hydronephrosis     Past Surgical History:  Cataract, bilateral    Chronic suprapubic catheter    History of appendectomy    History of ureteral stent    History of tonsillectomy    S/P coronary artery stent placement.  S/P Ureteral stent      ALLERGIES AND HOME MEDICATIONS:   Allergies:        Allergies:  	Benadryl Allergy: Drug, Other, shaking       Intolerances:  	metoprolol: Drug, Other, palpitation    Home Medications:   * Patient Currently Takes Medications as of 20-Aug-2018 19:10 documented in Structured Notes  · 	Bactrim  mg-160 mg oral tablet: 1 tab(s) orally 2 times a day   · 	pantoprazole 40 mg oral delayed release tablet: 1 tab(s) orally once a day (before a meal)  · 	lactobacillus acidophilus oral capsule: 1 cap(s) orally 2 times a day  · 	piperacillin-tazobactam 2 g-0.25 g intravenous injection: 3.375 milligram(s) intravenous every 8 hours  · 	gentamicin 0.3% ophthalmic solution: 1 drop(s) to each affected eye 4 times a day  · 	vancomycin 750 mg intravenous injection: 750 milligram(s) intravenous every 48 hours  · 	guaiFENesin 100 mg/5 mL oral liquid: 5 milliliter(s) orally every 6 hours, As needed, Cough  · 	albuterol 90 mcg/inh inhalation aerosol: 2 puff(s) inhaled every 6 hours, As needed, Bronchospasm  · 	ALPRAZolam 0.25 mg oral tablet: 1 tab(s) orally 3 times a day  · 	clopidogrel 75 mg oral tablet: 1 tab(s) orally once a day  · 	atorvastatin 40 mg oral tablet: 1 tab(s) orally once a day (at bedtime)  · 	amiodarone 200 mg oral tablet: 1 tab(s) orally once a day  · 	morphine: 1 milligram(s) intravenous every 3 hours, As Needed  · 	acetaminophen 325 mg oral tablet: 2 tab(s) orally every 6 hours, As needed, Mild Pain (1 - 3)    REVIEW OF SYSTEMS:    Review of Systems:  · GENITOURINARY: - - -	  · Genitourinary [+]: suprapubic catheter	  · ROS STATEMENT: all other ROS negative except as per HPI	    VITAL SIGNS( Pullset):    ,,ED ADULT Flow Sheet:    09-Nov-2018 15:17	  · Temp (F): 98.3	  · Temp (C) Temp (C): 36.8	  · Temp site Temp Site: oral	  · Heart Rate Heart Rate (beats/min): 78	  · BP Systolic Systolic: 117	  · BP Diastolic Diastolic (mm Hg): 74	  · Respiration Rate (breaths/min) Respiration Rate (breaths/min): 20	  · SpO2 (%) SpO2 (%): 99	  · O2 delivery Patient On: room air	  · SpO2 (%) SpO2 (%): 99	  · O2 delivery Patient On: room air	  · Preferred Language to Address Healthcare Preferred Language to Address Healthcare: English	    17:26	  · Presence of Pain: denies pain/discomfort	  · Pain Rating (0-10): Rest: 0	  · Pain Rating (0-10): Activity: 0	  · Preferred Language to Address Healthcare Preferred Language to Address Healthcare: English	  · Extensions of Self Extensions of Self: None	         .            PAST MEDICAL & SURGICAL HISTORY:  Neurogenic bladder  Ventricular bigeminy: history of ablation  MI (myocardial infarction)  High cholesterol  HTN (hypertension)  Chronic suprapubic catheter  History of renal stent  S/P coronary artery stent placement  History of tonsillectomy  Cataract, bilateral  History of appendectomy      Allergies    Benadryl Allergy (Other)    Intolerances    metoprolol (Other)    FAMILY HISTORY:  No pertinent family history in first degree relatives      Home Medications:  acetaminophen 325 mg oral tablet: 2 tab(s) orally every 6 hours, As needed, Mild Pain (1 - 3) (12 Jun 2018 14:38)  albuterol 90 mcg/inh inhalation aerosol: 2 puff(s) inhaled every 6 hours, As needed, Bronchospasm (12 Jun 2018 14:38)  ALPRAZolam 0.25 mg oral tablet: 1 tab(s) orally 3 times a day (12 Jun 2018 14:38)  amiodarone 200 mg oral tablet: 1 tab(s) orally once a day (12 Jun 2018 14:38)  atorvastatin 40 mg oral tablet: 1 tab(s) orally once a day (at bedtime) (12 Jun 2018 14:38)  clopidogrel 75 mg oral tablet: 1 tab(s) orally once a day (12 Jun 2018 14:38)  gentamicin 0.3% ophthalmic solution: 1 drop(s) to each affected eye 4 times a day (12 Jun 2018 14:38)  guaiFENesin 100 mg/5 mL oral liquid: 5 milliliter(s) orally every 6 hours, As needed, Cough (12 Jun 2018 14:38)  lactobacillus acidophilus oral capsule: 1 cap(s) orally 2 times a day (12 Jun 2018 14:38)  morphine: 1 milligram(s) intravenous every 3 hours, As Needed (12 Jun 2018 14:38)  pantoprazole 40 mg oral delayed release tablet: 1 tab(s) orally once a day (before a meal) (12 Jun 2018 14:38)  piperacillin-tazobactam 2 g-0.25 g intravenous injection: 3.375 milligram(s) intravenous every 8 hours (12 Jun 2018 14:38)  vancomycin 750 mg intravenous injection: 750 milligram(s) intravenous every 48 hours (12 Jun 2018 14:38)      MEDICATIONS  (STANDING):    MEDICATIONS  (PRN):      ROS:    leaking suprapubic cystotomy tube.      Physical Exam:    Vital Signs:  Vital Signs Last 24 Hrs  T(C): 36.8 (09 Nov 2018 15:17), Max: 36.8 (09 Nov 2018 15:17)  T(F): 98.3 (09 Nov 2018 15:17), Max: 98.3 (09 Nov 2018 15:17)  HR: 78 (09 Nov 2018 15:17) (78 - 78)  BP: 117/74 (09 Nov 2018 15:17) (117/74 - 117/74)  BP(mean): --  RR: 20 (09 Nov 2018 15:17) (20 - 20)  SpO2: 99% (09 Nov 2018 15:17) (99% - 99%)  Daily     Daily   I&O's Summary      General:  Appears stated age,  well-nourished, no distress  HEENT:  NC/AT, patent nares w/ pink mucosa, OP moist and pink,   conjunctivae clear  Chest:  Full & symmetric excursion, no increased effort.   Abdomen:  Soft, non-tender, non-distended, normoactive bowel sounds. Cystostomy tube leaking.  Pelvic Exam: Deferred.   Rectal Examination: Deferred.  Extremities:  no edema, pedal pusation are present, no calf tenderness.  Skin:  No rash/erythema  Neuro/Psych:  Alert and conscious. Grossly intact and symmetrical.

## 2018-11-09 NOTE — ED PROVIDER NOTE - OBJECTIVE STATEMENT
87 yo female in home hospice presents for evaluation of dysfunctional suprapubic catheter. Patient denies fever, chills, nausea, abdominal pain.

## 2018-11-09 NOTE — ED ADULT NURSE NOTE - CHPI ED NUR SYMPTOMS POS
No strenuous activity (bending/twisting), heavy lifting, driving or returning to work until cleared by MD.  Change dressing daily with gauze/tape till post-op day #5, then leave incision open to air.  You may shower post-op day#5, keep incision clean and dry.   Try to have regular bowel movements, take stool softener or laxative if necessary.  May take pepcid or zantac for upset stomach.  May apply ice to affected areas to decrease swelling.  Call to schedule an appt with Dr. Leiva for follow up, if you have staples or sutures they will be removed in office.  Contact your doctor if you experience: fever greater than 101.5, chills, chest pain, difficulty breathing, redness or excessive drainage around the incision, other concerns.   Follow up with your primary care provider.
leaking urination

## 2018-11-12 DIAGNOSIS — Z71.89 OTHER SPECIFIED COUNSELING: ICD-10-CM

## 2018-11-12 NOTE — ED ADULT NURSE NOTE - HARM RISK FACTORS
Patient notified via phone of results and of PA recommendations.  Patient stated she eliminated alcohol about 3-4 weeks ago and will not have a problem continuing to eliminate alcohol.  Patient also wanted Nany Duenas PA-C to know her  hasn't had alcohol for 20 years.  Patient declined appointment to discuss options/therapies, declined flu shot for her and her  stating her  won't come in.  Notified patient I will update Nany Duenas PA-C, patient verbalized understanding and had no further questions.    yes

## 2018-12-12 ENCOUNTER — EMERGENCY (EMERGENCY)
Facility: HOSPITAL | Age: 83
LOS: 0 days | Discharge: HOME | End: 2018-12-12
Attending: EMERGENCY MEDICINE
Payer: COMMERCIAL

## 2018-12-12 VITALS
SYSTOLIC BLOOD PRESSURE: 126 MMHG | TEMPERATURE: 99 F | HEIGHT: 63 IN | WEIGHT: 141.1 LBS | OXYGEN SATURATION: 97 % | HEART RATE: 68 BPM | RESPIRATION RATE: 19 BRPM | DIASTOLIC BLOOD PRESSURE: 69 MMHG

## 2018-12-12 VITALS
DIASTOLIC BLOOD PRESSURE: 61 MMHG | TEMPERATURE: 98 F | SYSTOLIC BLOOD PRESSURE: 115 MMHG | HEART RATE: 66 BPM | RESPIRATION RATE: 18 BRPM | OXYGEN SATURATION: 97 %

## 2018-12-12 DIAGNOSIS — Z90.89 ACQUIRED ABSENCE OF OTHER ORGANS: Chronic | ICD-10-CM

## 2018-12-12 DIAGNOSIS — Z95.5 PRESENCE OF CORONARY ANGIOPLASTY IMPLANT AND GRAFT: Chronic | ICD-10-CM

## 2018-12-12 DIAGNOSIS — Z98.890 OTHER SPECIFIED POSTPROCEDURAL STATES: Chronic | ICD-10-CM

## 2018-12-12 DIAGNOSIS — Z90.49 ACQUIRED ABSENCE OF OTHER SPECIFIED PARTS OF DIGESTIVE TRACT: Chronic | ICD-10-CM

## 2018-12-12 DIAGNOSIS — H26.9 UNSPECIFIED CATARACT: Chronic | ICD-10-CM

## 2018-12-12 DIAGNOSIS — Z93.59 OTHER CYSTOSTOMY STATUS: Chronic | ICD-10-CM

## 2018-12-12 PROCEDURE — 99282 EMERGENCY DEPT VISIT SF MDM: CPT | Mod: 25

## 2018-12-12 NOTE — ED ADULT TRIAGE NOTE - CHIEF COMPLAINT QUOTE
patient BIBA , as per EMS patient here for suprapubic catheter replace , as per aid Tiffanie Conway the catheter is licking

## 2018-12-12 NOTE — ED PROVIDER NOTE - OBJECTIVE STATEMENT
88 year old female with PMH of HTN, HLD, neurogenic bladder with suprapubic catheter and recent replacement 1 month ago presenting for catheter blockage, urine leaking around site. Pt otherwise not complaining of pain or discomfort and aide states no fever/chills.

## 2018-12-12 NOTE — ED PROVIDER NOTE - MEDICAL DECISION MAKING DETAILS
suprapubic catheter replaced, draining urine - will dc with Urologist follow up in future for catheter change.
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
## 2018-12-13 DIAGNOSIS — Z00.00 ENCOUNTER FOR GENERAL ADULT MEDICAL EXAMINATION WITHOUT ABNORMAL FINDINGS: ICD-10-CM

## 2018-12-13 DIAGNOSIS — T83.9XXA UNSPECIFIED COMPLICATION OF GENITOURINARY PROSTHETIC DEVICE, IMPLANT AND GRAFT, INITIAL ENCOUNTER: ICD-10-CM

## 2019-07-02 NOTE — DISCHARGE NOTE ADULT - NS AS DC FU INST LIST INST
no Complex Repair And Ftsg Text: The defect edges were debeveled with a #15 scalpel blade.  The primary defect was closed partially with a complex linear closure.  Given the location of the defect, shape of the defect and the proximity to free margins a full thickness skin graft was deemed most appropriate to repair the remaining defect.  The graft was trimmed to fit the size of the remaining defect.  The graft was then placed in the primary defect, oriented appropriately, and sutured into place.

## 2019-11-21 NOTE — PROGRESS NOTE ADULT - SUBJECTIVE AND OBJECTIVE BOX
Patient is a 86y old  Female who presents with a chief complaint of sob (19 Apr 2017 13:37)      INTERVAL HPI/OVERNIGHT EVENTS:  no events overnight, oxygen status good on RA, "cough is the only thing bothering me"    MEDICATIONS  (STANDING):  amiodarone    Tablet 200milliGRAM(s) Oral daily  clopidogrel Tablet 75milliGRAM(s) Oral daily  cefTRIAXone   IVPB 1Gram(s) IV Intermittent every 24 hours  heparin  Injectable 5000Unit(s) SubCutaneous every 8 hours  ALBUTerol/ipratropium for Nebulization 3milliLiter(s) Nebulizer every 6 hours  oseltamivir 30milliGRAM(s) Oral daily  influenza   Vaccine 0.5milliLiter(s) IntraMuscular once  azithromycin   Tablet 500milliGRAM(s) Oral daily  amLODIPine   Tablet 10milliGRAM(s) Oral daily  bisoprolol   Tablet 5milliGRAM(s) Oral daily  freetext medication  - 10milliGRAM(s) Oral at bedtime  predniSONE   Tablet 20milliGRAM(s) Oral daily    MEDICATIONS  (PRN):  acetaminophen   Tablet. 650milliGRAM(s) Oral every 6 hours PRN Mild Pain (1 - 3 or fever >100.4 F  zolpidem 5milliGRAM(s) Oral at bedtime PRN Insomnia  ALPRAZolam 0.25milliGRAM(s) Oral daily PRN anxiety  HYDROcodone/homatropine Syrup 5milliLiter(s) Oral every 4 hours PRN Cough  benzonatate 100milliGRAM(s) Oral every 6 hours PRN Cough      Allergies    Benadryl Allergy (Other)    Intolerances    metoprolol (Other)      REVIEW OF SYSTEMS:  CONSTITUTIONAL: No fever, weight loss, or fatigue  EYES: No eye pain, visual disturbances, or discharge  ENMT:  No difficulty hearing, tinnitus, vertigo; No sinus or throat pain  NECK: No pain or stiffness  BREASTS: No pain, masses, or nipple discharge  RESPIRATORY: + cough wheezing, chills or hemoptysis; No shortness of breath  CARDIOVASCULAR: No chest pain, palpitations, dizziness, or leg swelling  GASTROINTESTINAL: No abdominal or epigastric pain. No nausea, vomiting, or hematemesis; No diarrhea or constipation. No melena or hematochezia.  GENITOURINARY: No dysuria, frequency, hematuria, or incontinence  NEUROLOGICAL: No headaches, memory loss, loss of strength, numbness, or tremors  SKIN: No itching, burning, rashes, or lesions   LYMPH NODES: No enlarged glands  ENDOCRINE: No heat or cold intolerance; No hair loss  MUSCULOSKELETAL: No joint pain or swelling; No muscle, back, or extremity pain  PSYCHIATRIC: No depression, anxiety, mood swings, or difficulty sleeping  HEME/LYMPH: No easy bruising, or bleeding gums  ALLERGY AND IMMUNOLOGIC: No hives or eczema    Vital Signs Last 24 Hrs  T(C): 37.1, Max: 37.1 (04-19 @ 16:55)  T(F): 98.8, Max: 98.8 (04-19 @ 16:55)  HR: 83 (81 - 91)  BP: 139/85 (131/67 - 156/79)  BP(mean): --  RR: 17 (15 - 18)  SpO2: 97% (94% - 98%)    PHYSICAL EXAM:  GENERAL: NAD,   HEAD:  Atraumatic, Normocephalic  EYES: EOMI, PERRLA, conjunctiva and sclera clear  ENMT: No tonsillar erythema, exudates, or enlargement; Moist mucous membranes,   NECK: Supple, No JVD, Normal thyroid  NERVOUS SYSTEM:  Alert & Oriented X3, Good concentration; Motor Strength 4/5 B/L upper and lower extremities; DTRs 2+ intact and symmetric  CHEST/LUNG: mild upper airway congestion, mild wheeze  HEART: Regular rate and rhythm; No murmurs, rubs, or gallops  ABDOMEN: Soft, Nontender, Nondistended; Bowel sounds present  EXTREMITIES:  2+ Peripheral Pulses, No clubbing, cyanosis, or edema  LYMPH: No lymphadenopathy noted  SKIN: No rashes or lesions    LABS:                        10.6   11.5  )-----------( 183      ( 19 Apr 2017 07:24 )             31.7     04-19    143  |  108  |  30<H>  ----------------------------<  88  4.7   |  26  |  1.81<H>    Ca    8.6      19 Apr 2017 07:24  Mg     2.1     04-18          CAPILLARY BLOOD GLUCOSE      RADIOLOGY & ADDITIONAL TESTS:    Imaging Personally Reviewed:  [ x] YES  [ ] NO    Consultant(s) Notes Reviewed:  [x ] YES  [ ] NO    Care Discussed with Consultants/Other Providers [x ] YES  [ ] NO The patient is a 77y Male complaining of ear pain.

## 2019-12-31 NOTE — ED ADULT NURSE NOTE - NS ED NOTE ABUSE RESPONSE YN
Patient presented to the office after an appointment with Dr. Lebron today with questions about a colonoscopy.  Patient had a positive cologuard test and had questions about anesthesia.  Both type of anesthesia discussed with patient.  Patient had questions about having heart attack in 2017 and which anesthesia he would need.  Recommended patient to come in to discuss his questions with either Dr. King or GI NP.  Patient receptive to making an appointment to discuss.  Patient stated he has other questions for provider as well.  Informed once referral is sent he will be called to schedule an appointment; he verbalized his understanding.   
Yes

## 2020-01-06 NOTE — PATIENT PROFILE ADULT. - FALLEN IN THE PAST
FYI:    Pt slid into floor this morning    No injuries     Vitals are stable     Nursing home is starting alert chartings and neuro checks     Family is aware       Thank you      yes

## 2020-01-13 NOTE — PATIENT PROFILE ADULT. - FUNCTIONAL SCREEN CURRENT LEVEL: BATHING, MLM
(2) assistive person Retinoid Dermatitis Aggressive Treatment: I recommended more frequent application of Cetaphil or CeraVe to the areas of dermatitis. I also prescribed a topical steroid for twice daily use until the dermatitis resolves.

## 2020-02-22 NOTE — CHART NOTE - NSCHARTNOTEFT_GEN_A_CORE
Assessment:   Pt on abx for E. Coli/Sepsis; PICC in palce; acute pyelonephritis    Factors impacting intake: [ X] none [ ] nausea  [ ] vomiting [ ] diarrhea [ ] constipation  [ ]chewing problems [ ] swallowing issues  [ ] other:     Diet Presciption: Diet, Regular:   DASH/TLC {Sodium & Cholesterol Restricted} (12-05-17 @ 18:10)    Intake: good appetite; % most meals    Current Weight:  73.5 kg (12/5); previous weight ): 69 kg (11/26)  % Weight Change: 6% increase x 9 days most likely due to fluid retention    Pertinent Medications: MEDICATIONS  (STANDING):  amiodarone    Tablet 200 milliGRAM(s) Oral daily  bisoprolol   Tablet 2.5 milliGRAM(s) Oral daily  chlorhexidine 4% Liquid 1 Application(s) Topical daily  clopidogrel Tablet 75 milliGRAM(s) Oral daily  docusate sodium 100 milliGRAM(s) Oral two times a day  heparin  Injectable 5000 Unit(s) SubCutaneous every 12 hours  hydrocortisone 2.5% Cream 1 Application(s) Topical three times a day  lactobacillus acidophilus 1 Tablet(s) Oral every 12 hours  meropenem IVPB 1000 milliGRAM(s) IV Intermittent every 8 hours  pantoprazole    Tablet 40 milliGRAM(s) Oral before breakfast  potassium acid phosphate/sodium acid phosphate tablet (K-PHOS No. 2) 1 Tablet(s) Oral four times a day with meals  Rosuvastatin 10 milliGRAM(s),Rosuvastatin 10mg 10 milliGRAM(s) 10 milliGRAM(s) Oral at bedtime    MEDICATIONS  (PRN):  acetaminophen   Tablet 650 milliGRAM(s) Oral every 6 hours PRN For Temp greater than 38 C (100.4 F)  ALBUTerol    90 MICROgram(s) HFA Inhaler 2 Puff(s) Inhalation every 6 hours PRN Bronchospasm  aluminum hydroxide/magnesium hydroxide/simethicone Suspension 30 milliLiter(s) Oral every 4 hours PRN Dyspepsia  senna 2 Tablet(s) Oral at bedtime PRN Constipation    Pertinent Labs: 12-07 Na141 mmol/L Glu 70 mg/dL K+ 4.1 mmol/L Cr  1.31 mg/dL<H> BUN 16 mg/dL Phos 1.8 mg/dL<L> Alb n/a   PAB n/a      CAPILLARY BLOOD GLUCOSE    Skin: pressure ulcer stg 1 sacrum & b/l buttocks; no edema noted  BM x 1 (12/3); constipation an issue; pt being given colace, Senna, Bacid    Estimated Needs:   [X] no change since previous assessment  [ ] recalculated:     Previous Nutrition Diagnosis:   [ ] Inadequate Energy Intake [ ]Inadequate Oral Intake [ ] Excessive Energy Intake   [ ] Underweight [ ] Increased Nutrient Needs [ ] Overweight/Obesity   [ ] Altered GI Function [ ] Unintended Weight Loss [ ] Food & Nutrition Related Knowledge Deficit  [X ] Moderate Malnutrition     Nutrition Diagnosis is [ X] ongoing/resolving as pt continue to meet estimated energy needs [ ] resolved [ ] not applicable     New Nutrition Diagnosis: [X ] not applicable       Interventions: continue current diet rx as noted  Recommend  [ ] Change Diet To:  [ ] Nutrition Supplement  [ ] Nutrition Support  [ ] Other:     Monitoring and Evaluation:   [X ] PO intake [ x ] Tolerance to diet prescription [ x ] weights [ x ] labs[ x ] follow up per protocol  [ ] other: Home

## 2020-11-22 NOTE — H&P ADULT - PSH
DISPLAY PLAN FREE TEXT Cataract, bilateral    History of appendectomy    History of renal stent    History of tonsillectomy    S/P coronary artery stent placement

## 2020-11-29 NOTE — PATIENT PROFILE ADULT. - PSH
No Cataract, bilateral    History of appendectomy    History of renal stent    History of tonsillectomy    S/P coronary artery stent placement

## 2020-12-06 NOTE — ED ADULT NURSE NOTE - CHIEF COMPLAINT QUOTE
ambulated to bathroom pt complaining lower abdominal pain and foul smelling urine from suprapubic catheter. sent in by home care nurse to be evaluated for possible UTI. meal provided/po fluids offered plan of care explained plan of care explained/meal provided

## 2020-12-11 NOTE — PATIENT PROFILE ADULT. - NS PRO PT REFERRAL QUES 2 YN
Viral Upper Respiratory Illness (Child)  Your child has a viral upper respiratory illness (URI), which is another term for the common cold. The virus is contagious during the first few days. It is spread through the air by coughing, sneezing, or by direct contact (touching your sick child then touching your own eyes, nose, or mouth). Frequent handwashing will decrease risk of spread. Most viral illnesses resolve within 7 to 14 days with rest and simple home remedies. However, they may sometimes last up to 4 weeks. Antibiotics will not kill a virus and are generally not prescribed for this condition.    Home care  · Fluids: Fever increases water loss from the body. Encourage your child to drink lots of fluids to loosen lung secretions and make it easier to breathe. For infants under 1 year old, continue regular formula or breast feedings. Between feedings, give oral rehydration solution. This is available from drugstores and grocery stores without a prescription. For children over 1 year old, give plenty of fluids, such as water, juice, gelatin water, soda without caffeine, ginger ale, lemonade, or ice pops.  · Eating: If your child doesn't want to eat solid foods, it's OK for a few days, as long as he or she drinks lots of fluid.  · Rest: Keep children with fever at home resting or playing quietly until the fever is gone. Encourage frequent naps. Your child may return to day care or school when the fever is gone and he or she is eating well and feeling better.  · Sleep: Periods of sleeplessness and irritability are common. A congested child will sleep best with the head and upper body propped up on pillows or with the head of the bed frame raised on a 6-inch block.   · Cough: Coughing is a normal part of this illness. A cool mist humidifier at the bedside may be helpful. Be sure to clean the humidifier every day to prevent mold. Over-the-counter cough and cold medicines have not proved to be any more helpful than  a placebo (syrup with no medicine in it). In addition, these medicines can produce serious side effects, especially in infants under 2 years of age. Do not give over-the-counter cough and cold medicines to children under 6 years unless your healthcare provider has specifically advised you to do so. Also, dont expose your child to cigarette smoke. It can make the cough worse.  · Nasal congestion: Suction the nose of infants with a bulb syringe. You may put 2 to 3 drops of saltwater (saline) nose drops in each nostril before suctioning. This helps thin and remove secretions. Saline nose drops are available without a prescription. You can also use ¼ teaspoon of table salt dissolved in 1 cup of water.  · Fever: Use childrens acetaminophen for fever, fussiness, or discomfort, unless another medicine was prescribed. In infants over 6 months of age, you may use childrens ibuprofen or acetaminophen. (Note: If your child has chronic liver or kidney disease or has ever had a stomach ulcer or gastrointestinal bleeding, talk with your healthcare provider before using these medicines.) Aspirin should never be given to anyone younger than 18 years of age who is ill with a viral infection or fever. It may cause severe liver or brain damage.  · Preventing spread: Washing your hands before and after touching your sick child will help prevent a new infection. It will also help prevent the spread of this viral illness to yourself and other children.  Follow-up care  Follow up with your healthcare provider, or as advised.  When to seek medical advice  For a usually healthy child, call your child's healthcare provider right away if any of these occur:  · A fever, as follows:  ¨ Your child is 3 months old or younger and has a fever of 100.4°F (38°C) or higher. Get medical care right away. Fever in a young baby can be a sign of a dangerous infection.  ¨ Your child is of any age and has repeated fevers above 104°F (40°C).  ¨ Your child  "is younger than 2 years of age and a fever of 100.4°F (38°C) continues for more than 1 day.  ¨ Your child is 2 years old or older and a fever of 100.4°F (38°C) continues for more than 3 days.  · Earache, sinus pain, stiff or painful neck, headache, repeated diarrhea, or vomiting.  · Unusual fussiness.  · A new rash appears.  · Your child is dehydrated, with one or more of these symptoms:  ¨ No tears when crying.  ¨ Sunken eyes or a dry mouth.  ¨ No wet diapers for 8 hours in infants.  ¨ Reduced urine output in older children.  Call 911, or get immediate medical care  Contact emergency services if any of these occur:  · Increased wheezing or difficulty breathing  · Unusual drowsiness or confusion  · Fast breathing, as follows:  ¨ Birth to 6 weeks: over 60 breaths per minute.  ¨ 6 weeks to 2 years: over 45 breaths per minute.  ¨ 3 to 6 years: over 35 breaths per minute.  ¨ 7 to 10 years: over 30 breaths per minute.  ¨ Older than 10 years: over 25 breaths per minute.  Date Last Reviewed: 9/13/2015  © 2306-6457 [employer]. [address] [place] [address]. All rights reserved. This information is not intended as a substitute for professional medical care. Always follow your healthcare professional's instructions.       NEGATIVE COVID TEST  o You have tested negative for COVID-19 today.  If you did not have a close exposure (as defined below) you can return to your normal daily activities to include social distancing, wearing a mask and frequent handwashing.  o A "close exposure" is defined as anyone who has had an exposure (masked or unmasked) to a known COVID -19 positive person within 6 ft for longer than 15 minutes. If your exposure meets this definition, you are required by CDC guidelines to quarantine for at least 7-10 days from time of exposure.  o The CDC states that a test can be performed for an asymptomatic patient (someone who does not have any symptoms) after a close exposure, and " that a test should be done if you develop symptoms after a close exposure as described above.  o Specifically, you can test at day 5 or later if asymptomatic in order to get released from quarantine on day 7 or later.  If you develop symptoms sooner, you should test when your symptoms start.  o If you developed symptoms since the exposure, and your test was negative today and less than 5 days from your exposure, you still have to quarantine for 7-10 days from the date of the exposure.  o The 7-10 day quarantine begins from the day you were exposed, not the day of your test.  For example, if your exposure was on a Monday, and you waited until Friday of the same week to get tested and it was negative, your 7-10 day quarantine begins from that Monday, not the Friday you tested negative.  o Please note, if you decide to test as an asymptomatic during your quarantine and you are positive, you will be restarting your quarantine and moving from a possible 10 day quarantine (if you do not test), to a 11 day or greater quarantine.          no

## 2021-06-29 NOTE — PROGRESS NOTE ADULT - GASTROINTESTINAL
[facility]  [address] [place] [address] [address] [id number]  Dept: [phone]  Dept Fax: [phone]  Loc: [phone]    Cam Boo is a 13 y.o. female who presents today for her medical conditions/complaintsas noted below. Eliza Del Castillo is c/o of Dysuria (onset last night) and Urinary Frequency        HPI:     Dysuria  This is a new problem. The current episode started yesterday. The problem occurs constantly. The problem has been gradually worsening. Associated symptoms include chills, nausea and urinary symptoms. Pertinent negatives include no abdominal pain, anorexia, fatigue, fever, headaches, rash or vomiting. Nothing aggravates the symptoms. She has tried rest for the symptoms. The treatment provided mild relief. Urinary Frequency  This is a new problem. The current episode started yesterday. The problem occurs constantly. The problem has been unchanged. Associated symptoms include chills, nausea and urinary symptoms. Pertinent negatives include no abdominal pain, anorexia, fatigue, fever, headaches, rash or vomiting. Nothing aggravates the symptoms. She has tried rest for the symptoms. The treatment provided mild relief. Eliza tells me when she voids she immediately feels like she needs to go again and she did have some nausea this morning but it is better.    Past Medical History:   Diagnosis Date    Anxiety     Insomnia      Past Surgical History:   Procedure Laterality Date    TYMPANOSTOMY TUBE PLACEMENT         Family History   Problem Relation Age of Onset    Cancer Maternal Grandmother     Cancer Maternal Grandfather        Social History     Tobacco Use    Smoking status: Never Smoker    Smokeless tobacco: Never Used   Substance Use Topics    Alcohol use: Never      Current Outpatient Medications   Medication Sig Dispense Refill    sulfamethoxazole-trimethoprim (BACTRIM DS;SEPTRA DS) 800-160 MG per tablet Take 1 tablet by mouth 2 Appearance: Normal appearance. She is well-developed and well-groomed. She is not ill-appearing or toxic-appearing. HENT:      Head: Normocephalic. Right Ear: Hearing normal.      Left Ear: Hearing normal.      Nose: Nose normal.      Mouth/Throat:      Lips: Pink. Eyes:      General: Vision grossly intact. Neck:      Trachea: Phonation normal.   Cardiovascular:      Rate and Rhythm: Normal rate and regular rhythm. Heart sounds: Normal heart sounds, S1 normal and S2 normal. No murmur heard. No friction rub. No gallop. Pulmonary:      Effort: Pulmonary effort is normal. No respiratory distress. Breath sounds: Normal breath sounds and air entry. No wheezing, rhonchi or rales. Abdominal:      General: Abdomen is flat. Bowel sounds are normal.      Palpations: Abdomen is soft. Tenderness: There is no abdominal tenderness. There is no right CVA tenderness, left CVA tenderness, guarding or rebound. Musculoskeletal:         General: No tenderness or deformity. Normal range of motion. Cervical back: Full passive range of motion without pain. Skin:     General: Skin is warm and dry. Capillary Refill: Capillary refill takes less than 2 seconds. Neurological:      General: No focal deficit present. Mental Status: She is alert, oriented to person, place, and time and easily aroused. Psychiatric:         Attention and Perception: Attention normal.         Mood and Affect: Mood normal.         Speech: Speech normal.         Behavior: Behavior normal. Behavior is cooperative. /84   Pulse 82   Temp 98.2 °F (36.8 °C) (Temporal)   Resp 16   Wt 94 lb (42.6 kg)   LMP 06/20/2021 (Exact Date)   SpO2 97%     :Assessment       Diagnosis Orders   1. Dysuria  POCT Urinalysis no Micro    Culture, Urine   2.  Acute cystitis with hematuria  Culture, Urine       :Plan      Orders Placed This Encounter   Procedures    Culture, Urine     Order Specific Question:   Specify (ex-cath, midstream, cysto, etc)? Answer:   clean catch urine    POCT Urinalysis no Micro     Results for orders placed or performed in visit on 06/29/21   POCT Urinalysis no Micro   Result Value Ref Range    Color, UA yellow     Clarity, UA clear     Glucose, UA POC neg     Bilirubin, UA neg     Ketones, UA neg     Spec Grav, UA <=1.005     Blood, UA POC large (A)     pH, UA 6.5     Protein, UA  (A)     Urobilinogen, UA 0.2     Leukocytes, UA large (A)     Nitrite, UA neg     Appearance, Fluid         No follow-ups on file. Orders Placed This Encounter   Medications    sulfamethoxazole-trimethoprim (BACTRIM DS;SEPTRA DS) 800-160 MG per tablet     Sig: Take 1 tablet by mouth 2 times daily for 7 days     Dispense:  14 tablet     Refill:  0        Patient Instructions     Plenty of fluids- drink more water, avoid caffeine  Urine sent for culture we will call with results in 3-4 days  Bactrim as directed  Follow up with PCP or return to Urgent Care for worsening or unresolved symptoms. Patient Education        Urinary Tract Infection in Female Teens: Care Instructions  Overview     A urinary tract infection, or UTI, is a general term for an infection anywhere between the kidneys and the urethra (where urine comes out). Most UTIs are bladder infections. They often cause pain or burning when you urinate. UTIs are caused by bacteria and can be cured with antibiotics. Be sure to complete your treatment so that the infection does not get worse. Follow-up care is a key part of your treatment and safety. Be sure to make and go to all appointments, and call your doctor if you are having problems. It's also a good idea to know your test results and keep a list of the medicines you take. How can you care for yourself at home? · Take your antibiotics as directed. Do not stop taking them just because you feel better. You need to take the full course of antibiotics.   · Drink extra water and other fluids Care Instructions. \"     If you do not have an account, please click on the \"Sign Up Now\" link. Current as of: February 10, 2021               Content Version: 12.9  © 2006-2021 [employer]. Care instructions adapted under license by [facility] ([facility]). If you have questions about a medical condition or this instruction, always ask your healthcare professional. [place] [address] any warranty or liability for your use of this information. Patient given educational materials- see patient instructions. Discussed use, benefit, and side effects of prescribedmedications. All patient questions answered. Pt voiced understanding.        Electronically signed by LACEY Adkins CNP on 6/29/2021 at 12:54 PM negative Soft, non-tender, no hepatosplenomegaly, normal bowel sounds

## 2021-09-15 NOTE — PROGRESS NOTE ADULT - EXTREMITIES
No cyanosis, clubbing or edema
independent

## 2022-01-05 NOTE — PATIENT PROFILE ADULT. - HEALTHCARE QUESTIONS, PROFILE
n/a Cheek-To-Nose Interpolation Flap Text: A decision was made to reconstruct the defect utilizing an interpolation axial flap and a staged reconstruction.  A telfa template was made of the defect.  This telfa template was then used to outline the Cheek-To-Nose Interpolation flap.  The donor area for the pedicle flap was then injected with anesthesia.  The flap was excised through the skin and subcutaneous tissue down to the layer of the underlying musculature.  The interpolation flap was carefully excised within this deep plane to maintain its blood supply.  The edges of the donor site were undermined.   The donor site was closed in a primary fashion.  The pedicle was then rotated into position and sutured.  Once the tube was sutured into place, adequate blood supply was confirmed with blanching and refill.  The pedicle was then wrapped with xeroform gauze and dressed appropriately with a telfa and gauze bandage to ensure continued blood supply and protect the attached pedicle.

## 2022-01-21 NOTE — ED ADULT NURSE NOTE - NS ED NURSE DC TEACHING
/Medications Birth Control Pills Counseling: Birth Control Pill Counseling: I discussed with the patient the potential side effects of OCPs including but not limited to increased risk of stroke, heart attack, thrombophlebitis, deep venous thrombosis, hepatic adenomas, breast changes, GI upset, headaches, and depression.  The patient verbalized understanding of the proper use and possible adverse effects of OCPs. All of the patient's questions and concerns were addressed.

## 2022-02-17 NOTE — PROGRESS NOTE ADULT - PROVIDER SPECIALTY LIST ADULT
Cardiology
Hospitalist
[] : A student assisted with documenting this visit. I have reviewed and verified all information documented by the student, and made modifications to such information, when appropriate.

## 2023-01-30 NOTE — PROGRESS NOTE ADULT - MINUTES
My Asthma Action Plan    Name: Teddy Ibanez   YOB: 1977  Date: 1/30/2023   My doctor: Cyndi Quiroz MD   My clinic: [facility]        My Rescue Medicine:   Albuterol inhaler (Proair/Ventolin/Proventil HFA)  2-4 puffs EVERY 4 HOURS as needed. Use a spacer if recommended by your provider.   My Asthma Severity:   Intermittent / Exercise Induced  Know your asthma triggers: cold air             GREEN ZONE   Good Control    I feel good    No cough or wheeze    Can work, sleep and play without asthma symptoms       Take your asthma control medicine every day.     1. If exercise triggers your asthma, take your rescue medication    15 minutes before exercise or sports, and    During exercise if you have asthma symptoms  2. Spacer to use with inhaler: If you have a spacer, make sure to use it with your inhaler             YELLOW ZONE Getting Worse  I have ANY of these:    I do not feel good    Cough or wheeze    Chest feels tight    Wake up at night   1. Keep taking your Green Zone medications  2. Start taking your rescue medicine:    every 20 minutes for up to 1 hour. Then every 4 hours for 24-48 hours.  3. If you stay in the Yellow Zone for more than 12-24 hours, contact your doctor.  4. If you do not return to the Green Zone in 12-24 hours or you get worse, start taking your oral steroid medicine if prescribed by your provider.           RED ZONE Medical Alert - Get Help  I have ANY of these:    I feel awful    Medicine is not helping    Breathing getting harder    Trouble walking or talking    Nose opens wide to breathe       1. Take your rescue medicine NOW  2. If your provider has prescribed an oral steroid medicine, start taking it NOW  3. Call your doctor NOW  4. If you are still in the Red Zone after 20 minutes and you have not reached your doctor:    Take your rescue medicine again and    Call 911 or go to the emergency room right away    See your regular doctor within  2 weeks of an Emergency Room or Urgent Care visit for follow-up treatment.          Annual Reminders:  Meet with Asthma Educator,  Flu Shot in the Fall, consider Pneumonia Vaccination for patients with asthma (aged 19 and older).    Pharmacy: Data Unavailable    Electronically signed by Cyndi Quiroz MD   Date: 01/30/23                    Asthma Triggers  How To Control Things That Make Your Asthma Worse    Triggers are things that make your asthma worse.  Look at the list below to help you find your triggers and   what you can do about them. You can help prevent asthma flare-ups by staying away from your triggers.      Trigger                                                          What you can do   Cigarette Smoke  Tobacco smoke can make asthma worse. Do not allow smoking in your home, car or around you.  Be sure no one smokes at a child s day care or school.  If you smoke, ask your health care provider for ways to help you quit.  Ask family members to quit too.  Ask your health care provider for a referral to Quit Plan to help you quit smoking, or call 3-411-559-PLAN.     Colds, Flu, Bronchitis  These are common triggers of asthma. Wash your hands often.  Don t touch your eyes, nose or mouth.  Get a flu shot every year.     Dust Mites  These are tiny bugs that live in cloth or carpet. They are too small to see. Wash sheets and blankets in hot water every week.   Encase pillows and mattress in dust mite proof covers.  Avoid having carpet if you can. If you have carpet, vacuum weekly.   Use a dust mask and HEPA vacuum.   Pollen and Outdoor Mold  Some people are allergic to trees, grass, or weed pollen, or molds. Try to keep your windows closed.  Limit time out doors when pollen count is high.   Ask you health care provider about taking medicine during allergy season.     Animal Dander  Some people are allergic to skin flakes, urine or saliva from pets with fur or feathers. Keep pets with fur or feathers out of  30 your home.    If you can t keep the pet outdoors, then keep the pet out of your bedroom.  Keep the bedroom door closed.  Keep pets off cloth furniture and away from stuffed toys.     Mice, Rats, and Cockroaches  Some people are allergic to the waste from these pests.   Cover food and garbage.  Clean up spills and food crumbs.  Store grease in the refrigerator.   Keep food out of the bedroom.   Indoor Mold  This can be a trigger if your home has high moisture. Fix leaking faucets, pipes, or other sources of water.   Clean moldy surfaces.  Dehumidify basement if it is damp and smelly.   Smoke, Strong Odors, and Sprays  These can reduce air quality. Stay away from strong odors and sprays, such as perfume, powder, hair spray, paints, smoke incense, paint, cleaning products, candles and new carpet.   Exercise or Sports  Some people with asthma have this trigger. Be active!  Ask your doctor about taking medicine before sports or exercise to prevent symptoms.    Warm up for 5-10 minutes before and after sports or exercise.     Other Triggers of Asthma  Cold air:  Cover your nose and mouth with a scarf.  Sometimes laughing or crying can be a trigger.  Some medicines and food can trigger asthma.
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
## 2023-08-04 NOTE — PATIENT PROFILE ADULT. - COMFORT LEVEL, ACCEPTABLE
[FreeTextEntry1] : follow up [de-identified] : 60 y/o M pmhx HLD, mild intermittent asthma, pre-diabetes, rotator cuff repair (2019), GERD esophagitis, tubular adenoma presents for follow up.  Pt expressing concerns about 1x episode of dizziness described as "room spinning" 2 weeks ago. Pt was cooking/cleaning in the kitchen when he turned his head rapidly and felt the room spinning for a few seconds. He thinks he might have been dehydrated that day. No prodrome, palpitations, chest pain, SOB. No new weakness, sensation changes/numbness, persistent gait imbalances.  Otherwise, pt states he is increasing his exercise and eating healthier. No rescue albuterol use for asthma symptoms. Expressed concerns about prostate cancer since a friend was recently diagnosed. 0

## 2023-12-05 NOTE — ED ADULT NURSE NOTE - CAS ELECT INFOMATION PROVIDED
DC instructions/instructed to follow with Dr Bautista verbalized understanding of insructins Additional Notes: Went over proper regimen to help try to strengthen the skin. Patient was given the information sheet in the past that discussed supplements and topicals to use daily. Render Risk Assessment In Note?: no Detail Level: Simple

## 2024-01-23 NOTE — ED ADULT NURSE REASSESSMENT NOTE - CARDIO ASSESSMENT
Physical Therapy  Consult    Patient Name:  Karin Maguire   MRN:  [id number]  Admitting Diagnosis:  Colon adenocarcinoma   Recent Surgery: Procedure(s) (LRB):  COLECTOMY, RIGHT, LAPAROSCOPIC (eras low, lithotomy) (N/A) 1 Day Post-Op  Admit Date: 1/22/2024  Length of Stay: 1 days    Physical Therapy orders received and acknowledged. Pt is s/p Rt laparoscopic colectomy on 1/22/2024. Pt lives in a [facility] with no KAY with her . Pt reports she is functioning at a high level post op and is independent with all mobility and ADLs. Pt reports she has been ambulating independently since admission, denies any dizziness, SOB, or recent falls. Pt observed ambulating in hallway earlier in the AM and ambulated 20 ft in room with supervision and no AD. Pt denies need for acute skilled therapy intervention. PT provided education to pt regarding importance of maintaining frequent activity and ambulating while admitted to maintain independent level of mobility. Pt verbalized understanding. Pt does not require further acute skilled therapy intervention. Discharge from PT services and re-consult if pt experiences a change in status. No billable units this date.     Elizabeth Wheeler, PT, DPT  1/23/2024  Pager: [phone]           1/23/2024    
---
---

## 2024-04-01 NOTE — PHYSICAL THERAPY INITIAL EVALUATION ADULT - ASSISTIVE DEVICE:SIT/SUPINE, REHAB EVAL
CARDIOVASCULAR OFFICE NOTE    Patient:  Luh Bishop Date:  4/1/2024   YOB: 1972    51 year old female      CHIEF COMPLAINT:   Chief Complaint   Patient presents with   • Follow-up     3 month FUV abnormal stress test        HISTORY OF PRESENT ILLNESS:    Luh Bishop is a 51 year old female who presents today to discuss abnormal test results. PMHx of hypertriglyceridemia, DMT2, essential HTN, HLD.     Today, I reviewed stress echocardiogram results and options to proceed with LHC/poss vs CTA coronaries. She is agreeable to proceed with LHC/poss. She would like to know if there is something different she should do post-LHC. She notes she had mid-sternal burning sensation. No other complaints at this time.    Past Medical History:   Diagnosis Date   • Abnormal Pap smear of cervix 06/10/2013    ASCUS HPV+   • Benign hypertension 05/25/2016   • Diabetes mellitus (CMD)     Type 2.   • Hypertriglyceridemia    • Obesity    • Pancreatitis     3 episodes   • PCOS (polycystic ovarian syndrome)    • Pure hypercholesterolemia    • Sebaceous cyst 03/2021    Torso-Back, Dr. Brown   • Wears glasses     Contact lens as well       Past Surgical History:   Procedure Laterality Date   • Colonoscopy w biopsy  08/02/2022    Dr. Kain Mares.   • Colposcopy     • Incise and drain abscess  10/20/2020    INCISION AND DRAINAGE PERINEUM WITH WOUND EXPLORATION, Dr. Brown       ALLERGIES:   Allergen Reactions   • Flagyl [Metronidazole Hcl] HIVES   • Lisinopril Other (See Comments) and Angioedema     Possible angioedema       Current Medications    ASPIRIN (ECOTRIN) 81 MG EC TABLET    Take 1 tablet by mouth daily.    ATORVASTATIN (LIPITOR) 80 MG TABLET    Take 1 tablet by mouth daily.    BLOOD GLUCOSE LANCETS    Meter: Pharmacist choice for three times daily blood sugar checks Dx: E11.9    BLOOD GLUCOSE TEST STRIP    Meter: Pharmacist choice for three times daily blood sugar checks Dx: E11.9    CONTINUOUS BLOOD GLUC SENSOR  (DEXCOM G7 SENSOR) MISC    Change every 10 days.    EMPAGLIFLOZIN (JARDIANCE) 25 MG TABLET    Take 1 tablet by mouth daily (before breakfast).    FENOFIBRATE 160 MG TABLET    Take 1 tablet by mouth daily.    FLUCONAZOLE (DIFLUCAN) 150 MG TABLET    Take 1 tablet by mouth and repeat in 3 days.    GLIMEPIRIDE (AMARYL) 4 MG TABLET    Take 1 tablet by mouth daily (before breakfast).    GLUCAGON (BAQSIMI TWO PACK) 3 MG/DOSE POWDER    Call 911.  Place 1 spray in 1 nostril.  If no response in 15 minutes, place 1 more spray in nostril with new device.    INSULIN GLARGINE 100 UNIT/ML PEN-INJECTOR    Inject 45 Units into the skin nightly.    INSULIN PEN NEEDLE (BD PEN NEEDLE JOSÉ ANTONIO 2ND GEN) 32G X 4 MM MISC    Use to inject insulin 1 times daily. Remove needle cover(s) to expose needle before injecting.    LOSARTAN (COZAAR) 50 MG TABLET    Take 1 tablet by mouth daily.    METFORMIN (GLUCOPHAGE-XR) 500 MG 24 HR TABLET    Take 2 tablets by mouth in the morning and 2 tablets in the evening.    OMEGA-3 ACID ETHYL ESTERS (LOVAZA) 1 G CAPSULE    TAKE 2 CAPSULES TWICE DAILY       Social History     Tobacco Use   Smoking Status Never   Smokeless Tobacco Never       Social History     Substance and Sexual Activity   Alcohol Use Yes   • Alcohol/week: 1.0 standard drink of alcohol   • Types: 1 Glasses of wine per week    Comment: once a week        Family History   Problem Relation Age of Onset   • Diabetes Mother    • Cancer Mother         Breast   • Thyroid Mother    • Hypertension Mother    • Congestive Heart Failure Mother    • Cancer, Breast Mother 64   • Diabetes Other         Grandparent   • Other Other         Gout - Grandparent   • Hypertension Other         Grandparent       REVIEW OF SYSTEMS:  A 12 point ROS was done and is noncontributory unless otherwise stated in the HPI.    PHYSICAL EXAM:   Vitals:    04/01/24 0915   BP: 118/76   Pulse: 80         Constitutional:  Well developed, well nourished 51 year old female in no  apparent distress.  Skin:  Warm, dry and intact.  HEENT:  Normocephalic, atraumatic.  Mucous membranes moist, EOMs intact.  No masses or lesions.  Neck:  Supple, trachea midline, negative JVD or HJR at 45 degrees, negative carotid bruits bilaterally.  Cardiovascular:  S1, S2 regular rate and rhythm.  No murmur.  No thrill.  Respiratory:  Anterior/Posterior lung sounds clear to auscultation bilaterally.  Normal respiratory effort.  Musculoskeletal/Extremities:  CRT <3, no clubbing, no cyanosis, no edema.  Psych:  Mood and affect appropriate to situation.  Neuro:  No focal deficits.  Sensation equal bilaterally.  Normal tone and extraocular movements intact.    INVESTIGATIONS:  Lab Results   Component Value Date    SODIUM 133 (L) 11/10/2023    POTASSIUM 4.7 11/10/2023    BUN 12 11/10/2023    CREATININE 0.77 11/10/2023    WBC 5.6 11/10/2023    HCT 39.3 11/10/2023    HGB 13.2 11/10/2023    GLUCOSE 243 (H) 11/10/2023    TSH 1.754 11/10/2023    CHOLESTEROL 1,086 (H) 11/10/2023    HDL 53 11/10/2023    CALCLDL  11/10/2023      Comment:      Unable to Calculate LDL    TRIGLYCERIDE 8,214 (H) 11/10/2023    MG 1.9 10/20/2020       TESTING:  Echo 03/29/2024  Mildly enlarged left ventricular chamber size.  Left ventricular ejection fraction; 57 %.  No left ventricular regional wall motion abnormalities.  Grade I left ventricular diastolic dysfunction.  Normal right ventricular systolic function.  RVSP could not be calculated due to incomplete tricuspid regurgitation velocity profile.  Left atrial volume index of 34.0 ml/m².  No significant valve abnormalities.  No pericardial effusion.  No prior study available for comparison.    Stress Echo 03/29/2024  Final Impressions  Abnormal exercise stress echocardiogram.  Patient underwent a Manfred protocol and exercised on a treadmill for 7:00 min and achieved 91% of MPHR. She had mild chest pressure/burning soon after starting exercise (2/10) which increased to 5/10 at peak exercise.  Symptoms resolved in recovery. Exercise limited by leg fatigue and SOB. EKG changes as described below. Normal hemodynamic response.  Left ventricular ejection fraction does not change post stress.  No LV regional wall motion abnormalities with stress.  Left ventricular end systolic volume does not change post stress.    ASSESSMENT:   1. Cellulitis of gluteal region    2. Hypertriglyceridemia    3. Family history of early CAD    4. Benign hypertension    5. Type 2 diabetes mellitus with other diabetic arthropathy, without long-term current use of insulin (CMD)        PLAN:  SOB: Echo in 03/2024 showed normal LVEF of 57%. Stress echo in 03/2024 was abnormal as noted above. Pt notes she had mid-sternal chest burning sensation during stress echo. Will plan for left coronary angiogram with possible PTCA/stent on 04/04/24 at 0830. Risks and benefits were discussed. Patient is in agreement and consent was obtained in clinic today. RN provided education and medication instructions. Start bASA and Toprol-XL 25 mg daily.  Essential HTN: Well controlled, /76. Continue current medication regimen.  DMT2: A1C 11.2 as of 11/10/23. On Jardiance, glimepiride, and metformin. Now being managed by Tatyana Lewis NP  HLD with hypertriglyceridemia: LDL (Direct) 275, Chol 1,086, TG 8,214 as of 11/10/23. On atorvastatin, fenofibrate, omega-3. Discussed starting PCSK9 inhibitors and leqvio if cholesterol levels remained uncontrolled once diabetes is controlled.    Return in about 4 weeks (around 4/29/2024). Call or return to clinic as needed if these symptoms worsen, fail to improve as anticipated, or if new symptoms develop.       On 04/01/24, Hayder REAL scribed the services personally performed by Jemima Cespedes MD.    The documentation recorded by the scribe accurately and completely reflects the service(s) I personally performed and the decisions made by me.    Jemima Cespedes MD        bed rails

## 2024-07-28 NOTE — PROGRESS NOTE ADULT - PROBLEM SELECTOR PROBLEM 2
Urinary retention
Urinary tract infection without hematuria, site unspecified
Urinary retention
Acute on chronic kidney failure
Soha FERGUSON

## 2024-09-13 NOTE — ED PROVIDER NOTE - PSYCHIATRIC, MLM
9/12/24 EEG Final Interpretation  Severe diffuse encephalopathy. No epileptiform activity or lateralizing signs recorded.  
Alert and oriented to person, place, time/situation. normal mood and affect. no apparent risk to self or others.

## 2024-12-20 NOTE — H&P ADULT - PROBLEM/PLAN-5
68 year old female had pneumonia and had an MRI and found a mass above the kidney. She was referred to Dr. Webber, who ordered the biopsy. Patient states not having any sx.  fos=1  denies chest pain sob palp  denies recent uri or uti  Anesthesia Alert  NO--Difficult Airway  NO--History of neck surgery or radiation  NO--Limited ROM of neck  NO--History of Malignant hyperthermia  NO--Personal or family history of Pseudocholinesterase deficiency  NO--Prior Anesthesia Complication  NO--Latex Allergy  NO--Loose teeth  NO--History of Rheumatoid Arthritis  NO--LENIN  NO BLEEDING RISK  NO--Other_____    As per patient, this is their complete medical and surgical history, including medications both prescribed or over the counter.  Patient verbalized understanding of instructions and was given the opportunity to ask questions and have them answered.     15.95 points  The higher the score (maximum 58.2), the higher the functional status.  4.70 METs    1 points  RCRI Score  6.0 %  Risk of major cardiac event
DISPLAY PLAN FREE TEXT

## 2025-07-21 NOTE — PATIENT PROFILE ADULT. - NS PRO ABUSE SCREEN AFRAID ANYONE YN
Please review.  Protocol failed / Has no protocol.     Requested Prescriptions   Pending Prescriptions Disp Refills    magnesium oxide 400 MG Oral Tab 90 tablet 1     Sig: Take 1 tablet (400 mg total) by mouth daily.       There is no refill protocol information for this order           no